# Patient Record
Sex: FEMALE | Race: WHITE | Employment: OTHER | URBAN - METROPOLITAN AREA
[De-identification: names, ages, dates, MRNs, and addresses within clinical notes are randomized per-mention and may not be internally consistent; named-entity substitution may affect disease eponyms.]

---

## 2017-05-02 PROBLEM — M17.11 PRIMARY OSTEOARTHRITIS OF RIGHT KNEE: Status: ACTIVE | Noted: 2017-05-02

## 2017-06-09 PROBLEM — M17.0 PRIMARY OSTEOARTHRITIS OF BOTH KNEES: Status: ACTIVE | Noted: 2017-06-09

## 2017-08-13 PROBLEM — M25.561 RIGHT KNEE PAIN: Chronic | Status: ACTIVE | Noted: 2017-08-13

## 2017-09-06 PROBLEM — M71.9 BURSITIS: Chronic | Status: ACTIVE | Noted: 2017-09-06

## 2017-09-11 PROBLEM — G89.4 CHRONIC PAIN SYNDROME: Chronic | Status: ACTIVE | Noted: 2017-09-11

## 2017-11-17 PROBLEM — T81.718A PSEUDOANEURYSM FOLLOWING PROCEDURE (HCC): Status: ACTIVE | Noted: 2017-11-17

## 2017-11-17 PROBLEM — I72.9 PSEUDOANEURYSM FOLLOWING PROCEDURE (HCC): Status: ACTIVE | Noted: 2017-11-17

## 2017-11-19 PROBLEM — Z86.39 HX OF HYPERCHOLESTEROLEMIA: Chronic | Status: ACTIVE | Noted: 2017-11-18

## 2017-11-19 PROBLEM — Z95.1 HISTORY OF CORONARY ARTERY BYPASS SURGERY: Chronic | Status: ACTIVE | Noted: 2017-11-18

## 2018-03-19 ENCOUNTER — HOSPITAL ENCOUNTER (OUTPATIENT)
Age: 73
Discharge: HOME | End: 2018-03-21
Payer: COMMERCIAL

## 2018-03-19 LAB
ALBUMIN SERPL-MCNC: 4 G/DL (ref 3.5–5.2)
ALP BLD-CCNC: 41 U/L (ref 35–104)
ALT SERPL-CCNC: 37 U/L (ref 0–32)
ANION GAP SERPL CALCULATED.3IONS-SCNC: 13 MMOL/L (ref 7–16)
AST SERPL-CCNC: 33 U/L (ref 0–31)
BILIRUB SERPL-MCNC: 0.3 MG/DL (ref 0–1.2)
BUN BLDV-MCNC: 19 MG/DL (ref 8–23)
CALCIUM SERPL-MCNC: 10 MG/DL (ref 8.6–10.2)
CHLORIDE BLD-SCNC: 106 MMOL/L (ref 98–107)
CHOLESTEROL, TOTAL: 177 MG/DL (ref 0–199)
CO2: 24 MMOL/L (ref 22–29)
CREAT SERPL-MCNC: 0.9 MG/DL (ref 0.5–1)
GFR AFRICAN AMERICAN: >60
GFR NON-AFRICAN AMERICAN: >60 ML/MIN/1.73
GLUCOSE BLD-MCNC: 58 MG/DL (ref 74–109)
HBA1C MFR BLD: 6.3 % (ref 4.8–5.9)
HCT VFR BLD CALC: 38 % (ref 34–48)
HDLC SERPL-MCNC: 39 MG/DL
HEMOGLOBIN: 12.3 G/DL (ref 11.5–15.5)
LDL CHOLESTEROL CALCULATED: 105 MG/DL (ref 0–99)
MCH RBC QN AUTO: 30.2 PG (ref 26–35)
MCHC RBC AUTO-ENTMCNC: 32.4 % (ref 32–34.5)
MCV RBC AUTO: 93.4 FL (ref 80–99.9)
PDW BLD-RTO: 14.5 FL (ref 11.5–15)
PLATELET # BLD: 329 E9/L (ref 130–450)
PMV BLD AUTO: 10.2 FL (ref 7–12)
POTASSIUM SERPL-SCNC: 4.3 MMOL/L (ref 3.5–5)
RBC # BLD: 4.07 E12/L (ref 3.5–5.5)
SODIUM BLD-SCNC: 143 MMOL/L (ref 132–146)
TOTAL PROTEIN: 6.3 G/DL (ref 6.4–8.3)
TRIGL SERPL-MCNC: 164 MG/DL (ref 0–149)
VLDLC SERPL CALC-MCNC: 33 MG/DL
WBC # BLD: 5.4 E9/L (ref 4.5–11.5)

## 2018-03-19 PROCEDURE — 85027 COMPLETE CBC AUTOMATED: CPT

## 2018-03-19 PROCEDURE — 80061 LIPID PANEL: CPT

## 2018-03-19 PROCEDURE — 80053 COMPREHEN METABOLIC PANEL: CPT

## 2018-03-19 PROCEDURE — 83036 HEMOGLOBIN GLYCOSYLATED A1C: CPT

## 2018-04-03 ENCOUNTER — OFFICE VISIT (OUTPATIENT)
Dept: ORTHOPEDIC SURGERY | Age: 73
End: 2018-04-03
Payer: COMMERCIAL

## 2018-04-03 VITALS — TEMPERATURE: 97.9 F | BODY MASS INDEX: 34.74 KG/M2 | WEIGHT: 184 LBS | HEIGHT: 61 IN

## 2018-04-03 DIAGNOSIS — M17.0 PRIMARY OSTEOARTHRITIS OF BOTH KNEES: Primary | ICD-10-CM

## 2018-04-03 DIAGNOSIS — S83.8X1A ACUTE MEDIAL MENISCAL INJURY OF KNEE, RIGHT, INITIAL ENCOUNTER: ICD-10-CM

## 2018-04-03 PROCEDURE — 99214 OFFICE O/P EST MOD 30 MIN: CPT | Performed by: ORTHOPAEDIC SURGERY

## 2018-04-10 ENCOUNTER — HOSPITAL ENCOUNTER (OUTPATIENT)
Dept: MRI IMAGING | Age: 73
Discharge: HOME | End: 2018-04-12
Payer: COMMERCIAL

## 2018-04-10 DIAGNOSIS — S83.8X1A ACUTE MEDIAL MENISCAL INJURY OF KNEE, RIGHT, INITIAL ENCOUNTER: ICD-10-CM

## 2018-04-10 PROCEDURE — 73721 MRI JNT OF LWR EXTRE W/O DYE: CPT

## 2018-04-12 ENCOUNTER — HOSPITAL ENCOUNTER (OUTPATIENT)
Age: 73
Discharge: HOME | End: 2018-04-12
Payer: COMMERCIAL

## 2018-04-12 LAB
T3 TOTAL: 71.19 NG/DL (ref 80–200)
T4 TOTAL: 9.2 MCG/DL (ref 4.5–11.7)
TSH SERPL DL<=0.05 MIU/L-ACNC: 1.82 UIU/ML (ref 0.27–4.2)

## 2018-04-12 PROCEDURE — 84436 ASSAY OF TOTAL THYROXINE: CPT

## 2018-04-12 PROCEDURE — 84443 ASSAY THYROID STIM HORMONE: CPT

## 2018-04-12 PROCEDURE — 36415 COLL VENOUS BLD VENIPUNCTURE: CPT

## 2018-04-12 PROCEDURE — 84480 ASSAY TRIIODOTHYRONINE (T3): CPT

## 2018-04-16 ENCOUNTER — OFFICE VISIT (OUTPATIENT)
Dept: NEUROSURGERY | Age: 73
End: 2018-04-16
Payer: COMMERCIAL

## 2018-04-16 VITALS
DIASTOLIC BLOOD PRESSURE: 65 MMHG | HEART RATE: 87 BPM | SYSTOLIC BLOOD PRESSURE: 132 MMHG | HEIGHT: 61 IN | BODY MASS INDEX: 34.55 KG/M2 | WEIGHT: 183 LBS

## 2018-04-16 DIAGNOSIS — M51.36 LUMBAR DEGENERATIVE DISC DISEASE: Primary | ICD-10-CM

## 2018-04-16 PROCEDURE — 99213 OFFICE O/P EST LOW 20 MIN: CPT | Performed by: PHYSICIAN ASSISTANT

## 2018-04-16 RX ORDER — OXYCODONE HYDROCHLORIDE AND ACETAMINOPHEN 5; 325 MG/1; MG/1
1 TABLET ORAL EVERY 4 HOURS PRN
COMMUNITY
End: 2018-05-22

## 2018-04-16 RX ORDER — NITROGLYCERIN 0.4 MG/1
0.4 TABLET SUBLINGUAL EVERY 5 MIN PRN
COMMUNITY

## 2018-04-16 RX ORDER — CLOPIDOGREL BISULFATE 75 MG/1
75 TABLET ORAL DAILY
COMMUNITY

## 2018-04-16 RX ORDER — LANOLIN ALCOHOL/MO/W.PET/CERES
800 CREAM (GRAM) TOPICAL DAILY
COMMUNITY
End: 2021-05-18 | Stop reason: ALTCHOICE

## 2018-04-19 ENCOUNTER — OFFICE VISIT (OUTPATIENT)
Dept: ORTHOPEDIC SURGERY | Age: 73
End: 2018-04-19
Payer: COMMERCIAL

## 2018-04-19 DIAGNOSIS — S83.281A ACUTE LATERAL MENISCUS TEAR OF RIGHT KNEE, INITIAL ENCOUNTER: Primary | ICD-10-CM

## 2018-04-19 DIAGNOSIS — M17.11 PRIMARY OSTEOARTHRITIS OF RIGHT KNEE: ICD-10-CM

## 2018-04-19 PROCEDURE — 99214 OFFICE O/P EST MOD 30 MIN: CPT | Performed by: ORTHOPAEDIC SURGERY

## 2018-04-24 ENCOUNTER — APPOINTMENT (OUTPATIENT)
Dept: CT IMAGING | Age: 73
End: 2018-04-24
Payer: COMMERCIAL

## 2018-04-24 ENCOUNTER — HOSPITAL ENCOUNTER (EMERGENCY)
Age: 73
Discharge: HOME | End: 2018-04-24
Attending: EMERGENCY MEDICINE
Payer: COMMERCIAL

## 2018-04-24 VITALS
WEIGHT: 184 LBS | BODY MASS INDEX: 34.74 KG/M2 | TEMPERATURE: 97.5 F | DIASTOLIC BLOOD PRESSURE: 107 MMHG | HEART RATE: 82 BPM | SYSTOLIC BLOOD PRESSURE: 163 MMHG | OXYGEN SATURATION: 97 % | RESPIRATION RATE: 18 BRPM | HEIGHT: 61 IN

## 2018-04-24 DIAGNOSIS — R42 DIZZINESS: Primary | ICD-10-CM

## 2018-04-24 LAB
ANION GAP SERPL CALCULATED.3IONS-SCNC: 12 MMOL/L (ref 7–16)
BACTERIA: ABNORMAL /HPF
BASOPHILS ABSOLUTE: 0.03 E9/L (ref 0–0.2)
BASOPHILS RELATIVE PERCENT: 0.5 % (ref 0–2)
BILIRUBIN URINE: NEGATIVE
BLOOD, URINE: NEGATIVE
BUN BLDV-MCNC: 31 MG/DL (ref 8–23)
CALCIUM SERPL-MCNC: 9.1 MG/DL (ref 8.6–10.2)
CHLORIDE BLD-SCNC: 99 MMOL/L (ref 98–107)
CLARITY: ABNORMAL
CO2: 28 MMOL/L (ref 22–29)
COLOR: YELLOW
CREAT SERPL-MCNC: 1 MG/DL (ref 0.5–1)
EKG ATRIAL RATE: 81 BPM
EKG P AXIS: 12 DEGREES
EKG P-R INTERVAL: 186 MS
EKG Q-T INTERVAL: 402 MS
EKG QRS DURATION: 82 MS
EKG QTC CALCULATION (BAZETT): 466 MS
EKG R AXIS: 14 DEGREES
EKG T AXIS: 25 DEGREES
EKG VENTRICULAR RATE: 81 BPM
EOSINOPHILS ABSOLUTE: 0.15 E9/L (ref 0.05–0.5)
EOSINOPHILS RELATIVE PERCENT: 2.3 % (ref 0–6)
GFR AFRICAN AMERICAN: >60
GFR NON-AFRICAN AMERICAN: 55 ML/MIN/1.73
GLUCOSE BLD-MCNC: 205 MG/DL (ref 74–109)
GLUCOSE URINE: >=1000 MG/DL
HCT VFR BLD CALC: 39.5 % (ref 34–48)
HEMOGLOBIN: 13.1 G/DL (ref 11.5–15.5)
IMMATURE GRANULOCYTES #: 0.03 E9/L
IMMATURE GRANULOCYTES %: 0.5 % (ref 0–5)
KETONES, URINE: NEGATIVE MG/DL
LEUKOCYTE ESTERASE, URINE: ABNORMAL
LYMPHOCYTES ABSOLUTE: 1.75 E9/L (ref 1.5–4)
LYMPHOCYTES RELATIVE PERCENT: 26.7 % (ref 20–42)
MCH RBC QN AUTO: 31 PG (ref 26–35)
MCHC RBC AUTO-ENTMCNC: 33.2 % (ref 32–34.5)
MCV RBC AUTO: 93.4 FL (ref 80–99.9)
MONOCYTES ABSOLUTE: 0.55 E9/L (ref 0.1–0.95)
MONOCYTES RELATIVE PERCENT: 8.4 % (ref 2–12)
NEUTROPHILS ABSOLUTE: 4.05 E9/L (ref 1.8–7.3)
NEUTROPHILS RELATIVE PERCENT: 61.6 % (ref 43–80)
NITRITE, URINE: NEGATIVE
PDW BLD-RTO: 13.2 FL (ref 11.5–15)
PH UA: 5.5 (ref 5–9)
PLATELET # BLD: 277 E9/L (ref 130–450)
PMV BLD AUTO: 9.9 FL (ref 7–12)
POTASSIUM SERPL-SCNC: 5 MMOL/L (ref 3.5–5)
PROTEIN UA: NEGATIVE MG/DL
RBC # BLD: 4.23 E12/L (ref 3.5–5.5)
RBC UA: ABNORMAL /HPF (ref 0–2)
SODIUM BLD-SCNC: 139 MMOL/L (ref 132–146)
SPECIFIC GRAVITY UA: 1.01 (ref 1–1.03)
TROPONIN: <0.01 NG/ML (ref 0–0.03)
UROBILINOGEN, URINE: 0.2 E.U./DL
WBC # BLD: 6.6 E9/L (ref 4.5–11.5)
WBC UA: ABNORMAL /HPF (ref 0–5)

## 2018-04-24 PROCEDURE — 84484 ASSAY OF TROPONIN QUANT: CPT

## 2018-04-24 PROCEDURE — 36415 COLL VENOUS BLD VENIPUNCTURE: CPT

## 2018-04-24 PROCEDURE — 70450 CT HEAD/BRAIN W/O DYE: CPT

## 2018-04-24 PROCEDURE — 93005 ELECTROCARDIOGRAM TRACING: CPT | Performed by: EMERGENCY MEDICINE

## 2018-04-24 PROCEDURE — 85025 COMPLETE CBC W/AUTO DIFF WBC: CPT

## 2018-04-24 PROCEDURE — 80048 BASIC METABOLIC PNL TOTAL CA: CPT

## 2018-04-24 PROCEDURE — 81001 URINALYSIS AUTO W/SCOPE: CPT

## 2018-04-24 PROCEDURE — 99285 EMERGENCY DEPT VISIT HI MDM: CPT

## 2018-04-24 ASSESSMENT — ENCOUNTER SYMPTOMS
CONSTIPATION: 0
SORE THROAT: 0
ABDOMINAL PAIN: 0
WHEEZING: 0
DIARRHEA: 0
SINUS PAIN: 0
VOMITING: 0
BACK PAIN: 0
SINUS PRESSURE: 0
RHINORRHEA: 0
PHOTOPHOBIA: 0
COUGH: 0
SHORTNESS OF BREATH: 0

## 2018-04-30 ENCOUNTER — HOSPITAL ENCOUNTER (OUTPATIENT)
Age: 73
Discharge: HOME | End: 2018-05-02
Payer: COMMERCIAL

## 2018-04-30 ENCOUNTER — HOSPITAL ENCOUNTER (OUTPATIENT)
Dept: GENERAL RADIOLOGY | Age: 73
Discharge: HOME | End: 2018-05-02
Payer: COMMERCIAL

## 2018-04-30 DIAGNOSIS — M46.1 BILATERAL SACROILIITIS (HCC): ICD-10-CM

## 2018-04-30 PROCEDURE — 72202 X-RAY EXAM SI JOINTS 3/> VWS: CPT

## 2018-05-04 ENCOUNTER — HOSPITAL ENCOUNTER (EMERGENCY)
Age: 73
Discharge: HOME | End: 2018-05-04
Attending: EMERGENCY MEDICINE
Payer: COMMERCIAL

## 2018-05-04 ENCOUNTER — APPOINTMENT (OUTPATIENT)
Dept: GENERAL RADIOLOGY | Age: 73
End: 2018-05-04
Payer: COMMERCIAL

## 2018-05-04 VITALS
TEMPERATURE: 97.5 F | BODY MASS INDEX: 34.74 KG/M2 | HEART RATE: 88 BPM | OXYGEN SATURATION: 94 % | HEIGHT: 61 IN | DIASTOLIC BLOOD PRESSURE: 67 MMHG | SYSTOLIC BLOOD PRESSURE: 140 MMHG | WEIGHT: 184 LBS | RESPIRATION RATE: 18 BRPM

## 2018-05-04 DIAGNOSIS — R19.7 NAUSEA VOMITING AND DIARRHEA: ICD-10-CM

## 2018-05-04 DIAGNOSIS — R11.2 NAUSEA VOMITING AND DIARRHEA: ICD-10-CM

## 2018-05-04 DIAGNOSIS — E86.0 DEHYDRATION: Primary | ICD-10-CM

## 2018-05-04 LAB
ALBUMIN SERPL-MCNC: 4.4 G/DL (ref 3.5–5.2)
ALP BLD-CCNC: 52 U/L (ref 35–104)
ALT SERPL-CCNC: 36 U/L (ref 0–32)
ANION GAP SERPL CALCULATED.3IONS-SCNC: 16 MMOL/L (ref 7–16)
AST SERPL-CCNC: 34 U/L (ref 0–31)
BILIRUB SERPL-MCNC: 0.4 MG/DL (ref 0–1.2)
BUN BLDV-MCNC: 46 MG/DL (ref 8–23)
CALCIUM SERPL-MCNC: 10.3 MG/DL (ref 8.6–10.2)
CHLORIDE BLD-SCNC: 99 MMOL/L (ref 98–107)
CO2: 24 MMOL/L (ref 22–29)
CREAT SERPL-MCNC: 1.7 MG/DL (ref 0.5–1)
EKG ATRIAL RATE: 85 BPM
EKG P AXIS: -5 DEGREES
EKG P-R INTERVAL: 164 MS
EKG Q-T INTERVAL: 390 MS
EKG QRS DURATION: 76 MS
EKG QTC CALCULATION (BAZETT): 464 MS
EKG R AXIS: 27 DEGREES
EKG T AXIS: 21 DEGREES
EKG VENTRICULAR RATE: 85 BPM
GFR AFRICAN AMERICAN: 36
GFR NON-AFRICAN AMERICAN: 30 ML/MIN/1.73
GLUCOSE BLD-MCNC: 158 MG/DL
GLUCOSE BLD-MCNC: 172 MG/DL
GLUCOSE BLD-MCNC: 172 MG/DL (ref 74–109)
HCT VFR BLD CALC: 44.4 % (ref 34–48)
HEMOGLOBIN: 14.2 G/DL (ref 11.5–15.5)
INFLUENZA A BY PCR: NOT DETECTED
INFLUENZA B BY PCR: NOT DETECTED
LIPASE: 73 U/L (ref 13–60)
MCH RBC QN AUTO: 30.5 PG (ref 26–35)
MCHC RBC AUTO-ENTMCNC: 32 % (ref 32–34.5)
MCV RBC AUTO: 95.5 FL (ref 80–99.9)
PDW BLD-RTO: 13.2 FL (ref 11.5–15)
PLATELET # BLD: 298 E9/L (ref 130–450)
PMV BLD AUTO: 10.1 FL (ref 7–12)
POC ANION GAP: 17
POC BUN: 43
POC CHLORIDE: 104
POC CO2: 24
POC CREATININE: 1.6
POC POTASSIUM: 4
POC SODIUM: 140
POTASSIUM SERPL-SCNC: 4.6 MMOL/L (ref 3.5–5)
RBC # BLD: 4.65 E12/L (ref 3.5–5.5)
SODIUM BLD-SCNC: 139 MMOL/L (ref 132–146)
TOTAL PROTEIN: 7.7 G/DL (ref 6.4–8.3)
WBC # BLD: 13.1 E9/L (ref 4.5–11.5)

## 2018-05-04 PROCEDURE — 36415 COLL VENOUS BLD VENIPUNCTURE: CPT

## 2018-05-04 PROCEDURE — 80053 COMPREHEN METABOLIC PANEL: CPT

## 2018-05-04 PROCEDURE — 83690 ASSAY OF LIPASE: CPT

## 2018-05-04 PROCEDURE — 96360 HYDRATION IV INFUSION INIT: CPT

## 2018-05-04 PROCEDURE — 96361 HYDRATE IV INFUSION ADD-ON: CPT

## 2018-05-04 PROCEDURE — 2580000003 HC RX 258: Performed by: STUDENT IN AN ORGANIZED HEALTH CARE EDUCATION/TRAINING PROGRAM

## 2018-05-04 PROCEDURE — 87502 INFLUENZA DNA AMP PROBE: CPT

## 2018-05-04 PROCEDURE — 71045 X-RAY EXAM CHEST 1 VIEW: CPT

## 2018-05-04 PROCEDURE — 99284 EMERGENCY DEPT VISIT MOD MDM: CPT

## 2018-05-04 PROCEDURE — 85027 COMPLETE CBC AUTOMATED: CPT

## 2018-05-04 RX ORDER — 0.9 % SODIUM CHLORIDE 0.9 %
500 INTRAVENOUS SOLUTION INTRAVENOUS ONCE
Status: COMPLETED | OUTPATIENT
Start: 2018-05-04 | End: 2018-05-04

## 2018-05-04 RX ORDER — ONDANSETRON 4 MG/1
4 TABLET, ORALLY DISINTEGRATING ORAL EVERY 8 HOURS PRN
Qty: 10 TABLET | Refills: 0 | Status: SHIPPED | OUTPATIENT
Start: 2018-05-04 | End: 2019-10-30 | Stop reason: ALTCHOICE

## 2018-05-04 RX ADMIN — SODIUM CHLORIDE 500 ML: 9 INJECTION, SOLUTION INTRAVENOUS at 19:58

## 2018-05-04 RX ADMIN — SODIUM CHLORIDE 500 ML: 9 INJECTION, SOLUTION INTRAVENOUS at 18:52

## 2018-05-04 ASSESSMENT — ENCOUNTER SYMPTOMS
NAUSEA: 1
DIARRHEA: 1
COLOR CHANGE: 0
SORE THROAT: 0
ABDOMINAL PAIN: 0
BLOOD IN STOOL: 0
VOMITING: 1
BACK PAIN: 0
WHEEZING: 0
SHORTNESS OF BREATH: 0
COUGH: 0
STRIDOR: 0
CHEST TIGHTNESS: 0

## 2018-05-08 ENCOUNTER — HOSPITAL ENCOUNTER (OUTPATIENT)
Dept: MRI IMAGING | Age: 73
Discharge: HOME | End: 2018-05-10
Payer: COMMERCIAL

## 2018-05-08 DIAGNOSIS — M54.16 LUMBAR RADICULOPATHY: ICD-10-CM

## 2018-05-08 DIAGNOSIS — M50.220 OTHER CERVICAL DISC DISPLACEMENT, MID-CERVICAL REGION, UNSPECIFIED LEVEL: ICD-10-CM

## 2018-05-08 PROCEDURE — 72141 MRI NECK SPINE W/O DYE: CPT

## 2018-05-08 PROCEDURE — 72148 MRI LUMBAR SPINE W/O DYE: CPT

## 2018-05-21 RX ORDER — SODIUM CHLORIDE 9 MG/ML
INJECTION, SOLUTION INTRAVENOUS CONTINUOUS
Status: CANCELLED | OUTPATIENT
Start: 2018-05-30

## 2018-05-22 ENCOUNTER — OFFICE VISIT (OUTPATIENT)
Dept: ORTHOPEDIC SURGERY | Age: 73
End: 2018-05-22
Payer: COMMERCIAL

## 2018-05-22 ENCOUNTER — HOSPITAL ENCOUNTER (OUTPATIENT)
Dept: PREADMISSION TESTING | Age: 73
Discharge: HOME | End: 2018-05-22
Payer: COMMERCIAL

## 2018-05-22 VITALS
DIASTOLIC BLOOD PRESSURE: 54 MMHG | WEIGHT: 187 LBS | SYSTOLIC BLOOD PRESSURE: 130 MMHG | RESPIRATION RATE: 20 BRPM | OXYGEN SATURATION: 96 % | HEART RATE: 80 BPM | TEMPERATURE: 98.1 F | BODY MASS INDEX: 35.33 KG/M2

## 2018-05-22 VITALS — WEIGHT: 194 LBS | HEIGHT: 61 IN | BODY MASS INDEX: 36.63 KG/M2

## 2018-05-22 DIAGNOSIS — M17.11 PRIMARY OSTEOARTHRITIS OF RIGHT KNEE: Primary | ICD-10-CM

## 2018-05-22 DIAGNOSIS — M17.11 PRIMARY OSTEOARTHRITIS OF RIGHT KNEE: ICD-10-CM

## 2018-05-22 DIAGNOSIS — S83.281A ACUTE LATERAL MENISCUS TEAR OF RIGHT KNEE, INITIAL ENCOUNTER: Primary | ICD-10-CM

## 2018-05-22 DIAGNOSIS — Z01.818 PREOP TESTING: ICD-10-CM

## 2018-05-22 LAB
ANION GAP SERPL CALCULATED.3IONS-SCNC: 14 MMOL/L (ref 7–16)
BACTERIA: ABNORMAL /HPF
BILIRUBIN URINE: NEGATIVE
BLOOD, URINE: NEGATIVE
BUN BLDV-MCNC: 33 MG/DL (ref 8–23)
CALCIUM SERPL-MCNC: 9.9 MG/DL (ref 8.6–10.2)
CHLORIDE BLD-SCNC: 98 MMOL/L (ref 98–107)
CLARITY: CLEAR
CO2: 28 MMOL/L (ref 22–29)
COLOR: ABNORMAL
CREAT SERPL-MCNC: 1.2 MG/DL (ref 0.5–1)
EPITHELIAL CELLS, UA: ABNORMAL /HPF
GFR AFRICAN AMERICAN: 54
GFR NON-AFRICAN AMERICAN: 44 ML/MIN/1.73
GLUCOSE BLD-MCNC: 232 MG/DL (ref 74–109)
GLUCOSE URINE: 250 MG/DL
HCT VFR BLD CALC: 36.3 % (ref 34–48)
HEMOGLOBIN: 11.8 G/DL (ref 11.5–15.5)
KETONES, URINE: NEGATIVE MG/DL
LEUKOCYTE ESTERASE, URINE: ABNORMAL
MCH RBC QN AUTO: 30.6 PG (ref 26–35)
MCHC RBC AUTO-ENTMCNC: 32.5 % (ref 32–34.5)
MCV RBC AUTO: 94.3 FL (ref 80–99.9)
NITRITE, URINE: NEGATIVE
PDW BLD-RTO: 13.1 FL (ref 11.5–15)
PH UA: 5 (ref 5–9)
PLATELET # BLD: 285 E9/L (ref 130–450)
PMV BLD AUTO: 10.4 FL (ref 7–12)
POTASSIUM REFLEX MAGNESIUM: 4.6 MMOL/L (ref 3.5–5)
PROTEIN UA: NEGATIVE MG/DL
RBC # BLD: 3.85 E12/L (ref 3.5–5.5)
RBC UA: ABNORMAL /HPF (ref 0–2)
SODIUM BLD-SCNC: 140 MMOL/L (ref 132–146)
SPECIFIC GRAVITY UA: 1.01 (ref 1–1.03)
UROBILINOGEN, URINE: 0.2 E.U./DL
WBC # BLD: 5.2 E9/L (ref 4.5–11.5)
WBC UA: ABNORMAL /HPF (ref 0–5)

## 2018-05-22 PROCEDURE — 85027 COMPLETE CBC AUTOMATED: CPT

## 2018-05-22 PROCEDURE — 87186 SC STD MICRODIL/AGAR DIL: CPT

## 2018-05-22 PROCEDURE — 80048 BASIC METABOLIC PNL TOTAL CA: CPT

## 2018-05-22 PROCEDURE — 99214 OFFICE O/P EST MOD 30 MIN: CPT | Performed by: ORTHOPAEDIC SURGERY

## 2018-05-22 PROCEDURE — 36415 COLL VENOUS BLD VENIPUNCTURE: CPT

## 2018-05-22 PROCEDURE — 81001 URINALYSIS AUTO W/SCOPE: CPT

## 2018-05-22 PROCEDURE — 87088 URINE BACTERIA CULTURE: CPT

## 2018-05-22 RX ORDER — FUROSEMIDE 20 MG/1
20 TABLET ORAL DAILY
COMMUNITY
End: 2022-06-07

## 2018-05-22 ASSESSMENT — PAIN SCALES - GENERAL: PAINLEVEL_OUTOF10: 0

## 2018-05-22 ASSESSMENT — PAIN DESCRIPTION - LOCATION: LOCATION: KNEE

## 2018-05-22 ASSESSMENT — PAIN DESCRIPTION - PAIN TYPE: TYPE: CHRONIC PAIN

## 2018-05-22 ASSESSMENT — PAIN DESCRIPTION - ORIENTATION: ORIENTATION: RIGHT

## 2018-05-23 DIAGNOSIS — M17.11 PRIMARY OSTEOARTHRITIS OF RIGHT KNEE: Primary | ICD-10-CM

## 2018-05-24 ENCOUNTER — TELEPHONE (OUTPATIENT)
Dept: ADMINISTRATIVE | Age: 73
End: 2018-05-24

## 2018-05-24 LAB
ORGANISM: ABNORMAL
URINE CULTURE, ROUTINE: ABNORMAL
URINE CULTURE, ROUTINE: ABNORMAL

## 2018-05-31 ENCOUNTER — HOSPITAL ENCOUNTER (OUTPATIENT)
Dept: CT IMAGING | Age: 73
Discharge: HOME | End: 2018-05-31
Payer: COMMERCIAL

## 2018-05-31 DIAGNOSIS — M51.36 LUMBAR DEGENERATIVE DISC DISEASE: ICD-10-CM

## 2018-05-31 PROCEDURE — 72131 CT LUMBAR SPINE W/O DYE: CPT

## 2018-06-05 ENCOUNTER — ANESTHESIA EVENT (OUTPATIENT)
Dept: OPERATING ROOM | Age: 73
End: 2018-06-05
Payer: COMMERCIAL

## 2018-06-05 ENCOUNTER — TELEPHONE (OUTPATIENT)
Dept: NEUROSURGERY | Age: 73
End: 2018-06-05

## 2018-06-05 ENCOUNTER — HOSPITAL ENCOUNTER (OUTPATIENT)
Age: 73
Discharge: HOME | End: 2018-06-07
Payer: COMMERCIAL

## 2018-06-05 ENCOUNTER — HOSPITAL ENCOUNTER (OUTPATIENT)
Dept: GENERAL RADIOLOGY | Age: 73
Discharge: HOME | End: 2018-06-07
Payer: COMMERCIAL

## 2018-06-05 DIAGNOSIS — M79.672 FOOT PAIN, LEFT: ICD-10-CM

## 2018-06-05 PROCEDURE — 73630 X-RAY EXAM OF FOOT: CPT

## 2018-06-06 ENCOUNTER — HOSPITAL ENCOUNTER (OUTPATIENT)
Age: 73
Setting detail: SURGERY ADMIT
Discharge: HOME | End: 2018-06-06
Attending: ORTHOPAEDIC SURGERY | Admitting: ORTHOPAEDIC SURGERY
Payer: COMMERCIAL

## 2018-06-06 ENCOUNTER — ANESTHESIA (OUTPATIENT)
Dept: OPERATING ROOM | Age: 73
End: 2018-06-06
Payer: COMMERCIAL

## 2018-06-06 VITALS
HEART RATE: 64 BPM | BODY MASS INDEX: 34.93 KG/M2 | OXYGEN SATURATION: 99 % | RESPIRATION RATE: 16 BRPM | HEIGHT: 61 IN | SYSTOLIC BLOOD PRESSURE: 110 MMHG | DIASTOLIC BLOOD PRESSURE: 60 MMHG | TEMPERATURE: 96.2 F | WEIGHT: 185 LBS

## 2018-06-06 VITALS
SYSTOLIC BLOOD PRESSURE: 111 MMHG | OXYGEN SATURATION: 100 % | DIASTOLIC BLOOD PRESSURE: 52 MMHG | RESPIRATION RATE: 6 BRPM

## 2018-06-06 DIAGNOSIS — M17.11 PRIMARY OSTEOARTHRITIS OF RIGHT KNEE: Primary | ICD-10-CM

## 2018-06-06 DIAGNOSIS — S83.281A ACUTE LATERAL MENISCUS TEAR OF RIGHT KNEE, INITIAL ENCOUNTER: ICD-10-CM

## 2018-06-06 LAB — METER GLUCOSE: 81 MG/DL (ref 70–110)

## 2018-06-06 PROCEDURE — 3600000003 HC SURGERY LEVEL 3 BASE: Performed by: ORTHOPAEDIC SURGERY

## 2018-06-06 PROCEDURE — 7100000001 HC PACU RECOVERY - ADDTL 15 MIN: Performed by: ORTHOPAEDIC SURGERY

## 2018-06-06 PROCEDURE — 3700000001 HC ADD 15 MINUTES (ANESTHESIA): Performed by: ORTHOPAEDIC SURGERY

## 2018-06-06 PROCEDURE — 7100000010 HC PHASE II RECOVERY - FIRST 15 MIN: Performed by: ORTHOPAEDIC SURGERY

## 2018-06-06 PROCEDURE — 29880 ARTHRS KNE SRG MNISECTMY M&L: CPT | Performed by: ORTHOPAEDIC SURGERY

## 2018-06-06 PROCEDURE — 6360000002 HC RX W HCPCS: Performed by: NURSE ANESTHETIST, CERTIFIED REGISTERED

## 2018-06-06 PROCEDURE — 82962 GLUCOSE BLOOD TEST: CPT

## 2018-06-06 PROCEDURE — 3700000000 HC ANESTHESIA ATTENDED CARE: Performed by: ORTHOPAEDIC SURGERY

## 2018-06-06 PROCEDURE — 7100000011 HC PHASE II RECOVERY - ADDTL 15 MIN: Performed by: ORTHOPAEDIC SURGERY

## 2018-06-06 PROCEDURE — 2500000003 HC RX 250 WO HCPCS: Performed by: ORTHOPAEDIC SURGERY

## 2018-06-06 PROCEDURE — 6370000000 HC RX 637 (ALT 250 FOR IP): Performed by: ANESTHESIOLOGY

## 2018-06-06 PROCEDURE — 2580000003 HC RX 258: Performed by: ANESTHESIOLOGY

## 2018-06-06 PROCEDURE — 3600000013 HC SURGERY LEVEL 3 ADDTL 15MIN: Performed by: ORTHOPAEDIC SURGERY

## 2018-06-06 PROCEDURE — 6360000002 HC RX W HCPCS: Performed by: ANESTHESIOLOGY

## 2018-06-06 PROCEDURE — 7100000000 HC PACU RECOVERY - FIRST 15 MIN: Performed by: ORTHOPAEDIC SURGERY

## 2018-06-06 PROCEDURE — 6360000002 HC RX W HCPCS: Performed by: NURSE PRACTITIONER

## 2018-06-06 RX ORDER — ACETAMINOPHEN AND CODEINE PHOSPHATE 300; 30 MG/1; MG/1
1 TABLET ORAL
Qty: 30 TABLET | Refills: 0 | Status: SHIPPED | OUTPATIENT
Start: 2018-06-06 | End: 2018-06-20 | Stop reason: SDUPTHER

## 2018-06-06 RX ORDER — CHLORHEXIDINE GLUCONATE 4 G/100ML
SOLUTION TOPICAL
Status: DISCONTINUED | OUTPATIENT
Start: 2018-06-06 | End: 2018-06-06 | Stop reason: HOSPADM

## 2018-06-06 RX ORDER — ACETAMINOPHEN AND CODEINE PHOSPHATE 300; 30 MG/1; MG/1
1 TABLET ORAL
Status: DISCONTINUED | OUTPATIENT
Start: 2018-06-06 | End: 2018-06-06 | Stop reason: ALTCHOICE

## 2018-06-06 RX ORDER — LABETALOL HYDROCHLORIDE 5 MG/ML
10 INJECTION, SOLUTION INTRAVENOUS EVERY 10 MIN PRN
Status: DISCONTINUED | OUTPATIENT
Start: 2018-06-06 | End: 2018-06-06 | Stop reason: HOSPADM

## 2018-06-06 RX ORDER — BUPIVACAINE HYDROCHLORIDE 2.5 MG/ML
INJECTION, SOLUTION EPIDURAL; INFILTRATION; INTRACAUDAL PRN
Status: DISCONTINUED | OUTPATIENT
Start: 2018-06-06 | End: 2018-06-06 | Stop reason: HOSPADM

## 2018-06-06 RX ORDER — PROMETHAZINE HYDROCHLORIDE 25 MG/ML
12.5 INJECTION, SOLUTION INTRAMUSCULAR; INTRAVENOUS
Status: DISCONTINUED | OUTPATIENT
Start: 2018-06-06 | End: 2018-06-06 | Stop reason: HOSPADM

## 2018-06-06 RX ORDER — ONDANSETRON 2 MG/ML
INJECTION INTRAMUSCULAR; INTRAVENOUS PRN
Status: DISCONTINUED | OUTPATIENT
Start: 2018-06-06 | End: 2018-06-06 | Stop reason: SDUPTHER

## 2018-06-06 RX ORDER — FENTANYL CITRATE 50 UG/ML
INJECTION, SOLUTION INTRAMUSCULAR; INTRAVENOUS PRN
Status: DISCONTINUED | OUTPATIENT
Start: 2018-06-06 | End: 2018-06-06 | Stop reason: SDUPTHER

## 2018-06-06 RX ORDER — SODIUM CHLORIDE 0.9 % (FLUSH) 0.9 %
10 SYRINGE (ML) INJECTION PRN
Status: DISCONTINUED | OUTPATIENT
Start: 2018-06-06 | End: 2018-06-06 | Stop reason: HOSPADM

## 2018-06-06 RX ORDER — SODIUM CHLORIDE 9 MG/ML
INJECTION, SOLUTION INTRAVENOUS CONTINUOUS
Status: DISCONTINUED | OUTPATIENT
Start: 2018-06-06 | End: 2018-06-06 | Stop reason: HOSPADM

## 2018-06-06 RX ORDER — PROPOFOL 10 MG/ML
INJECTION, EMULSION INTRAVENOUS PRN
Status: DISCONTINUED | OUTPATIENT
Start: 2018-06-06 | End: 2018-06-06 | Stop reason: SDUPTHER

## 2018-06-06 RX ORDER — MORPHINE SULFATE 2 MG/ML
1 INJECTION, SOLUTION INTRAMUSCULAR; INTRAVENOUS EVERY 5 MIN PRN
Status: DISCONTINUED | OUTPATIENT
Start: 2018-06-06 | End: 2018-06-06 | Stop reason: HOSPADM

## 2018-06-06 RX ORDER — MORPHINE SULFATE 2 MG/ML
INJECTION, SOLUTION INTRAMUSCULAR; INTRAVENOUS
Status: DISPENSED
Start: 2018-06-06 | End: 2018-06-06

## 2018-06-06 RX ORDER — SODIUM CHLORIDE 0.9 % (FLUSH) 0.9 %
10 SYRINGE (ML) INJECTION EVERY 12 HOURS SCHEDULED
Status: DISCONTINUED | OUTPATIENT
Start: 2018-06-06 | End: 2018-06-06 | Stop reason: HOSPADM

## 2018-06-06 RX ADMIN — FENTANYL CITRATE 50 MCG: 50 INJECTION, SOLUTION INTRAMUSCULAR; INTRAVENOUS at 07:35

## 2018-06-06 RX ADMIN — PROPOFOL 150 MG: 10 INJECTION, EMULSION INTRAVENOUS at 07:10

## 2018-06-06 RX ADMIN — SODIUM CHLORIDE: 9 INJECTION, SOLUTION INTRAVENOUS at 06:10

## 2018-06-06 RX ADMIN — ONDANSETRON 4 MG: 2 INJECTION, SOLUTION INTRAMUSCULAR; INTRAVENOUS at 07:50

## 2018-06-06 RX ADMIN — HYDROMORPHONE HYDROCHLORIDE 0.5 MG: 1 INJECTION, SOLUTION INTRAMUSCULAR; INTRAVENOUS; SUBCUTANEOUS at 08:45

## 2018-06-06 RX ADMIN — ACETAMINOPHEN AND CODEINE PHOSPHATE 1 TABLET: 300; 30 TABLET ORAL at 10:21

## 2018-06-06 RX ADMIN — FENTANYL CITRATE 100 MCG: 50 INJECTION, SOLUTION INTRAMUSCULAR; INTRAVENOUS at 07:50

## 2018-06-06 RX ADMIN — HYDROMORPHONE HYDROCHLORIDE 0.5 MG: 1 INJECTION, SOLUTION INTRAMUSCULAR; INTRAVENOUS; SUBCUTANEOUS at 08:40

## 2018-06-06 RX ADMIN — CEFAZOLIN SODIUM 2 G: 2 SOLUTION INTRAVENOUS at 07:01

## 2018-06-06 RX ADMIN — MORPHINE SULFATE 1 MG: 2 INJECTION, SOLUTION INTRAMUSCULAR; INTRAVENOUS at 08:55

## 2018-06-06 RX ADMIN — FENTANYL CITRATE 50 MCG: 50 INJECTION, SOLUTION INTRAMUSCULAR; INTRAVENOUS at 07:20

## 2018-06-06 RX ADMIN — FENTANYL CITRATE 50 MCG: 50 INJECTION, SOLUTION INTRAMUSCULAR; INTRAVENOUS at 07:10

## 2018-06-06 RX ADMIN — MORPHINE SULFATE 1 MG: 2 INJECTION, SOLUTION INTRAMUSCULAR; INTRAVENOUS at 09:00

## 2018-06-06 ASSESSMENT — PAIN DESCRIPTION - ORIENTATION
ORIENTATION: RIGHT

## 2018-06-06 ASSESSMENT — PULMONARY FUNCTION TESTS
PIF_VALUE: 1
PIF_VALUE: 3
PIF_VALUE: 1
PIF_VALUE: 3
PIF_VALUE: 3
PIF_VALUE: 1
PIF_VALUE: 3
PIF_VALUE: 26
PIF_VALUE: 3
PIF_VALUE: 1
PIF_VALUE: 2
PIF_VALUE: 3
PIF_VALUE: 3
PIF_VALUE: 1
PIF_VALUE: 3
PIF_VALUE: 2
PIF_VALUE: 3
PIF_VALUE: 2
PIF_VALUE: 3
PIF_VALUE: 1
PIF_VALUE: 1
PIF_VALUE: 3
PIF_VALUE: 1
PIF_VALUE: 3
PIF_VALUE: 2
PIF_VALUE: 3
PIF_VALUE: 1
PIF_VALUE: 3
PIF_VALUE: 1
PIF_VALUE: 1
PIF_VALUE: 2
PIF_VALUE: 3
PIF_VALUE: 3
PIF_VALUE: 2
PIF_VALUE: 3

## 2018-06-06 ASSESSMENT — PAIN SCALES - GENERAL
PAINLEVEL_OUTOF10: 8
PAINLEVEL_OUTOF10: 8
PAINLEVEL_OUTOF10: 10
PAINLEVEL_OUTOF10: 8
PAINLEVEL_OUTOF10: 8
PAINLEVEL_OUTOF10: 2
PAINLEVEL_OUTOF10: 0
PAINLEVEL_OUTOF10: 6
PAINLEVEL_OUTOF10: 8
PAINLEVEL_OUTOF10: 4
PAINLEVEL_OUTOF10: 0

## 2018-06-06 ASSESSMENT — PAIN DESCRIPTION - LOCATION
LOCATION: KNEE

## 2018-06-06 ASSESSMENT — PAIN DESCRIPTION - PAIN TYPE
TYPE: SURGICAL PAIN
TYPE: ACUTE PAIN;SURGICAL PAIN
TYPE: SURGICAL PAIN

## 2018-06-06 ASSESSMENT — PAIN DESCRIPTION - ONSET
ONSET: ON-GOING
ONSET: ON-GOING
ONSET: SUDDEN
ONSET: ON-GOING
ONSET: ON-GOING

## 2018-06-06 ASSESSMENT — PAIN DESCRIPTION - PROGRESSION
CLINICAL_PROGRESSION: NOT CHANGED
CLINICAL_PROGRESSION: GRADUALLY WORSENING
CLINICAL_PROGRESSION: NOT CHANGED
CLINICAL_PROGRESSION: GRADUALLY IMPROVING

## 2018-06-06 ASSESSMENT — PAIN DESCRIPTION - FREQUENCY
FREQUENCY: CONTINUOUS

## 2018-06-06 ASSESSMENT — PAIN DESCRIPTION - DESCRIPTORS
DESCRIPTORS: ACHING;CONSTANT;SORE
DESCRIPTORS: ACHING
DESCRIPTORS: ACHING;DISCOMFORT;SORE

## 2018-06-06 ASSESSMENT — PAIN - FUNCTIONAL ASSESSMENT: PAIN_FUNCTIONAL_ASSESSMENT: 0-10

## 2018-06-18 ENCOUNTER — APPOINTMENT (OUTPATIENT)
Dept: ULTRASOUND IMAGING | Age: 73
End: 2018-06-18
Payer: COMMERCIAL

## 2018-06-18 ENCOUNTER — TELEPHONE (OUTPATIENT)
Dept: PREADMISSION TESTING | Age: 73
End: 2018-06-18

## 2018-06-18 ENCOUNTER — HOSPITAL ENCOUNTER (EMERGENCY)
Age: 73
Discharge: HOME | End: 2018-06-18
Attending: EMERGENCY MEDICINE
Payer: COMMERCIAL

## 2018-06-18 VITALS
SYSTOLIC BLOOD PRESSURE: 122 MMHG | RESPIRATION RATE: 16 BRPM | TEMPERATURE: 98.1 F | DIASTOLIC BLOOD PRESSURE: 65 MMHG | BODY MASS INDEX: 34.74 KG/M2 | HEIGHT: 61 IN | OXYGEN SATURATION: 96 % | HEART RATE: 74 BPM | WEIGHT: 184 LBS

## 2018-06-18 DIAGNOSIS — B35.4 TINEA CORPORIS: Primary | ICD-10-CM

## 2018-06-18 DIAGNOSIS — H69.80 DYSFUNCTION OF EUSTACHIAN TUBE, UNSPECIFIED LATERALITY: ICD-10-CM

## 2018-06-18 PROCEDURE — 99283 EMERGENCY DEPT VISIT LOW MDM: CPT

## 2018-06-18 PROCEDURE — 93971 EXTREMITY STUDY: CPT

## 2018-06-18 RX ORDER — METHYLPREDNISOLONE 4 MG/1
TABLET ORAL
Qty: 1 KIT | Refills: 0 | Status: SHIPPED | OUTPATIENT
Start: 2018-06-18 | End: 2018-06-24

## 2018-06-18 RX ORDER — NYSTATIN 100000 [USP'U]/G
POWDER TOPICAL
Qty: 45 G | Refills: 1 | Status: SHIPPED | OUTPATIENT
Start: 2018-06-18 | End: 2018-09-06 | Stop reason: ALTCHOICE

## 2018-06-18 ASSESSMENT — PAIN DESCRIPTION - LOCATION: LOCATION: BREAST

## 2018-06-18 ASSESSMENT — PAIN DESCRIPTION - PAIN TYPE: TYPE: ACUTE PAIN

## 2018-06-18 ASSESSMENT — PAIN DESCRIPTION - DESCRIPTORS: DESCRIPTORS: BURNING

## 2018-06-18 ASSESSMENT — PAIN SCALES - GENERAL: PAINLEVEL_OUTOF10: 10

## 2018-06-18 ASSESSMENT — PAIN DESCRIPTION - ORIENTATION: ORIENTATION: RIGHT;LEFT

## 2018-06-20 ENCOUNTER — OFFICE VISIT (OUTPATIENT)
Dept: ORTHOPEDIC SURGERY | Age: 73
End: 2018-06-20

## 2018-06-20 VITALS — BODY MASS INDEX: 34.74 KG/M2 | WEIGHT: 184 LBS | HEIGHT: 61 IN | TEMPERATURE: 98 F

## 2018-06-20 DIAGNOSIS — S83.8X1A ACUTE MEDIAL MENISCAL INJURY OF KNEE, RIGHT, INITIAL ENCOUNTER: ICD-10-CM

## 2018-06-20 DIAGNOSIS — Z98.890 STATUS POST ARTHROSCOPY OF RIGHT KNEE: ICD-10-CM

## 2018-06-20 DIAGNOSIS — M17.11 PRIMARY OSTEOARTHRITIS OF RIGHT KNEE: Primary | ICD-10-CM

## 2018-06-20 DIAGNOSIS — S83.281A ACUTE LATERAL MENISCUS TEAR OF RIGHT KNEE, INITIAL ENCOUNTER: ICD-10-CM

## 2018-06-20 PROCEDURE — 99024 POSTOP FOLLOW-UP VISIT: CPT | Performed by: NURSE PRACTITIONER

## 2018-06-20 RX ORDER — ACETAMINOPHEN AND CODEINE PHOSPHATE 300; 30 MG/1; MG/1
1 TABLET ORAL EVERY 4 HOURS PRN
Qty: 28 TABLET | Refills: 0 | Status: SHIPPED | OUTPATIENT
Start: 2018-06-20 | End: 2018-06-27

## 2018-06-26 ENCOUNTER — OFFICE VISIT (OUTPATIENT)
Dept: ORTHOPEDIC SURGERY | Age: 73
End: 2018-06-26
Payer: COMMERCIAL

## 2018-06-26 VITALS — WEIGHT: 184 LBS | HEIGHT: 61 IN | TEMPERATURE: 98 F | BODY MASS INDEX: 34.74 KG/M2

## 2018-06-26 DIAGNOSIS — M70.62 TROCHANTERIC BURSITIS OF BOTH HIPS: Primary | ICD-10-CM

## 2018-06-26 DIAGNOSIS — M70.61 TROCHANTERIC BURSITIS OF BOTH HIPS: Primary | ICD-10-CM

## 2018-06-26 PROCEDURE — 99214 OFFICE O/P EST MOD 30 MIN: CPT | Performed by: ORTHOPAEDIC SURGERY

## 2018-06-26 PROCEDURE — 20610 DRAIN/INJ JOINT/BURSA W/O US: CPT | Performed by: ORTHOPAEDIC SURGERY

## 2018-06-26 RX ORDER — TRIAMCINOLONE ACETONIDE 40 MG/ML
40 INJECTION, SUSPENSION INTRA-ARTICULAR; INTRAMUSCULAR ONCE
Status: COMPLETED | OUTPATIENT
Start: 2018-06-26 | End: 2018-06-26

## 2018-06-26 RX ADMIN — TRIAMCINOLONE ACETONIDE 40 MG: 40 INJECTION, SUSPENSION INTRA-ARTICULAR; INTRAMUSCULAR at 11:58

## 2018-07-09 ENCOUNTER — OFFICE VISIT (OUTPATIENT)
Dept: ORTHOPEDIC SURGERY | Age: 73
End: 2018-07-09
Payer: COMMERCIAL

## 2018-07-09 VITALS — HEIGHT: 61 IN | WEIGHT: 195 LBS | BODY MASS INDEX: 36.82 KG/M2

## 2018-07-09 DIAGNOSIS — M70.61 TROCHANTERIC BURSITIS OF BOTH HIPS: Primary | ICD-10-CM

## 2018-07-09 DIAGNOSIS — M70.62 TROCHANTERIC BURSITIS OF BOTH HIPS: Primary | ICD-10-CM

## 2018-07-09 PROCEDURE — 99213 OFFICE O/P EST LOW 20 MIN: CPT | Performed by: ORTHOPAEDIC SURGERY

## 2018-07-09 PROCEDURE — 20610 DRAIN/INJ JOINT/BURSA W/O US: CPT | Performed by: ORTHOPAEDIC SURGERY

## 2018-07-09 RX ORDER — TRIAMCINOLONE ACETONIDE 40 MG/ML
40 INJECTION, SUSPENSION INTRA-ARTICULAR; INTRAMUSCULAR ONCE
Status: COMPLETED | OUTPATIENT
Start: 2018-07-09 | End: 2018-07-09

## 2018-07-09 RX ADMIN — TRIAMCINOLONE ACETONIDE 40 MG: 40 INJECTION, SUSPENSION INTRA-ARTICULAR; INTRAMUSCULAR at 11:22

## 2018-07-09 NOTE — PROGRESS NOTES
(Patient taking differently: Take 25 mg by mouth daily ), Disp: 30 tablet, Rfl: 3    gabapentin (NEURONTIN) 300 MG capsule, Take 300 mg by mouth 3 times daily, Disp: , Rfl:     fenofibrate (TRICOR) 145 MG tablet, Take 145 mg by mouth daily, Disp: , Rfl:     isosorbide mononitrate (IMDUR) 60 MG CR tablet, Take 60 mg by mouth daily, Disp: , Rfl:     ezetimibe (ZETIA) 10 MG tablet, Take 10 mg by mouth every evening , Disp: , Rfl:     glipiZIDE (GLUCOTROL) 5 MG tablet, Take 2 tablets by mouth 2 times daily (before meals). , Disp: 60 tablet, Rfl: 0    Multiple Vitamins-Minerals (CENTRUM SILVER PO), Take 1 tablet by mouth daily. , Disp: , Rfl:     Omega-3 Fatty Acids (FISH OIL) 1000 MG CAPS, Take 3,000 mg by mouth 2 times daily. , Disp: , Rfl:     levothyroxine (SYNTHROID) 100 MCG tablet, Take 100 mcg by mouth daily. , Disp: , Rfl:     potassium chloride (KLOR-CON) 10 MEQ CR tablet, Take 10 mEq by mouth every evening , Disp: , Rfl:   Allergies   Allergen Reactions    Atorvastatin     Cipro Xr      Reaction unknown    Cymbalta [Duloxetine Hcl] Nausea Only    Diazepam     Erythromycin      Reaction unknown    Indomethacin     Ketorolac     Ketorolac Tromethamine Itching    Lipitor Other (See Comments)     Severe leg pain    Lodine [Etodolac]     Oscal 500-200 D-3 [Oyster Shell Calcium-D]     Paxil [Paroxetine Hcl]     Ropinirole     Statins Other (See Comments)     Muscles go weak and she falls    Zithromax [Azithromycin]     Requip [Ropinirole Hcl] Nausea And Vomiting     Social History     Social History    Marital status:      Spouse name: N/A    Number of children: N/A    Years of education: N/A     Occupational History    Not on file.      Social History Main Topics    Smoking status: Never Smoker    Smokeless tobacco: Never Used    Alcohol use No    Drug use: No    Sexual activity: No     Other Topics Concern    Not on file     Social History Narrative    No narrative on file Family History   Problem Relation Age of Onset    Cancer Father     Cancer Brother     Arthritis Other     Depression Other     Diabetes Other     Heart Disease Other     Hypertension Other          REVIEW OF SYSTEMS:     General/Constitution:  (-)weight loss, (-)fever, (-)chills, (-)weakness. Skin: (-) rash,(-) psoriasis,(-) eczema, (-)skin cancer. Musculoskeletal: (-) fractures,  (-) dislocations,(-) collagen vascular disease, (-) fibromyalgia, (-) multiple sclerosis, (-) muscular dystrophy, (-) RSD,(-) joint pain (-)swelling, (-) joint pain,swelling. Neurologic: (-) epilepsy, (-)seizures,(-) brain tumor,(-) TIA, (-)stroke, (-)headaches, (-)Parkinson disease,(-) memory loss, (-) LOC. Cardiovascular: (-) Chest pain, (-) swelling in legs/feet, (-) SOB, (-) cramping in legs/feet with walking. Respiratory: (-) SOB, (-) Coughing, (-) night sweats. GI: (-) nausea, (-) vomiting, (-) diarrhea, (-) blood in stool, (-) gastric ulcer. Psychiatric: (-) Depression, (-) Anxiety, (-) bipolar disease, (-) Alzheimer's Disease  Allergic/Immunologic: (-) allergies latex, (-) allergies metal, (-) skin sensitivity. Hematlogic: (-) anemia, (-) blood transfusion, (-) DVT/PE, (-) Clotting disorders      Subjective:      Constitution:    Ht. 5 ft 1 in. , Wt. 195 lbs. Psycihatric:    The patient is alert and oriented x 3, appears to be stated age and in no distress. Respiratory:    Respiratory effort is not labored. Patient is not gasping. Palpation of the chest reveals no tactile fremitus. Skin:    Upon inspection: the skin appears warm, dry and intact. There is not a previous scar over the affected area. There is not any cellulitis, lymphedema or cutaneous lesions noted in the lower extremities. Upon palpation there is no induration noted. Neurologic:      Motor exam of the lower extremities show ; quadriceps, hamstrings, foot dorsi and plantar flexors intact R.  5/5 and L. 5/5.  Deep tendon

## 2018-07-11 ENCOUNTER — APPOINTMENT (OUTPATIENT)
Dept: CT IMAGING | Age: 73
End: 2018-07-11
Payer: COMMERCIAL

## 2018-07-11 ENCOUNTER — HOSPITAL ENCOUNTER (EMERGENCY)
Age: 73
Discharge: HOME | End: 2018-07-11
Attending: EMERGENCY MEDICINE
Payer: COMMERCIAL

## 2018-07-11 ENCOUNTER — APPOINTMENT (OUTPATIENT)
Dept: GENERAL RADIOLOGY | Age: 73
End: 2018-07-11
Payer: COMMERCIAL

## 2018-07-11 VITALS
RESPIRATION RATE: 18 BRPM | SYSTOLIC BLOOD PRESSURE: 173 MMHG | HEART RATE: 68 BPM | HEIGHT: 61 IN | TEMPERATURE: 97.6 F | WEIGHT: 191.06 LBS | OXYGEN SATURATION: 98 % | BODY MASS INDEX: 36.07 KG/M2 | DIASTOLIC BLOOD PRESSURE: 77 MMHG

## 2018-07-11 DIAGNOSIS — H81.10 BENIGN PAROXYSMAL POSITIONAL VERTIGO, UNSPECIFIED LATERALITY: Primary | ICD-10-CM

## 2018-07-11 LAB
ANION GAP SERPL CALCULATED.3IONS-SCNC: 12 MMOL/L (ref 7–16)
BASOPHILS ABSOLUTE: 0.03 E9/L (ref 0–0.2)
BASOPHILS RELATIVE PERCENT: 0.4 % (ref 0–2)
BUN BLDV-MCNC: 55 MG/DL (ref 8–23)
CALCIUM SERPL-MCNC: 10.4 MG/DL (ref 8.6–10.2)
CHLORIDE BLD-SCNC: 104 MMOL/L (ref 98–107)
CHP ED QC CHECK: NORMAL
CHP ED QC CHECK: YES
CO2: 29 MMOL/L (ref 22–29)
CREAT SERPL-MCNC: 1.4 MG/DL (ref 0.5–1)
EOSINOPHILS ABSOLUTE: 0.15 E9/L (ref 0.05–0.5)
EOSINOPHILS RELATIVE PERCENT: 2.1 % (ref 0–6)
GFR AFRICAN AMERICAN: 45
GFR NON-AFRICAN AMERICAN: 37 ML/MIN/1.73
GLUCOSE BLD-MCNC: 118 MG/DL
GLUCOSE BLD-MCNC: 66 MG/DL
GLUCOSE BLD-MCNC: 67 MG/DL (ref 74–109)
HCT VFR BLD CALC: 36 % (ref 34–48)
HEMOGLOBIN: 11.9 G/DL (ref 11.5–15.5)
IMMATURE GRANULOCYTES #: 0.06 E9/L
IMMATURE GRANULOCYTES %: 0.9 % (ref 0–5)
LYMPHOCYTES ABSOLUTE: 1.45 E9/L (ref 1.5–4)
LYMPHOCYTES RELATIVE PERCENT: 20.7 % (ref 20–42)
MCH RBC QN AUTO: 30.8 PG (ref 26–35)
MCHC RBC AUTO-ENTMCNC: 33.1 % (ref 32–34.5)
MCV RBC AUTO: 93.3 FL (ref 80–99.9)
METER GLUCOSE: 118 MG/DL (ref 70–110)
METER GLUCOSE: 66 MG/DL (ref 70–110)
MONOCYTES ABSOLUTE: 0.53 E9/L (ref 0.1–0.95)
MONOCYTES RELATIVE PERCENT: 7.6 % (ref 2–12)
NEUTROPHILS ABSOLUTE: 4.77 E9/L (ref 1.8–7.3)
NEUTROPHILS RELATIVE PERCENT: 68.3 % (ref 43–80)
PDW BLD-RTO: 13.7 FL (ref 11.5–15)
PLATELET # BLD: 216 E9/L (ref 130–450)
PMV BLD AUTO: 10 FL (ref 7–12)
POTASSIUM SERPL-SCNC: 5.2 MMOL/L (ref 3.5–5)
RBC # BLD: 3.86 E12/L (ref 3.5–5.5)
SODIUM BLD-SCNC: 145 MMOL/L (ref 132–146)
TROPONIN: <0.01 NG/ML (ref 0–0.03)
WBC # BLD: 7 E9/L (ref 4.5–11.5)

## 2018-07-11 PROCEDURE — 99285 EMERGENCY DEPT VISIT HI MDM: CPT

## 2018-07-11 PROCEDURE — 70450 CT HEAD/BRAIN W/O DYE: CPT

## 2018-07-11 PROCEDURE — 85025 COMPLETE CBC W/AUTO DIFF WBC: CPT

## 2018-07-11 PROCEDURE — 36415 COLL VENOUS BLD VENIPUNCTURE: CPT

## 2018-07-11 PROCEDURE — 71045 X-RAY EXAM CHEST 1 VIEW: CPT

## 2018-07-11 PROCEDURE — 82962 GLUCOSE BLOOD TEST: CPT

## 2018-07-11 PROCEDURE — 6370000000 HC RX 637 (ALT 250 FOR IP): Performed by: STUDENT IN AN ORGANIZED HEALTH CARE EDUCATION/TRAINING PROGRAM

## 2018-07-11 PROCEDURE — 80048 BASIC METABOLIC PNL TOTAL CA: CPT

## 2018-07-11 PROCEDURE — 94761 N-INVAS EAR/PLS OXIMETRY MLT: CPT

## 2018-07-11 PROCEDURE — 84484 ASSAY OF TROPONIN QUANT: CPT

## 2018-07-11 RX ORDER — ACETAMINOPHEN 500 MG
1000 TABLET ORAL ONCE
Status: COMPLETED | OUTPATIENT
Start: 2018-07-11 | End: 2018-07-11

## 2018-07-11 RX ADMIN — ACETAMINOPHEN 1000 MG: 500 TABLET, FILM COATED ORAL at 11:03

## 2018-07-11 ASSESSMENT — ENCOUNTER SYMPTOMS
ABDOMINAL DISTENTION: 0
EYE PAIN: 0
SORE THROAT: 0
BACK PAIN: 0
COUGH: 0
EYE REDNESS: 0
VOMITING: 0
SINUS PRESSURE: 0
WHEEZING: 0
EYE DISCHARGE: 0
NAUSEA: 0
DIARRHEA: 0
SHORTNESS OF BREATH: 0

## 2018-07-11 ASSESSMENT — PAIN SCALES - GENERAL: PAINLEVEL_OUTOF10: 8

## 2018-07-11 NOTE — ED PROVIDER NOTES
The patient is a 79-year-old female presents emergency department evaluated for dizziness. She states that she and episode of dizziness this morning when she woke up and rolled over in bed. She does not remember whether she rolled to the right of the left. She describes the dizzy sensation as feeling as if the room is spinning. She denies ever experiencing a sensation like this in the past. She states that she is not dizzy at this time, but when she changes position from sitting to standing she has felt dizzy this morning. She denies any chest pain, shortness of breath, palpitations or syncope related to the dizziness. However, the patient does have extensive history of cardiovascular disease with multiple stents placed in the past and a previous myocardial infarction. The patient has been falling frequently during the last several weeks. She states that she has not felt dizzy prior to the falls, which she cannot comment as to what has led her to falling more frequently. The patient does not take any anticoagulants, but does take aspirin and Plavix. She denies any hearing loss or ear pain. There is been no ringing in the ears. He denies any recent fevers or chills. She denies any focal numbness or weakness. She states that this time she feels a symptomatic. The history is provided by the patient. Review of Systems   Constitutional: Negative for chills and fever. HENT: Negative for ear pain, sinus pressure and sore throat. Eyes: Negative for pain, discharge and redness. Respiratory: Negative for cough, shortness of breath and wheezing. Cardiovascular: Negative for chest pain. Gastrointestinal: Negative for abdominal distention, diarrhea, nausea and vomiting. Genitourinary: Negative for dysuria and frequency. Musculoskeletal: Negative for arthralgias and back pain. Skin: Negative for rash and wound. Neurological: Positive for dizziness. Negative for weakness and headaches. Hematological: Negative for adenopathy. All other systems reviewed and are negative. Physical Exam   Constitutional: She is oriented to person, place, and time. She appears well-developed and well-nourished. No distress. HENT:   Head: Not macrocephalic and not microcephalic. Head is without raccoon's eyes, without Scott's sign, without abrasion, without contusion, without laceration, without right periorbital erythema and without left periorbital erythema. Hair is normal.   Right Ear: Hearing, tympanic membrane, external ear and ear canal normal.   Left Ear: Hearing, tympanic membrane, external ear and ear canal normal.   Mouth/Throat: Oropharynx is clear and moist. No oropharyngeal exudate. Eyes: Conjunctivae and EOM are normal. Pupils are equal, round, and reactive to light. Neck: Normal range of motion and full passive range of motion without pain. Neck supple. Muscular tenderness present. No spinous process tenderness present. No neck rigidity. No edema, no erythema and normal range of motion present. Cardiovascular: Normal rate, regular rhythm, normal heart sounds and intact distal pulses. Exam reveals no friction rub. No murmur heard. Pulmonary/Chest: Effort normal and breath sounds normal. No respiratory distress. She has no wheezes. She has no rales. Abdominal: Soft. She exhibits no distension. There is no tenderness. There is no rebound and no guarding. Musculoskeletal:   No peripheral edema, erythema, or temperature discrepancy in bilateral lower extremities. Bruising noted on all 4 extremities. Neurological: She is alert and oriented to person, place, and time. She has normal strength. She displays no atrophy and no tremor. No sensory deficit. She exhibits normal muscle tone. She displays no seizure activity. HiNTS exam without saccades, nystagmus, or gaze deviation. Negative McDougal-Hallpike test.     No focal deficits. Skin: Skin is warm and dry. She is not diaphoretic. Interval 386 ms    QTc Calculation (Bazett) 410 ms    P Axis -15 degrees    R Axis 4 degrees    T Axis 5 degrees       Radiology:  CT Head WO Contrast   Final Result   1. There is no acute intracranial abnormality. 2. Atrophy and periventricular leukomalacia. XR CHEST PORTABLE   Final Result   No evidence of acute cardiopulmonary disease.          ------------------------- NURSING NOTES AND VITALS REVIEWED ---------------------------  Date / Time Roomed:  7/11/2018  8:05 AM  ED Bed Assignment:  03/03    The nursing notes within the ED encounter and vital signs as below have been reviewed. BP (!) 175/82   Pulse 68   Temp 97.6 °F (36.4 °C) (Oral)   Resp 20   Ht 5' 1\" (1.549 m)   Wt 191 lb 1 oz (86.7 kg)   SpO2 99%   BMI 36.10 kg/m²   Oxygen Saturation Interpretation: Normal      ------------------------------------------ PROGRESS NOTES ------------------------------------------  I have spoken with the patient and discussed todays results, in addition to providing specific details for the plan of care and counseling regarding the diagnosis and prognosis. Their questions are answered at this time and they are agreeable with the plan. I discussed at length with them reasons for immediate return here for re evaluation. They will followup with primary care by calling their office tomorrow. --------------------------------- ADDITIONAL PROVIDER NOTES ---------------------------------    The patient presents emergency department evaluated for dizziness. The dizziness was described as a vertiginous sensation was worse and she rolled over in bed. There is no sinus symptoms and physical exam concerning for posterior stroke. Thus, she did not have any ataxia or dysarthria or trouble swallowing. Orthostatic testing was negative. Cardiac evaluation was performed as patient has history of cardiac disease, but EKG was normal in appearance with a negative troponin.  CT head was performed as patient has been

## 2018-07-11 NOTE — ED NOTES
The patient is complaining of right sided neck pain going into her head. Dr juarez. She was able to walk to the rest room. Her gait was steady.      Sorin Ramirez RN  07/11/18 [id number]

## 2018-07-23 LAB
EKG ATRIAL RATE: 68 BPM
EKG P AXIS: -15 DEGREES
EKG P-R INTERVAL: 182 MS
EKG Q-T INTERVAL: 386 MS
EKG QRS DURATION: 80 MS
EKG QTC CALCULATION (BAZETT): 410 MS
EKG R AXIS: 4 DEGREES
EKG T AXIS: 5 DEGREES
EKG VENTRICULAR RATE: 68 BPM

## 2018-09-06 ENCOUNTER — OFFICE VISIT (OUTPATIENT)
Dept: NEUROLOGY | Age: 73
End: 2018-09-06
Payer: COMMERCIAL

## 2018-09-06 VITALS
SYSTOLIC BLOOD PRESSURE: 113 MMHG | DIASTOLIC BLOOD PRESSURE: 58 MMHG | HEART RATE: 72 BPM | TEMPERATURE: 97.1 F | BODY MASS INDEX: 36.09 KG/M2 | RESPIRATION RATE: 18 BRPM | OXYGEN SATURATION: 99 % | WEIGHT: 191 LBS

## 2018-09-06 DIAGNOSIS — M54.17 L-S RADICULOPATHY: Primary | ICD-10-CM

## 2018-09-06 PROBLEM — M71.9 BURSITIS: Chronic | Status: RESOLVED | Noted: 2017-09-06 | Resolved: 2018-09-06

## 2018-09-06 PROBLEM — M25.561 RIGHT KNEE PAIN: Chronic | Status: RESOLVED | Noted: 2017-08-13 | Resolved: 2018-09-06

## 2018-09-06 PROBLEM — G89.4 CHRONIC PAIN SYNDROME: Chronic | Status: RESOLVED | Noted: 2017-09-11 | Resolved: 2018-09-06

## 2018-09-06 PROCEDURE — 99204 OFFICE O/P NEW MOD 45 MIN: CPT | Performed by: PSYCHIATRY & NEUROLOGY

## 2018-09-06 RX ORDER — LISINOPRIL 5 MG/1
5 TABLET ORAL DAILY
COMMUNITY
End: 2020-10-02

## 2018-09-06 RX ORDER — GLIPIZIDE 10 MG/1
10 TABLET ORAL
Status: ON HOLD | COMMUNITY
End: 2020-10-07 | Stop reason: HOSPADM

## 2018-09-06 RX ORDER — BACLOFEN 10 MG/1
10 TABLET ORAL NIGHTLY
Qty: 60 TABLET | Refills: 5 | Status: SHIPPED | OUTPATIENT
Start: 2018-09-06 | End: 2019-08-21

## 2018-09-06 RX ORDER — OMEPRAZOLE 20 MG/1
20 CAPSULE, DELAYED RELEASE ORAL DAILY
COMMUNITY

## 2018-09-06 NOTE — PROGRESS NOTES
examination displayed stable vital signs. She was an elderly woman in no acute distress who was alert, cooperative and oriented. She was very pleasant and in excellent historian. In addition, she appreciated my good humor. Her skin was unremarkable; there was no lymphadenopathy. Head examination was unrevealing, there was limited range of motion of her neck---especially with extension. I found no carotid bruits with +1 upstrokes. Her spine revealed minimal tenderness at the T7 spinous process but no other abnormalities. Her lungs were clear to auscultation. Cardiac examination was unrevealing with a well-healed bypass scar. Abdominal examination was also unremarkable. Her peripheral pulses are +1 without bruits. Her limbs were unremarkable except for her multiple surgical scars. Straight leg raising was unrevealing. Neurological examination produced an intact mental status. Cranial nerve examination was unrevealing. I found normal tone and bulk with 5/5 strength throughout. There were no abnormal movements. Reflexes were +1 in the right arm and absent in the left. I could not elicit reflexes in her legs. There were no pathological reflexes---including suck or grasp reflexes. Sensory exam was intact to light touch. Pinprick and vibration were decreased to both mid calves. Joint position sense was impaired in the feet. She walked with a slightly wide-based, cautious gait. Romberg's was unremarkable. Laboratory data included an unremarkable CT scan of her head from July of this year. An EDX study from 2014 thumb radiculopathy and very questionable myopathy. Past MRIs of her cervical and lumbosacral spines revealed significant young joint disease. This individual first presents with memory issues. I found none. She provided an excellent history and participating in the examination without any cognitive issues.   I suspect her difficulties are related to attention span and likely aggravated by her inadequate sleep. She was reassured she does not have dementia. She displays peripheral neuropathic findings from her long-standing diabetes mellitus. Her gait issues also aggravated by likely lumbosacral motor radiculopathies. Her nighttime spasms are likely the result of her radicular disease as well. Her right arm discomforts are from cervical radicular disease. I doubt any myopathic process. EDX studies of all limbs are in order. I will also prescribe low-dose baclofen at night to aid sleep and reduce her spasms. Medically she is stable despite her multiple comorbidities. I discontinued many of her supplements. She will return in 4 months. EDX studies of all limbs will ensue. Baclofen was begun at 10 mg at night. She will report back in EDX laboratory. She will call any time if problems arise. I spent 65 minutes with the patient with over 50 % spent in counseling and disease management. All pt issues were addressed and all questions were answered.

## 2018-09-11 ENCOUNTER — TELEPHONE (OUTPATIENT)
Dept: NEUROLOGY | Age: 73
End: 2018-09-11

## 2018-09-11 DIAGNOSIS — R25.2 SPASTICITY: Primary | ICD-10-CM

## 2018-09-11 NOTE — TELEPHONE ENCOUNTER
Pt called office stating that she had a reaction to her baclofen 10 mg daily that she began last week. Pt states that she had increased jerking and generalized pain . Patient did stop medication. She would like another muscle relaxer. Pt pharmacy is [facility].    Please advise

## 2018-09-11 NOTE — TELEPHONE ENCOUNTER
Baclofen does not cause increased jerking or generalized pain. Her reaction to this medication is highly unusual.  I first recommended she take the medication only at night. If not, we can consider using Dantrium 25 mg per day. Please let her know her options.   Thank you

## 2018-09-11 NOTE — TELEPHONE ENCOUNTER
Spoke with patient who states that she already took her baclofen at night  And she does not want to take it anymore. She would like to be started on Dantrium. Please escribe to [facility].

## 2018-09-12 RX ORDER — DANTROLENE SODIUM 25 MG/1
25 CAPSULE ORAL 2 TIMES DAILY
Qty: 60 CAPSULE | Refills: 5 | Status: SHIPPED | OUTPATIENT
Start: 2018-09-12 | End: 2019-07-25

## 2018-09-20 ENCOUNTER — HOSPITAL ENCOUNTER (OUTPATIENT)
Dept: NEUROLOGY | Age: 73
Discharge: HOME | End: 2018-09-20
Payer: COMMERCIAL

## 2018-09-20 DIAGNOSIS — M54.17 L-S RADICULOPATHY: ICD-10-CM

## 2018-09-20 PROCEDURE — 95886 MUSC TEST DONE W/N TEST COMP: CPT | Performed by: PSYCHIATRY & NEUROLOGY

## 2018-09-20 PROCEDURE — 95886 MUSC TEST DONE W/N TEST COMP: CPT

## 2018-09-20 PROCEDURE — 95913 NRV CNDJ TEST 13/> STUDIES: CPT

## 2018-09-20 PROCEDURE — 95913 NRV CNDJ TEST 13/> STUDIES: CPT | Performed by: PSYCHIATRY & NEUROLOGY

## 2018-09-20 NOTE — PROCEDURES
32.6   L Superficial peroneal - Ankle      Lat leg NR NR NR 10 NR 32.7   R Sural - Ankle (Calf)      Calf 3.28 3.96 2.1 14 43 32.6   L Sural - Ankle (Calf)      Calf 2.92 3.33 9.4 14 48 32.7       F  Wave      Nerve F Lat M Lat F-M Lat    ms ms ms   R Peroneal - EDB 51.9 4.1 47.8   L Peroneal - EDB 50.8 3.1 47.7   L Tibial - AH 55.2 5.7 49.5   R Median - APB 29.2 4.4 24.7   R Ulnar - ADM 29.5 3.1 26.4   L Median - APB 28.6 4.9 23.8   L Ulnar - ADM 29.3 3.0 26.3       H Reflex      Nerve Lat Hmax    ms   R Tibial - Soleus 31.0   L Tibial - Soleus 34. 3       EMG         EMG Summary Table     Spontaneous MUAP Recruitment   Muscle IA Fib PSW Fasc H.F. Amp Dur. PPP Pattern   R. Cervical paraspinals (mid) N None None None None N N N N   R. Cervical paraspinals (low) N None None None None N N N N   R. Supraspinatus N None None None None N N N N   R. Deltoid N None None None +2Myopathic N N N N   R. Triceps brachii N None None None +2Myopathic N N N N   R. Biceps brachii N None None None +2Myopathic N N N N   R. Brachioradialis N None None None +2Myopathic N N N N   R. Extensor indicis proprius N None None None +1Myopathic N N N N   R. Flexor digitorum profundus (Ulnar) N None None None +1Myopathic N N N N   R. Flexor pollicis longus N None None None +1Myopathic N N N N   R. First dorsal interosseous N None None None None N N N N   R. Abductor pollicis brevis N None None None None N N N N   R. Lumbar paraspinals (mid) N None None None None N N N Inc Resistance to NI   R. Lumbar paraspinals (low) N None None None None N N N Inc Resistance to NI   R. Sacral paraspinals N None None None None N N N Inc Resistance to NI   R. Gluteus medius N None None None None N N 1+ N   R. Biceps femoris (short head) N None None None None N N N N   R. Vastus lateralis N None None None None N N N N   R. Gastrocnemius (Medial head) N None None None None N N 1+ Sl Decr   R. Tibialis anterior N None None None None N N 2+ Sl Decr   R.  Flexor digitorum longus Inc/DNT None None None None N N 1+ Sl Decr   R. Extensor hallucis longus N None None None None N N 2+ Decr   R. Dorsal interossei (pedis) N None None None None N N N Distant MUPs   R. Extensor digitorum brevis N None None None None N N 2+ Decr         Nerve conduction studies in both arms disclosed no abnormalities. Nerve conduction studies of both legs revealed absent superficial peroneal sensory nerve action potentials and a prolonged left H reflex latency. These findings were compared to the normal values in this laboratory, listed at the end of this report. Monopolar needle examination of the right arm disclosed diffuse myopathic changes. Needle testing of the cervical paraspinals was unrevealing. Monopolar needle examination of the left leg disclosed chronic axonal loss with reinnervation in the muscles supplied primarily by the L5 and S1 motor roots. Needle testing of the lumbosacral paraspinals could not be performed due to excessive resistance to needle insertion from previous lumbosacral laminectomies. Electrodiagnostic examination of both arms and both legs disclosed evidence suggesting following---    1. A diffuse myopathy involving the right arm---as noted by the marked myopathic potentials in that limb. 2.  Left L5 and S1 motor radiculopathies. These were not proven due to difficulties performing needle examination of lumbosacral paraspinals. There were no other motor radiculopathies. There were no peripheral neuropathies. Sensory radiculopathies cannot be evaluated by electrodiagnostic means. Clinically, the patient recently presented in my office but I suspected diabetic peripheral neuropathy, lumbosacral radiculopathies and cervical radicular disease. I doubted myopathic disease at that time. Her lumbosacral radiculopathies are producing her leg discomforts. Her myopathic changes in her arms can explain her difficulties with those limbs.   MRIs of the cervical and lumbosacral spine are in order as well as additional workup for myopathic disease. Clinical correlation was highly advised.       Normal nerve Conduction Values    Sensory Nerves Peak to Peak  Amplitude  (mV) Peak Latency (ms)   Superficial Radial Sensory Antidromic (10cm) 11 2.8    Median Sensory Antidromic Dig II   Palm (7cm)  Wrist (14 cm)  Age 19-49 BMI <24  Age 47-78 BMI <24  Age 20-48 BMI >/= 24  Age 47-78 BMI >/= 24   8  13  19  15  13  8   2.3  4     Ulnar Sensory Antidromic Dig V (14 cm)  Age 20-48 BMI <24  Age 47-78 BMI <24  Age 20-48 BMI >/= 24  Age 47-78 BMI >/= 24 9  13  13  8  4 4   Medial Antebrachial cutaneous Sensory Antidromic (10 cm)   3 2.6   Lateral Antebrachial cutaneous Sensory Antidromic (10 cm) 6 2.5   Sural Sensory Antidromic (14 cm) 4 4.5     Medial and lateral Plantars (14 cm)   Compare side to side <3.8     Superficial peroneal sensory (10 cm)  Age <9  Age 7-34  Age 35-46  Age 52-63  Age >57   >6  >6  >5  >4  >3   <4.3  <4.4  <4.5  <4.6  <4.6     Saphenous sensory (12 cm)  Age <9  Age 7-34  Age 35-46  Age 52-63  Age >57   >6  >6  >4  >4  >3   <4.3  <4.4  <4.5  <4.6  <4.6     Dorsal ulnar sensory (8 cm)  Age <9  Age 7-34  Age 35-46  Age 52-63  Age >57   >24  >24  >16  >9  >5   <2.9  <3  <3.1  <3.1  <3.2         Study Latency difference (ms)   Median compared to (minus) ulnar motor comparison  All ages  Age 20-48  Age 47-78   1.5  1.4  1.7   Median to Ulnar comparison (second lumbrical/interossei)  0.4     Combined Sensory Index:   Study Latency Difference (ms)</=   Median to Ulnar Palmar Orthodromic mixed nerve comparison 0.3   Median to Ulnar sensory Ring finger comparison 0.4       Median: Radial sensory digit 1 comparison 0.5     Combined Sensory Index (CSI)  0.9       Motor Nerves Baseline to Peak Amplitude  (mV) Conduction Velocity (m/s) Distal Latency (ms)   Median motor APB  All ages  Men Age21 to 44  Men Age 36 to 52  Men Age 48 to 61  Men Age 61 to 71  Men age

## 2018-09-27 ENCOUNTER — HOSPITAL ENCOUNTER (OUTPATIENT)
Dept: MRI IMAGING | Age: 73
Discharge: HOME | End: 2018-09-29
Payer: COMMERCIAL

## 2018-09-27 DIAGNOSIS — M96.1 POSTLAMINECTOMY SYNDROME, LUMBAR REGION: ICD-10-CM

## 2018-09-27 PROCEDURE — A9577 INJ MULTIHANCE: HCPCS | Performed by: RADIOLOGY

## 2018-09-27 PROCEDURE — 72158 MRI LUMBAR SPINE W/O & W/DYE: CPT

## 2018-09-27 PROCEDURE — 6360000004 HC RX CONTRAST MEDICATION: Performed by: RADIOLOGY

## 2018-09-27 RX ADMIN — GADOBENATE DIMEGLUMINE 20 ML: 529 INJECTION, SOLUTION INTRAVENOUS at 14:55

## 2018-10-10 DIAGNOSIS — M17.11 PRIMARY OSTEOARTHRITIS OF RIGHT KNEE: Primary | ICD-10-CM

## 2018-10-11 ENCOUNTER — OFFICE VISIT (OUTPATIENT)
Dept: ORTHOPEDIC SURGERY | Age: 73
End: 2018-10-11
Payer: COMMERCIAL

## 2018-10-11 VITALS — BODY MASS INDEX: 36.82 KG/M2 | WEIGHT: 195 LBS | TEMPERATURE: 98 F | HEIGHT: 61 IN

## 2018-10-11 DIAGNOSIS — R22.31 FOREARM MASS, RIGHT: ICD-10-CM

## 2018-10-11 DIAGNOSIS — S83.281A ACUTE LATERAL MENISCUS TEAR OF RIGHT KNEE, INITIAL ENCOUNTER: ICD-10-CM

## 2018-10-11 DIAGNOSIS — M17.11 PRIMARY OSTEOARTHRITIS OF RIGHT KNEE: Primary | ICD-10-CM

## 2018-10-11 PROCEDURE — 99213 OFFICE O/P EST LOW 20 MIN: CPT | Performed by: ORTHOPAEDIC SURGERY

## 2018-10-11 PROCEDURE — 20610 DRAIN/INJ JOINT/BURSA W/O US: CPT | Performed by: ORTHOPAEDIC SURGERY

## 2018-10-11 RX ORDER — TRIAMCINOLONE ACETONIDE 40 MG/ML
40 INJECTION, SUSPENSION INTRA-ARTICULAR; INTRAMUSCULAR ONCE
Status: COMPLETED | OUTPATIENT
Start: 2018-10-11 | End: 2018-10-11

## 2018-10-11 RX ADMIN — TRIAMCINOLONE ACETONIDE 40 MG: 40 INJECTION, SUSPENSION INTRA-ARTICULAR; INTRAMUSCULAR at 12:19

## 2018-10-11 NOTE — PROGRESS NOTES
fremitus. Skin:    Upon inspection: the skin appears warm, dry and intact. There is  a previous scar over the affected area. There is any cellulitis, lymphedema or cutaneous lesions noted in the lower extremities. Upon palpation there is no induration noted. Neurologic:    Gait: normal;  Motor exam of the lower extremities show ; quadriceps, hamstrings, foot dorsi and plantar flexors intact R.  5/5 and L. 5/5. Deep tendon reflexes are 2/4 at the knees and 2/4 at the ankles with strong extensor hallicus longus motor strength bilaterally. Sensory to both feet is intact to all sensory roots. Cardiovascular: The vascular exam is normal and is well perfused to distal extremities. Distal pulses DP/PT: R. 2+; L. 2+. There is cap refill noted less than two seconds in all digits. There is not edema of the bilateral lower extremities. There is not varicosities noted in the distal extremities. Lymph:    Upon palpation,  there is no lymphadenopathy noted in bilateral lower extremities. Musculoskeletal:      Gait: normal; examination of the nails and digits reveal no cyanosis or clubbing. Lumbar exam:    On visual inspection, there is not deformity of the spine. full range of motion, no tenderness, palpable spasm or pain on motion. Special tests: Straight Leg Raise negative, Demetrius test negative. Hip exam-     Upon inspection, there is not deformity noted. Upon palpation there is not tenderness. ROM: is  full and symmetrical.   Strength: Hip Flexors 5/5; Hip Abductors 5/5; Hip Adduction 5/5. Knee exam    Right knee exam shows;  range of motion of R. Knee is 0 to 120, and L. Knee is 0 to 120. The patient does have  pain on motion, effusion is moderate, there is tenderness over the  global region, there are not any masses, there is not ligamentous instability, there is not  deformity noted.     Knee exam: right positive for moderate crepitations, some mild tenderness laxity is

## 2018-10-15 DIAGNOSIS — R22.31 MASS OF RIGHT FOREARM: Primary | ICD-10-CM

## 2018-10-18 ENCOUNTER — HOSPITAL ENCOUNTER (OUTPATIENT)
Dept: MAMMOGRAPHY | Age: 73
Discharge: HOME | End: 2018-10-20
Payer: COMMERCIAL

## 2018-10-18 DIAGNOSIS — Z12.39 BREAST CANCER SCREENING: ICD-10-CM

## 2018-10-18 PROCEDURE — 77067 SCR MAMMO BI INCL CAD: CPT

## 2018-10-19 ENCOUNTER — HOSPITAL ENCOUNTER (OUTPATIENT)
Age: 73
Discharge: HOME | End: 2018-10-19
Payer: COMMERCIAL

## 2018-10-19 LAB
SEDIMENTATION RATE, ERYTHROCYTE: 17 MM/HR (ref 0–20)
TOTAL CK: 36 U/L (ref 20–180)

## 2018-10-19 PROCEDURE — 85651 RBC SED RATE NONAUTOMATED: CPT

## 2018-10-19 PROCEDURE — 82550 ASSAY OF CK (CPK): CPT

## 2018-10-19 PROCEDURE — 36415 COLL VENOUS BLD VENIPUNCTURE: CPT

## 2018-10-19 PROCEDURE — 82085 ASSAY OF ALDOLASE: CPT

## 2018-10-22 LAB — ALDOLASE: 2 U/L (ref 1.5–8.1)

## 2018-10-24 ENCOUNTER — HOSPITAL ENCOUNTER (OUTPATIENT)
Dept: OPERATING ROOM | Age: 73
Discharge: HOME | End: 2018-10-24
Payer: COMMERCIAL

## 2018-10-24 ENCOUNTER — HOSPITAL ENCOUNTER (OUTPATIENT)
Age: 73
Setting detail: OUTPATIENT SURGERY
Discharge: HOME | End: 2018-10-24
Attending: ANESTHESIOLOGY | Admitting: ANESTHESIOLOGY
Payer: COMMERCIAL

## 2018-10-24 VITALS
SYSTOLIC BLOOD PRESSURE: 128 MMHG | DIASTOLIC BLOOD PRESSURE: 66 MMHG | OXYGEN SATURATION: 96 % | RESPIRATION RATE: 16 BRPM | HEART RATE: 84 BPM

## 2018-10-24 DIAGNOSIS — M47.896 OTHER OSTEOARTHRITIS OF SPINE, LUMBAR REGION: ICD-10-CM

## 2018-10-24 PROCEDURE — 7100000011 HC PHASE II RECOVERY - ADDTL 15 MIN: Performed by: ANESTHESIOLOGY

## 2018-10-24 PROCEDURE — 2500000003 HC RX 250 WO HCPCS: Performed by: ANESTHESIOLOGY

## 2018-10-24 PROCEDURE — 7100000010 HC PHASE II RECOVERY - FIRST 15 MIN: Performed by: ANESTHESIOLOGY

## 2018-10-24 PROCEDURE — 3600000005 HC SURGERY LEVEL 5 BASE: Performed by: ANESTHESIOLOGY

## 2018-10-24 PROCEDURE — 3209999900 FLUORO FOR SURGICAL PROCEDURES

## 2018-10-24 PROCEDURE — 6360000002 HC RX W HCPCS: Performed by: ANESTHESIOLOGY

## 2018-10-24 PROCEDURE — 2709999900 HC NON-CHARGEABLE SUPPLY: Performed by: ANESTHESIOLOGY

## 2018-10-24 RX ORDER — OXYCODONE HYDROCHLORIDE AND ACETAMINOPHEN 5; 325 MG/1; MG/1
1 TABLET ORAL EVERY 8 HOURS PRN
COMMUNITY
End: 2020-05-26 | Stop reason: DRUGHIGH

## 2018-10-24 RX ORDER — LIDOCAINE HYDROCHLORIDE 10 MG/ML
INJECTION, SOLUTION EPIDURAL; INFILTRATION; INTRACAUDAL; PERINEURAL PRN
Status: DISCONTINUED | OUTPATIENT
Start: 2018-10-24 | End: 2018-10-24 | Stop reason: HOSPADM

## 2018-10-24 ASSESSMENT — PAIN - FUNCTIONAL ASSESSMENT: PAIN_FUNCTIONAL_ASSESSMENT: 0-10

## 2018-10-24 ASSESSMENT — PAIN SCALES - GENERAL
PAINLEVEL_OUTOF10: 0
PAINLEVEL_OUTOF10: 0

## 2018-10-24 NOTE — OP NOTE
surrounding area. The needle was then repositioned to perform the medial branch nerve block. An additional 1.5 ml. of solution was injected at that level. The needle was then withdrawn intact. The entire procedure was then repeated at the left L1-2, L3-4 levels. Sterile Band-Aids were applied. Mich Gan was then placed in the supine position and transported to the recovery room and noted to have tolerated the procedure in satisfactory condition. Mich Gan has been given discharge instructions with their next scheduled appointment.       Electronically signed by Cezar Lindsey

## 2018-10-30 ENCOUNTER — HOSPITAL ENCOUNTER (OUTPATIENT)
Dept: MRI IMAGING | Age: 73
Discharge: HOME | End: 2018-11-01
Payer: COMMERCIAL

## 2018-10-30 DIAGNOSIS — R22.31 MASS OF RIGHT FOREARM: ICD-10-CM

## 2018-10-30 PROCEDURE — 73221 MRI JOINT UPR EXTREM W/O DYE: CPT

## 2018-11-06 ENCOUNTER — OFFICE VISIT (OUTPATIENT)
Dept: ORTHOPEDIC SURGERY | Age: 73
End: 2018-11-06
Payer: COMMERCIAL

## 2018-11-06 DIAGNOSIS — M70.62 TROCHANTERIC BURSITIS, LEFT HIP: Primary | ICD-10-CM

## 2018-11-06 DIAGNOSIS — S46.219A BICEPS TENDON TEAR: ICD-10-CM

## 2018-11-06 PROCEDURE — 99213 OFFICE O/P EST LOW 20 MIN: CPT | Performed by: ORTHOPAEDIC SURGERY

## 2018-11-06 PROCEDURE — 20610 DRAIN/INJ JOINT/BURSA W/O US: CPT | Performed by: ORTHOPAEDIC SURGERY

## 2018-11-06 RX ORDER — TRIAMCINOLONE ACETONIDE 40 MG/ML
40 INJECTION, SUSPENSION INTRA-ARTICULAR; INTRAMUSCULAR ONCE
Status: COMPLETED | OUTPATIENT
Start: 2018-11-06 | End: 2018-11-06

## 2018-11-06 RX ADMIN — TRIAMCINOLONE ACETONIDE 40 MG: 40 INJECTION, SUSPENSION INTRA-ARTICULAR; INTRAMUSCULAR at 13:58

## 2018-11-06 NOTE — PROGRESS NOTES
Chief Complaint   Patient presents with    Elbow Pain     follow up right elbow pain       Subjective:     Patient ID: Parish Dean is a 72 y.o. José Fear female    Knee Pain  Patient complains of right knee pain. She had arthroscopy in June 2018. She fell after surgery and continues to pain. She also complains of mass to right forearm. She is here for MRI results. She also reports left troch pain as well/ she states she had csi with good relief.      Past Medical History:   Diagnosis Date    Arthritis     CAD (coronary artery disease)     CAD (coronary artery disease)     Diabetes ([facility] [address]     Fibromyalgia     GERD (gastroesophageal reflux disease)     History of cardiovascular stress test 02/17/2014    lexiscan, also 4/21/2015    History of MI (myocardial infarction)     x4; most recent 2016    Hx of blood clots     leg    Hyperlipidemia     Hypertension     Hypothyroidism     Intractable low back pain 6/19/2016    Non-insulin dependent type 2 diabetes mellitus ([facility] [address]     NSTEMI (non-ST elevated myocardial infarction) ([facility] [address] 11/19/2015     Past Surgical History:   Procedure Laterality Date    APPENDECTOMY      BACK SURGERY      BLADDER SURGERY      CARDIAC CATHETERIZATION  11/15/2017    Dr Komal Caballero WITH IMPLANT Right 7 7 15    CATARACT REMOVAL WITH IMPLANT Left 4/26/2016    CHOLECYSTECTOMY      COCCYX REMOVAL      COLONOSCOPY      CORONARY ANGIOPLASTY WITH [address] started needing cardiac care; 5 stents total    CORONARY ANGIOPLASTY WITH STENT PLACEMENT  11/19/2015    Dr Servando Shaikh stent 3x18 prox Cx   [place] Dr. Hughes Body; 3 bypass grafts    ENDOSCOPY, COLON, DIAGNOSTIC      EYE SURGERY      HEMORRHOID SURGERY      HYSTERECTOMY      LUMBAR FUSION  6/7/2016    Dr. Nash Hughes Left 11 18 2015    left lumbar transforaminal nerve block l4-5 l5-s1    NERVE BLOCK Right 07/17/2017 auscultation bilaterally without rales, rhonchi or wheezes. Abdomen: soft, nontender. Positive bowel sounds. No organomegaly. No guarding or rigidity. Constitution:    Ht. 5 ft 1 in. , Wt. 194 lbs. Psycihatric:    The patient is alert and oriented x 3, appears to be stated age and in no distress. Respiratory:    Respiratory effort is not labored. Patient is not gasping. Palpation of the chest reveals no tactile fremitus. Skin:    Upon inspection: the skin appears warm, dry and intact. There is  a previous scar over the affected area. There is any cellulitis, lymphedema or cutaneous lesions noted in the lower extremities. Upon palpation there is no induration noted. Neurologic:    Gait: normal;  Motor exam of the lower extremities show ; quadriceps, hamstrings, foot dorsi and plantar flexors intact R.  5/5 and L. 5/5. Deep tendon reflexes are 2/4 at the knees and 2/4 at the ankles with strong extensor hallicus longus motor strength bilaterally. Sensory to both feet is intact to all sensory roots. Cardiovascular: The vascular exam is normal and is well perfused to distal extremities. Distal pulses DP/PT: R. 2+; L. 2+. There is cap refill noted less than two seconds in all digits. There is not edema of the bilateral lower extremities. There is not varicosities noted in the distal extremities. Lymph:    Upon palpation,  there is no lymphadenopathy noted in bilateral lower extremities. Musculoskeletal:      Gait: normal; examination of the nails and digits reveal no cyanosis or clubbing. Lumbar exam:    On visual inspection, there is not deformity of the spine. full range of motion, no tenderness, palpable spasm or pain on motion. Special tests: Straight Leg Raise negative, Demetrius test negative. Hip exam:     Upon visual inspection there is not a deformity noted. Patient complains of tenderness at the  greater trochanter.      Exam of the right hip shows no

## 2018-11-07 ENCOUNTER — NURSE ONLY (OUTPATIENT)
Dept: ORTHOPEDIC SURGERY | Age: 73
End: 2018-11-07
Payer: COMMERCIAL

## 2018-11-07 DIAGNOSIS — M17.11 PRIMARY OSTEOARTHRITIS OF RIGHT KNEE: Primary | ICD-10-CM

## 2018-11-07 PROCEDURE — 20610 DRAIN/INJ JOINT/BURSA W/O US: CPT | Performed by: NURSE PRACTITIONER

## 2018-11-07 RX ORDER — HYALURONATE SODIUM 10 MG/ML
20 SYRINGE (ML) INTRAARTICULAR ONCE
Status: COMPLETED | OUTPATIENT
Start: 2018-11-07 | End: 2018-11-07

## 2018-11-07 RX ADMIN — Medication 20 MG: at 08:21

## 2018-11-14 ENCOUNTER — NURSE ONLY (OUTPATIENT)
Dept: ORTHOPEDIC SURGERY | Age: 73
End: 2018-11-14
Payer: COMMERCIAL

## 2018-11-14 DIAGNOSIS — M17.11 PRIMARY OSTEOARTHRITIS OF RIGHT KNEE: ICD-10-CM

## 2018-11-14 DIAGNOSIS — M70.61 TROCHANTERIC BURSITIS OF RIGHT HIP: Primary | ICD-10-CM

## 2018-11-14 PROCEDURE — 20610 DRAIN/INJ JOINT/BURSA W/O US: CPT | Performed by: NURSE PRACTITIONER

## 2018-11-14 RX ORDER — HYALURONATE SODIUM 10 MG/ML
20 SYRINGE (ML) INTRAARTICULAR ONCE
Status: COMPLETED | OUTPATIENT
Start: 2018-11-14 | End: 2018-11-14

## 2018-11-14 RX ORDER — TRIAMCINOLONE ACETONIDE 40 MG/ML
40 INJECTION, SUSPENSION INTRA-ARTICULAR; INTRAMUSCULAR ONCE
Status: COMPLETED | OUTPATIENT
Start: 2018-11-14 | End: 2018-11-14

## 2018-11-14 RX ADMIN — Medication 20 MG: at 08:32

## 2018-11-14 RX ADMIN — TRIAMCINOLONE ACETONIDE 40 MG: 40 INJECTION, SUSPENSION INTRA-ARTICULAR; INTRAMUSCULAR at 08:32

## 2018-11-21 ENCOUNTER — NURSE ONLY (OUTPATIENT)
Dept: ORTHOPEDIC SURGERY | Age: 73
End: 2018-11-21
Payer: COMMERCIAL

## 2018-11-21 DIAGNOSIS — M17.11 PRIMARY OSTEOARTHRITIS OF RIGHT KNEE: Primary | ICD-10-CM

## 2018-11-21 PROCEDURE — 20610 DRAIN/INJ JOINT/BURSA W/O US: CPT | Performed by: NURSE PRACTITIONER

## 2018-11-21 RX ORDER — HYALURONATE SODIUM 10 MG/ML
20 SYRINGE (ML) INTRAARTICULAR ONCE
Status: COMPLETED | OUTPATIENT
Start: 2018-11-21 | End: 2018-11-21

## 2018-11-21 RX ADMIN — Medication 20 MG: at 08:23

## 2018-11-21 NOTE — PROGRESS NOTES
Verbal and written consent was obtained by the patient. The following is a well known to me that is here for Euflexxa #3. The knee was prepped in sterile fashion. Euflexxa 20 mg injected to Right knee. The patient tolerated the injections well and I will see the patient back as needed.

## 2019-04-02 ENCOUNTER — OFFICE VISIT (OUTPATIENT)
Dept: ORTHOPEDIC SURGERY | Age: 74
End: 2019-04-02
Payer: COMMERCIAL

## 2019-04-02 VITALS — BODY MASS INDEX: 37.76 KG/M2 | TEMPERATURE: 98 F | HEIGHT: 61 IN | WEIGHT: 200 LBS

## 2019-04-02 DIAGNOSIS — S46.219A BICEPS TENDON TEAR: Primary | ICD-10-CM

## 2019-04-02 DIAGNOSIS — M77.01 MEDIAL EPICONDYLITIS, RIGHT: ICD-10-CM

## 2019-04-02 PROCEDURE — 20550 NJX 1 TENDON SHEATH/LIGAMENT: CPT | Performed by: ORTHOPAEDIC SURGERY

## 2019-04-02 PROCEDURE — 99214 OFFICE O/P EST MOD 30 MIN: CPT | Performed by: ORTHOPAEDIC SURGERY

## 2019-04-02 NOTE — PROGRESS NOTES
Chief Complaint   Patient presents with    Elbow Pain     Right Elbow, F/U        Subjective:     Patient ID: Diomedes Lovett is a 72 y.o. Vicky Nawaf female    Knee Pain  Patient complains of right elbow pain. She had MRi p[reviously with topical cream treatment. She also complains of lower back pain. She is also complaining of right knee pain.     Past Medical History:   Diagnosis Date    Arthritis     CAD (coronary artery disease)     CAD (coronary artery disease)     Diabetes ([address]     Fibromyalgia     GERD (gastroesophageal reflux disease)     History of cardiovascular stress test 02/17/2014    lexiscan, also 4/21/2015    History of MI (myocardial infarction)     x4; most recent 2016    Hx of blood clots     leg    Hyperlipidemia     Hypertension     Hypothyroidism     Intractable low back pain 6/19/2016    Non-insulin dependent type 2 diabetes mellitus ([facility] [address]     NSTEMI (non-ST elevated myocardial infarction) ([facility] [address] 11/19/2015     Past Surgical History:   Procedure Laterality Date    APPENDECTOMY      BACK SURGERY      BLADDER SURGERY      CARDIAC CATHETERIZATION  11/15/2017    Dr Thuy Conrad WITH IMPLANT Right 7 7 15    CATARACT REMOVAL WITH IMPLANT Left 4/26/2016    CHOLECYSTECTOMY      COCCYX REMOVAL      COLONOSCOPY      CORONARY ANGIOPLASTY WITH STENT PLACEMENT      1996 started needing cardiac care; 5 stents total    CORONARY ANGIOPLASTY WITH STENT PLACEMENT  11/19/2015    Dr Coleman Sol stent 3x18 prox Cx   Joel Mojica; 3 bypass grafts    ENDOSCOPY, COLON, DIAGNOSTIC      EYE SURGERY      HEMORRHOID SURGERY      HYSTERECTOMY      LUMBAR FUSION  6/7/2016    Dr. Moise December Left 11 18 2015    left lumbar transforaminal nerve block l4-5 l5-s1    NERVE BLOCK Right 07/17/2017    lumbar transforaminal #1    NERVE BLOCK Right 09/06/2017    right knee injection#1    NERVE BLOCK Bilateral 10/24/2018    lumbar facet block     ORTHOPEDIC SURGERY      bilateral feet    NC ARTHRS KNE SURG W/MENISCECTOMY MED/LAT W/SHVG Right 6/6/2018    RIGHT KNEE ARTHROSCOPY MEDIAL AND LATERAL MENISECTOMY SYNOVECTOMY AND CHONDROPLASTY performed by Manisha Bach DO at [facility] [address] DX/THER AGNT PARAVERT FACET JOINT, LUMBAR/SAC, 1ST LEVEL Bilateral 10/24/2018    BILATERAL LUMBAR FACET BLOCK L1-2 ,L3-4 WITH X-RAY performed by Roberta Schmidt DO at [address]      TONSILLECTOMY      TONSILLECTOMY      VASCULAR SURGERY      laser on leaky veins       Current Outpatient Medications:     aspirin 81 MG tablet, Take 81 mg by mouth daily, Disp: , Rfl:     oxyCODONE-acetaminophen (PERCOCET) 5-325 MG per tablet, Take 1 tablet by mouth daily. ., Disp: , Rfl:     dantrolene (DANTRIUM) 25 MG capsule, Take 1 capsule by mouth 2 times daily, Disp: 60 capsule, Rfl: 5    glipiZIDE (GLUCOTROL) 5 MG tablet, Take 5 mg by mouth 2 times daily (before meals), Disp: , Rfl:     omeprazole (PRILOSEC) 20 MG delayed release capsule, Take 20 mg by mouth daily, Disp: , Rfl:     lisinopril (PRINIVIL;ZESTRIL) 5 MG tablet, Take 5 mg by mouth daily, Disp: , Rfl:     IRON PO, Take 65 mg by mouth daily, Disp: , Rfl:     baclofen (LIORESAL) 10 MG tablet, Take 1 tablet by mouth nightly 10 mg -20 mg hs, Disp: 60 tablet, Rfl: 5    furosemide (LASIX) 20 MG tablet, Take 20 mg by mouth daily, Disp: , Rfl:     ondansetron (ZOFRAN ODT) 4 MG disintegrating tablet, Take 1 tablet by mouth every 8 hours as needed for Nausea or Vomiting, Disp: 10 tablet, Rfl: 0    clopidogrel (PLAVIX) 75 MG tablet, Take 75 mg by mouth daily, Disp: , Rfl:     nitroGLYCERIN (NITROSTAT) 0.4 MG SL tablet, Place 0.4 mg under the tongue every 5 minutes as needed for Chest pain up to max of 3 total doses.  If no relief after 1 dose, call 911., Disp: , Rfl:     folic acid (FOLVITE) 464 MCG tablet, Take 400 mcg by mouth 2 times daily , Disp: , Rfl:     dapagliflozin (FARXIGA) 5 MG tablet, Take 5 mg by mouth every morning, Disp: , Rfl:     metFORMIN (GLUCOPHAGE) 500 MG tablet, Take 1 tablet by mouth daily (with breakfast) (Patient taking differently: Take 1,000 mg by mouth 2 times daily (with meals) ), Disp: 60 tablet, Rfl: 3    vitamin D 1000 UNITS CAPS, Take 1,000 Units by mouth every evening , Disp: , Rfl:     metoprolol succinate ER (TOPROL-XL) 25 MG XL tablet, Take 1 tablet by mouth 2 times daily (Patient taking differently: Take 25 mg by mouth daily ), Disp: 30 tablet, Rfl: 3    gabapentin (NEURONTIN) 300 MG capsule, Take 300 mg by mouth. Takes 1 tablet in am 2 tablets in pm., Disp: , Rfl:     fenofibrate (TRICOR) 145 MG tablet, Take 145 mg by mouth daily, Disp: , Rfl:     isosorbide mononitrate (IMDUR) 60 MG CR tablet, Take 60 mg by mouth daily, Disp: , Rfl:     ezetimibe (ZETIA) 10 MG tablet, Take 10 mg by mouth every evening , Disp: , Rfl:     Multiple Vitamins-Minerals (CENTRUM SILVER PO), Take 1 tablet by mouth daily. , Disp: , Rfl:     levothyroxine (SYNTHROID) 100 MCG tablet, Take 100 mcg by mouth daily. , Disp: , Rfl:   Allergies   Allergen Reactions    Atorvastatin     Cipro Xr      Reaction unknown    Cymbalta [Duloxetine Hcl] Nausea Only    Diazepam     Erythromycin      Reaction unknown    Indomethacin     Ketorolac     Ketorolac Tromethamine Itching    Lipitor Other (See Comments)     Severe leg pain    Lodine [Etodolac]     Oscal 500-200 D-3 [Oyster Shell Calcium-D]     Paxil [Paroxetine Hcl]     Ropinirole     Statins Other (See Comments)     Muscles go weak and she falls    Tromethamine     Zithromax [Azithromycin]     Requip [Ropinirole Hcl] Nausea And Vomiting     Social History     Socioeconomic History    Marital status:       Spouse name: Not on file    Number of children: Not on file    Years of education: Not on file    Highest education level: Not on file Occupational History    Not on file   Social Needs    Financial resource strain: Not on file    Food insecurity:     Worry: Not on file     Inability: Not on file    Transportation needs:     Medical: Not on file     Non-medical: Not on file   Tobacco Use    Smoking status: Never Smoker    Smokeless tobacco: Never Used   Substance and Sexual Activity    Alcohol use: No    Drug use: No    Sexual activity: Never   Lifestyle    Physical activity:     Days per week: Not on file     Minutes per session: Not on file    Stress: Not on file   Relationships    Social connections:     Talks on phone: Not on file     Gets together: Not on file     Attends [employer] service: Not on file     Active member of club or organization: Not on file     Attends meetings of clubs or organizations: Not on file     Relationship status: Not on file    Intimate partner violence:     Fear of current or ex partner: Not on file     Emotionally abused: Not on file     Physically abused: Not on file     Forced sexual activity: Not on file   Other Topics Concern    Not on file   Social History Narrative    Not on file     Family History   Problem Relation Age of Onset    Cancer Father     Cancer Brother     Arthritis Other     Depression Other     Diabetes Other     Heart Disease Other     Hypertension Other          REVIEW OF SYSTEMS:     General/Constitution:  (-)weight loss, (-)fever, (-)chills, (-)weakness. Skin: (-) rash,(-) psoriasis,(-) eczema, (-)skin cancer. Musculoskeletal: (-) fractures,  (-) dislocations,(-) collagen vascular disease, (-) fibromyalgia, (-) multiple sclerosis, (-) muscular dystrophy, (-) RSD,(-) joint pain (-)swelling, (-) joint pain,swelling. Neurologic: (-) epilepsy, (-)seizures,(-) brain tumor,(-) TIA, (-)stroke, (-)headaches, (-)Parkinson disease,(-) memory loss, (-) LOC.   Cardiovascular: (-) Chest pain, (-) swelling in legs/feet, (-) SOB, (-) cramping in legs/feet with walking. Respiratory: (-) SOB, (-) Coughing, (-) night sweats. GI: (-) nausea, (-) vomiting, (-) diarrhea, (-) blood in stool, (-) gastric ulcer. Psychiatric: (-) Depression, (-) Anxiety, (-) bipolar disease, (-) Alzheimer's Disease  Allergic/Immunologic: (-) allergies latex, (-) allergies metal, (-) skin sensitivity. Hematlogic: (-) anemia, (-) blood transfusion, (-) DVT/PE, (-) Clotting disorders      Subjective:  Vital signs are stable. In general, patient is awake, alert and oriented X3, in no apparent distress. Examination of HENT reveals normocephalic, atraumatic. PERRLA/EOMI sclera are white. Conjunctivae are clear. TM's are intact. Pharynx is pink and moist.  Uvula and tongue are midline. Heart: Positive S1 and positive S2 with regular rate and rhythm. Lungs: Clear to auscultation bilaterally without rales, rhonchi or wheezes. Abdomen: soft, nontender. Positive bowel sounds. No organomegaly. No guarding or rigidity. Constitution:    Temp 98 °F (36.7 °C)   Ht 5' 1\" (1.549 m)   Wt 200 lb (90.7 kg)   BMI 37.79 kg/m²       Psycihatric:    The patient is alert and oriented x 3, appears to be stated age and in no distress. Respiratory:    Respiratory effort is not labored. Patient is not gasping. Palpation of the chest reveals no tactile fremitus. Skin:    Upon inspection: the skin appears warm, dry and intact. There is  a previous scar over the affected area. There is any cellulitis, lymphedema or cutaneous lesions noted in the lower extremities. Upon palpation there is no induration noted. Neurologic:    Gait: normal;  Motor exam of the lower extremities show ; quadriceps, hamstrings, foot dorsi and plantar flexors intact R.  5/5 and L. 5/5. Deep tendon reflexes are 2/4 at the knees and 2/4 at the ankles with strong extensor hallicus longus motor strength bilaterally. Sensory to both feet is intact to all sensory roots. Cardiovascular:     The vascular exam is Radiographic findings reviewed with patient    Assessment:  Encounter Diagnoses   Name Primary?  Biceps tendon tear Yes       Plan:  I discussed with the patient there is not a great fix for the bicep tendon tear/tendonitis. I explained to her that an injection would now help into the bicep. I recommend that she see Dr Colonel Young for lumbar evaluation  Verbal and written consent for the injection was given by the patient. She was placed in a supine position and the right medial elbow was cleaned in prepped with alcohol and betadine. She was injected with . 5ml of lidocaine and 05ml of Kenalog 40 mg into the elbow. The patient tolerated the injection well.   Follow up prn  She will follow up in 1 week for her knee

## 2019-04-09 ENCOUNTER — OFFICE VISIT (OUTPATIENT)
Dept: ORTHOPEDIC SURGERY | Age: 74
End: 2019-04-09
Payer: COMMERCIAL

## 2019-04-09 VITALS — WEIGHT: 189 LBS | TEMPERATURE: 98 F | BODY MASS INDEX: 35.68 KG/M2 | HEIGHT: 61 IN

## 2019-04-09 DIAGNOSIS — M17.11 PRIMARY OSTEOARTHRITIS OF RIGHT KNEE: Primary | ICD-10-CM

## 2019-04-09 PROCEDURE — 99213 OFFICE O/P EST LOW 20 MIN: CPT | Performed by: ORTHOPAEDIC SURGERY

## 2019-04-09 NOTE — PROGRESS NOTES
Chief Complaint   Patient presents with    Knee Pain     Right Knee, F/U  Finished Euflexxa series on 11/21/18 with little results. Subjective:     Patient ID: Dawna Paula is a 72 y.o. Kaye Chars female    Knee Pain  Patient complains of right knee pain. She had arthroscopy in June 2018. She has also had CSI and visco with minimal relief.   Past Medical History:   Diagnosis Date    Arthritis     CAD (coronary artery disease)     CAD (coronary artery disease)     Diabetes ([address]     Fibromyalgia     GERD (gastroesophageal reflux disease)     History of cardiovascular stress test 02/17/2014    lexiscan, also 4/21/2015    History of MI (myocardial infarction)     x4; most recent 2016    Hx of blood clots     leg    Hyperlipidemia     Hypertension     Hypothyroidism     Intractable low back pain 6/19/2016    Non-insulin dependent type 2 diabetes mellitus ([facility] [address]     NSTEMI (non-ST elevated myocardial infarction) ([facility] [address] 11/19/2015     Past Surgical History:   Procedure Laterality Date    APPENDECTOMY      BACK SURGERY      BLADDER SURGERY      CARDIAC CATHETERIZATION  11/15/2017    Dr Monik Inman WITH IMPLANT Right 7 7 15    CATARACT REMOVAL WITH IMPLANT Left 4/26/2016    CHOLECYSTECTOMY      COCCYX REMOVAL      COLONOSCOPY      CORONARY ANGIOPLASTY WITH STENT PLACEMENT      1996 started needing cardiac care; 5 stents total    CORONARY ANGIOPLASTY WITH STENT PLACEMENT  11/19/2015    Dr Valerio Captain stent 3x18 prox Cx   Soco Ruck Dr. Tex Jang; 3 bypass grafts    ENDOSCOPY, COLON, DIAGNOSTIC      EYE SURGERY      HEMORRHOID SURGERY      HYSTERECTOMY      LUMBAR FUSION  6/7/2016    Dr. Payton Montgomery Left 11 18 2015    left lumbar transforaminal nerve block l4-5 l5-s1    NERVE BLOCK Right 07/17/2017    lumbar transforaminal #1    NERVE BLOCK Right 09/06/2017    right knee injection#1    NERVE BLOCK Bilateral 10/24/2018    lumbar facet block     ORTHOPEDIC SURGERY      bilateral feet    HI ARTHRS KNE SURG W/MENISCECTOMY MED/LAT W/SHVG Right 6/6/2018    RIGHT KNEE ARTHROSCOPY MEDIAL AND LATERAL MENISECTOMY SYNOVECTOMY AND CHONDROPLASTY performed by Jennyfer Ryan DO at [facility] [address] DX/THER AGNT PARAVERT FACET JOINT, LUMBAR/SAC, 1ST LEVEL Bilateral 10/24/2018    BILATERAL LUMBAR FACET BLOCK L1-2 ,L3-4 WITH X-RAY performed by Janey Day DO at [address]      TONSILLECTOMY      TONSILLECTOMY      VASCULAR SURGERY      laser on leaky veins       Current Outpatient Medications:     Semaglutide 0.25 or 0.5 MG/DOSE SOPN, Inject into the skin, Disp: , Rfl:     aspirin 81 MG tablet, Take 81 mg by mouth daily, Disp: , Rfl:     oxyCODONE-acetaminophen (PERCOCET) 5-325 MG per tablet, Take 1 tablet by mouth daily. ., Disp: , Rfl:     dantrolene (DANTRIUM) 25 MG capsule, Take 1 capsule by mouth 2 times daily, Disp: 60 capsule, Rfl: 5    glipiZIDE (GLUCOTROL) 5 MG tablet, Take 5 mg by mouth 2 times daily (before meals), Disp: , Rfl:     omeprazole (PRILOSEC) 20 MG delayed release capsule, Take 20 mg by mouth daily, Disp: , Rfl:     lisinopril (PRINIVIL;ZESTRIL) 5 MG tablet, Take 5 mg by mouth daily, Disp: , Rfl:     IRON PO, Take 65 mg by mouth daily, Disp: , Rfl:     baclofen (LIORESAL) 10 MG tablet, Take 1 tablet by mouth nightly 10 mg -20 mg hs, Disp: 60 tablet, Rfl: 5    furosemide (LASIX) 20 MG tablet, Take 20 mg by mouth daily, Disp: , Rfl:     ondansetron (ZOFRAN ODT) 4 MG disintegrating tablet, Take 1 tablet by mouth every 8 hours as needed for Nausea or Vomiting, Disp: 10 tablet, Rfl: 0    clopidogrel (PLAVIX) 75 MG tablet, Take 75 mg by mouth daily, Disp: , Rfl:     nitroGLYCERIN (NITROSTAT) 0.4 MG SL tablet, Place 0.4 mg under the tongue every 5 minutes as needed for Chest pain up to max of 3 total doses.  If no relief after 1 dose, call Years of education: Not on file    Highest education level: Not on file   Occupational History    Not on file   Social Needs    Financial resource strain: Not on file    Food insecurity:     Worry: Not on file     Inability: Not on file    Transportation needs:     Medical: Not on file     Non-medical: Not on file   Tobacco Use    Smoking status: Never Smoker    Smokeless tobacco: Never Used   Substance and Sexual Activity    Alcohol use: No    Drug use: No    Sexual activity: Never   Lifestyle    Physical activity:     Days per week: Not on file     Minutes per session: Not on file    Stress: Not on file   Relationships    Social connections:     Talks on phone: Not on file     Gets together: Not on file     Attends Gnosticism service: Not on file     Active member of club or organization: Not on file     Attends meetings of clubs or organizations: Not on file     Relationship status: Not on file    Intimate partner violence:     Fear of current or ex partner: Not on file     Emotionally abused: Not on file     Physically abused: Not on file     Forced sexual activity: Not on file   Other Topics Concern    Not on file   Social History Narrative    Not on file     Family History   Problem Relation Age of Onset    Cancer Father     Cancer Brother     Arthritis Other     Depression Other     Diabetes Other     Heart Disease Other     Hypertension Other          REVIEW OF SYSTEMS:     General/Constitution:  (-)weight loss, (-)fever, (-)chills, (-)weakness. Skin: (-) rash,(-) psoriasis,(-) eczema, (-)skin cancer. Musculoskeletal: (-) fractures,  (-) dislocations,(-) collagen vascular disease, (-) fibromyalgia, (-) multiple sclerosis, (-) muscular dystrophy, (-) RSD,(-) joint pain (-)swelling, (-) joint pain,swelling. Neurologic: (-) epilepsy, (-)seizures,(-) brain tumor,(-) TIA, (-)stroke, (-)headaches, (-)Parkinson disease,(-) memory loss, (-) LOC.   Cardiovascular: (-) Chest pain, (-) swelling in legs/feet, (-) SOB, (-) cramping in legs/feet with walking. Respiratory: (-) SOB, (-) Coughing, (-) night sweats. GI: (-) nausea, (-) vomiting, (-) diarrhea, (-) blood in stool, (-) gastric ulcer. Psychiatric: (-) Depression, (-) Anxiety, (-) bipolar disease, (-) Alzheimer's Disease  Allergic/Immunologic: (-) allergies latex, (-) allergies metal, (-) skin sensitivity. Hematlogic: (-) anemia, (-) blood transfusion, (-) DVT/PE, (-) Clotting disorders      Subjective:  Vital signs are stable. In general, patient is awake, alert and oriented X3, in no apparent distress. Examination of HENT reveals normocephalic, atraumatic. PERRLA/EOMI sclera are white. Conjunctivae are clear. TM's are intact. Pharynx is pink and moist.  Uvula and tongue are midline. Heart: Positive S1 and positive S2 with regular rate and rhythm. Lungs: Clear to auscultation bilaterally without rales, rhonchi or wheezes. Abdomen: soft, nontender. Positive bowel sounds. No organomegaly. No guarding or rigidity. Constitution:    Temp 98 °F (36.7 °C)   Ht 5' 1\" (1.549 m)   Wt 189 lb (85.7 kg)   BMI 35.71 kg/m²       Psycihatric:    The patient is alert and oriented x 3, appears to be stated age and in no distress. Respiratory:    Respiratory effort is not labored. Patient is not gasping. Palpation of the chest reveals no tactile fremitus. Skin:    Upon inspection: the skin appears warm, dry and intact. There is  a previous scar over the affected area. There is any cellulitis, lymphedema or cutaneous lesions noted in the lower extremities. Upon palpation there is no induration noted. Neurologic:    Gait: normal;  Motor exam of the lower extremities show ; quadriceps, hamstrings, foot dorsi and plantar flexors intact R.  5/5 and L. 5/5. Deep tendon reflexes are 2/4 at the knees and 2/4 at the ankles with strong extensor hallicus longus motor strength bilaterally.  Sensory to both feet is intact to all sensory roots. Cardiovascular: The vascular exam is normal and is well perfused to distal extremities. Distal pulses DP/PT: R. 2+; L. 2+. There is cap refill noted less than two seconds in all digits. There is not edema of the bilateral lower extremities. There is not varicosities noted in the distal extremities. Lymph:    Upon palpation,  there is no lymphadenopathy noted in bilateral lower extremities. Musculoskeletal:      Gait: normal; examination of the nails and digits reveal no cyanosis or clubbing. Lumbar exam:    On visual inspection, there is not deformity of the spine. full range of motion, no tenderness, palpable spasm or pain on motion. Special tests: Straight Leg Raise negative, Demetrius test negative. Hip exam-     Upon inspection, there is not deformity noted. Upon palpation there is not tenderness. ROM: is  full and symmetrical.   Strength: Hip Flexors 5/5; Hip Abductors 5/5; Hip Adduction 5/5. Knee exam    Right knee exam shows;  range of motion of R. Knee is 0 to 120, and L. Knee is 0 to 120. The patient does have  pain on motion, effusion is moderate, there is tenderness over the  global region, there are not any masses, there is not ligamentous instability, there is not  deformity noted. Knee exam: right positive for moderate crepitations, some mild tenderness laxity is not noted with any stress. There is not a popliteal cyst.    R. Knee:  Lachman's negative, Anterior Drawer negative, Posterior Drawer negative  Dario's negative, Thallasy  negative,   PF grind test negative, Apprehension test negative, Patellar J sign  negative  L. Knee:  Lachman's negative, Anterior Drawer negative, Posterior Drawer negative  Dario's negative, Thallasy  negative,   PF grind test negative, Apprehension test negative,  Patellar J sign  negative    Right forearm with palpable mass to medial forearm    MRI:  Diffuse partial thickness cartilage loss is identified. There is mild   spurring of the lateral compartment. A complex tear of the body and   anterior horn of the lateral meniscus with a predominant horizontal   component. The medial meniscus is intact. ACL, PCL, MCL and LCL   complexes are intact. Radiographic findings reviewed with patient      Assessment:  Encounter Diagnoses   Name Primary?  Primary osteoarthritis of right knee Yes           Plan:  I had a lengthy discussion with the patient regarding their diagnosis. I explained treatment options including surgical vs non surgical treatment. I reviewed in detail the risks and benefits and outlined the procedure in detail with expected outcomes and possible complications. I also discussed non surgical treatment such as injections (CSI and visco supplementation), physical therapy, topical creams and NSAID's. They have elected for conservative management at this time. The patient has failed conservative measures such as NSAIDS, HEP, and cortisone injections. She is an excellent candidate for Zilretta injections  in the Right knee.  We will contact the patient's insurance company and see them back in the office once we have received approval.

## 2019-04-16 ENCOUNTER — HOSPITAL ENCOUNTER (EMERGENCY)
Age: 74
Discharge: HOME | End: 2019-04-16
Payer: COMMERCIAL

## 2019-04-16 ENCOUNTER — APPOINTMENT (OUTPATIENT)
Dept: ULTRASOUND IMAGING | Age: 74
End: 2019-04-16
Payer: COMMERCIAL

## 2019-04-16 VITALS
SYSTOLIC BLOOD PRESSURE: 155 MMHG | HEART RATE: 79 BPM | OXYGEN SATURATION: 95 % | WEIGHT: 196.6 LBS | BODY MASS INDEX: 37.12 KG/M2 | HEIGHT: 61 IN | RESPIRATION RATE: 18 BRPM | DIASTOLIC BLOOD PRESSURE: 72 MMHG | TEMPERATURE: 98.1 F

## 2019-04-16 DIAGNOSIS — M79.604 RIGHT LEG PAIN: Primary | ICD-10-CM

## 2019-04-16 PROCEDURE — 93971 EXTREMITY STUDY: CPT

## 2019-04-16 PROCEDURE — 6370000000 HC RX 637 (ALT 250 FOR IP): Performed by: NURSE PRACTITIONER

## 2019-04-16 PROCEDURE — 99284 EMERGENCY DEPT VISIT MOD MDM: CPT

## 2019-04-16 RX ORDER — ACETAMINOPHEN 325 MG/1
650 TABLET ORAL ONCE
Status: COMPLETED | OUTPATIENT
Start: 2019-04-16 | End: 2019-04-16

## 2019-04-16 RX ADMIN — ACETAMINOPHEN 650 MG: 325 TABLET, FILM COATED ORAL at 21:00

## 2019-04-16 ASSESSMENT — PAIN DESCRIPTION - ORIENTATION: ORIENTATION: RIGHT

## 2019-04-16 ASSESSMENT — PAIN SCALES - GENERAL
PAINLEVEL_OUTOF10: 10
PAINLEVEL_OUTOF10: 10

## 2019-04-16 ASSESSMENT — PAIN DESCRIPTION - DESCRIPTORS: DESCRIPTORS: SHARP

## 2019-04-16 ASSESSMENT — PAIN DESCRIPTION - PAIN TYPE: TYPE: ACUTE PAIN

## 2019-04-16 ASSESSMENT — PAIN DESCRIPTION - LOCATION: LOCATION: LEG

## 2019-04-17 NOTE — ED PROVIDER NOTES
[facility]         Department of Emergency Medicine   ED  Provider Note  Admit Date/RoomTime: 4/16/2019  8:08 PM  ED Room: 17/17  Chief Complaint   Leg Pain (right sided, started last night, hx dvt in that leg)    History of Present Illness   Source of history provided by:  patient. History/Exam Limitations: none. Abigail Seth is a 72 y.o. old female who has a past medical history of:   Past Medical History:   Diagnosis Date    Arthritis     CAD (coronary artery disease)     CAD (coronary artery disease)     Diabetes ([facility] [address]     Fibromyalgia     GERD (gastroesophageal reflux disease)     History of cardiovascular stress test 02/17/2014    lexiscan, also 4/21/2015    History of MI (myocardial infarction)     x4; most recent 2016    Hx of blood clots     leg    Hyperlipidemia     Hypertension     Hypothyroidism     Intractable low back pain 6/19/2016    Non-insulin dependent type 2 diabetes mellitus ([facility] [address]     NSTEMI (non-ST elevated myocardial infarction) ([facility] [address] 11/19/2015    presents to the emergency department by private vehicle, for Right calf pain which occured 1 day(s) prior to arrival.  Cause of complaint: none while at home. Her weight bearing status is normal.  Previous injury: no. The patients tetanus status is unknown. Since onset the symptoms have been moderate in degree. Her pain is aggraveated by nothing and relieved by nothing. ROS    Pertinent positives and negatives are stated within HPI, all other systems reviewed and are negative.     Past Surgical History:   Procedure Laterality Date    APPENDECTOMY      BACK SURGERY      BLADDER SURGERY      CARDIAC CATHETERIZATION  11/15/2017    Dr Alma Delia Cadena Right 7 7 15    CATARACT REMOVAL WITH IMPLANT Left 4/26/2016    CHOLECYSTECTOMY      COCCYX REMOVAL      COLONOSCOPY      CORONARY ANGIOPLASTY WITH STENT PLACEMENT      1996 started needing cardiac care; 5 stents total    CORONARY ANGIOPLASTY WITH STENT PLACEMENT  11/19/2015    Dr Sumanth Garcia stent 3x18 prox Cx   Cleophus Chani Haque; 3 bypass grafts    ENDOSCOPY, COLON, DIAGNOSTIC      EYE SURGERY      HEMORRHOID SURGERY      HYSTERECTOMY      LUMBAR FUSION  6/7/2016    Dr. Bahman Serna Left 11 18 2015    left lumbar transforaminal nerve block l4-5 l5-s1    NERVE BLOCK Right 07/17/2017    lumbar transforaminal #1    NERVE BLOCK Right 09/06/2017    right knee injection#1    NERVE BLOCK Bilateral 10/24/2018    lumbar facet block     ORTHOPEDIC SURGERY      bilateral feet    NE ARTHRS KNE SURG W/MENISCECTOMY MED/LAT W/SHVG Right 6/6/2018    RIGHT KNEE ARTHROSCOPY MEDIAL AND LATERAL MENISECTOMY SYNOVECTOMY AND CHONDROPLASTY performed by Fabio Beverly DO at [address] DX/THER AGNT PARAVERT FACET JOINT, LUMBAR/SAC, 1ST LEVEL Bilateral 10/24/2018    BILATERAL LUMBAR FACET BLOCK L1-2 ,L3-4 WITH X-RAY performed by Darvin Giron DO at [address]      TONSILLECTOMY      TONSILLECTOMY      VASCULAR SURGERY      laser on leaky veins   Social History:  reports that she has never smoked. She has never used smokeless tobacco. She reports that she does not drink alcohol or use drugs. Family History: family history includes Arthritis in an other family member; Cancer in her brother and father; Depression in an other family member; Diabetes in an other family member; Heart Disease in an other family member; Hypertension in an other family member. Allergies: Atorvastatin; Cipro xr; Cymbalta [duloxetine hcl]; Diazepam; Erythromycin; Indomethacin; Ketorolac; Ketorolac tromethamine; Lipitor; Lodine [etodolac]; Oscal 500-200 d-3 [oyster shell calcium-d]; Paxil [paroxetine hcl]; Ropinirole; Statins; Tromethamine; Trulicity [dulaglutide];  Zithromax [azithromycin]; and Requip [ropinirole hcl]    Physical Exam           ED Triage Vitals [04/16/19 2001]   BP Temp Temp Source Pulse Resp SpO2 Height Weight   (!) 155/72 98.1 °F (36.7 °C) Oral 79 18 95 % 5' 1\" (1.549 m) 196 lb 9.6 oz (89.2 kg)      Oxygen Saturation Interpretation: Normal.    · Constitutional:  Alert, development consistent with age. · HEENT:  NC/NT. Airway patent. · Neck:  Normal ROM. Supple. · Extremity(s):  Right: calf. Tenderness:  mild. Swelling: None. Calf:  Posterior calf tenderness present. .              Deformity: No.                 ROM: diminished range with pain. Skin:  no erythema, rash or wounds noted. Neurovascular: Motor deficit: none. Sensory deficit: none. Pulse deficit: none. Capillary refill: normal.  · Gait:  normal.  · Lymphatics: No lymphangitis or adenopathy noted. · Neurological:  Oriented x3. Motor functions intact. Lab / Imaging Results   (All laboratory and radiology results have been personally reviewed by myself)  Labs:  No results found for this visit on 04/16/19. Imaging: All Radiology results interpreted by Radiologist unless otherwise noted. US DUP LOWER EXTREMITY RIGHT MATIAS   Final Result   No evidence of deep venous thrombosis in the right lower extremity. ED Course / Medical Decision Making     Medications   acetaminophen (TYLENOL) tablet 650 mg (650 mg Oral Given 4/16/19 2100)        Consults:   None    Procedure(s):   none    MDM:      Ultrasound was negative for acute DVT in there was no trauma. Plan is subsequently for symptom control with appropriate outpatient follow-up. She is instructed to return to emergency department with any new or worsening symptoms. Counseling:    The emergency provider has spoken with the patient and discussed todays results, in addition to providing specific details for the plan of care and counseling regarding the diagnosis and prognosis. Questions are answered at this time and they are agreeable with the plan. Assessment      1. Right leg pain      Plan   Discharge to home  Patient condition is good    New Medications     Discharge Medication List as of 4/16/2019  9:48 PM        Electronically signed by TRINIDAD Johnson CNP   DD: 4/16/19  **This report was transcribed using voice recognition software. Every effort was made to ensure accuracy; however, inadvertent computerized transcription errors may be present.   END OF ED PROVIDER NOTE       TRINIDAD Kidd CNP  04/16/19 [id number]

## 2019-05-29 DIAGNOSIS — M25.552 LEFT HIP PAIN: Primary | ICD-10-CM

## 2019-05-30 ENCOUNTER — OFFICE VISIT (OUTPATIENT)
Dept: ORTHOPEDIC SURGERY | Age: 74
End: 2019-05-30
Payer: COMMERCIAL

## 2019-05-30 VITALS — WEIGHT: 194 LBS | BODY MASS INDEX: 36.63 KG/M2 | HEIGHT: 61 IN

## 2019-05-30 DIAGNOSIS — M16.12 PRIMARY OSTEOARTHRITIS OF LEFT HIP: ICD-10-CM

## 2019-05-30 DIAGNOSIS — M70.62 TROCHANTERIC BURSITIS, LEFT HIP: Primary | ICD-10-CM

## 2019-05-30 PROCEDURE — 99213 OFFICE O/P EST LOW 20 MIN: CPT | Performed by: ORTHOPAEDIC SURGERY

## 2019-05-30 NOTE — PROGRESS NOTES
Chief Complaint   Patient presents with    Follow-up     follow up from left hip pain, patient had films today, patient states injections help. Jeannie Sams  Is a 72 y.o. @female@  is following up today with left hip pain. Patient states pain level is a 5/10. She has tried cortisone to troch bursa.     Past Medical History:   Diagnosis Date    Arthritis     CAD (coronary artery disease)     CAD (coronary artery disease)     Diabetes ([facility]     Fibromyalgia     GERD (gastroesophageal reflux disease)     History of cardiovascular stress test 02/17/2014    lexiscan, also 4/21/2015    History of MI (myocardial infarction)     x4; most recent 2016    Hx of blood clots     leg    Hyperlipidemia     Hypertension     Hypothyroidism     Intractable low back pain 6/19/2016    Non-insulin dependent type 2 diabetes mellitus ([facility] [address]     NSTEMI (non-ST elevated myocardial infarction) ([facility] [address] 11/19/2015     Past Surgical History:   Procedure Laterality Date    APPENDECTOMY      BACK SURGERY      BLADDER SURGERY      CARDIAC CATHETERIZATION  11/15/2017    Dr Familia Lu WITH IMPLANT Right 7 7 15    CATARACT REMOVAL WITH IMPLANT Left 4/26/2016    CHOLECYSTECTOMY      COCCYX REMOVAL      COLONOSCOPY      CORONARY ANGIOPLASTY WITH STENT PLACEMENT      1996 started needing cardiac care; 5 stents total    CORONARY ANGIOPLASTY WITH STENT PLACEMENT  11/19/2015    Dr Kamilah Nielson stent 3x18 prox Cx   [place] Dr. Erskin Canavan; 3 bypass grafts    ENDOSCOPY, COLON, DIAGNOSTIC      EYE SURGERY      HEMORRHOID SURGERY      HYSTERECTOMY      LUMBAR FUSION  6/7/2016    Dr. Martin Last Left 11 18 2015    left lumbar transforaminal nerve block l4-5 l5-s1    NERVE BLOCK Right 07/17/2017    lumbar transforaminal #1    NERVE BLOCK Right 09/06/2017    right knee injection#1    NERVE BLOCK Bilateral 10/24/2018    lumbar facet block     ORTHOPEDIC SURGERY      bilateral feet    KY ARTHRS KNE SURG W/MENISCECTOMY MED/LAT W/SHVG Right 6/6/2018    RIGHT KNEE ARTHROSCOPY MEDIAL AND LATERAL MENISECTOMY SYNOVECTOMY AND CHONDROPLASTY performed by Lucia Cruz DO at [facility] [address] DX/THER AGNT PARAVERT FACET JOINT, LUMBAR/SAC, 1ST LEVEL Bilateral 10/24/2018    BILATERAL LUMBAR FACET BLOCK L1-2 ,L3-4 WITH X-RAY performed by Mike De Paz DO at [address]      TONSILLECTOMY      TONSILLECTOMY      VASCULAR SURGERY      laser on leaky veins       Current Outpatient Medications:     aspirin 81 MG tablet, Take 81 mg by mouth daily, Disp: , Rfl:     oxyCODONE-acetaminophen (PERCOCET) 5-325 MG per tablet, Take 1 tablet by mouth daily. ., Disp: , Rfl:     glipiZIDE (GLUCOTROL) 5 MG tablet, Take 5 mg by mouth 2 times daily (before meals), Disp: , Rfl:     omeprazole (PRILOSEC) 20 MG delayed release capsule, Take 20 mg by mouth daily, Disp: , Rfl:     lisinopril (PRINIVIL;ZESTRIL) 5 MG tablet, Take 5 mg by mouth daily, Disp: , Rfl:     IRON PO, Take 65 mg by mouth daily, Disp: , Rfl:     baclofen (LIORESAL) 10 MG tablet, Take 1 tablet by mouth nightly 10 mg -20 mg hs, Disp: 60 tablet, Rfl: 5    furosemide (LASIX) 20 MG tablet, Take 20 mg by mouth daily, Disp: , Rfl:     ondansetron (ZOFRAN ODT) 4 MG disintegrating tablet, Take 1 tablet by mouth every 8 hours as needed for Nausea or Vomiting, Disp: 10 tablet, Rfl: 0    clopidogrel (PLAVIX) 75 MG tablet, Take 75 mg by mouth daily, Disp: , Rfl:     nitroGLYCERIN (NITROSTAT) 0.4 MG SL tablet, Place 0.4 mg under the tongue every 5 minutes as needed for Chest pain up to max of 3 total doses.  If no relief after 1 dose, call 911., Disp: , Rfl:     folic acid (FOLVITE) 890 MCG tablet, Take 400 mcg by mouth 2 times daily , Disp: , Rfl:     metFORMIN (GLUCOPHAGE) 500 MG tablet, Take 1 tablet by mouth daily (with breakfast) (Patient taking differently: Take 1,000 mg by mouth 2 times daily (with meals) ), Disp: 60 tablet, Rfl: 3    vitamin D 1000 UNITS CAPS, Take 1,000 Units by mouth every evening , Disp: , Rfl:     metoprolol succinate ER (TOPROL-XL) 25 MG XL tablet, Take 1 tablet by mouth 2 times daily (Patient taking differently: Take 25 mg by mouth daily ), Disp: 30 tablet, Rfl: 3    gabapentin (NEURONTIN) 300 MG capsule, Take 300 mg by mouth. Takes 1 tablet in am 2 tablets in pm., Disp: , Rfl:     fenofibrate (TRICOR) 145 MG tablet, Take 145 mg by mouth daily, Disp: , Rfl:     isosorbide mononitrate (IMDUR) 60 MG CR tablet, Take 60 mg by mouth daily, Disp: , Rfl:     ezetimibe (ZETIA) 10 MG tablet, Take 10 mg by mouth every evening , Disp: , Rfl:     Multiple Vitamins-Minerals (CENTRUM SILVER PO), Take 1 tablet by mouth daily. , Disp: , Rfl:     levothyroxine (SYNTHROID) 100 MCG tablet, Take 100 mcg by mouth daily. , Disp: , Rfl:     dantrolene (DANTRIUM) 25 MG capsule, Take 1 capsule by mouth 2 times daily, Disp: 60 capsule, Rfl: 5  Allergies   Allergen Reactions    Atorvastatin     Cipro Xr      Reaction unknown    Cymbalta [Duloxetine Hcl] Nausea Only    Diazepam     Erythromycin      Reaction unknown    Indomethacin     Ketorolac     Ketorolac Tromethamine Itching    Lipitor Other (See Comments)     Severe leg pain    Lodine [Etodolac]     Oscal 500-200 D-3 [Oyster Shell Calcium-D]     Paxil [Paroxetine Hcl]     Ropinirole     Statins Other (See Comments)     Muscles go weak and she falls    Tromethamine     Trulicity [Dulaglutide] Swelling    Zithromax [Azithromycin]     Requip [Ropinirole Hcl] Nausea And Vomiting     Social History     Socioeconomic History    Marital status:       Spouse name: Not on file    Number of children: Not on file    Years of education: Not on file    Highest education level: Not on file   Occupational History    Not on file   Social Needs    Financial resource strain: Not on file    Food insecurity:     Worry: Not on file     Inability: Not on file    Transportation needs:     Medical: Not on file     Non-medical: Not on file   Tobacco Use    Smoking status: Never Smoker    Smokeless tobacco: Never Used   Substance and Sexual Activity    Alcohol use: No    Drug use: No    Sexual activity: Never   Lifestyle    Physical activity:     Days per week: Not on file     Minutes per session: Not on file    Stress: Not on file   Relationships    Social connections:     Talks on phone: Not on file     Gets together: Not on file     Attends Anabaptism service: Not on file     Active member of club or organization: Not on file     Attends meetings of clubs or organizations: Not on file     Relationship status: Not on file    Intimate partner violence:     Fear of current or ex partner: Not on file     Emotionally abused: Not on file     Physically abused: Not on file     Forced sexual activity: Not on file   Other Topics Concern    Not on file   Social History Narrative    Not on file     Family History   Problem Relation Age of Onset    Cancer Father     Cancer Brother     Arthritis Other     Depression Other     Diabetes Other     Heart Disease Other     Hypertension Other        REVIEW OF SYSTEMS:     General/Constitution:  (-)weight loss, (-)fever, (-)chills, (-)weakness. Skin: (-) rash,(-) psoriasis,(-) eczema, (-)skin cancer. Musculoskeletal: (-) fractures,  (-) dislocations,(-) collagen vascular disease, (-) fibromyalgia, (-) multiple sclerosis, (-) muscular dystrophy, (-) RSD,(-) joint pain (-)swelling, (-) joint pain,swelling. Neurologic: (-) epilepsy, (-)seizures,(-) brain tumor,(-) TIA, (-)stroke, (-)headaches, (-)Parkinson disease,(-) memory loss, (-) LOC. Cardiovascular: (-) Chest pain, (-) swelling in legs/feet, (-) SOB, (-) cramping in legs/feet with walking. Respiratory: (-) SOB, (-) Coughing, (-) night sweats.   GI: (-) nausea, (-) vomiting, (-) diarrhea, (-) blood in stool, (-) gastric ulcer. Psychiatric: (-) Depression, (-) Anxiety, (-) bipolar disease, (-) Alzheimer's Disease  Allergic/Immunologic: (-) allergies latex, (-) allergies metal, (-) skin sensitivity. Hematlogic: (-) anemia, (-) blood transfusion, (-) DVT/PE, (-) Clotting disorders    Constitutional:  The patient is alert and oriented x 3, appears to be stated age and in no distress. Ht 5' 1\" (1.549 m)   Wt 194 lb (88 kg)   BMI 36.66 kg/m²     Skin:  Upon inspection: the skin appears warm, dry and intact. There is not a previous scar over the affected area. There is not any cellulitis, lymphedema or cutaneous lesions noted in the lower extremities. Upon palpation there is no induration noted. Neurologic:  Gait: normal;  Motor exam of the lower extremities show ; quadriceps, hamstrings, foot dorsi and plantar flexors intact R.  5/5 and L. 5/5. Deep tendon reflexes are 2/4 at the knees and 2/4 at the ankles with strong extensor hallicus longus motor strength bilaterally. Sensory to both feet is intact to all sensory roots. .    Cardiovascular: The vascular exam is normal and is well perfused to distal extremities. Distal pulses DP/PT: R. 2+; L. 2+. There is cap refill noted less than two seconds in all digits. There is not edema of the bilateral lower extremities. There is not varicosities noted in the distal extremities. Lymph:  Upon palpation,  there is no lymphadenopathy noted in bilateral lower extremities. Musculoskeletal:  Gait: normal; examination of the nails and digits reveal no cyanosis or clubbing. Lumbar exam:  On visual inspection, there is not deformity of the spine. antalgic gait, limited range of motion, pain with motion noted during exam. Special tests: Straight Leg Raise positive, Demetrius testnegative. Hip exam:  Upon visual inspection there is not a deformity noted. Patient complains of tenderness at the  greater trochanter and groin.      Exam of the left hip shows none leg length discrepancy. Range of motion of the involved/uninvolved hip is 20 degrees internal rotation and 30 degrees external rotation/20 degrees internal rotation and 30 degrees external rotation, the hip joint is stable to testing. Strength of the lower extremity is normal.The patient does not have ipsilateral knee pain, and is described as  none. Hip exam- Gait: normal; Strength: Hip Flexors 5/5; Hip Abductors 5/5; Hip Adduction 5/5. Knee exam   bilateral knee exam shows;  range of motion of R. Knee is 0 to 120, and L. Knee is 0 to 120. The patient does not have  pain on motion, effusion is none, there is not tenderness over the  medial, lateral, anterior region, there are not any masses, there is not ligamentous instability, there is not  deformity noted. Knee exam: the injured knee reveals normal exam, no swelling, tenderness, instability; ligaments intact, FROM. Knee exam: neither positive for moderate crepitations, some mild tenderness laxity is not noted with  stress. Xrays: The hip is well aligned. Mild arthritic narrowing of the hip joints   bilaterally similar to the previous study. Femoral head and neck   junction normal in contour. No fractures identified. The bony pelvis   appears normal.               Radiographic findings reviewed with patient      Assessment:  Encounter Diagnosis   Name Primary?  Trochanteric bursitis, left hip Yes       Plan:  Natural history and expected course discussed. Questions answered. Educational materials distributed. Home exercises discussed. OTC analgesics as needed.    Left hip CSI under fluro with dr Jose Angel trejo  Fu after injection to evaluate

## 2019-06-04 ENCOUNTER — OFFICE VISIT (OUTPATIENT)
Dept: NEUROSURGERY | Age: 74
End: 2019-06-04
Payer: COMMERCIAL

## 2019-06-04 VITALS
HEIGHT: 61 IN | WEIGHT: 191 LBS | DIASTOLIC BLOOD PRESSURE: 60 MMHG | SYSTOLIC BLOOD PRESSURE: 115 MMHG | BODY MASS INDEX: 36.06 KG/M2 | HEART RATE: 69 BPM

## 2019-06-04 DIAGNOSIS — M54.40 CHRONIC LOW BACK PAIN WITH SCIATICA, SCIATICA LATERALITY UNSPECIFIED, UNSPECIFIED BACK PAIN LATERALITY: Primary | ICD-10-CM

## 2019-06-04 DIAGNOSIS — G89.29 CHRONIC LOW BACK PAIN WITH SCIATICA, SCIATICA LATERALITY UNSPECIFIED, UNSPECIFIED BACK PAIN LATERALITY: Primary | ICD-10-CM

## 2019-06-04 PROCEDURE — 99213 OFFICE O/P EST LOW 20 MIN: CPT | Performed by: NEUROLOGICAL SURGERY

## 2019-06-04 NOTE — PROGRESS NOTES
Patient is here for follow consult for: back pain    Physical exam  Alert and Oriented X3  PERRLA, EOMI  HICKEY 5/5  Sensation intact to LT and PP  Reflexes are 2+ and symmetric    A/P: patient is here for follow up for: back pain. She is neurologically stable. She has had a prior L4-S1 fusion.   I will get a myelogram and if this does not show significant stenosis, I will recommend a spinal cord stimulator trial.    Judy Comp

## 2019-06-06 ENCOUNTER — OFFICE VISIT (OUTPATIENT)
Dept: NEUROLOGY | Age: 74
End: 2019-06-06
Payer: COMMERCIAL

## 2019-06-06 ENCOUNTER — TELEPHONE (OUTPATIENT)
Dept: PHYSICAL MEDICINE AND REHAB | Age: 74
End: 2019-06-06

## 2019-06-06 VITALS
DIASTOLIC BLOOD PRESSURE: 56 MMHG | SYSTOLIC BLOOD PRESSURE: 119 MMHG | HEIGHT: 61 IN | OXYGEN SATURATION: 97 % | BODY MASS INDEX: 36.44 KG/M2 | RESPIRATION RATE: 18 BRPM | HEART RATE: 80 BPM | WEIGHT: 193 LBS

## 2019-06-06 DIAGNOSIS — G72.9 MYOPATHY: Primary | ICD-10-CM

## 2019-06-06 PROCEDURE — 99214 OFFICE O/P EST MOD 30 MIN: CPT | Performed by: PSYCHIATRY & NEUROLOGY

## 2019-06-06 NOTE — TELEPHONE ENCOUNTER
Per Dr Dmitry Cui, this patient is scheduled for a consult on 6/11/19 but does not need to be seen in the office because the referral is for her to have a left hip steroid injection under fluro only. This is done at the Surgery center and will also require an authorization from insurance prior to scheduling. I called the patient and left a detailed message stating this, and that we will call her to schedule once an authorization is obtained. I went ahead and cancelled the appointment.

## 2019-06-06 NOTE — PROGRESS NOTES
worrisome---explaining her arm and leg weakness. I question an autoimmune disorder to previous Lipitor usage. Additional studies weren't order her    Medically she is stable despite her multiple comorbidities. She will return in 3 months. CK levels and antibodies to HMG reductase were ordered. She will be followed closely. Steroids and other immunosuppressants may be in order. I spent 60 minutes with the patient with over 50 % spent in counseling and disease management. All patient issues were addressed and all questions were answered.

## 2019-06-12 ENCOUNTER — HOSPITAL ENCOUNTER (OUTPATIENT)
Age: 74
Discharge: HOME | End: 2019-06-12
Payer: COMMERCIAL

## 2019-06-12 DIAGNOSIS — G72.9 MYOPATHY: ICD-10-CM

## 2019-06-12 LAB
SEDIMENTATION RATE, ERYTHROCYTE: 22 MM/HR (ref 0–20)
TOTAL CK: 50 U/L (ref 20–180)

## 2019-06-12 PROCEDURE — 36415 COLL VENOUS BLD VENIPUNCTURE: CPT

## 2019-06-12 PROCEDURE — 82550 ASSAY OF CK (CPK): CPT

## 2019-06-12 PROCEDURE — 85651 RBC SED RATE NONAUTOMATED: CPT

## 2019-06-12 PROCEDURE — 83516 IMMUNOASSAY NONANTIBODY: CPT

## 2019-06-13 ENCOUNTER — TELEPHONE (OUTPATIENT)
Dept: ORTHOPEDIC SURGERY | Age: 74
End: 2019-06-13

## 2019-06-13 NOTE — TELEPHONE ENCOUNTER
Spoke to [employer]. At [facility], she looked up  and states no Authorization is required.   Reference # [id number]

## 2019-06-25 LAB
Lab: NORMAL
REPORT: NORMAL
THIS TEST SENT TO: NORMAL

## 2019-07-01 ENCOUNTER — TELEPHONE (OUTPATIENT)
Dept: NEUROLOGY | Age: 74
End: 2019-07-01

## 2019-07-08 DIAGNOSIS — M54.40 CHRONIC LOW BACK PAIN WITH SCIATICA, SCIATICA LATERALITY UNSPECIFIED, UNSPECIFIED BACK PAIN LATERALITY: Primary | ICD-10-CM

## 2019-07-08 DIAGNOSIS — Z86.79 HISTORY OF HYPERTENSION: ICD-10-CM

## 2019-07-08 DIAGNOSIS — G89.29 CHRONIC LOW BACK PAIN WITH SCIATICA, SCIATICA LATERALITY UNSPECIFIED, UNSPECIFIED BACK PAIN LATERALITY: Primary | ICD-10-CM

## 2019-07-24 ENCOUNTER — HOSPITAL ENCOUNTER (OUTPATIENT)
Age: 74
Discharge: HOME | End: 2019-07-24
Payer: COMMERCIAL

## 2019-07-24 LAB
ALBUMIN SERPL-MCNC: 4.2 G/DL (ref 3.5–5.2)
ALP BLD-CCNC: 57 U/L (ref 35–104)
ALT SERPL-CCNC: 27 U/L (ref 0–32)
ANION GAP SERPL CALCULATED.3IONS-SCNC: 10 MMOL/L (ref 7–16)
AST SERPL-CCNC: 24 U/L (ref 0–31)
BILIRUB SERPL-MCNC: 0.3 MG/DL (ref 0–1.2)
BILIRUBIN URINE: NEGATIVE
BLOOD, URINE: NEGATIVE
BUN BLDV-MCNC: 31 MG/DL (ref 8–23)
CALCIUM SERPL-MCNC: 10 MG/DL (ref 8.6–10.2)
CHLORIDE BLD-SCNC: 102 MMOL/L (ref 98–107)
CLARITY: CLEAR
CO2: 29 MMOL/L (ref 22–29)
COLOR: YELLOW
CREAT SERPL-MCNC: 1.4 MG/DL (ref 0.5–1)
CREATININE URINE: 19 MG/DL (ref 29–226)
GFR AFRICAN AMERICAN: 45
GFR NON-AFRICAN AMERICAN: 37 ML/MIN/1.73
GLUCOSE FASTING: 100 MG/DL (ref 74–99)
GLUCOSE URINE: 500 MG/DL
HCT VFR BLD CALC: 38.8 % (ref 34–48)
HEMOGLOBIN: 12.5 G/DL (ref 11.5–15.5)
KETONES, URINE: NEGATIVE MG/DL
LEUKOCYTE ESTERASE, URINE: NEGATIVE
MCH RBC QN AUTO: 30.6 PG (ref 26–35)
MCHC RBC AUTO-ENTMCNC: 32.2 % (ref 32–34.5)
MCV RBC AUTO: 94.9 FL (ref 80–99.9)
MICROALBUMIN UR-MCNC: <12 MG/L
MICROALBUMIN/CREAT UR-RTO: ABNORMAL (ref 0–30)
NITRITE, URINE: NEGATIVE
PARATHYROID HORMONE INTACT: 37 PG/ML (ref 15–65)
PDW BLD-RTO: 13.7 FL (ref 11.5–15)
PH UA: 6.5 (ref 5–9)
PHOSPHORUS: 3.5 MG/DL (ref 2.5–4.5)
PLATELET # BLD: 272 E9/L (ref 130–450)
PMV BLD AUTO: 10 FL (ref 7–12)
POTASSIUM SERPL-SCNC: 4.1 MMOL/L (ref 3.5–5)
PROTEIN PROTEIN: <4 MG/DL (ref 0–12)
PROTEIN UA: NEGATIVE MG/DL
PROTEIN/CREAT RATIO: 0.2
PROTEIN/CREAT RATIO: 0.2 (ref 0–0.2)
RBC # BLD: 4.09 E12/L (ref 3.5–5.5)
SODIUM BLD-SCNC: 141 MMOL/L (ref 132–146)
SPECIFIC GRAVITY UA: 1.01 (ref 1–1.03)
TOTAL PROTEIN: 7 G/DL (ref 6.4–8.3)
UROBILINOGEN, URINE: 0.2 E.U./DL
VITAMIN D 25-HYDROXY: 107 NG/ML (ref 30–100)
WBC # BLD: 4.5 E9/L (ref 4.5–11.5)

## 2019-07-24 PROCEDURE — 82306 VITAMIN D 25 HYDROXY: CPT

## 2019-07-24 PROCEDURE — 83970 ASSAY OF PARATHORMONE: CPT

## 2019-07-24 PROCEDURE — 36415 COLL VENOUS BLD VENIPUNCTURE: CPT

## 2019-07-24 PROCEDURE — 82570 ASSAY OF URINE CREATININE: CPT

## 2019-07-24 PROCEDURE — 85027 COMPLETE CBC AUTOMATED: CPT

## 2019-07-24 PROCEDURE — 84156 ASSAY OF PROTEIN URINE: CPT

## 2019-07-24 PROCEDURE — 82044 UR ALBUMIN SEMIQUANTITATIVE: CPT

## 2019-07-24 PROCEDURE — 80053 COMPREHEN METABOLIC PANEL: CPT

## 2019-07-24 PROCEDURE — 84100 ASSAY OF PHOSPHORUS: CPT

## 2019-07-24 PROCEDURE — 81003 URINALYSIS AUTO W/O SCOPE: CPT

## 2019-07-25 ENCOUNTER — HOSPITAL ENCOUNTER (OUTPATIENT)
Dept: CT IMAGING | Age: 74
Discharge: HOME | End: 2019-07-27
Payer: COMMERCIAL

## 2019-07-25 ENCOUNTER — TELEPHONE (OUTPATIENT)
Dept: NEUROSURGERY | Age: 74
End: 2019-07-25

## 2019-07-25 ENCOUNTER — HOSPITAL ENCOUNTER (OUTPATIENT)
Dept: GENERAL RADIOLOGY | Age: 74
Discharge: HOME | End: 2019-07-27
Payer: COMMERCIAL

## 2019-07-25 VITALS
RESPIRATION RATE: 18 BRPM | TEMPERATURE: 97.8 F | SYSTOLIC BLOOD PRESSURE: 125 MMHG | HEART RATE: 98 BPM | BODY MASS INDEX: 36.06 KG/M2 | OXYGEN SATURATION: 98 % | DIASTOLIC BLOOD PRESSURE: 61 MMHG | HEIGHT: 61 IN | WEIGHT: 191 LBS

## 2019-07-25 DIAGNOSIS — M54.40 ACUTE RIGHT-SIDED LOW BACK PAIN WITH SCIATICA, SCIATICA LATERALITY UNSPECIFIED: Primary | ICD-10-CM

## 2019-07-25 DIAGNOSIS — Z86.79 HISTORY OF HYPERTENSION: ICD-10-CM

## 2019-07-25 DIAGNOSIS — M54.40 CHRONIC LOW BACK PAIN WITH SCIATICA, SCIATICA LATERALITY UNSPECIFIED, UNSPECIFIED BACK PAIN LATERALITY: ICD-10-CM

## 2019-07-25 DIAGNOSIS — G89.29 CHRONIC LOW BACK PAIN WITH SCIATICA, SCIATICA LATERALITY UNSPECIFIED, UNSPECIFIED BACK PAIN LATERALITY: ICD-10-CM

## 2019-07-25 LAB
INR BLD: 1.1
PROTHROMBIN TIME: 12.9 SEC (ref 9.3–12.4)

## 2019-07-25 PROCEDURE — 7100000011 HC PHASE II RECOVERY - ADDTL 15 MIN

## 2019-07-25 PROCEDURE — 36415 COLL VENOUS BLD VENIPUNCTURE: CPT

## 2019-07-25 PROCEDURE — 7100000010 HC PHASE II RECOVERY - FIRST 15 MIN

## 2019-07-25 PROCEDURE — 72132 CT LUMBAR SPINE W/DYE: CPT

## 2019-07-25 PROCEDURE — 2709999900 FL MYELOGRAM LUMBOSACRAL S&I

## 2019-07-25 PROCEDURE — 6360000004 HC RX CONTRAST MEDICATION: Performed by: PHYSICIAN ASSISTANT

## 2019-07-25 PROCEDURE — 85610 PROTHROMBIN TIME: CPT

## 2019-07-25 RX ADMIN — IOPAMIDOL 13 ML: 408 INJECTION, SOLUTION INTRATHECAL at 10:21

## 2019-07-25 ASSESSMENT — PAIN DESCRIPTION - DESCRIPTORS: DESCRIPTORS: SHARP

## 2019-07-25 ASSESSMENT — PAIN - FUNCTIONAL ASSESSMENT: PAIN_FUNCTIONAL_ASSESSMENT: 0-10

## 2019-07-25 NOTE — BRIEF OP NOTE
Brief Postoperative Note  ______________________________________________________________      IR PROCEDURE NOTE  LUMBAR PUNCTURE WITH CONTRAST  INJECTION   (lumbosacral spine imaging)    Patient Name: Marcio Jolley   Medical Record Number: [id number]  Date: 7/25/19   Time: 9:29 AM   Room/Bed: Room/bed info not found    Preoperative diagnosis: S/P lumbosacral fusion; chronic low back pain with sciatica    Postoperative diagnosis: S/P lumbosacral fusion; chronic low back pain with sciatica    Procedure Type: Fluoroscopic-guided lumbar puncture with contrast injection for CT Myelogram Imaging of Lumbosacral Spine. Anesthesia: Local anesthesia with approximately 5 mL of 1% lidocaine administered subcutaneously. Performed by: Briseida Nagy PA-C under indirect supervision by Altagracia Mclaughlin II, MD.    Estimated blood loss: Minimal    Complications: None    Implants: None    Procedure: Informed consent obtained. The appropriate labs reviewed including INR and PT. A site under fluoroscopy guidance at the level of L4 was selected. This site was prepped in usual sterile fashion. 1% lidocaine local injection was used for local anesthesia. A 6 in. 20g spinal needle inserted into thecal sac. 13 ml of Isovue M 200 contrast was injected. Final needle placement and contrast within the thecal sac was confirmed and images were saved into PACs. The patient tolerated the procedure. There were no immediate complications. Patient was taken to the CT suite for completion of imaging as per ordering provider.       Electronically signed by Avila Ribeiro   DD: 7/25/19  9:29 AM

## 2019-08-15 ENCOUNTER — HOSPITAL ENCOUNTER (EMERGENCY)
Age: 74
Discharge: HOME | End: 2019-08-15
Attending: EMERGENCY MEDICINE
Payer: COMMERCIAL

## 2019-08-15 ENCOUNTER — APPOINTMENT (OUTPATIENT)
Dept: CT IMAGING | Age: 74
End: 2019-08-15
Payer: COMMERCIAL

## 2019-08-15 VITALS
HEART RATE: 78 BPM | WEIGHT: 190 LBS | SYSTOLIC BLOOD PRESSURE: 148 MMHG | DIASTOLIC BLOOD PRESSURE: 68 MMHG | RESPIRATION RATE: 16 BRPM | TEMPERATURE: 98.7 F | OXYGEN SATURATION: 94 % | HEIGHT: 61 IN | BODY MASS INDEX: 35.87 KG/M2

## 2019-08-15 DIAGNOSIS — H53.8 BLURRED VISION: Primary | ICD-10-CM

## 2019-08-15 PROCEDURE — 70450 CT HEAD/BRAIN W/O DYE: CPT

## 2019-08-15 PROCEDURE — 99284 EMERGENCY DEPT VISIT MOD MDM: CPT

## 2019-08-20 ENCOUNTER — PREP FOR PROCEDURE (OUTPATIENT)
Dept: PHYSICAL MEDICINE AND REHAB | Age: 74
End: 2019-08-20

## 2019-08-21 ENCOUNTER — OFFICE VISIT (OUTPATIENT)
Dept: PAIN MANAGEMENT | Age: 74
End: 2019-08-21
Payer: COMMERCIAL

## 2019-08-21 VITALS
OXYGEN SATURATION: 97 % | HEART RATE: 65 BPM | BODY MASS INDEX: 35.68 KG/M2 | HEIGHT: 61 IN | DIASTOLIC BLOOD PRESSURE: 50 MMHG | WEIGHT: 189 LBS | TEMPERATURE: 98.7 F | SYSTOLIC BLOOD PRESSURE: 100 MMHG | RESPIRATION RATE: 18 BRPM

## 2019-08-21 DIAGNOSIS — M47.816 LUMBAR FACET ARTHROPATHY: ICD-10-CM

## 2019-08-21 DIAGNOSIS — M54.17 L-S RADICULOPATHY: ICD-10-CM

## 2019-08-21 DIAGNOSIS — M51.9 LUMBAR DISC DISORDER: ICD-10-CM

## 2019-08-21 DIAGNOSIS — M70.62 GREATER TROCHANTERIC BURSITIS OF LEFT HIP: ICD-10-CM

## 2019-08-21 DIAGNOSIS — M96.1 LUMBAR POSTLAMINECTOMY SYNDROME: Primary | ICD-10-CM

## 2019-08-21 DIAGNOSIS — M16.0 PRIMARY OSTEOARTHRITIS OF BOTH HIPS: ICD-10-CM

## 2019-08-21 DIAGNOSIS — M47.816 LUMBAR SPONDYLOSIS: ICD-10-CM

## 2019-08-21 DIAGNOSIS — M70.61 GREATER TROCHANTERIC BURSITIS OF RIGHT HIP: ICD-10-CM

## 2019-08-21 PROCEDURE — 99204 OFFICE O/P NEW MOD 45 MIN: CPT | Performed by: PAIN MEDICINE

## 2019-08-21 RX ORDER — POTASSIUM CHLORIDE 750 MG/1
10 TABLET, FILM COATED, EXTENDED RELEASE ORAL DAILY
Status: ON HOLD | COMMUNITY
Start: 2019-06-10 | End: 2020-10-07 | Stop reason: HOSPADM

## 2019-08-21 RX ORDER — FUROSEMIDE 40 MG/1
TABLET ORAL
COMMUNITY
Start: 2019-06-10 | End: 2019-08-21

## 2019-08-21 NOTE — PROGRESS NOTES
Anitra Avendaño presents to the [facility] on 8/21/2019. Elda Rowe is complaining of pain in her low back that travels into her right buttock and down her right leg. The pain is persistent. The pain is described as aching, shooting, dull, sharp, burning and is sharp that brings tears to her eyes. Pain is rated on her best day at a 2, on her worst day at a 10, and on average at a 8 on the VAS scale. She took her last dose of Percocet last night and also tylenol prn. Any procedures since your last visit: No, with  % relief. She has been on anticoagulation medications to include ASA and Plavix and is managed by Dr. Margret Hernandez. anticoag meds are on hold at present as she is sched'd for a left hip injection tomorrow with Dr. Vandana Casillas. Pacemaker or defibrilator: No Physician managing device is .       BP (!) 100/50   Pulse 65   Temp 98.7 °F (37.1 °C)   Resp 18   Ht 5' 1\" (1.549 m)   Wt 189 lb (85.7 kg)   SpO2 97%   BMI 35.71 kg/m²      No LMP recorded. Patient has had a hysterectomy.

## 2019-08-22 ENCOUNTER — HOSPITAL ENCOUNTER (OUTPATIENT)
Age: 74
Setting detail: OUTPATIENT SURGERY
Discharge: HOME | End: 2019-08-22
Attending: PHYSICAL MEDICINE & REHABILITATION | Admitting: PHYSICAL MEDICINE & REHABILITATION
Payer: COMMERCIAL

## 2019-08-22 ENCOUNTER — HOSPITAL ENCOUNTER (OUTPATIENT)
Dept: OPERATING ROOM | Age: 74
Setting detail: OUTPATIENT SURGERY
Discharge: HOME | End: 2019-08-22
Attending: PHYSICAL MEDICINE & REHABILITATION
Payer: COMMERCIAL

## 2019-08-22 VITALS
DIASTOLIC BLOOD PRESSURE: 49 MMHG | TEMPERATURE: 98 F | HEART RATE: 63 BPM | OXYGEN SATURATION: 99 % | RESPIRATION RATE: 16 BRPM | SYSTOLIC BLOOD PRESSURE: 101 MMHG

## 2019-08-22 DIAGNOSIS — M70.62 TROCHANTERIC BURSITIS OF LEFT HIP: ICD-10-CM

## 2019-08-22 PROBLEM — M16.12 OSTEOARTHRITIS OF LEFT HIP: Status: ACTIVE | Noted: 2019-08-22

## 2019-08-22 PROCEDURE — 3209999900 FLUORO FOR SURGICAL PROCEDURES

## 2019-08-22 PROCEDURE — 20610 DRAIN/INJ JOINT/BURSA W/O US: CPT | Performed by: PHYSICAL MEDICINE & REHABILITATION

## 2019-08-22 PROCEDURE — 2500000003 HC RX 250 WO HCPCS: Performed by: PHYSICAL MEDICINE & REHABILITATION

## 2019-08-22 PROCEDURE — 6360000002 HC RX W HCPCS: Performed by: PHYSICAL MEDICINE & REHABILITATION

## 2019-08-22 PROCEDURE — 6360000004 HC RX CONTRAST MEDICATION: Performed by: PHYSICAL MEDICINE & REHABILITATION

## 2019-08-22 PROCEDURE — 3600000005 HC SURGERY LEVEL 5 BASE: Performed by: PHYSICAL MEDICINE & REHABILITATION

## 2019-08-22 PROCEDURE — 7100000010 HC PHASE II RECOVERY - FIRST 15 MIN: Performed by: PHYSICAL MEDICINE & REHABILITATION

## 2019-08-22 PROCEDURE — 77002 NEEDLE LOCALIZATION BY XRAY: CPT | Performed by: PHYSICAL MEDICINE & REHABILITATION

## 2019-08-22 PROCEDURE — 7100000011 HC PHASE II RECOVERY - ADDTL 15 MIN: Performed by: PHYSICAL MEDICINE & REHABILITATION

## 2019-08-22 PROCEDURE — 2709999900 HC NON-CHARGEABLE SUPPLY: Performed by: PHYSICAL MEDICINE & REHABILITATION

## 2019-08-22 RX ORDER — LIDOCAINE HYDROCHLORIDE 10 MG/ML
INJECTION, SOLUTION EPIDURAL; INFILTRATION; INTRACAUDAL; PERINEURAL PRN
Status: DISCONTINUED | OUTPATIENT
Start: 2019-08-22 | End: 2019-08-22 | Stop reason: ALTCHOICE

## 2019-08-22 ASSESSMENT — PAIN - FUNCTIONAL ASSESSMENT: PAIN_FUNCTIONAL_ASSESSMENT: 0-10

## 2019-08-22 ASSESSMENT — PAIN SCALES - GENERAL
PAINLEVEL_OUTOF10: 0
PAINLEVEL_OUTOF10: 0

## 2019-08-22 NOTE — H&P
Craig Ovens, [address] [facility]  [address] [facility] [place]  Phone: [phone]  Fax: [phone]    PCP: Smith Buckley DO  Date of visit: 8/22/2019    CC: left hip pain       Ynes Garcia is a 69 y.o. female who presents today for left hip steroid injection. Patient complains of left hip pain that is chronic. No red flag symptoms. Consents to proceed with procedure. Allergies   Allergen Reactions    Atorvastatin     Cipro Xr      Reaction unknown    Cymbalta [Duloxetine Hcl] Nausea Only    Diazepam     Erythromycin      Reaction unknown    Indomethacin     Ketorolac     Ketorolac Tromethamine Itching    Lipitor Other (See Comments)     Severe leg pain    Lodine [Etodolac]     Oscal 500-200 D-3 [place] Shell Calcium-D]     Paxil [Paroxetine Hcl]     Ropinirole     Statins Other (See Comments)     Muscles go weak and she falls    Tromethamine     Trulicity [Dulaglutide] Swelling    Zithromax [Azithromycin]     Requip [Ropinirole Hcl] Nausea And Vomiting       No current facility-administered medications for this encounter.         Past Medical History:   Diagnosis Date    Arthritis     CAD (coronary artery disease)     CAD (coronary artery disease)     Diabetes ([address]     Fibromyalgia     GERD (gastroesophageal reflux disease)     History of cardiovascular stress test 02/17/2014    lexiscan, also 4/21/2015    History of MI (myocardial infarction)     x4; most recent 2016    Hx of blood clots     leg    Hyperlipidemia     Hypertension     Hypothyroidism     Intractable low back pain 6/19/2016    Non-insulin dependent type 2 diabetes mellitus ([address]     NSTEMI (non-ST elevated myocardial infarction) ([address] 11/19/2015       Past Surgical History:   Procedure Laterality Date    APPENDECTOMY      BACK SURGERY      BLADDER SURGERY      CARDIAC CATHETERIZATION  11/15/2017    Dr Angelic Dai

## 2019-08-22 NOTE — OP NOTE
HIP ARTHROGRAM WITH STEROID INFILTRATION      Patient: Chris Roque                                                    MRN: [id number]  : 1945                                                       Date of procedure: 2019    Physician Performing Procedure: Crystal Rodriguez DO     Clinical Scenario: As per our office notes. Diagnosis: left hip osteoarthritis    Injectate: A total of 5mL, consisting of 1mL of Kenalog (40mg/mL), the remainder comprised of 2% lidocaine    Levels Treated: left Hip Joint    Approach: Anterior    Improvement after today's procedure: As per nursing record. Comments: None    Procedure: The patient was prepped and draped in a sterile fashion after being placed in the supine position. After preliminary films were obtained, the femoral artery was palpated in the groin and a santos was placed 2 cm lateral and 2 cm distal to this point. After skin preparation, a 25 gauge needle was used to anesthetize the skin with 3-5 cc of 1% Lidocaine. A 22 gauge spinal needle was inserted in a superior direction with fluoroscopic guidance to contact the bone at the junction of the medial aspect of the femoral head and neck. Needle position was then confirmed in multiple views. 1-2cc's of omnipaque was injected and a partial hip arthrogram was obtained. The anesthetic/steroid solution noted above injected into the hip joint. The needle was withdrawn. Appropriate radiographs were obtained with results as described above. The patient tolerated the procedure well and was discharged after an appropriate period of observation. If there are any complications, the patient was instructed to call me. The patient is recommended to follow-up with the requesting physician within 4-6 weeks.

## 2019-09-09 ENCOUNTER — OFFICE VISIT (OUTPATIENT)
Dept: ORTHOPEDIC SURGERY | Age: 74
End: 2019-09-09
Payer: COMMERCIAL

## 2019-09-09 ENCOUNTER — OFFICE VISIT (OUTPATIENT)
Dept: PAIN MANAGEMENT | Age: 74
End: 2019-09-09
Payer: COMMERCIAL

## 2019-09-09 VITALS — BODY MASS INDEX: 35.87 KG/M2 | HEIGHT: 61 IN | WEIGHT: 190 LBS

## 2019-09-09 DIAGNOSIS — M17.12 PRIMARY OSTEOARTHRITIS OF LEFT KNEE: ICD-10-CM

## 2019-09-09 DIAGNOSIS — F45.1 SOMATIC SYMPTOM DISORDER, PERSISTENT, SEVERE, WITH PREDOMINANT PAIN: Primary | ICD-10-CM

## 2019-09-09 DIAGNOSIS — M17.11 PRIMARY OSTEOARTHRITIS OF RIGHT KNEE: Primary | ICD-10-CM

## 2019-09-09 PROCEDURE — 96131 PSYCL TST EVAL PHYS/QHP EA: CPT | Performed by: PSYCHOLOGIST

## 2019-09-09 PROCEDURE — 20610 DRAIN/INJ JOINT/BURSA W/O US: CPT | Performed by: ORTHOPAEDIC SURGERY

## 2019-09-09 PROCEDURE — 99205 OFFICE O/P NEW HI 60 MIN: CPT | Performed by: PSYCHOLOGIST

## 2019-09-09 PROCEDURE — 96130 PSYCL TST EVAL PHYS/QHP 1ST: CPT | Performed by: PSYCHOLOGIST

## 2019-09-09 PROCEDURE — 99213 OFFICE O/P EST LOW 20 MIN: CPT | Performed by: ORTHOPAEDIC SURGERY

## 2019-09-09 RX ORDER — TRIAMCINOLONE ACETONIDE 40 MG/ML
40 INJECTION, SUSPENSION INTRA-ARTICULAR; INTRAMUSCULAR ONCE
Status: COMPLETED | OUTPATIENT
Start: 2019-09-09 | End: 2019-09-09

## 2019-09-09 RX ADMIN — TRIAMCINOLONE ACETONIDE 40 MG: 40 INJECTION, SUSPENSION INTRA-ARTICULAR; INTRAMUSCULAR at 15:01

## 2019-09-09 NOTE — PROGRESS NOTES
Chief Complaint   Patient presents with    Follow-up     follow up from bilateral knee pain and left hip pain, patient did have hip injection that helped. She states the pain is aching to sharp pain. Subjective:     Patient ID: Lindsay Brunson is a 69 y.o. [place] female    Knee Pain  Patient complains of bilateral knee pain. She had arthroscopy in June 2018. She has also had CSI and visco with short term relief.    Past Medical History:   Diagnosis Date    Arthritis     CAD (coronary artery disease)     CAD (coronary artery disease)     Diabetes ([address]     Fibromyalgia     GERD (gastroesophageal reflux disease)     History of cardiovascular stress test 02/17/2014    lexiscan, also 4/21/2015    History of MI (myocardial infarction)     x4; most recent 2016    Hx of blood clots     leg    Hyperlipidemia     Hypertension     Hypothyroidism     Intractable low back pain 6/19/2016    Non-insulin dependent type 2 diabetes mellitus ([facility]     NSTEMI (non-ST elevated myocardial infarction) ([facility] 11/19/2015     Past Surgical History:   Procedure Laterality Date    APPENDECTOMY      ARTHROCENTESIS Left 8/22/2019    LEFT HIP CORTICOSTERIOD INJECTION UNDER FLUOROSCOPIC GUIDANCE performed by Addison Pro DO at [address] [place] [address]  11/15/2017    Dr Homero Jacinto WITH IMPLANT Right 7 7 15    CATARACT REMOVAL WITH IMPLANT Left 4/26/2016    CHOLECYSTECTOMY      COCCYX REMOVAL      COLONOSCOPY      CORONARY ANGIOPLASTY WITH [address] started needing cardiac care; 5 stents total    CORONARY ANGIOPLASTY WITH STENT PLACEMENT  11/19/2015    Dr Joanna Hayes stent 3x18 prox Cx   Mehnaz Pink; 3 bypass grafts    ENDOSCOPY, COLON, DIAGNOSTIC      EYE SURGERY      HEMORRHOID SURGERY      HYSTERECTOMY      LUMBAR FUSION  6/7/2016     every 8 hours as needed for Nausea or Vomiting, Disp: 10 tablet, Rfl: 0    clopidogrel (PLAVIX) 75 MG tablet, Take 75 mg by mouth daily, Disp: , Rfl:     nitroGLYCERIN (NITROSTAT) 0.4 MG SL tablet, Place 0.4 mg under the tongue every 5 minutes as needed for Chest pain up to max of 3 total doses. If no relief after 1 dose, call 911., Disp: , Rfl:     folic acid (FOLVITE) 119 MCG tablet, Take 400 mcg by mouth 2 times daily , Disp: , Rfl:     vitamin D 1000 UNITS CAPS, Take 1,000 Units by mouth every evening , Disp: , Rfl:     metoprolol succinate ER (TOPROL-XL) 25 MG XL tablet, Take 1 tablet by mouth 2 times daily (Patient taking differently: Take 25 mg by mouth daily ), Disp: 30 tablet, Rfl: 3    gabapentin (NEURONTIN) 300 MG capsule, Take 300 mg by mouth. Takes 2 tablet in am 3 tablets in pm, Disp: , Rfl:     fenofibrate (TRICOR) 145 MG tablet, Take 145 mg by mouth daily Takes 1/2 tablet, Disp: , Rfl:     isosorbide mononitrate (IMDUR) 60 MG CR tablet, Take 60 mg by mouth daily, Disp: , Rfl:     ezetimibe (ZETIA) 10 MG tablet, Take 10 mg by mouth every evening , Disp: , Rfl:     Multiple Vitamins-Minerals (CENTRUM SILVER PO), Take 1 tablet by mouth daily. , Disp: , Rfl:     levothyroxine (SYNTHROID) 100 MCG tablet, Take 100 mcg by mouth daily. , Disp: , Rfl:   Allergies   Allergen Reactions    Atorvastatin     Cipro Xr      Reaction unknown    Cymbalta [Duloxetine Hcl] Nausea Only    Diazepam     Erythromycin      Reaction unknown    Indomethacin     Ketorolac     Ketorolac Tromethamine Itching    Lipitor Other (See Comments)     Severe leg pain    Lodine [Etodolac]     Oscal 500-200 D-3 [Oyster Shell Calcium-D]     Paxil [Paroxetine Hcl]     Ropinirole     Statins Other (See Comments)     Muscles go weak and she falls    Tromethamine     Trulicity [Dulaglutide] Swelling    Zithromax [Azithromycin]     Requip [Ropinirole Hcl] Nausea And Vomiting     Social History     Socioeconomic History (-)stroke, (-)headaches, (-)Parkinson disease,(-) memory loss, (-) LOC. Cardiovascular: (-) Chest pain, (-) swelling in legs/feet, (-) SOB, (-) cramping in legs/feet with walking. Respiratory: (-) SOB, (-) Coughing, (-) night sweats. GI: (-) nausea, (-) vomiting, (-) diarrhea, (-) blood in stool, (-) gastric ulcer. Psychiatric: (-) Depression, (-) Anxiety, (-) bipolar disease, (-) Alzheimer's Disease  Allergic/Immunologic: (-) allergies latex, (-) allergies metal, (-) skin sensitivity. Hematlogic: (-) anemia, (-) blood transfusion, (-) DVT/PE, (-) Clotting disorders      Subjective:  Vital signs are stable. In general, patient is awake, alert and oriented X3, in no apparent distress. Examination of HENT reveals normocephalic, atraumatic. PERRLA/EOMI sclera are white. Conjunctivae are clear. TM's are intact. Pharynx is pink and moist.  Uvula and tongue are midline. Heart: Positive S1 and positive S2 with regular rate and rhythm. Lungs: Clear to auscultation bilaterally without rales, rhonchi or wheezes. Abdomen: soft, nontender. Positive bowel sounds. No organomegaly. No guarding or rigidity. Constitution:    Ht 5' 1\" (1.549 m)   Wt 190 lb (86.2 kg)   BMI 35.90 kg/m²       Psycihatric:    The patient is alert and oriented x 3, appears to be stated age and in no distress. Respiratory:    Respiratory effort is not labored. Patient is not gasping. Palpation of the chest reveals no tactile fremitus. Skin:    Upon inspection: the skin appears warm, dry and intact. There is  a previous scar over the affected area. There is any cellulitis, lymphedema or cutaneous lesions noted in the lower extremities. Upon palpation there is no induration noted. Neurologic:    Gait: normal;  Motor exam of the lower extremities show ; quadriceps, hamstrings, foot dorsi and plantar flexors intact R.  5/5 and L. 5/5.  Deep tendon reflexes are 2/4 at the knees and 2/4 at the ankles with strong

## 2019-09-16 ENCOUNTER — HOSPITAL ENCOUNTER (OUTPATIENT)
Age: 74
Discharge: HOME | End: 2019-09-18
Payer: COMMERCIAL

## 2019-09-16 ENCOUNTER — HOSPITAL ENCOUNTER (OUTPATIENT)
Dept: GENERAL RADIOLOGY | Age: 74
Discharge: HOME | End: 2019-09-18
Payer: COMMERCIAL

## 2019-09-16 DIAGNOSIS — M54.2 NECK PAIN: ICD-10-CM

## 2019-09-16 PROCEDURE — 72050 X-RAY EXAM NECK SPINE 4/5VWS: CPT

## 2019-09-23 ENCOUNTER — OFFICE VISIT (OUTPATIENT)
Dept: ORTHOPEDIC SURGERY | Age: 74
End: 2019-09-23
Payer: COMMERCIAL

## 2019-09-23 VITALS — HEIGHT: 61 IN | TEMPERATURE: 98 F | BODY MASS INDEX: 35.87 KG/M2 | WEIGHT: 190 LBS

## 2019-09-23 DIAGNOSIS — M17.11 PRIMARY OSTEOARTHRITIS OF RIGHT KNEE: Primary | ICD-10-CM

## 2019-09-23 PROCEDURE — 20610 DRAIN/INJ JOINT/BURSA W/O US: CPT | Performed by: ORTHOPAEDIC SURGERY

## 2019-09-23 PROCEDURE — 99213 OFFICE O/P EST LOW 20 MIN: CPT | Performed by: ORTHOPAEDIC SURGERY

## 2019-09-23 RX ORDER — TRIAMCINOLONE ACETONIDE 40 MG/ML
40 INJECTION, SUSPENSION INTRA-ARTICULAR; INTRAMUSCULAR ONCE
Status: COMPLETED | OUTPATIENT
Start: 2019-09-23 | End: 2019-09-23

## 2019-09-23 RX ADMIN — TRIAMCINOLONE ACETONIDE 40 MG: 40 INJECTION, SUSPENSION INTRA-ARTICULAR; INTRAMUSCULAR at 11:55

## 2019-09-23 NOTE — PROGRESS NOTES
Chief Complaint   Patient presents with    Knee Pain     Right Knee, F/U       Subjective:     Patient ID: Romy Bernal is a 69 [facility]    Knee Pain  Patient complains of bilateral knee pain. She had arthroscopy in June 2018. She has also had CSI and visco with short term relief. The right knee is painful, she had left injected 2 wks ago with good results.     Past Medical History:   Diagnosis Date    Arthritis     CAD (coronary artery disease)     CAD (coronary artery disease)     Diabetes ([address]     Fibromyalgia     GERD (gastroesophageal reflux disease)     History of cardiovascular stress test 02/17/2014    lexiscan, also 4/21/2015    History of MI (myocardial infarction)     x4; most recent 2016    Hx of blood clots     leg    Hyperlipidemia     Hypertension     Hypothyroidism     Intractable low back pain 6/19/2016    Non-insulin dependent type 2 diabetes mellitus ([address]     NSTEMI (non-ST elevated myocardial infarction) ([facility] 11/19/2015     Past Surgical History:   Procedure Laterality Date    APPENDECTOMY      ARTHROCENTESIS Left 8/22/2019    LEFT HIP CORTICOSTERIOD INJECTION UNDER FLUOROSCOPIC GUIDANCE performed by Yumi Forbes DO at [address] [place] [address]  11/15/2017    Dr Jayson Quintana WITH IMPLANT Right 7 7 15    CATARACT REMOVAL WITH IMPLANT Left 4/26/2016    CHOLECYSTECTOMY      COCCYX REMOVAL      COLONOSCOPY      CORONARY ANGIOPLASTY WITH [address] started needing cardiac care; 5 stents total    CORONARY ANGIOPLASTY WITH STENT PLACEMENT  11/19/2015    Dr Usman Rogers stent 3x18 prox Cx   Britt Rodriguez Expose; 3 bypass grafts    ENDOSCOPY, COLON, DIAGNOSTIC      EYE SURGERY      HEMORRHOID SURGERY      HYSTERECTOMY      LUMBAR FUSION  6/7/2016    Dr. Glen Duong Left 11 18 2015    left lumbar      Spouse name: Not on file    Number of children: Not on file    Years of education: Not on file    Highest education level: Not on file   Occupational History    Not on file   Social Needs    Financial resource strain: Not on file    Food insecurity:     Worry: Not on file     Inability: Not on file    Transportation needs:     Medical: Not on file     Non-medical: Not on file   Tobacco Use    Smoking status: Never Smoker    Smokeless tobacco: Never Used   Substance and Sexual Activity    Alcohol use: No    Drug use: No    Sexual activity: Never   Lifestyle    Physical activity:     Days per week: Not on file     Minutes per session: Not on file    Stress: Not on file   Relationships    Social connections:     Talks on phone: Not on file     Gets together: Not on file     Attends [employer] service: Not on file     Active member of club or organization: Not on file     Attends meetings of clubs or organizations: Not on file     Relationship status: Not on file    Intimate partner violence:     Fear of current or ex partner: Not on file     Emotionally abused: Not on file     Physically abused: Not on file     Forced sexual activity: Not on file   Other Topics Concern    Not on file   Social History Narrative    Not on file     Family History   Problem Relation Age of Onset    Cancer Father     Cancer Brother     Arthritis Other     Depression Other     Diabetes Other     Heart Disease Other     Hypertension Other     Stroke Other          REVIEW OF SYSTEMS:     General/Constitution:  (-)weight loss, (-)fever, (-)chills, (-)weakness. Skin: (-) rash,(-) psoriasis,(-) eczema, (-)skin cancer. Musculoskeletal: (-) fractures,  (-) dislocations,(-) collagen vascular disease, (-) fibromyalgia, (-) multiple sclerosis, (-) muscular dystrophy, (-) RSD,(-) joint pain (-)swelling, (-) joint pain,swelling.   Neurologic: (-) epilepsy, (-)seizures,(-) brain tumor,(-) TIA, (-)stroke,

## 2019-10-09 ENCOUNTER — OFFICE VISIT (OUTPATIENT)
Dept: NEUROLOGY | Age: 74
End: 2019-10-09
Payer: COMMERCIAL

## 2019-10-09 VITALS
WEIGHT: 190 LBS | OXYGEN SATURATION: 95 % | SYSTOLIC BLOOD PRESSURE: 96 MMHG | DIASTOLIC BLOOD PRESSURE: 60 MMHG | RESPIRATION RATE: 17 BRPM | BODY MASS INDEX: 35.87 KG/M2 | HEIGHT: 61 IN | HEART RATE: 76 BPM

## 2019-10-09 DIAGNOSIS — M54.17 L-S RADICULOPATHY: Primary | ICD-10-CM

## 2019-10-09 DIAGNOSIS — M54.12 CERVICAL RADICULOPATHY: ICD-10-CM

## 2019-10-09 PROCEDURE — 99215 OFFICE O/P EST HI 40 MIN: CPT | Performed by: PSYCHIATRY & NEUROLOGY

## 2019-10-09 RX ORDER — GABAPENTIN 300 MG/1
600 CAPSULE ORAL 3 TIMES DAILY
Qty: 180 CAPSULE | Refills: 5 | Status: SHIPPED | OUTPATIENT
Start: 2019-10-09 | End: 2019-10-10 | Stop reason: DRUGHIGH

## 2019-10-09 RX ORDER — BACLOFEN 10 MG/1
10 TABLET ORAL NIGHTLY
Qty: 30 TABLET | Refills: 5 | Status: SHIPPED | OUTPATIENT
Start: 2019-10-09 | End: 2019-11-14 | Stop reason: SDUPTHER

## 2019-10-10 ENCOUNTER — TELEPHONE (OUTPATIENT)
Dept: NEUROLOGY | Age: 74
End: 2019-10-10

## 2019-10-10 RX ORDER — GABAPENTIN 600 MG/1
600 TABLET ORAL 3 TIMES DAILY
Qty: 90 TABLET | Refills: 5 | OUTPATIENT
Start: 2019-10-10 | End: 2019-11-14 | Stop reason: SDUPTHER

## 2019-10-11 ENCOUNTER — OFFICE VISIT (OUTPATIENT)
Dept: PAIN MANAGEMENT | Age: 74
End: 2019-10-11
Payer: COMMERCIAL

## 2019-10-11 VITALS
HEART RATE: 68 BPM | HEIGHT: 61 IN | WEIGHT: 190 LBS | SYSTOLIC BLOOD PRESSURE: 110 MMHG | BODY MASS INDEX: 35.87 KG/M2 | TEMPERATURE: 97.9 F | RESPIRATION RATE: 16 BRPM | OXYGEN SATURATION: 93 % | DIASTOLIC BLOOD PRESSURE: 62 MMHG

## 2019-10-11 DIAGNOSIS — M54.17 L-S RADICULOPATHY: ICD-10-CM

## 2019-10-11 DIAGNOSIS — M70.61 GREATER TROCHANTERIC BURSITIS OF RIGHT HIP: ICD-10-CM

## 2019-10-11 DIAGNOSIS — M51.9 LUMBAR DISC DISORDER: ICD-10-CM

## 2019-10-11 DIAGNOSIS — G89.4 CHRONIC PAIN SYNDROME: ICD-10-CM

## 2019-10-11 DIAGNOSIS — F45.1 SOMATIC SYMPTOM DISORDER, PERSISTENT, SEVERE, WITH PREDOMINANT PAIN: ICD-10-CM

## 2019-10-11 DIAGNOSIS — M96.1 LUMBAR POSTLAMINECTOMY SYNDROME: ICD-10-CM

## 2019-10-11 DIAGNOSIS — M47.816 LUMBAR FACET ARTHROPATHY: ICD-10-CM

## 2019-10-11 DIAGNOSIS — M70.62 GREATER TROCHANTERIC BURSITIS OF LEFT HIP: ICD-10-CM

## 2019-10-11 DIAGNOSIS — M16.0 PRIMARY OSTEOARTHRITIS OF BOTH HIPS: Primary | ICD-10-CM

## 2019-10-11 DIAGNOSIS — M47.816 LUMBAR SPONDYLOSIS: ICD-10-CM

## 2019-10-11 PROCEDURE — 99213 OFFICE O/P EST LOW 20 MIN: CPT | Performed by: PAIN MEDICINE

## 2019-10-18 ENCOUNTER — HOSPITAL ENCOUNTER (OUTPATIENT)
Dept: MAMMOGRAPHY | Age: 74
Discharge: HOME | End: 2019-10-20
Payer: COMMERCIAL

## 2019-10-18 DIAGNOSIS — Z12.39 BREAST CANCER SCREENING: ICD-10-CM

## 2019-10-18 PROCEDURE — 77067 SCR MAMMO BI INCL CAD: CPT

## 2019-10-19 ENCOUNTER — HOSPITAL ENCOUNTER (EMERGENCY)
Age: 74
Discharge: HOME | End: 2019-10-19
Attending: EMERGENCY MEDICINE
Payer: COMMERCIAL

## 2019-10-19 ENCOUNTER — APPOINTMENT (OUTPATIENT)
Dept: CT IMAGING | Age: 74
End: 2019-10-19
Payer: COMMERCIAL

## 2019-10-19 VITALS
SYSTOLIC BLOOD PRESSURE: 119 MMHG | HEART RATE: 68 BPM | DIASTOLIC BLOOD PRESSURE: 58 MMHG | TEMPERATURE: 98.1 F | OXYGEN SATURATION: 98 % | HEIGHT: 61 IN | RESPIRATION RATE: 16 BRPM | WEIGHT: 192 LBS | BODY MASS INDEX: 36.25 KG/M2

## 2019-10-19 DIAGNOSIS — R25.1 SHAKING: Primary | ICD-10-CM

## 2019-10-19 LAB
ALBUMIN SERPL-MCNC: 3.9 G/DL (ref 3.5–5.2)
ALP BLD-CCNC: 61 U/L (ref 35–104)
ALT SERPL-CCNC: 26 U/L (ref 0–32)
ANION GAP SERPL CALCULATED.3IONS-SCNC: 8 MMOL/L (ref 7–16)
AST SERPL-CCNC: 26 U/L (ref 0–31)
BASOPHILS ABSOLUTE: 0.04 E9/L (ref 0–0.2)
BASOPHILS RELATIVE PERCENT: 0.6 % (ref 0–2)
BILIRUB SERPL-MCNC: 0.2 MG/DL (ref 0–1.2)
BILIRUBIN URINE: NEGATIVE
BLOOD, URINE: NEGATIVE
BUN BLDV-MCNC: 37 MG/DL (ref 8–23)
CALCIUM SERPL-MCNC: 9.7 MG/DL (ref 8.6–10.2)
CHLORIDE BLD-SCNC: 102 MMOL/L (ref 98–107)
CLARITY: CLEAR
CO2: 31 MMOL/L (ref 22–29)
COLOR: YELLOW
CREAT SERPL-MCNC: 1.7 MG/DL (ref 0.5–1)
EOSINOPHILS ABSOLUTE: 0.34 E9/L (ref 0.05–0.5)
EOSINOPHILS RELATIVE PERCENT: 5.5 % (ref 0–6)
GFR AFRICAN AMERICAN: 36
GFR NON-AFRICAN AMERICAN: 30 ML/MIN/1.73
GLUCOSE BLD-MCNC: 112 MG/DL (ref 74–99)
GLUCOSE URINE: 500 MG/DL
HCT VFR BLD CALC: 39 % (ref 34–48)
HEMOGLOBIN: 12.6 G/DL (ref 11.5–15.5)
IMMATURE GRANULOCYTES #: 0.03 E9/L
IMMATURE GRANULOCYTES %: 0.5 % (ref 0–5)
KETONES, URINE: NEGATIVE MG/DL
LEUKOCYTE ESTERASE, URINE: NEGATIVE
LYMPHOCYTES ABSOLUTE: 1.71 E9/L (ref 1.5–4)
LYMPHOCYTES RELATIVE PERCENT: 27.7 % (ref 20–42)
MCH RBC QN AUTO: 30.7 PG (ref 26–35)
MCHC RBC AUTO-ENTMCNC: 32.3 % (ref 32–34.5)
MCV RBC AUTO: 94.9 FL (ref 80–99.9)
MONOCYTES ABSOLUTE: 0.6 E9/L (ref 0.1–0.95)
MONOCYTES RELATIVE PERCENT: 9.7 % (ref 2–12)
NEUTROPHILS ABSOLUTE: 3.46 E9/L (ref 1.8–7.3)
NEUTROPHILS RELATIVE PERCENT: 56 % (ref 43–80)
NITRITE, URINE: NEGATIVE
PDW BLD-RTO: 14.1 FL (ref 11.5–15)
PH UA: 6 (ref 5–9)
PLATELET # BLD: 298 E9/L (ref 130–450)
PMV BLD AUTO: 9.8 FL (ref 7–12)
POTASSIUM SERPL-SCNC: 4 MMOL/L (ref 3.5–5)
PROTEIN UA: NEGATIVE MG/DL
RBC # BLD: 4.11 E12/L (ref 3.5–5.5)
SODIUM BLD-SCNC: 141 MMOL/L (ref 132–146)
SPECIFIC GRAVITY UA: 1.01 (ref 1–1.03)
TOTAL CK: 39 U/L (ref 20–180)
TOTAL PROTEIN: 6.6 G/DL (ref 6.4–8.3)
TROPONIN: <0.01 NG/ML (ref 0–0.03)
UROBILINOGEN, URINE: 0.2 E.U./DL
WBC # BLD: 6.2 E9/L (ref 4.5–11.5)

## 2019-10-19 PROCEDURE — 82550 ASSAY OF CK (CPK): CPT

## 2019-10-19 PROCEDURE — 81003 URINALYSIS AUTO W/O SCOPE: CPT

## 2019-10-19 PROCEDURE — 70450 CT HEAD/BRAIN W/O DYE: CPT

## 2019-10-19 PROCEDURE — 99284 EMERGENCY DEPT VISIT MOD MDM: CPT

## 2019-10-19 PROCEDURE — 85025 COMPLETE CBC W/AUTO DIFF WBC: CPT

## 2019-10-19 PROCEDURE — 36415 COLL VENOUS BLD VENIPUNCTURE: CPT

## 2019-10-19 PROCEDURE — 84484 ASSAY OF TROPONIN QUANT: CPT

## 2019-10-19 PROCEDURE — 80053 COMPREHEN METABOLIC PANEL: CPT

## 2019-10-28 ENCOUNTER — TELEPHONE (OUTPATIENT)
Dept: PAIN MANAGEMENT | Age: 74
End: 2019-10-28

## 2019-10-29 ENCOUNTER — OFFICE VISIT (OUTPATIENT)
Dept: ORTHOPEDIC SURGERY | Age: 74
End: 2019-10-29
Payer: COMMERCIAL

## 2019-10-29 ENCOUNTER — PREP FOR PROCEDURE (OUTPATIENT)
Dept: PAIN MANAGEMENT | Age: 74
End: 2019-10-29

## 2019-10-29 VITALS — HEIGHT: 61 IN | WEIGHT: 190 LBS | BODY MASS INDEX: 35.87 KG/M2

## 2019-10-29 DIAGNOSIS — M17.12 PRIMARY OSTEOARTHRITIS OF LEFT KNEE: Primary | ICD-10-CM

## 2019-10-29 DIAGNOSIS — M96.1 LUMBAR POSTLAMINECTOMY SYNDROME: Primary | ICD-10-CM

## 2019-10-29 DIAGNOSIS — M70.62 TROCHANTERIC BURSITIS, LEFT HIP: ICD-10-CM

## 2019-10-29 PROCEDURE — 99214 OFFICE O/P EST MOD 30 MIN: CPT | Performed by: ORTHOPAEDIC SURGERY

## 2019-10-29 PROCEDURE — 20610 DRAIN/INJ JOINT/BURSA W/O US: CPT | Performed by: ORTHOPAEDIC SURGERY

## 2019-10-29 RX ORDER — TRIAMCINOLONE ACETONIDE 40 MG/ML
40 INJECTION, SUSPENSION INTRA-ARTICULAR; INTRAMUSCULAR ONCE
Status: COMPLETED | OUTPATIENT
Start: 2019-10-29 | End: 2019-10-29

## 2019-10-29 RX ADMIN — TRIAMCINOLONE ACETONIDE 40 MG: 40 INJECTION, SUSPENSION INTRA-ARTICULAR; INTRAMUSCULAR at 09:50

## 2019-10-30 RX ORDER — CITALOPRAM 20 MG/1
20 TABLET ORAL DAILY
COMMUNITY
End: 2022-06-07

## 2019-10-30 RX ORDER — SODIUM CHLORIDE 9 MG/ML
INJECTION, SOLUTION INTRAVENOUS CONTINUOUS
Status: CANCELLED | OUTPATIENT
Start: 2019-10-30

## 2019-11-01 ENCOUNTER — ANESTHESIA EVENT (OUTPATIENT)
Dept: OPERATING ROOM | Age: 74
End: 2019-11-01
Payer: COMMERCIAL

## 2019-11-04 ENCOUNTER — ANESTHESIA (OUTPATIENT)
Dept: OPERATING ROOM | Age: 74
End: 2019-11-04
Payer: COMMERCIAL

## 2019-11-04 ENCOUNTER — HOSPITAL ENCOUNTER (OUTPATIENT)
Age: 74
Setting detail: OUTPATIENT SURGERY
Discharge: HOME | End: 2019-11-04
Attending: PAIN MEDICINE | Admitting: PAIN MEDICINE
Payer: COMMERCIAL

## 2019-11-04 ENCOUNTER — HOSPITAL ENCOUNTER (OUTPATIENT)
Dept: OPERATING ROOM | Age: 74
Setting detail: OUTPATIENT SURGERY
Discharge: HOME | End: 2019-11-04
Attending: PAIN MEDICINE
Payer: COMMERCIAL

## 2019-11-04 VITALS
OXYGEN SATURATION: 98 % | SYSTOLIC BLOOD PRESSURE: 155 MMHG | HEART RATE: 65 BPM | BODY MASS INDEX: 34.74 KG/M2 | HEIGHT: 61 IN | DIASTOLIC BLOOD PRESSURE: 71 MMHG | TEMPERATURE: 98 F | WEIGHT: 184 LBS | RESPIRATION RATE: 16 BRPM

## 2019-11-04 VITALS
SYSTOLIC BLOOD PRESSURE: 124 MMHG | OXYGEN SATURATION: 95 % | RESPIRATION RATE: 8 BRPM | TEMPERATURE: 98.6 F | DIASTOLIC BLOOD PRESSURE: 59 MMHG

## 2019-11-04 DIAGNOSIS — Z96.89 S/P INSERTION OF SPINAL CORD STIMULATOR: ICD-10-CM

## 2019-11-04 LAB — METER GLUCOSE: 112 MG/DL (ref 74–99)

## 2019-11-04 PROCEDURE — 3600000004 HC SURGERY LEVEL 4 BASE: Performed by: PAIN MEDICINE

## 2019-11-04 PROCEDURE — 7100000011 HC PHASE II RECOVERY - ADDTL 15 MIN: Performed by: PAIN MEDICINE

## 2019-11-04 PROCEDURE — 7100000010 HC PHASE II RECOVERY - FIRST 15 MIN: Performed by: PAIN MEDICINE

## 2019-11-04 PROCEDURE — 2500000003 HC RX 250 WO HCPCS: Performed by: PAIN MEDICINE

## 2019-11-04 PROCEDURE — 3700000000 HC ANESTHESIA ATTENDED CARE: Performed by: PAIN MEDICINE

## 2019-11-04 PROCEDURE — 82962 GLUCOSE BLOOD TEST: CPT

## 2019-11-04 PROCEDURE — 63650 IMPLANT NEUROELECTRODES: CPT | Performed by: PAIN MEDICINE

## 2019-11-04 PROCEDURE — 6360000002 HC RX W HCPCS: Performed by: NURSE ANESTHETIST, CERTIFIED REGISTERED

## 2019-11-04 PROCEDURE — 2709999900 HC NON-CHARGEABLE SUPPLY: Performed by: PAIN MEDICINE

## 2019-11-04 PROCEDURE — 95972 ALYS CPLX SP/PN NPGT W/PRGRM: CPT | Performed by: PAIN MEDICINE

## 2019-11-04 PROCEDURE — 2580000003 HC RX 258: Performed by: ANESTHESIOLOGY

## 2019-11-04 PROCEDURE — 3700000001 HC ADD 15 MINUTES (ANESTHESIA): Performed by: PAIN MEDICINE

## 2019-11-04 PROCEDURE — 2580000003 HC RX 258: Performed by: NURSE PRACTITIONER

## 2019-11-04 PROCEDURE — C1897 LEAD, NEUROSTIM TEST KIT: HCPCS | Performed by: PAIN MEDICINE

## 2019-11-04 PROCEDURE — 3600000014 HC SURGERY LEVEL 4 ADDTL 15MIN: Performed by: PAIN MEDICINE

## 2019-11-04 PROCEDURE — C1713 ANCHOR/SCREW BN/BN,TIS/BN: HCPCS | Performed by: PAIN MEDICINE

## 2019-11-04 PROCEDURE — 6360000002 HC RX W HCPCS: Performed by: NURSE PRACTITIONER

## 2019-11-04 PROCEDURE — 3209999900 FLUORO FOR SURGICAL PROCEDURES

## 2019-11-04 DEVICE — TRIAL LEAD KIT, 50CM WITH 5MM SPACING
Type: IMPLANTABLE DEVICE | Site: BACK | Status: FUNCTIONAL
Brand: NEVRO®

## 2019-11-04 DEVICE — N300 LEAD ANCHOR KIT
Type: IMPLANTABLE DEVICE | Site: BACK | Status: FUNCTIONAL
Brand: NEVRO®

## 2019-11-04 RX ORDER — SODIUM CHLORIDE 9 MG/ML
INJECTION, SOLUTION INTRAVENOUS CONTINUOUS
Status: DISCONTINUED | OUTPATIENT
Start: 2019-11-04 | End: 2019-11-04 | Stop reason: HOSPADM

## 2019-11-04 RX ORDER — FENTANYL CITRATE 50 UG/ML
INJECTION, SOLUTION INTRAMUSCULAR; INTRAVENOUS PRN
Status: DISCONTINUED | OUTPATIENT
Start: 2019-11-04 | End: 2019-11-04 | Stop reason: SDUPTHER

## 2019-11-04 RX ORDER — LIDOCAINE HYDROCHLORIDE 5 MG/ML
INJECTION, SOLUTION INFILTRATION; INTRAVENOUS PRN
Status: DISCONTINUED | OUTPATIENT
Start: 2019-11-04 | End: 2019-11-04 | Stop reason: ALTCHOICE

## 2019-11-04 RX ORDER — MIDAZOLAM HYDROCHLORIDE 1 MG/ML
INJECTION INTRAMUSCULAR; INTRAVENOUS PRN
Status: DISCONTINUED | OUTPATIENT
Start: 2019-11-04 | End: 2019-11-04 | Stop reason: SDUPTHER

## 2019-11-04 RX ORDER — PROPOFOL 10 MG/ML
INJECTION, EMULSION INTRAVENOUS CONTINUOUS PRN
Status: DISCONTINUED | OUTPATIENT
Start: 2019-11-04 | End: 2019-11-04 | Stop reason: SDUPTHER

## 2019-11-04 RX ORDER — LIDOCAINE HYDROCHLORIDE 20 MG/ML
INJECTION, SOLUTION INTRAVENOUS PRN
Status: DISCONTINUED | OUTPATIENT
Start: 2019-11-04 | End: 2019-11-04 | Stop reason: SDUPTHER

## 2019-11-04 RX ADMIN — MIDAZOLAM 2 MG: 1 INJECTION INTRAMUSCULAR; INTRAVENOUS at 13:22

## 2019-11-04 RX ADMIN — FENTANYL CITRATE 50 MCG: 50 INJECTION, SOLUTION INTRAMUSCULAR; INTRAVENOUS at 14:02

## 2019-11-04 RX ADMIN — FENTANYL CITRATE 50 MCG: 50 INJECTION, SOLUTION INTRAMUSCULAR; INTRAVENOUS at 13:23

## 2019-11-04 RX ADMIN — LIDOCAINE HYDROCHLORIDE 50 MG: 20 INJECTION, SOLUTION INTRAVENOUS at 13:23

## 2019-11-04 RX ADMIN — CEFAZOLIN 2 G: 1 INJECTION, POWDER, FOR SOLUTION INTRAMUSCULAR; INTRAVENOUS at 13:23

## 2019-11-04 RX ADMIN — SODIUM CHLORIDE: 9 INJECTION, SOLUTION INTRAVENOUS at 12:30

## 2019-11-04 RX ADMIN — PROPOFOL 50 MCG/KG/MIN: 10 INJECTION, EMULSION INTRAVENOUS at 13:23

## 2019-11-04 ASSESSMENT — PULMONARY FUNCTION TESTS
PIF_VALUE: 0

## 2019-11-04 ASSESSMENT — PAIN SCALES - GENERAL
PAINLEVEL_OUTOF10: 0

## 2019-11-04 ASSESSMENT — PAIN - FUNCTIONAL ASSESSMENT: PAIN_FUNCTIONAL_ASSESSMENT: 0-10

## 2019-11-04 ASSESSMENT — PAIN DESCRIPTION - DESCRIPTORS: DESCRIPTORS: ACHING

## 2019-11-11 ENCOUNTER — OFFICE VISIT (OUTPATIENT)
Dept: PAIN MANAGEMENT | Age: 74
End: 2019-11-11
Payer: COMMERCIAL

## 2019-11-11 VITALS
SYSTOLIC BLOOD PRESSURE: 120 MMHG | TEMPERATURE: 98.8 F | BODY MASS INDEX: 34.74 KG/M2 | HEART RATE: 67 BPM | DIASTOLIC BLOOD PRESSURE: 58 MMHG | OXYGEN SATURATION: 95 % | HEIGHT: 61 IN | RESPIRATION RATE: 16 BRPM | WEIGHT: 184 LBS

## 2019-11-11 DIAGNOSIS — M96.1 LUMBAR POSTLAMINECTOMY SYNDROME: Primary | ICD-10-CM

## 2019-11-11 DIAGNOSIS — G89.4 CHRONIC PAIN SYNDROME: ICD-10-CM

## 2019-11-11 PROCEDURE — 99024 POSTOP FOLLOW-UP VISIT: CPT | Performed by: PAIN MEDICINE

## 2019-11-11 PROCEDURE — 99213 OFFICE O/P EST LOW 20 MIN: CPT | Performed by: PAIN MEDICINE

## 2019-11-14 ENCOUNTER — TELEPHONE (OUTPATIENT)
Dept: PAIN MANAGEMENT | Age: 74
End: 2019-11-14

## 2019-11-14 ENCOUNTER — OFFICE VISIT (OUTPATIENT)
Dept: NEUROLOGY | Age: 74
End: 2019-11-14
Payer: COMMERCIAL

## 2019-11-14 VITALS
RESPIRATION RATE: 16 BRPM | WEIGHT: 184 LBS | BODY MASS INDEX: 34.74 KG/M2 | HEIGHT: 61 IN | HEART RATE: 69 BPM | OXYGEN SATURATION: 97 % | DIASTOLIC BLOOD PRESSURE: 62 MMHG | SYSTOLIC BLOOD PRESSURE: 110 MMHG

## 2019-11-14 DIAGNOSIS — I95.1 ORTHOSTATIC SYNCOPE: ICD-10-CM

## 2019-11-14 DIAGNOSIS — M54.17 L-S RADICULOPATHY: Primary | ICD-10-CM

## 2019-11-14 PROCEDURE — 99215 OFFICE O/P EST HI 40 MIN: CPT | Performed by: PSYCHIATRY & NEUROLOGY

## 2019-11-14 RX ORDER — BACLOFEN 10 MG/1
10 TABLET ORAL NIGHTLY
Qty: 90 TABLET | Refills: 3 | Status: SHIPPED
Start: 2019-11-14 | End: 2020-06-25 | Stop reason: SDUPTHER

## 2019-11-14 RX ORDER — GABAPENTIN 600 MG/1
600 TABLET ORAL 3 TIMES DAILY
Qty: 270 TABLET | Refills: 1 | Status: SHIPPED
Start: 2019-11-14 | End: 2020-06-08 | Stop reason: ALTCHOICE

## 2019-11-15 ENCOUNTER — OFFICE VISIT (OUTPATIENT)
Dept: PAIN MANAGEMENT | Age: 74
End: 2019-11-15
Payer: COMMERCIAL

## 2019-11-15 VITALS
DIASTOLIC BLOOD PRESSURE: 62 MMHG | BODY MASS INDEX: 34.74 KG/M2 | HEIGHT: 61 IN | WEIGHT: 184 LBS | RESPIRATION RATE: 16 BRPM | SYSTOLIC BLOOD PRESSURE: 92 MMHG | HEART RATE: 65 BPM | TEMPERATURE: 98 F | OXYGEN SATURATION: 94 %

## 2019-11-15 DIAGNOSIS — M47.816 LUMBAR SPONDYLOSIS: ICD-10-CM

## 2019-11-15 DIAGNOSIS — M70.62 GREATER TROCHANTERIC BURSITIS OF LEFT HIP: ICD-10-CM

## 2019-11-15 DIAGNOSIS — M16.0 PRIMARY OSTEOARTHRITIS OF BOTH HIPS: ICD-10-CM

## 2019-11-15 DIAGNOSIS — M47.816 LUMBAR FACET ARTHROPATHY: Primary | ICD-10-CM

## 2019-11-15 DIAGNOSIS — M70.61 GREATER TROCHANTERIC BURSITIS OF RIGHT HIP: ICD-10-CM

## 2019-11-15 DIAGNOSIS — M51.9 LUMBAR DISC DISORDER: ICD-10-CM

## 2019-11-15 DIAGNOSIS — M54.16 LUMBAR RADICULOPATHY: Chronic | ICD-10-CM

## 2019-11-15 DIAGNOSIS — M96.1 LUMBAR POSTLAMINECTOMY SYNDROME: ICD-10-CM

## 2019-11-15 DIAGNOSIS — F45.1 SOMATIC SYMPTOM DISORDER, PERSISTENT, SEVERE, WITH PREDOMINANT PAIN: ICD-10-CM

## 2019-11-15 DIAGNOSIS — G89.4 CHRONIC PAIN SYNDROME: ICD-10-CM

## 2019-11-15 PROCEDURE — 99213 OFFICE O/P EST LOW 20 MIN: CPT | Performed by: PAIN MEDICINE

## 2019-11-25 ENCOUNTER — OFFICE VISIT (OUTPATIENT)
Dept: ORTHOPEDIC SURGERY | Age: 74
End: 2019-11-25
Payer: COMMERCIAL

## 2019-11-25 DIAGNOSIS — M17.12 PRIMARY OSTEOARTHRITIS OF LEFT KNEE: Primary | ICD-10-CM

## 2019-11-25 PROCEDURE — 20610 DRAIN/INJ JOINT/BURSA W/O US: CPT | Performed by: ORTHOPAEDIC SURGERY

## 2019-11-26 RX ORDER — HYALURONATE SODIUM 10 MG/ML
20 SYRINGE (ML) INTRAARTICULAR ONCE
Status: COMPLETED | OUTPATIENT
Start: 2019-11-26 | End: 2019-11-26

## 2019-11-26 RX ADMIN — Medication 20 MG: at 08:00

## 2020-05-26 ENCOUNTER — VIRTUAL VISIT (OUTPATIENT)
Dept: PAIN MANAGEMENT | Age: 75
End: 2020-05-26
Payer: MEDICARE

## 2020-05-26 ENCOUNTER — PREP FOR PROCEDURE (OUTPATIENT)
Dept: PAIN MANAGEMENT | Age: 75
End: 2020-05-26

## 2020-05-26 PROCEDURE — 99442 PR PHYS/QHP TELEPHONE EVALUATION 11-20 MIN: CPT | Performed by: PAIN MEDICINE

## 2020-05-26 RX ORDER — OXYCODONE HYDROCHLORIDE AND ACETAMINOPHEN 5; 325 MG/1; MG/1
1 TABLET ORAL DAILY PRN
Qty: 30 TABLET | Refills: 0 | Status: SHIPPED | OUTPATIENT
Start: 2020-05-26 | End: 2020-06-25

## 2020-05-26 NOTE — PROGRESS NOTES
[email or web address] html     1-Stay home except to get medical care  2-Clean your hands often for atleast 20 secnds, avoid touching: Avoid touching your eyes, nose, and mouth with unwashed hands. 3-Seek medical attention: Seek prompt medical attention if your illness is worsening (e.g., difficulty breathing). Call you doctor first.  3-Wear a facemask if you are sick   4-Cover your coughs and sneezes        I affirm this is a Patient Initiated Episode with an Established Patient who has not had a related appointment within my department in the past 7 days or scheduled within the next 24 hours.     Total Time: minutes: 11-20 minutes    Note: not billable if this call serves to triage the patient into an appointment for the relevant concern

## 2020-05-27 ENCOUNTER — HOSPITAL ENCOUNTER (OUTPATIENT)
Age: 75
Discharge: HOME | End: 2020-05-29
Payer: MEDICARE

## 2020-05-27 LAB
ALBUMIN SERPL-MCNC: 4.4 G/DL (ref 3.5–5.2)
ALP BLD-CCNC: 63 U/L (ref 35–104)
ALT SERPL-CCNC: 37 U/L (ref 0–32)
ANION GAP SERPL CALCULATED.3IONS-SCNC: 15 MMOL/L (ref 7–16)
AST SERPL-CCNC: 39 U/L (ref 0–31)
BILIRUB SERPL-MCNC: 0.4 MG/DL (ref 0–1.2)
BUN BLDV-MCNC: 29 MG/DL (ref 8–23)
CALCIUM SERPL-MCNC: 9.7 MG/DL (ref 8.6–10.2)
CHLORIDE BLD-SCNC: 104 MMOL/L (ref 98–107)
CHOLESTEROL, TOTAL: 212 MG/DL (ref 0–199)
CO2: 26 MMOL/L (ref 22–29)
CREAT SERPL-MCNC: 1.3 MG/DL (ref 0.5–1)
GFR AFRICAN AMERICAN: 49
GFR NON-AFRICAN AMERICAN: 40 ML/MIN/1.73
GLUCOSE BLD-MCNC: 138 MG/DL (ref 74–99)
HBA1C MFR BLD: 6.9 % (ref 4–5.6)
HCT VFR BLD CALC: 39.4 % (ref 34–48)
HDLC SERPL-MCNC: 38 MG/DL
HEMOGLOBIN: 12.7 G/DL (ref 11.5–15.5)
LDL CHOLESTEROL CALCULATED: 146 MG/DL (ref 0–99)
MCH RBC QN AUTO: 30.6 PG (ref 26–35)
MCHC RBC AUTO-ENTMCNC: 32.2 % (ref 32–34.5)
MCV RBC AUTO: 94.9 FL (ref 80–99.9)
PDW BLD-RTO: 13.9 FL (ref 11.5–15)
PLATELET # BLD: 279 E9/L (ref 130–450)
PMV BLD AUTO: 11.1 FL (ref 7–12)
POTASSIUM SERPL-SCNC: 4.7 MMOL/L (ref 3.5–5)
PRO-BNP: 330 PG/ML (ref 0–125)
RBC # BLD: 4.15 E12/L (ref 3.5–5.5)
SODIUM BLD-SCNC: 145 MMOL/L (ref 132–146)
TOTAL PROTEIN: 7 G/DL (ref 6.4–8.3)
TRIGL SERPL-MCNC: 138 MG/DL (ref 0–149)
TSH SERPL DL<=0.05 MIU/L-ACNC: 1.83 UIU/ML (ref 0.27–4.2)
VLDLC SERPL CALC-MCNC: 28 MG/DL
WBC # BLD: 3.4 E9/L (ref 4.5–11.5)

## 2020-05-27 PROCEDURE — 80053 COMPREHEN METABOLIC PANEL: CPT

## 2020-05-27 PROCEDURE — 83036 HEMOGLOBIN GLYCOSYLATED A1C: CPT

## 2020-05-27 PROCEDURE — 80061 LIPID PANEL: CPT

## 2020-05-27 PROCEDURE — 85027 COMPLETE CBC AUTOMATED: CPT

## 2020-05-27 PROCEDURE — 83880 ASSAY OF NATRIURETIC PEPTIDE: CPT

## 2020-05-27 PROCEDURE — 84443 ASSAY THYROID STIM HORMONE: CPT

## 2020-05-28 ENCOUNTER — HOSPITAL ENCOUNTER (OUTPATIENT)
Age: 75
Discharge: HOME | End: 2020-05-28
Payer: MEDICARE

## 2020-05-28 LAB
ALBUMIN SERPL-MCNC: 4.1 G/DL (ref 3.5–5.2)
ALP BLD-CCNC: 63 U/L (ref 35–104)
ALT SERPL-CCNC: 32 U/L (ref 0–32)
ANION GAP SERPL CALCULATED.3IONS-SCNC: 10 MMOL/L (ref 7–16)
AST SERPL-CCNC: 27 U/L (ref 0–31)
BILIRUB SERPL-MCNC: 0.2 MG/DL (ref 0–1.2)
BUN BLDV-MCNC: 43 MG/DL (ref 8–23)
CALCIUM SERPL-MCNC: 9.4 MG/DL (ref 8.6–10.2)
CHLORIDE BLD-SCNC: 103 MMOL/L (ref 98–107)
CO2: 27 MMOL/L (ref 22–29)
CREAT SERPL-MCNC: 1.5 MG/DL (ref 0.5–1)
GFR AFRICAN AMERICAN: 41
GFR NON-AFRICAN AMERICAN: 34 ML/MIN/1.73
GLUCOSE FASTING: 253 MG/DL (ref 74–99)
HCT VFR BLD CALC: 38.2 % (ref 34–48)
HEMOGLOBIN: 12 G/DL (ref 11.5–15.5)
MCH RBC QN AUTO: 30.6 PG (ref 26–35)
MCHC RBC AUTO-ENTMCNC: 31.4 % (ref 32–34.5)
MCV RBC AUTO: 97.4 FL (ref 80–99.9)
PDW BLD-RTO: 13.7 FL (ref 11.5–15)
PHOSPHORUS: 3.4 MG/DL (ref 2.5–4.5)
PLATELET # BLD: 241 E9/L (ref 130–450)
PMV BLD AUTO: 10.4 FL (ref 7–12)
POTASSIUM SERPL-SCNC: 4.7 MMOL/L (ref 3.5–5)
RBC # BLD: 3.92 E12/L (ref 3.5–5.5)
SODIUM BLD-SCNC: 140 MMOL/L (ref 132–146)
TOTAL PROTEIN: 6.4 G/DL (ref 6.4–8.3)
VITAMIN D 25-HYDROXY: 68 NG/ML (ref 30–100)
WBC # BLD: 3.8 E9/L (ref 4.5–11.5)

## 2020-05-28 PROCEDURE — 85027 COMPLETE CBC AUTOMATED: CPT

## 2020-05-28 PROCEDURE — 84100 ASSAY OF PHOSPHORUS: CPT

## 2020-05-28 PROCEDURE — 80053 COMPREHEN METABOLIC PANEL: CPT

## 2020-05-28 PROCEDURE — 82306 VITAMIN D 25 HYDROXY: CPT

## 2020-05-28 PROCEDURE — 36415 COLL VENOUS BLD VENIPUNCTURE: CPT

## 2020-06-01 ENCOUNTER — TELEPHONE (OUTPATIENT)
Dept: PAIN MANAGEMENT | Age: 75
End: 2020-06-01

## 2020-06-08 ENCOUNTER — HOSPITAL ENCOUNTER (OUTPATIENT)
Age: 75
Discharge: HOME | End: 2020-06-10
Payer: MEDICARE

## 2020-06-08 PROCEDURE — U0003 INFECTIOUS AGENT DETECTION BY NUCLEIC ACID (DNA OR RNA); SEVERE ACUTE RESPIRATORY SYNDROME CORONAVIRUS 2 (SARS-COV-2) (CORONAVIRUS DISEASE [COVID-19]), AMPLIFIED PROBE TECHNIQUE, MAKING USE OF HIGH THROUGHPUT TECHNOLOGIES AS DESCRIBED BY CMS-2020-01-R: HCPCS

## 2020-06-08 RX ORDER — GABAPENTIN 600 MG/1
600 TABLET ORAL 3 TIMES DAILY
COMMUNITY
End: 2020-06-25 | Stop reason: SDUPTHER

## 2020-06-11 ENCOUNTER — ANESTHESIA EVENT (OUTPATIENT)
Dept: OPERATING ROOM | Age: 75
End: 2020-06-11
Payer: MEDICARE

## 2020-06-11 LAB
SARS-COV-2: NOT DETECTED
SOURCE: NORMAL

## 2020-06-12 NOTE — ANESTHESIA PRE PROCEDURE
Dapagliflozin Propanediol (FARXIGA PO) Take 10 mg by mouth daily      aspirin 81 MG tablet Take 81 mg by mouth daily Stopped 5 days pre op      glipiZIDE (GLUCOTROL) 5 MG tablet Take 5 mg by mouth 2 times daily (before meals)      omeprazole (PRILOSEC) 20 MG delayed release capsule Take 20 mg by mouth daily      lisinopril (PRINIVIL;ZESTRIL) 5 MG tablet Take 5 mg by mouth daily      IRON PO Take 65 mg by mouth daily      furosemide (LASIX) 20 MG tablet Take 20 mg by mouth daily      clopidogrel (PLAVIX) 75 MG tablet Take 75 mg by mouth daily Stopped 5 days pre op      nitroGLYCERIN (NITROSTAT) 0.4 MG SL tablet Place 0.4 mg under the tongue every 5 minutes as needed for Chest pain up to max of 3 total doses. If no relief after 1 dose, call 911.  folic acid (FOLVITE) 400 MCG tablet Take 400 mcg by mouth 2 times daily       vitamin D 1000 UNITS CAPS Take 1,000 Units by mouth every evening       metoprolol succinate ER (TOPROL-XL) 25 MG XL tablet Take 1 tablet by mouth 2 times daily (Patient taking differently: Take 25 mg by mouth daily ) 30 tablet 3    fenofibrate (TRICOR) 145 MG tablet Take 145 mg by mouth daily Takes 1/2 tablet      isosorbide mononitrate (IMDUR) 60 MG CR tablet Take 30 mg by mouth daily       ezetimibe (ZETIA) 10 MG tablet Take 10 mg by mouth every evening       Multiple Vitamins-Minerals (CENTRUM SILVER PO) Take 1 tablet by mouth daily.  levothyroxine (SYNTHROID) 100 MCG tablet Take 100 mcg by mouth daily. Allergies:     Allergies   Allergen Reactions    Atorvastatin     Cipro Xr      Reaction unknown    Cymbalta [Duloxetine Hcl] Nausea Only    Diazepam     Duloxetine Nausea Only    Erythromycin      Reaction unknown    Indomethacin     Ketorolac     Ketorolac Tromethamine Itching    Lipitor Other (See Comments)     Severe leg pain    Lodine [Etodolac]     Oscal 500-200 D-3 [place] Pac Shell Calcium-D]     Paxil [Paroxetine Hcl]     Ropinirole     Statins [facility]    NH INJ DX/THER AGNT PARAVERT FACET JOINT, LUMBAR/SAC, 1ST LEVEL Bilateral 10/24/2018    BILATERAL LUMBAR FACET BLOCK L1-2 ,L3-4 WITH X-RAY performed by Chiquita Burnett DO at [address] / [address] N/A 11/4/2019    SPINAL CORD STIMULATOR TRIAL WITH NEVRO performed by Te Woody MD at [address]      TONSILLECTOMY      VASCULAR SURGERY      laser on leaky veins       Social History:    Social History     Tobacco Use    Smoking status: Never Smoker    Smokeless tobacco: Never Used   Substance Use Topics    Alcohol use: No                                Counseling given: Not Answered      Vital Signs (Current):   Vitals:    06/08/20 1440   Weight: 184 lb (83.5 kg)   Height: 5' 1\" (1.549 m)                                              BP Readings from Last 3 Encounters:   11/15/19 92/62   11/14/19 110/62   11/11/19 (!) 120/58       NPO Status:  GREATER THAN 8 HOURS                                                                               BMI:   Wt Readings from Last 3 Encounters:   11/15/19 184 lb (83.5 kg)   11/14/19 184 lb (83.5 kg)   11/11/19 184 lb (83.5 kg)     Body mass index is 34.77 kg/m².     CBC:   Lab Results   Component Value Date    WBC 3.8 05/28/2020    RBC 3.92 05/28/2020    HGB 12.0 05/28/2020    HCT 38.2 05/28/2020    MCV 97.4 05/28/2020    RDW 13.7 05/28/2020     05/28/2020       CMP:   Lab Results   Component Value Date     05/28/2020    K 4.7 05/28/2020    K 4.6 05/22/2018     05/28/2020    CO2 27 05/28/2020    BUN 43 05/28/2020    CREATININE 1.5 05/28/2020    GFRAA 41 05/28/2020    LABGLOM 34 05/28/2020    GLUCOSE 138 05/27/2020    GLUCOSE 147 01/31/2012    PROT 6.4 05/28/2020    CALCIUM 9.4 05/28/2020    BILITOT 0.2 05/28/2020    ALKPHOS 63 05/28/2020    AST 27 05/28/2020    ALT 32 05/28/2020       POC Tests: No results for input(s): POCGLU, POCNA, POCK, POCCL, DO  Disha 15, 2020  8:41 AM

## 2020-06-15 ENCOUNTER — ANESTHESIA (OUTPATIENT)
Dept: OPERATING ROOM | Age: 75
End: 2020-06-15
Payer: MEDICARE

## 2020-06-15 ENCOUNTER — HOSPITAL ENCOUNTER (OUTPATIENT)
Dept: OPERATING ROOM | Age: 75
Setting detail: OUTPATIENT SURGERY
Discharge: HOME | End: 2020-06-15
Attending: PAIN MEDICINE
Payer: MEDICARE

## 2020-06-15 ENCOUNTER — HOSPITAL ENCOUNTER (OUTPATIENT)
Age: 75
Setting detail: OUTPATIENT SURGERY
Discharge: HOME | End: 2020-06-15
Attending: PAIN MEDICINE | Admitting: PAIN MEDICINE
Payer: MEDICARE

## 2020-06-15 VITALS
OXYGEN SATURATION: 98 % | SYSTOLIC BLOOD PRESSURE: 129 MMHG | WEIGHT: 184 LBS | HEART RATE: 59 BPM | HEIGHT: 61 IN | BODY MASS INDEX: 34.74 KG/M2 | TEMPERATURE: 96.9 F | RESPIRATION RATE: 16 BRPM | DIASTOLIC BLOOD PRESSURE: 59 MMHG

## 2020-06-15 VITALS
DIASTOLIC BLOOD PRESSURE: 54 MMHG | OXYGEN SATURATION: 99 % | TEMPERATURE: 98.6 F | RESPIRATION RATE: 11 BRPM | SYSTOLIC BLOOD PRESSURE: 90 MMHG

## 2020-06-15 LAB — METER GLUCOSE: 92 MG/DL (ref 74–99)

## 2020-06-15 PROCEDURE — 7100000010 HC PHASE II RECOVERY - FIRST 15 MIN: Performed by: PAIN MEDICINE

## 2020-06-15 PROCEDURE — 82962 GLUCOSE BLOOD TEST: CPT

## 2020-06-15 PROCEDURE — C1822 GEN, NEURO, HF, RECHG BAT: HCPCS | Performed by: PAIN MEDICINE

## 2020-06-15 PROCEDURE — 2580000003 HC RX 258: Performed by: PAIN MEDICINE

## 2020-06-15 PROCEDURE — C1778 LEAD, NEUROSTIMULATOR: HCPCS | Performed by: PAIN MEDICINE

## 2020-06-15 PROCEDURE — 2709999900 HC NON-CHARGEABLE SUPPLY: Performed by: PAIN MEDICINE

## 2020-06-15 PROCEDURE — 6360000002 HC RX W HCPCS: Performed by: PAIN MEDICINE

## 2020-06-15 PROCEDURE — 2580000003 HC RX 258: Performed by: ANESTHESIOLOGY

## 2020-06-15 PROCEDURE — 82962 GLUCOSE BLOOD TEST: CPT | Performed by: PAIN MEDICINE

## 2020-06-15 PROCEDURE — 3700000001 HC ADD 15 MINUTES (ANESTHESIA): Performed by: PAIN MEDICINE

## 2020-06-15 PROCEDURE — 2780000010 HC IMPLANT OTHER: Performed by: PAIN MEDICINE

## 2020-06-15 PROCEDURE — 63688 REV/RMV IMP SP NPG/R DTCH CN: CPT | Performed by: PAIN MEDICINE

## 2020-06-15 PROCEDURE — 95972 ALYS CPLX SP/PN NPGT W/PRGRM: CPT | Performed by: PAIN MEDICINE

## 2020-06-15 PROCEDURE — 3600000014 HC SURGERY LEVEL 4 ADDTL 15MIN: Performed by: PAIN MEDICINE

## 2020-06-15 PROCEDURE — 2720000010 HC SURG SUPPLY STERILE: Performed by: PAIN MEDICINE

## 2020-06-15 PROCEDURE — 7100000011 HC PHASE II RECOVERY - ADDTL 15 MIN: Performed by: PAIN MEDICINE

## 2020-06-15 PROCEDURE — 3209999900 FLUORO FOR SURGICAL PROCEDURES

## 2020-06-15 PROCEDURE — 6360000002 HC RX W HCPCS: Performed by: NURSE ANESTHETIST, CERTIFIED REGISTERED

## 2020-06-15 PROCEDURE — 2500000003 HC RX 250 WO HCPCS: Performed by: PAIN MEDICINE

## 2020-06-15 PROCEDURE — 63650 IMPLANT NEUROELECTRODES: CPT | Performed by: PAIN MEDICINE

## 2020-06-15 PROCEDURE — 6370000000 HC RX 637 (ALT 250 FOR IP): Performed by: ANESTHESIOLOGY

## 2020-06-15 PROCEDURE — 3600000004 HC SURGERY LEVEL 4 BASE: Performed by: PAIN MEDICINE

## 2020-06-15 PROCEDURE — 3700000000 HC ANESTHESIA ATTENDED CARE: Performed by: PAIN MEDICINE

## 2020-06-15 DEVICE — SURGICAL LEAD KIT, 50CM
Type: IMPLANTABLE DEVICE | Site: BACK | Status: FUNCTIONAL
Brand: NEVRO®

## 2020-06-15 DEVICE — SENZA II
Type: IMPLANTABLE DEVICE | Site: BACK | Status: FUNCTIONAL
Brand: NEVRO

## 2020-06-15 DEVICE — N300 LEAD ANCHOR KIT
Type: IMPLANTABLE DEVICE | Site: BACK | Status: FUNCTIONAL
Brand: NEVRO®

## 2020-06-15 RX ORDER — LIDOCAINE HYDROCHLORIDE 5 MG/ML
INJECTION, SOLUTION INFILTRATION; INTRAVENOUS PRN
Status: DISCONTINUED | OUTPATIENT
Start: 2020-06-15 | End: 2020-06-15 | Stop reason: ALTCHOICE

## 2020-06-15 RX ORDER — PROPOFOL 10 MG/ML
INJECTION, EMULSION INTRAVENOUS CONTINUOUS PRN
Status: DISCONTINUED | OUTPATIENT
Start: 2020-06-15 | End: 2020-06-15 | Stop reason: SDUPTHER

## 2020-06-15 RX ORDER — MIDAZOLAM HYDROCHLORIDE 1 MG/ML
INJECTION INTRAMUSCULAR; INTRAVENOUS PRN
Status: DISCONTINUED | OUTPATIENT
Start: 2020-06-15 | End: 2020-06-15 | Stop reason: SDUPTHER

## 2020-06-15 RX ORDER — OXYCODONE HYDROCHLORIDE AND ACETAMINOPHEN 5; 325 MG/1; MG/1
1 TABLET ORAL
Status: COMPLETED | OUTPATIENT
Start: 2020-06-15 | End: 2020-06-15

## 2020-06-15 RX ORDER — SODIUM CHLORIDE 9 MG/ML
INJECTION, SOLUTION INTRAVENOUS CONTINUOUS
Status: DISCONTINUED | OUTPATIENT
Start: 2020-06-15 | End: 2020-06-15 | Stop reason: HOSPADM

## 2020-06-15 RX ORDER — LIDOCAINE HYDROCHLORIDE 20 MG/ML
INJECTION, SOLUTION INTRAVENOUS PRN
Status: DISCONTINUED | OUTPATIENT
Start: 2020-06-15 | End: 2020-06-15 | Stop reason: SDUPTHER

## 2020-06-15 RX ORDER — OXYCODONE HYDROCHLORIDE AND ACETAMINOPHEN 5; 325 MG/1; MG/1
TABLET ORAL
Status: DISCONTINUED
Start: 2020-06-15 | End: 2020-06-15 | Stop reason: HOSPADM

## 2020-06-15 RX ORDER — FENTANYL CITRATE 50 UG/ML
INJECTION, SOLUTION INTRAMUSCULAR; INTRAVENOUS PRN
Status: DISCONTINUED | OUTPATIENT
Start: 2020-06-15 | End: 2020-06-15 | Stop reason: SDUPTHER

## 2020-06-15 RX ADMIN — PROPOFOL 75 MCG/KG/MIN: 10 INJECTION, EMULSION INTRAVENOUS at 09:51

## 2020-06-15 RX ADMIN — CEFAZOLIN 2 G: 10 INJECTION, POWDER, FOR SOLUTION INTRAVENOUS at 11:10

## 2020-06-15 RX ADMIN — FENTANYL CITRATE 50 MCG: 50 INJECTION, SOLUTION INTRAMUSCULAR; INTRAVENOUS at 09:51

## 2020-06-15 RX ADMIN — MIDAZOLAM 2 MG: 1 INJECTION INTRAMUSCULAR; INTRAVENOUS at 09:49

## 2020-06-15 RX ADMIN — LIDOCAINE HYDROCHLORIDE 50 MG: 20 INJECTION, SOLUTION INTRAVENOUS at 09:51

## 2020-06-15 RX ADMIN — FENTANYL CITRATE 50 MCG: 50 INJECTION, SOLUTION INTRAMUSCULAR; INTRAVENOUS at 10:54

## 2020-06-15 RX ADMIN — OXYCODONE HYDROCHLORIDE AND ACETAMINOPHEN 1 TABLET: 5; 325 TABLET ORAL at 12:42

## 2020-06-15 RX ADMIN — CEFAZOLIN 2 G: 10 INJECTION, POWDER, FOR SOLUTION INTRAVENOUS at 09:46

## 2020-06-15 RX ADMIN — SODIUM CHLORIDE: 9 INJECTION, SOLUTION INTRAVENOUS at 09:15

## 2020-06-15 ASSESSMENT — PULMONARY FUNCTION TESTS
PIF_VALUE: 0

## 2020-06-15 ASSESSMENT — PAIN DESCRIPTION - ONSET
ONSET: AWAKENED FROM SLEEP
ONSET: GRADUAL

## 2020-06-15 ASSESSMENT — PAIN SCALES - GENERAL
PAINLEVEL_OUTOF10: 0
PAINLEVEL_OUTOF10: 4
PAINLEVEL_OUTOF10: 4

## 2020-06-15 ASSESSMENT — PAIN DESCRIPTION - PAIN TYPE: TYPE: SURGICAL PAIN

## 2020-06-15 ASSESSMENT — PAIN - FUNCTIONAL ASSESSMENT: PAIN_FUNCTIONAL_ASSESSMENT: 0-10

## 2020-06-15 ASSESSMENT — PAIN DESCRIPTION - DESCRIPTORS: DESCRIPTORS: DISCOMFORT

## 2020-06-15 NOTE — H&P
[address]  [address] [facility], [address] [place] [address] Kvng  [phone]    Procedure History & Physical      Rj Vieira     HPI:    Patient  is here for low back and leg pain for SCS implant with Nevro  Labs/imaging studies reviewed   All question and concerns addressed including R/B/A associated with the procedure    Past Medical History:   Diagnosis Date    Arthritis     CAD (coronary artery disease)     CHF (congestive heart failure) ([address]     Diabetes ([address]     Fibromyalgia     GERD (gastroesophageal reflux disease)     History of cardiovascular stress test 02/17/2014    lexiscan, also 4/21/2015    History of MI (myocardial infarction)     x4; most recent 2016    Hx of blood clots     DVT leg    Hyperlipidemia     Hypertension     Hypothyroidism     Intractable low back pain 6/19/2016    Non-insulin dependent type 2 diabetes mellitus ([facility]     NSTEMI (non-ST elevated myocardial infarction) ([facility] 11/19/2015       Past Surgical History:   Procedure Laterality Date    APPENDECTOMY      ARTHROCENTESIS Left 8/22/2019    LEFT HIP CORTICOSTERIOD INJECTION UNDER FLUOROSCOPIC GUIDANCE performed by Schemartínez-PlDO heena at [address] [facility]  11/15/2017    Dr Lorrine Goodpasture Right 7 7 15    CATARACT REMOVAL WITH IMPLANT Left 4/26/2016    CHOLECYSTECTOMY      COCCYX REMOVAL      COLONOSCOPY      CORONARY ANGIOPLASTY WITH [address] started needing cardiac care; 5 stents total    CORONARY ANGIOPLASTY WITH STENT PLACEMENT  11/19/2015    Dr Josué Park stent 3x18 prox Cx   [facility] Dr. Pawan Denney; 3 bypass grafts    ENDOSCOPY, COLON, DIAGNOSTIC      HEMORRHOID SURGERY      HYSTERECTOMY      LUMBAR FUSION  6/7/2016    Dr. Karime Yoder Left 11 18 2015    left lumbar transforaminal nerve block l4-5 l5-s1    NERVE BLOCK Right MD   citalopram (CELEXA) 10 MG tablet Take 10 mg by mouth daily    Historical Provider, MD   potassium chloride (KLOR-CON) 10 MEQ extended release tablet Take 10 mEq by mouth daily  6/10/19   Historical Provider, MD   Multiple Vitamins-Minerals (EYE HEALTH) CAPS Take by mouth daily     Historical Provider, MD   Semaglutide (OZEMPIC) 0.25 or 0.5 MG/DOSE SOPN Inject into the skin once a week fridays    Historical Provider, MD   Dapagliflozin Propanediol (FARXIGA PO) Take 10 mg by mouth daily    Historical Provider, MD   aspirin 81 MG tablet Take 81 mg by mouth daily Stopped 5 days pre op    Historical Provider, MD   glipiZIDE (GLUCOTROL) 5 MG tablet Take 5 mg by mouth 2 times daily (before meals)    Historical Provider, MD   IRON PO Take 65 mg by mouth daily    Historical Provider, MD   furosemide (LASIX) 20 MG tablet Take 20 mg by mouth daily    Historical Provider, MD   clopidogrel (PLAVIX) 75 MG tablet Take 75 mg by mouth daily Stopped 5 days pre op    Historical Provider, MD   nitroGLYCERIN (NITROSTAT) 0.4 MG SL tablet Place 0.4 mg under the tongue every 5 minutes as needed for Chest pain up to max of 3 total doses. If no relief after 1 dose, call 911. Historical Provider, MD   folic acid (FOLVITE) 585 MCG tablet Take 400 mcg by mouth 2 times daily     Historical Provider, MD   vitamin D 1000 UNITS CAPS Take 1,000 Units by mouth every evening     Historical Provider, MD   fenofibrate (TRICOR) 145 MG tablet Take 145 mg by mouth daily Takes 1/2 tablet    Historical Provider, MD   isosorbide mononitrate (IMDUR) 60 MG CR tablet Take 30 mg by mouth daily     Historical Provider, MD   ezetimibe (ZETIA) 10 MG tablet Take 10 mg by mouth every evening     Historical Provider, MD   Multiple Vitamins-Minerals (CENTRUM SILVER PO) Take 1 tablet by mouth daily.     Historical Provider, MD       Allergies   Allergen Reactions    Atorvastatin     Cipro Xr      Reaction unknown    Cymbalta [Duloxetine Hcl] Nausea Only   

## 2020-06-24 ENCOUNTER — OFFICE VISIT (OUTPATIENT)
Dept: PAIN MANAGEMENT | Age: 75
End: 2020-06-24
Payer: MEDICARE

## 2020-06-24 VITALS
BODY MASS INDEX: 34.74 KG/M2 | HEART RATE: 60 BPM | HEIGHT: 61 IN | TEMPERATURE: 98.1 F | WEIGHT: 184 LBS | RESPIRATION RATE: 16 BRPM | DIASTOLIC BLOOD PRESSURE: 68 MMHG | SYSTOLIC BLOOD PRESSURE: 122 MMHG

## 2020-06-24 PROCEDURE — 99024 POSTOP FOLLOW-UP VISIT: CPT | Performed by: PAIN MEDICINE

## 2020-06-24 PROCEDURE — 99213 OFFICE O/P EST LOW 20 MIN: CPT | Performed by: PAIN MEDICINE

## 2020-06-24 NOTE — PROGRESS NOTES
Do you currently have any of the following:    Fever: No  Headache:  No  Cough: yes, hx of CHF  Shortness of breath: No  Exposed to anyone with these symptoms: No                                                                                                                Norval Coma presents to the [facility] on 6/24/2020. Rohit Kolb is complaining of pain none in her low back. The pain is constant. The pain is described as no pain. Pain is rated on her best day at a 0, on her worst day at a 0, and on average at a 0 on the VAS scale. She took her last dose of Tylenol last night. Rohit Kolb does not have issues with constipation. Any procedures since your last visit: Yes,spinal cord stimulator insertion    She is  on NSAIDS and  is  on anticoagulation medications to include ASA and Plavix and is managed by Dr Jose Hernandez. Pacemaker or defibrilator: No       /68   Pulse 60   Temp 98.1 °F (36.7 °C) (Oral)   Resp 16   Ht 5' 1\" (1.549 m)   Wt 184 lb (83.5 kg)   BMI 34.77 kg/m²      No LMP recorded. Patient has had a hysterectomy.

## 2020-06-25 ENCOUNTER — OFFICE VISIT (OUTPATIENT)
Dept: NEUROLOGY | Age: 75
End: 2020-06-25
Payer: MEDICARE

## 2020-06-25 VITALS
SYSTOLIC BLOOD PRESSURE: 139 MMHG | WEIGHT: 184 LBS | TEMPERATURE: 97.4 F | RESPIRATION RATE: 18 BRPM | DIASTOLIC BLOOD PRESSURE: 60 MMHG | HEIGHT: 61 IN | BODY MASS INDEX: 34.74 KG/M2 | OXYGEN SATURATION: 97 % | HEART RATE: 68 BPM

## 2020-06-25 PROCEDURE — 99215 OFFICE O/P EST HI 40 MIN: CPT | Performed by: PSYCHIATRY & NEUROLOGY

## 2020-06-25 RX ORDER — BACLOFEN 10 MG/1
10 TABLET ORAL NIGHTLY
Qty: 90 TABLET | Refills: 3 | Status: SHIPPED
Start: 2020-06-25 | End: 2021-01-19

## 2020-06-25 RX ORDER — FENOFIBRATE 54 MG/1
54 TABLET ORAL DAILY
Qty: 90 TABLET | Refills: 3 | Status: SHIPPED
Start: 2020-06-25 | End: 2021-04-22

## 2020-06-25 RX ORDER — FENOFIBRATE 54 MG/1
54 TABLET ORAL DAILY
Qty: 90 TABLET | Refills: 3 | Status: SHIPPED
Start: 2020-06-25 | End: 2020-06-25 | Stop reason: SDUPTHER

## 2020-06-25 RX ORDER — GABAPENTIN 600 MG/1
600 TABLET ORAL 3 TIMES DAILY
Qty: 180 TABLET | Refills: 5 | Status: SHIPPED
Start: 2020-06-25 | End: 2021-01-19

## 2020-06-25 NOTE — PROGRESS NOTES
was sleeping and eating better. Her blood sugars remained better controlled. Review of systems was otherwise unremarkable. Physical examination displayed stable vital signs. She was an elderly woman in no acute distress who was alert, cooperative and oriented. She remained very pleasant. Her lungs were clear to auscultation. Cardiac testing was unrevealing with a well-healed bypass scar. Her peripheral pulses are +1 without bruits. Her limbs were unremarkable except for her multiple surgical scars. I found no edema or cyanosis. Neurological examination produced an intact mental status. Cranial nerve testing was unrevealing. I found normal tone and bulk with 5/5 strength throughout. Reflexes were +1 in the right arm and absent in the left. There were no reflexes in her legs. Sensory exam was intact to light touch. Pinprick and vibration were decreased to both mid calves. Joint position sense was impaired in the feet. She walked with a slightly wide-based, cautious gait. She again displayed a minimal right foot drop. There were no changes on neurological testing. Laboratory data included a CMP with a GFR of only 34. Hemoglobin A1c was 6.9. CBC was normal.    This individual initially presented with memory issues. I again found none. Those difficulties were related to her inadequate sleep. Her lumbosacral radicular disease was producing her pains and right foot drop. Her gait issues are from the lumbosacral motor radiculopathies. Fortunately, she was responding remarkably well to the new stimulator. Hopefully, in the future gabapentin and baclofen to be reduced. In the interim, I again recommend hightop tennis shoes to avoid right foot drop and or tripping. Her tremors were related to anxiety. These have not returned. Medically, and now question recurrent congestive heart failure due to marked dyspnea with minimal exertion. She will see cardiology soon.     She will

## 2020-06-30 ENCOUNTER — OFFICE VISIT (OUTPATIENT)
Dept: PAIN MANAGEMENT | Age: 75
End: 2020-06-30
Payer: MEDICARE

## 2020-06-30 VITALS
OXYGEN SATURATION: 95 % | RESPIRATION RATE: 16 BRPM | WEIGHT: 184 LBS | HEART RATE: 77 BPM | BODY MASS INDEX: 34.74 KG/M2 | SYSTOLIC BLOOD PRESSURE: 104 MMHG | HEIGHT: 61 IN | TEMPERATURE: 98.3 F | DIASTOLIC BLOOD PRESSURE: 62 MMHG

## 2020-06-30 PROCEDURE — 99213 OFFICE O/P EST LOW 20 MIN: CPT | Performed by: PAIN MEDICINE

## 2020-06-30 NOTE — PROGRESS NOTES
Do you currently have any of the following:    Fever: No  Headache:  No  Cough: No  Shortness of breath: No  Exposed to anyone with these symptoms: No                                                                                                                Juany Bloodgood presents to the [address] [address] on 6/30/2020. Israel Mueller is complaining of pain is occasionally in lower back. . The pain is intermittent. The pain is described as shooting. Pain is rated on her best day at a 0, on her worst day at a 0, and on average at a 0 on the VAS scale. She took her last dose of Tylenol 2 days ago. Lazarus Fabry does not have issues with constipation. Any procedures since your last visit: Yes, with 90 % relief with SCS. She is not on NSAIDS and  is  on anticoagulation medications to include Plavix and is managed by Angela Becker DO  . Pacemaker or defibrilator: No Physician managing device is NA.       /62   Pulse 77   Temp 98.3 °F (36.8 °C) (Oral)   Resp 16   Ht 5' 1\" (1.549 m)   Wt 184 lb (83.5 kg)   SpO2 95%   BMI 34.77 kg/m²      No LMP recorded. Patient has had a hysterectomy.
lower  Intact: Yes     Neurological:     Sensory:diminished to light touch and pin prick bilateral legs  Motor:                             Right Quadriceps4/5          Left Quadriceps4/5           Right Gastrocnemius4/5    Left Gastrocnemius4/5  Right Ant Tibialis4/5  Left Ant Tibialis4/5  Reflexes:    Right Quadriceps reflex0  Left Quadriceps reflex0  Right Achilles reflex0  Left Achilles reflex0  Gait:antalgic     Dermatology:     Skin:no unusual rashes and no skin lesions     Impression:  Chronic low back pain with radiation to both lower extremities   Lumbar spine MRI 2019 L4-5-S1 bilateral laminectomies with fusion  Patient had seen neurosurgery and is not a candidate for surgery, SCS was recommended   Plan:  Follow up on her low back pain and steristripes removal.  Continues to benefit from SCS implant. Steristripes removed with no signs of infection or discharge. Incision cleaned with chloroprep and band aid applied. Currently on Gabapentin 1500 mg daily. OARRS report reviewed 06/2020. Patient encouraged to stay active and to lose weight. Treatment plan discussed with the patient including medications side effects. We discussed with the patient that combining opioids, benzodiazepines, alcohol, illicit drugs or sleep aids increases the risk of respiratory depression including death. We discussed that these medications may cause drowsiness, sedation or dizziness and have counseled the patient not to drive or operate machinery. We have discussed that these medications will be prescribed only by one provider. We have discussed with the patient about age related risk factors and have thoroughly discussed the importance of taking these medications as prescribed. The patient verbalizes understanding. bryant Zavala M.D.

## 2020-07-04 ENCOUNTER — APPOINTMENT (OUTPATIENT)
Dept: GENERAL RADIOLOGY | Age: 75
End: 2020-07-04
Payer: MEDICARE

## 2020-07-04 ENCOUNTER — HOSPITAL ENCOUNTER (OUTPATIENT)
Age: 75
Setting detail: OBSERVATION
Discharge: HOME | End: 2020-07-05
Attending: EMERGENCY MEDICINE | Admitting: INTERNAL MEDICINE
Payer: MEDICARE

## 2020-07-04 PROBLEM — R07.9 CHEST PAIN: Status: ACTIVE | Noted: 2020-07-04

## 2020-07-04 LAB
ALBUMIN SERPL-MCNC: 3.6 G/DL (ref 3.5–5.2)
ALP BLD-CCNC: 55 U/L (ref 35–104)
ALT SERPL-CCNC: 32 U/L (ref 0–32)
ANION GAP SERPL CALCULATED.3IONS-SCNC: 13 MMOL/L (ref 7–16)
AST SERPL-CCNC: 33 U/L (ref 0–31)
BASOPHILS ABSOLUTE: 0.03 E9/L (ref 0–0.2)
BASOPHILS RELATIVE PERCENT: 0.8 % (ref 0–2)
BILIRUB SERPL-MCNC: 0.2 MG/DL (ref 0–1.2)
BUN BLDV-MCNC: 27 MG/DL (ref 8–23)
CALCIUM SERPL-MCNC: 9.3 MG/DL (ref 8.6–10.2)
CHLORIDE BLD-SCNC: 102 MMOL/L (ref 98–107)
CO2: 24 MMOL/L (ref 22–29)
CREAT SERPL-MCNC: 1.2 MG/DL (ref 0.5–1)
D DIMER: <200 NG/ML DDU
EOSINOPHILS ABSOLUTE: 0.16 E9/L (ref 0.05–0.5)
EOSINOPHILS RELATIVE PERCENT: 4.3 % (ref 0–6)
GFR AFRICAN AMERICAN: 53
GFR NON-AFRICAN AMERICAN: 44 ML/MIN/1.73
GLUCOSE BLD-MCNC: 162 MG/DL (ref 74–99)
HCT VFR BLD CALC: 37.3 % (ref 34–48)
HEMOGLOBIN: 12 G/DL (ref 11.5–15.5)
IMMATURE GRANULOCYTES #: 0.02 E9/L
IMMATURE GRANULOCYTES %: 0.5 % (ref 0–5)
LYMPHOCYTES ABSOLUTE: 1.27 E9/L (ref 1.5–4)
LYMPHOCYTES RELATIVE PERCENT: 34.4 % (ref 20–42)
MCH RBC QN AUTO: 31.3 PG (ref 26–35)
MCHC RBC AUTO-ENTMCNC: 32.2 % (ref 32–34.5)
MCV RBC AUTO: 97.4 FL (ref 80–99.9)
METER GLUCOSE: 119 MG/DL (ref 74–99)
METER GLUCOSE: 49 MG/DL (ref 74–99)
MONOCYTES ABSOLUTE: 0.36 E9/L (ref 0.1–0.95)
MONOCYTES RELATIVE PERCENT: 9.8 % (ref 2–12)
NEUTROPHILS ABSOLUTE: 1.85 E9/L (ref 1.8–7.3)
NEUTROPHILS RELATIVE PERCENT: 50.2 % (ref 43–80)
PDW BLD-RTO: 13.2 FL (ref 11.5–15)
PLATELET # BLD: 262 E9/L (ref 130–450)
PMV BLD AUTO: 10 FL (ref 7–12)
POTASSIUM REFLEX MAGNESIUM: 4.1 MMOL/L (ref 3.5–5)
RBC # BLD: 3.83 E12/L (ref 3.5–5.5)
SODIUM BLD-SCNC: 139 MMOL/L (ref 132–146)
T4 TOTAL: 8.6 MCG/DL (ref 4.5–11.7)
TOTAL PROTEIN: 6.3 G/DL (ref 6.4–8.3)
TROPONIN: <0.01 NG/ML (ref 0–0.03)
TROPONIN: <0.01 NG/ML (ref 0–0.03)
TSH SERPL DL<=0.05 MIU/L-ACNC: 1.03 UIU/ML (ref 0.27–4.2)
WBC # BLD: 3.7 E9/L (ref 4.5–11.5)

## 2020-07-04 PROCEDURE — 36415 COLL VENOUS BLD VENIPUNCTURE: CPT

## 2020-07-04 PROCEDURE — 84443 ASSAY THYROID STIM HORMONE: CPT

## 2020-07-04 PROCEDURE — 85378 FIBRIN DEGRADE SEMIQUANT: CPT

## 2020-07-04 PROCEDURE — 94760 N-INVAS EAR/PLS OXIMETRY 1: CPT

## 2020-07-04 PROCEDURE — 80053 COMPREHEN METABOLIC PANEL: CPT

## 2020-07-04 PROCEDURE — 85025 COMPLETE CBC W/AUTO DIFF WBC: CPT

## 2020-07-04 PROCEDURE — 71045 X-RAY EXAM CHEST 1 VIEW: CPT

## 2020-07-04 PROCEDURE — 6370000000 HC RX 637 (ALT 250 FOR IP): Performed by: INTERNAL MEDICINE

## 2020-07-04 PROCEDURE — 99285 EMERGENCY DEPT VISIT HI MDM: CPT

## 2020-07-04 PROCEDURE — G0378 HOSPITAL OBSERVATION PER HR: HCPCS

## 2020-07-04 PROCEDURE — 84436 ASSAY OF TOTAL THYROXINE: CPT

## 2020-07-04 PROCEDURE — 82962 GLUCOSE BLOOD TEST: CPT

## 2020-07-04 PROCEDURE — 93005 ELECTROCARDIOGRAM TRACING: CPT | Performed by: EMERGENCY MEDICINE

## 2020-07-04 PROCEDURE — 84484 ASSAY OF TROPONIN QUANT: CPT

## 2020-07-04 RX ORDER — ISOSORBIDE MONONITRATE 30 MG/1
30 TABLET, EXTENDED RELEASE ORAL DAILY
Status: DISCONTINUED | OUTPATIENT
Start: 2020-07-05 | End: 2020-07-05 | Stop reason: HOSPADM

## 2020-07-04 RX ORDER — DOCUSATE SODIUM 100 MG/1
100 CAPSULE, LIQUID FILLED ORAL DAILY
COMMUNITY
End: 2020-10-02

## 2020-07-04 RX ORDER — CLOPIDOGREL BISULFATE 75 MG/1
75 TABLET ORAL DAILY
Status: DISCONTINUED | OUTPATIENT
Start: 2020-07-05 | End: 2020-07-05 | Stop reason: HOSPADM

## 2020-07-04 RX ORDER — PANTOPRAZOLE SODIUM 40 MG/1
40 TABLET, DELAYED RELEASE ORAL DAILY
Status: DISCONTINUED | OUTPATIENT
Start: 2020-07-05 | End: 2020-07-05 | Stop reason: HOSPADM

## 2020-07-04 RX ORDER — GABAPENTIN 300 MG/1
600 CAPSULE ORAL 3 TIMES DAILY
Status: DISCONTINUED | OUTPATIENT
Start: 2020-07-04 | End: 2020-07-05 | Stop reason: HOSPADM

## 2020-07-04 RX ORDER — GLIPIZIDE 5 MG/1
10 TABLET ORAL
Status: DISCONTINUED | OUTPATIENT
Start: 2020-07-05 | End: 2020-07-05 | Stop reason: HOSPADM

## 2020-07-04 RX ORDER — DOCUSATE SODIUM 100 MG/1
100 CAPSULE, LIQUID FILLED ORAL DAILY
Status: DISCONTINUED | OUTPATIENT
Start: 2020-07-05 | End: 2020-07-05 | Stop reason: HOSPADM

## 2020-07-04 RX ORDER — METOPROLOL SUCCINATE 25 MG/1
25 TABLET, EXTENDED RELEASE ORAL 2 TIMES DAILY
Status: DISCONTINUED | OUTPATIENT
Start: 2020-07-05 | End: 2020-07-04

## 2020-07-04 RX ORDER — LISINOPRIL 5 MG/1
5 TABLET ORAL DAILY
Status: DISCONTINUED | OUTPATIENT
Start: 2020-07-05 | End: 2020-07-05 | Stop reason: HOSPADM

## 2020-07-04 RX ORDER — LANOLIN ALCOHOL/MO/W.PET/CERES
800 CREAM (GRAM) TOPICAL DAILY
Status: DISCONTINUED | OUTPATIENT
Start: 2020-07-05 | End: 2020-07-05 | Stop reason: HOSPADM

## 2020-07-04 RX ORDER — OXYCODONE HYDROCHLORIDE AND ACETAMINOPHEN 5; 325 MG/1; MG/1
1 TABLET ORAL EVERY 8 HOURS PRN
COMMUNITY
End: 2020-10-02

## 2020-07-04 RX ORDER — FUROSEMIDE 20 MG/1
20 TABLET ORAL DAILY
Status: DISCONTINUED | OUTPATIENT
Start: 2020-07-05 | End: 2020-07-05 | Stop reason: HOSPADM

## 2020-07-04 RX ORDER — NITROGLYCERIN 0.4 MG/1
0.4 TABLET SUBLINGUAL EVERY 5 MIN PRN
Status: DISCONTINUED | OUTPATIENT
Start: 2020-07-04 | End: 2020-07-05 | Stop reason: HOSPADM

## 2020-07-04 RX ORDER — LEVOTHYROXINE SODIUM 0.1 MG/1
100 TABLET ORAL DAILY
Status: DISCONTINUED | OUTPATIENT
Start: 2020-07-05 | End: 2020-07-05 | Stop reason: HOSPADM

## 2020-07-04 RX ORDER — BACLOFEN 10 MG/1
10 TABLET ORAL NIGHTLY
Status: DISCONTINUED | OUTPATIENT
Start: 2020-07-04 | End: 2020-07-05 | Stop reason: HOSPADM

## 2020-07-04 RX ORDER — POTASSIUM CHLORIDE 750 MG/1
10 TABLET, EXTENDED RELEASE ORAL DAILY
Status: DISCONTINUED | OUTPATIENT
Start: 2020-07-05 | End: 2020-07-05 | Stop reason: HOSPADM

## 2020-07-04 RX ORDER — ASPIRIN 81 MG/1
81 TABLET ORAL DAILY
Status: DISCONTINUED | OUTPATIENT
Start: 2020-07-05 | End: 2020-07-05 | Stop reason: HOSPADM

## 2020-07-04 RX ORDER — OXYCODONE HYDROCHLORIDE AND ACETAMINOPHEN 5; 325 MG/1; MG/1
1 TABLET ORAL EVERY 8 HOURS PRN
Status: DISCONTINUED | OUTPATIENT
Start: 2020-07-04 | End: 2020-07-05 | Stop reason: HOSPADM

## 2020-07-04 RX ORDER — CITALOPRAM 10 MG/1
20 TABLET ORAL DAILY
Status: DISCONTINUED | OUTPATIENT
Start: 2020-07-05 | End: 2020-07-05 | Stop reason: HOSPADM

## 2020-07-04 RX ORDER — METOPROLOL SUCCINATE 25 MG/1
25 TABLET, EXTENDED RELEASE ORAL DAILY
Status: DISCONTINUED | OUTPATIENT
Start: 2020-07-05 | End: 2020-07-05 | Stop reason: HOSPADM

## 2020-07-04 RX ORDER — VITAMIN B COMPLEX
1000 TABLET ORAL EVERY EVENING
Status: DISCONTINUED | OUTPATIENT
Start: 2020-07-04 | End: 2020-07-05 | Stop reason: HOSPADM

## 2020-07-04 RX ORDER — EZETIMIBE 10 MG/1
10 TABLET ORAL EVERY EVENING
Status: DISCONTINUED | OUTPATIENT
Start: 2020-07-04 | End: 2020-07-05 | Stop reason: HOSPADM

## 2020-07-04 RX ADMIN — BACLOFEN 10 MG: 10 TABLET ORAL at 22:05

## 2020-07-04 RX ADMIN — EZETIMIBE 10 MG: 10 TABLET ORAL at 22:05

## 2020-07-04 RX ADMIN — MELATONIN 1000 UNITS: at 22:05

## 2020-07-04 RX ADMIN — GABAPENTIN 600 MG: 300 CAPSULE ORAL at 22:04

## 2020-07-04 ASSESSMENT — ENCOUNTER SYMPTOMS
SHORTNESS OF BREATH: 1
VOMITING: 0
ABDOMINAL PAIN: 0
EYE REDNESS: 0
NAUSEA: 0

## 2020-07-04 ASSESSMENT — PAIN SCALES - GENERAL: PAINLEVEL_OUTOF10: 0

## 2020-07-04 NOTE — ED PROVIDER NOTES
Chief complaint: Chest pain      HPI:  7/4/20, Time: 3:54 PM EDT         Jesica Wolfe is a 69 y.o. female presenting to the ED for pain. The history is obtained from patient result. The patient states that she began with chest pain presently 8 hours prior to arrival.  The pain is located on the left side of his chest and described as a dull sensation. The pain does wax and wane with no particular aggravating or alleviating factors. She states that the pain is rated 5 out of 10 when present. She denies any pain at the current time. She states that the pain does come and last for approximately 5 to 10 minutes at a time. The patient has never had a similar pain in past.  She does admit to mild associated shortness of breath from the pain. The patient denies any fevers, chills, nausea, vomiting, abdominal pain. The patient does have a history of hypertension, hyperlipidemia, diabetes as well as coronary artery disease. The patient did have a bypass in the 1990s. The patient does follow with Dr. Janis Green for cardiology. She states that her last breast test has been greater than 1 year. 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 She is not currently on any blood thinners. She denies any hormone replacement therapy, active malignancy. The patient does report that she traveled from [place] approximately 1 month ago. The patient did have aspirin by EMS prior to arrival.    ROS:   Review of Systems   Constitutional: Negative for chills and fatigue. HENT: Negative for congestion. Eyes: Negative for redness. Respiratory: Positive for shortness of breath. Cardiovascular: Positive for chest pain. Gastrointestinal: Negative for abdominal pain, nausea and vomiting. Genitourinary: Negative for dysuria. Musculoskeletal: Negative for arthralgias. Skin: Negative for rash. Neurological: Negative for light-headedness. includes ADHD in an other family member; COPD in her brother; Cancer in her brother; Cancer (age of onset: [de-identified]) in her father; Diabetes in her sister and another family member; Heart Attack (age of onset: 54) in her mother; Heart Disease in her brother and sister; Heart Disease (age of onset: 58) in an other family member. The patients home medications have been reviewed. Allergies: Lipitor; Statins; Atorvastatin; Cymbalta [duloxetine hcl]; Cipro xr; Diazepam; Duloxetine; Erythromycin; Indomethacin; Ketorolac; Ketorolac tromethamine; Lodine [etodolac]; Oscal 500-200 d-3 [oyster shell calcium-d]; Paxil [paroxetine hcl]; Ropinirole; Tromethamine; Trulicity [dulaglutide]; Zithromax [azithromycin]; and Requip [ropinirole hcl]    ---------------------------------------------------PHYSICAL EXAM--------------------------------------    Constitutional/General: Alert and oriented x3, well appearing, non toxic in NAD  Head: Normocephalic and atraumatic  Mouth: Oropharynx clear, handling secretions, no trismus  Neck: Supple, full ROM,  Pulmonary: Lungs clear to auscultation bilaterally, no wheezes, rales, or rhonchi. Not in respiratory distress  Cardiovascular:  Regular rate. Regular rhythm. No murmurs  Chest: no chest wall tenderness  Abdomen: Soft. Non tender. Non distended. +BS. No rebound, guarding, or rigidity. No pulsatile masses appreciated. Musculoskeletal: Moves all extremities x 4. Warm and well perfused, no clubbing, cyanosis, or edema. Capillary refill <3 seconds  Skin: warm and dry. No rashes. Neurologic: GCS 15, no gross focal neurologic deficits  Psych: Normal Affect    -------------------------------------------------- RESULTS -------------------------------------------------  I have personally reviewed all laboratory and imaging results for this patient. Results are listed below.      LABS:  Results for orders placed or performed during the hospital encounter of 07/04/20   CBC Auto Differential Result Value Ref Range    WBC 3.7 (L) 4.5 - 11.5 E9/L    RBC 3.83 3.50 - 5.50 E12/L    Hemoglobin 12.0 11.5 - 15.5 g/dL    Hematocrit 37.3 34.0 - 48.0 %    MCV 97.4 80.0 - 99.9 fL    MCH 31.3 26.0 - 35.0 pg    MCHC 32.2 32.0 - 34.5 %    RDW 13.2 11.5 - 15.0 fL    Platelets 654 645 - 219 E9/L    MPV 10.0 7.0 - 12.0 fL    Neutrophils % 50.2 43.0 - 80.0 %    Immature Granulocytes % 0.5 0.0 - 5.0 %    Lymphocytes % 34.4 20.0 - 42.0 %    Monocytes % 9.8 2.0 - 12.0 %    Eosinophils % 4.3 0.0 - 6.0 %    Basophils % 0.8 0.0 - 2.0 %    Neutrophils Absolute 1.85 1.80 - 7.30 E9/L    Immature Granulocytes # 0.02 E9/L    Lymphocytes Absolute 1.27 (L) 1.50 - 4.00 E9/L    Monocytes Absolute 0.36 0.10 - 0.95 E9/L    Eosinophils Absolute 0.16 0.05 - 0.50 E9/L    Basophils Absolute 0.03 0.00 - 0.20 E9/L   Comprehensive Metabolic Panel w/ Reflex to MG   Result Value Ref Range    Sodium 139 132 - 146 mmol/L    Potassium reflex Magnesium 4.1 3.5 - 5.0 mmol/L    Chloride 102 98 - 107 mmol/L    CO2 24 22 - 29 mmol/L    Anion Gap 13 7 - 16 mmol/L    Glucose 162 (H) 74 - 99 mg/dL    BUN 27 (H) 8 - 23 mg/dL    CREATININE 1.2 (H) 0.5 - 1.0 mg/dL    GFR Non-African American 44 >=60 mL/min/1.73    GFR African American 53     Calcium 9.3 8.6 - 10.2 mg/dL    Total Protein 6.3 (L) 6.4 - 8.3 g/dL    Alb 3.6 3.5 - 5.2 g/dL    Total Bilirubin 0.2 0.0 - 1.2 mg/dL    Alkaline Phosphatase 55 35 - 104 U/L    ALT 32 0 - 32 U/L    AST 33 (H) 0 - 31 U/L   Troponin   Result Value Ref Range    Troponin <0.01 0.00 - 0.03 ng/mL   D-Dimer, Quantitative   Result Value Ref Range    D-Dimer, Quant <200 ng/mL DDU   TSH without Reflex   Result Value Ref Range    TSH 1.030 0.270 - 4.200 uIU/mL   T4   Result Value Ref Range    T4, Total 8.6 4.5 - 11.7 mcg/dL   Troponin   Result Value Ref Range    Troponin <0.01 0.00 - 0.03 ng/mL   Comprehensive metabolic panel   Result Value Ref Range    Sodium 144 132 - 146 mmol/L    Potassium 4.3 3.5 - 5.0 mmol/L    Chloride 105 98 - 107 mmol/L    CO2 28 22 - 29 mmol/L    Anion Gap 11 7 - 16 mmol/L    Glucose 89 74 - 99 mg/dL    BUN 24 (H) 8 - 23 mg/dL    CREATININE 1.2 (H) 0.5 - 1.0 mg/dL    GFR Non-African American 44 >=60 mL/min/1.73    GFR African American 53     Calcium 9.3 8.6 - 10.2 mg/dL    Total Protein 5.9 (L) 6.4 - 8.3 g/dL    Alb 3.6 3.5 - 5.2 g/dL    Total Bilirubin 0.2 0.0 - 1.2 mg/dL    Alkaline Phosphatase 53 35 - 104 U/L    ALT 29 0 - 32 U/L    AST 27 0 - 31 U/L   Lipid panel   Result Value Ref Range    Cholesterol, Total 170 0 - 199 mg/dL    Triglycerides 129 0 - 149 mg/dL    HDL 35 >40 mg/dL    LDL Calculated 109 (H) 0 - 99 mg/dL    VLDL Cholesterol Calculated 26 mg/dL   TSH without Reflex   Result Value Ref Range    TSH 1.070 0.270 - 4.200 uIU/mL   POCT Glucose   Result Value Ref Range    Meter Glucose 49 (L) 74 - 99 mg/dL   POCT Glucose   Result Value Ref Range    Meter Glucose 119 (H) 74 - 99 mg/dL   POCT Glucose   Result Value Ref Range    Meter Glucose 97 74 - 99 mg/dL   POCT Glucose   Result Value Ref Range    Meter Glucose 96 74 - 99 mg/dL   EKG 12 Lead   Result Value Ref Range    Ventricular Rate 61 BPM    Atrial Rate 61 BPM    P-R Interval 196 ms    QRS Duration 78 ms    Q-T Interval 404 ms    QTc Calculation (Bazett) 406 ms    R Axis 1 degrees    T Axis -3 degrees       RADIOLOGY:  Interpreted by Radiologist.  NM Cardiac Stress Test Nuclear Imaging   Final Result   1. No pharmacologically induced reversible perfusion defects to suggest   ischemia   2. Ejection fraction of 68%   3. No focal wall motion abnormality         XR CHEST PORTABLE   Final Result   No acute process. EKG:  This EKG is signed and interpreted by me.     Normal sinus rhythm, rate of 61, no ST segment elevation or depression, there is T wave inversion isolated to lead III, this is stable from the patient's prior EKG, IN interval is 196 MS, QRS is 78 MS,  MS  Interpreted by me      ------------------------- NURSING NOTES AND VITALS REVIEWED ---------------------------   The nursing notes within the ED encounter and vital signs as below have been reviewed by myself. BP (!) 122/50   Pulse 76   Temp 97.9 °F (36.6 °C) (Oral)   Resp 18   Ht 5' 1\" (1.549 m)   Wt 168 lb 4.8 oz (76.3 kg)   SpO2 94%   BMI 31.80 kg/m²   Oxygen Saturation Interpretation: Normal    The patients available past medical records and past encounters were reviewed.         ------------------------------ ED COURSE/MEDICAL DECISION MAKING----------------------  Medications   aspirin EC tablet 81 mg (81 mg Oral Given 7/5/20 1059)   levothyroxine (SYNTHROID) tablet 100 mcg (100 mcg Oral Given 7/5/20 1058)   ezetimibe (ZETIA) tablet 10 mg (10 mg Oral Given 7/4/20 2205)   isosorbide mononitrate (IMDUR) extended release tablet 30 mg (30 mg Oral Given 7/5/20 1058)   Vitamin D (CHOLECALCIFEROL) tablet 1,000 Units (1,000 Units Oral Given 7/4/20 2205)   clopidogrel (PLAVIX) tablet 75 mg (75 mg Oral Given 7/5/20 1059)   nitroGLYCERIN (NITROSTAT) SL tablet 0.4 mg (has no administration in time range)   folic acid (FOLVITE) tablet 800 mcg (800 mcg Oral Given 7/5/20 1105)   furosemide (LASIX) tablet 20 mg (20 mg Oral Given 7/5/20 1059)   glipiZIDE (GLUCOTROL) tablet 10 mg (10 mg Oral Given 7/5/20 1058)   lisinopril (PRINIVIL;ZESTRIL) tablet 5 mg (5 mg Oral Given 7/5/20 1059)   Semaglutide(0.25 or 0.5MG/DOS) SOPN 0.25 mg (has no administration in time range)   potassium chloride (KLOR-CON M) extended release tablet 10 mEq (10 mEq Oral Given 7/5/20 1059)   citalopram (CELEXA) tablet 20 mg (20 mg Oral Given 7/5/20 1059)   baclofen (LIORESAL) tablet 10 mg (10 mg Oral Given 7/4/20 2205)   gabapentin (NEURONTIN) capsule 600 mg (600 mg Oral Given 7/5/20 1058)   oxyCODONE-acetaminophen (PERCOCET) 5-325 MG per tablet 1 tablet (1 tablet Oral Given 7/5/20 5246)   docusate sodium (COLACE) capsule 100 mg (100 mg Oral Given 7/5/20 1059)   pantoprazole (PROTONIX) tablet 40 mg (40 mg Oral Given 7/5/20 1059)   metoprolol succinate (TOPROL XL) extended release tablet 25 mg (25 mg Oral Given 7/5/20 1058)   glucose (GLUTOSE) 40 % oral gel 15 g (has no administration in time range)   dextrose 50 % IV solution (has no administration in time range)   glucagon (rDNA) injection 1 mg (has no administration in time range)   dextrose 5 % solution (has no administration in time range)   insulin lispro (HUMALOG) injection vial 0-12 Units (0 Units Subcutaneous Not Given 7/5/20 1139)   insulin lispro (HUMALOG) injection vial 0-6 Units (has no administration in time range)   regadenoson (LEXISCAN) injection 0.4 mg (0.4 mg Intravenous Given 7/5/20 1035)   technetium sestamibi (CARDIOLITE) injection 10 millicurie (10 millicuries Intravenous Given 7/5/20 0837)   technetium sestamibi (CARDIOLITE) injection 30 millicurie (30 millicuries Intravenous Given 7/5/20 1044)             Medical Decision Making:   Patient is a 53-year-old female presenting emergency department with a chief complaint of chest pain. Patient labs and imaging reviewed. Patient will be admitted for further treatment and evaluation. Re-Evaluations/Consultations:             ED Course as of Jul 05 1458   Sat Jul 04, 2020   1722 Spoke with Dr. Cesar Law he will accept the patient for admission    [MT]   1802 Spoke with Dr. Rodas Ask he will consult on the case. [MT]      ED Course User Index  [MT] Chaitanya Gillette DO   Time: 4961: The patient sitting in the bed in no acute distress. Results of today discussed with patient daughter at bedside. Patient will be admitted for further treatment and evaluation. Critical Care: Please note that the withdrawal or failure to initiate urgent interventions for this patient would likely result in a life threatening deterioration or permanent disability. Accordingly this patient received 0 minutes of critical care time, excluding separately billable procedures.           This patient's ED course

## 2020-07-05 ENCOUNTER — APPOINTMENT (OUTPATIENT)
Dept: NUCLEAR MEDICINE | Age: 75
End: 2020-07-05
Payer: MEDICARE

## 2020-07-05 VITALS
TEMPERATURE: 97.9 F | WEIGHT: 168.3 LBS | OXYGEN SATURATION: 94 % | HEIGHT: 61 IN | HEART RATE: 76 BPM | RESPIRATION RATE: 18 BRPM | DIASTOLIC BLOOD PRESSURE: 50 MMHG | SYSTOLIC BLOOD PRESSURE: 122 MMHG | BODY MASS INDEX: 31.78 KG/M2

## 2020-07-05 LAB
ALBUMIN SERPL-MCNC: 3.6 G/DL (ref 3.5–5.2)
ALP BLD-CCNC: 53 U/L (ref 35–104)
ALT SERPL-CCNC: 29 U/L (ref 0–32)
ANION GAP SERPL CALCULATED.3IONS-SCNC: 11 MMOL/L (ref 7–16)
AST SERPL-CCNC: 27 U/L (ref 0–31)
BILIRUB SERPL-MCNC: 0.2 MG/DL (ref 0–1.2)
BUN BLDV-MCNC: 24 MG/DL (ref 8–23)
CALCIUM SERPL-MCNC: 9.3 MG/DL (ref 8.6–10.2)
CHLORIDE BLD-SCNC: 105 MMOL/L (ref 98–107)
CHOLESTEROL, TOTAL: 170 MG/DL (ref 0–199)
CO2: 28 MMOL/L (ref 22–29)
CREAT SERPL-MCNC: 1.2 MG/DL (ref 0.5–1)
EKG ATRIAL RATE: 61 BPM
EKG P-R INTERVAL: 196 MS
EKG Q-T INTERVAL: 404 MS
EKG QRS DURATION: 78 MS
EKG QTC CALCULATION (BAZETT): 406 MS
EKG R AXIS: 1 DEGREES
EKG T AXIS: -3 DEGREES
EKG VENTRICULAR RATE: 61 BPM
GFR AFRICAN AMERICAN: 53
GFR NON-AFRICAN AMERICAN: 44 ML/MIN/1.73
GLUCOSE BLD-MCNC: 89 MG/DL (ref 74–99)
HBA1C MFR BLD: 6.1 % (ref 4–5.6)
HDLC SERPL-MCNC: 35 MG/DL
LDL CHOLESTEROL CALCULATED: 109 MG/DL (ref 0–99)
LV EF: 68 %
LVEF MODALITY: NORMAL
METER GLUCOSE: 60 MG/DL (ref 74–99)
METER GLUCOSE: 96 MG/DL (ref 74–99)
METER GLUCOSE: 97 MG/DL (ref 74–99)
POTASSIUM SERPL-SCNC: 4.3 MMOL/L (ref 3.5–5)
SODIUM BLD-SCNC: 144 MMOL/L (ref 132–146)
TOTAL PROTEIN: 5.9 G/DL (ref 6.4–8.3)
TRIGL SERPL-MCNC: 129 MG/DL (ref 0–149)
TSH SERPL DL<=0.05 MIU/L-ACNC: 1.07 UIU/ML (ref 0.27–4.2)
VLDLC SERPL CALC-MCNC: 26 MG/DL

## 2020-07-05 PROCEDURE — 3430000000 HC RX DIAGNOSTIC RADIOPHARMACEUTICAL: Performed by: RADIOLOGY

## 2020-07-05 PROCEDURE — G0378 HOSPITAL OBSERVATION PER HR: HCPCS

## 2020-07-05 PROCEDURE — A9500 TC99M SESTAMIBI: HCPCS | Performed by: RADIOLOGY

## 2020-07-05 PROCEDURE — 78452 HT MUSCLE IMAGE SPECT MULT: CPT

## 2020-07-05 PROCEDURE — 6370000000 HC RX 637 (ALT 250 FOR IP): Performed by: INTERNAL MEDICINE

## 2020-07-05 PROCEDURE — 93010 ELECTROCARDIOGRAM REPORT: CPT | Performed by: INTERNAL MEDICINE

## 2020-07-05 PROCEDURE — 93017 CV STRESS TEST TRACING ONLY: CPT

## 2020-07-05 PROCEDURE — 84443 ASSAY THYROID STIM HORMONE: CPT

## 2020-07-05 PROCEDURE — 83036 HEMOGLOBIN GLYCOSYLATED A1C: CPT

## 2020-07-05 PROCEDURE — 6360000002 HC RX W HCPCS: Performed by: INTERNAL MEDICINE

## 2020-07-05 PROCEDURE — 80053 COMPREHEN METABOLIC PANEL: CPT

## 2020-07-05 PROCEDURE — 80061 LIPID PANEL: CPT

## 2020-07-05 PROCEDURE — 36415 COLL VENOUS BLD VENIPUNCTURE: CPT

## 2020-07-05 PROCEDURE — 82962 GLUCOSE BLOOD TEST: CPT

## 2020-07-05 RX ORDER — NICOTINE POLACRILEX 4 MG
15 LOZENGE BUCCAL PRN
Status: DISCONTINUED | OUTPATIENT
Start: 2020-07-05 | End: 2020-07-05 | Stop reason: HOSPADM

## 2020-07-05 RX ORDER — DEXTROSE MONOHYDRATE 25 G/50ML
12.5 INJECTION, SOLUTION INTRAVENOUS PRN
Status: DISCONTINUED | OUTPATIENT
Start: 2020-07-05 | End: 2020-07-05 | Stop reason: HOSPADM

## 2020-07-05 RX ORDER — DEXTROSE MONOHYDRATE 50 MG/ML
100 INJECTION, SOLUTION INTRAVENOUS PRN
Status: DISCONTINUED | OUTPATIENT
Start: 2020-07-05 | End: 2020-07-05 | Stop reason: HOSPADM

## 2020-07-05 RX ADMIN — LISINOPRIL 5 MG: 5 TABLET ORAL at 10:59

## 2020-07-05 RX ADMIN — PANTOPRAZOLE SODIUM 40 MG: 40 TABLET, DELAYED RELEASE ORAL at 10:59

## 2020-07-05 RX ADMIN — GLIPIZIDE 10 MG: 5 TABLET ORAL at 10:58

## 2020-07-05 RX ADMIN — GLIPIZIDE 10 MG: 5 TABLET ORAL at 16:24

## 2020-07-05 RX ADMIN — FUROSEMIDE 20 MG: 20 TABLET ORAL at 10:59

## 2020-07-05 RX ADMIN — GABAPENTIN 600 MG: 300 CAPSULE ORAL at 10:58

## 2020-07-05 RX ADMIN — ISOSORBIDE MONONITRATE 30 MG: 30 TABLET, EXTENDED RELEASE ORAL at 10:58

## 2020-07-05 RX ADMIN — REGADENOSON 0.4 MG: 0.08 INJECTION, SOLUTION INTRAVENOUS at 10:35

## 2020-07-05 RX ADMIN — CITALOPRAM HYDROBROMIDE 20 MG: 10 TABLET ORAL at 10:59

## 2020-07-05 RX ADMIN — Medication 30 MILLICURIE: at 10:44

## 2020-07-05 RX ADMIN — FOLIC ACID TAB 400 MCG 800 MCG: 400 TAB at 11:05

## 2020-07-05 RX ADMIN — DOCUSATE SODIUM 100 MG: 100 CAPSULE, LIQUID FILLED ORAL at 10:59

## 2020-07-05 RX ADMIN — POTASSIUM CHLORIDE 10 MEQ: 750 TABLET, EXTENDED RELEASE ORAL at 10:59

## 2020-07-05 RX ADMIN — LEVOTHYROXINE SODIUM 100 MCG: 0.1 TABLET ORAL at 10:58

## 2020-07-05 RX ADMIN — Medication 10 MILLICURIE: at 08:37

## 2020-07-05 RX ADMIN — METOPROLOL SUCCINATE 25 MG: 25 TABLET, EXTENDED RELEASE ORAL at 10:58

## 2020-07-05 RX ADMIN — ASPIRIN 81 MG: 81 TABLET, COATED ORAL at 10:59

## 2020-07-05 RX ADMIN — CLOPIDOGREL BISULFATE 75 MG: 75 TABLET ORAL at 10:59

## 2020-07-05 RX ADMIN — OXYCODONE HYDROCHLORIDE AND ACETAMINOPHEN 1 TABLET: 5; 325 TABLET ORAL at 05:42

## 2020-07-05 ASSESSMENT — PAIN SCALES - GENERAL: PAINLEVEL_OUTOF10: 5

## 2020-07-05 NOTE — H&P
Department of Internal Medicine        CHIEF COMPLAINT: Chest pain    Reason for Admission: Chest pain rule out acute coronary syndrome    HISTORY OF PRESENT ILLNESS:      The patient is a 69 y.o. female who presents with chest pain that started about 8 hours prior to arrival to the ED. Patient stated it was left-sided chest pain described as a slight dull sensation. Patient denying chest pain in the ED. Patient states when she gets the pain it lasts 5 to 10 minutes and goes away. Not exacerbated by any event or food. She denies history of chest pain in the past.  There was some associated mild shortness of breath. Patient denied fever chills, nausea vomiting, abdominal pain or dizziness. Patient does have a history of bypass in 36s. Patient does follow-up with cardiology. Patient did have a stress test but it was over a year ago. Patient has history of DVT, PE years ago. Patient vital signs are stable O2 saturation 97% on room air.   Chest x-ray was unremarkable and 2 troponins performed were normal.    Past Medical History:    Past Medical History:   Diagnosis Date    Arthritis     CAD (coronary artery disease)     CHF (congestive heart failure) (HCC)     Diabetes (HCC)     Fibromyalgia     GERD (gastroesophageal reflux disease)     History of cardiovascular stress test 02/17/2014    lexiscan, also 4/21/2015    History of MI (myocardial infarction)     x4; most recent 2016    Hx of blood clots     DVT leg    Hyperlipidemia     Hypertension     Hypothyroidism     Intractable low back pain 6/19/2016    Non-insulin dependent type 2 diabetes mellitus ([facility]     NSTEMI (non-ST elevated myocardial infarction) ([facility] 11/19/2015     Past Surgical History:    Past Surgical History:   Procedure Laterality Date    APPENDECTOMY      ARTHROCENTESIS Left 8/22/2019    LEFT HIP CORTICOSTERIOD INJECTION UNDER FLUOROSCOPIC GUIDANCE performed by Huey Garcia DO at [address] [place] [address] SURGERY      CARDIAC CATHETERIZATION  11/15/2017    Dr Patrice Hamlin Right 7 7 15    CATARACT REMOVAL WITH IMPLANT Left 4/26/2016    CHOLECYSTECTOMY      COCCYX REMOVAL      COLONOSCOPY      CORONARY ANGIOPLASTY WITH STENT PLACEMENT      1996 started needing cardiac care; 5 stents total    CORONARY ANGIOPLASTY WITH STENT PLACEMENT  11/19/2015    Dr Jason Goss stent 3x18 prox Cx   Flysse Wilman Koehler; 3 bypass grafts    ENDOSCOPY, COLON, DIAGNOSTIC      HEMORRHOID SURGERY      HYSTERECTOMY      LUMBAR FUSION  6/7/2016    Dr. Frank Langford Left 11 18 2015    left lumbar transforaminal nerve block l4-5 l5-s1    NERVE BLOCK Right 07/17/2017    lumbar transforaminal #1    NERVE BLOCK Right 09/06/2017    right knee injection#1    NERVE BLOCK Bilateral 10/24/2018    lumbar facet block     NERVE BLOCK Left 08/22/2019    hip     NERVE SURGERY N/A 6/15/2020    SPINAL CORD STIMULATOR IMPLANT WITH NERVO performed by Brianna Ortiz MD at [address]      bilateral feet    OTHER SURGICAL HISTORY N/A 11/04/2019    spinal cord stimulator trial    KS ARTHRS KNE SURG W/MENISCECTOMY MED/LAT W/SHVG Right 6/6/2018    RIGHT KNEE ARTHROSCOPY MEDIAL AND LATERAL MENISECTOMY SYNOVECTOMY AND CHONDROPLASTY performed by Renato Carias DO at [facility] [address] DX/THER AGNT PARAVERT FACET JOINT, LUMBAR/SAC, 1ST LEVEL Bilateral 10/24/2018    BILATERAL LUMBAR FACET BLOCK L1-2 ,L3-4 WITH X-RAY performed by Mynor Adame DO at [address] [facility] [address] N/A 11/4/2019    SPINAL CORD STIMULATOR TRIAL WITH NEVRO performed by Brianna Ortiz MD at [address] [facility]  06/15/2020    nevro     TONSILLECTOMY      VASCULAR SURGERY      laser on leaky veins       Medications Prior to Admission:    @  Prior to Admission medications    Medication Sig Start Date End Date Taking? Authorizing Provider   oxyCODONE-acetaminophen (PERCOCET) 5-325 MG per tablet Take 1 tablet by mouth every 8 hours as needed for Pain. Yes Historical Provider, MD   docusate sodium (COLACE) 100 MG capsule Take 100 mg by mouth daily   Yes Historical Provider, MD   baclofen (LIORESAL) 10 MG tablet Take 1 tablet by mouth nightly 6/25/20 6/25/21 Yes Estelita Lundborg., MD   gabapentin (NEURONTIN) 600 MG tablet Take 1 tablet by mouth 3 times daily for 180 days.  6/25/20 12/22/20 Yes Estelita Lundborg., MD   fenofibrate (TRICOR) 54 MG tablet Take 1 tablet by mouth daily 6/25/20  Yes Estelita Lundborg., MD   citalopram (CELEXA) 10 MG tablet Take 20 mg by mouth daily    Yes Historical Provider, MD   potassium chloride (KLOR-CON) 10 MEQ extended release tablet Take 10 mEq by mouth daily  6/10/19  Yes Historical Provider, MD   Multiple Vitamins-Minerals (EYE HEALTH) CAPS Take by mouth daily    Yes Historical Provider, MD   Semaglutide (OZEMPIC) 0.25 or 0.5 MG/DOSE SOPN Inject into the skin once a week fridays   Yes Historical Provider, MD   Dapagliflozin Propanediol (FARXIGA PO) Take 10 mg by mouth daily   Yes Historical Provider, MD   aspirin 81 MG tablet Take 81 mg by mouth daily Stopped 5 days pre op   Yes Historical Provider, MD   glipiZIDE (GLUCOTROL) 5 MG tablet Take 10 mg by mouth 2 times daily (before meals)    Yes Historical Provider, MD   omeprazole (PRILOSEC) 20 MG delayed release capsule Take 20 mg by mouth daily   Yes Historical Provider, MD   lisinopril (PRINIVIL;ZESTRIL) 5 MG tablet Take 5 mg by mouth daily    Yes Historical Provider, MD   IRON PO Take 65 mg by mouth daily   Yes Historical Provider, MD   furosemide (LASIX) 20 MG tablet Take 20 mg by mouth daily   Yes Historical Provider, MD   clopidogrel (PLAVIX) 75 MG tablet Take 75 mg by mouth daily Stopped 5 days pre op   Yes Historical Provider, MD   folic acid (FOLVITE) 216 MCG tablet Take 800 mcg by mouth daily    Yes Historical Provider, MD   vitamin D 1000 UNITS CAPS Take 1,000 Units by mouth every evening    Yes Historical Provider, MD   metoprolol succinate ER (TOPROL-XL) 25 MG XL tablet Take 1 tablet by mouth 2 times daily  Patient taking differently: Take 25 mg by mouth daily  8/15/16  Yes Juno Delgado DO   isosorbide mononitrate (IMDUR) 60 MG CR tablet Take 30 mg by mouth daily    Yes Historical Provider, MD   ezetimibe (ZETIA) 10 MG tablet Take 10 mg by mouth every evening    Yes Historical Provider, MD   Multiple Vitamins-Minerals (CENTRUM SILVER PO) Take 1 tablet by mouth daily. Yes Historical Provider, MD   levothyroxine (SYNTHROID) 100 MCG tablet Take 100 mcg by mouth daily. Yes Historical Provider, MD   nitroGLYCERIN (NITROSTAT) 0.4 MG SL tablet Place 0.4 mg under the tongue every 5 minutes as needed for Chest pain up to max of 3 total doses. If no relief after 1 dose, call 911. Historical Provider, MD       Allergies:  Lipitor; Statins; Atorvastatin; Cymbalta [duloxetine hcl]; Cipro xr; Diazepam; Duloxetine; Erythromycin; Indomethacin; Ketorolac; Ketorolac tromethamine; Lodine [etodolac]; Oscal 500-200 d-3 [oyster shell calcium-d]; Paxil [paroxetine hcl]; Ropinirole; Tromethamine; Trulicity [dulaglutide]; Zithromax [azithromycin]; and Requip [ropinirole hcl]    Social History:   Social History     Socioeconomic History    Marital status:       Spouse name: Not on file    Number of children: Not on file    Years of education: Not on file    Highest education level: Not on file   Occupational History    Not on file   Social Needs    Financial resource strain: Not on file    Food insecurity     Worry: Not on file     Inability: Not on file    Transportation needs     Medical: Not on file     Non-medical: Not on file   Tobacco Use    Smoking status: Never Smoker    Smokeless tobacco: Never Used   Substance and Sexual Activity    Alcohol use: No    Drug use: No    Sexual activity: Never   Lifestyle    Physical activity     Days per week: Not on file     Minutes per session: Not on file    Stress: Not on file   Relationships    Social connections     Talks on phone: Not on file     Gets together: Not on file     Attends L[employer] service: Not on file     Active member of club or organization: Not on file     Attends meetings of clubs or organizations: Not on file     Relationship status: Not on file    Intimate partner violence     Fear of current or ex partner: Not on file     Emotionally abused: Not on file     Physically abused: Not on file     Forced sexual activity: Not on file   Other Topics Concern    Not on file   Social History Narrative    Not on file       Family History:   Family History   Problem Relation Age of Onset    Cancer Father     Cancer Brother     Arthritis Other     Depression Other     Diabetes Other     Heart Disease Other     Hypertension Other     Stroke Other        REVIEW OF SYSTEMS:    Gen: Patient denies any lightheadedness or dizziness. No LOC or syncope. No fevers or chills. HEENT: No earache, sore throat or nasal congestion. Resp: Denies cough, hemoptysis or sputum production. Cardiac: + chest pain, SOB, no diaphoresis or palpitations. GI: No nausea, vomiting, diarrhea or constipation. No melena or hematochezia. : No urinary complaints, dysuria, hematuria or frequency. MSK: No extremity weakness, paralysis or paresthesias. PHYSICAL EXAM:    Vitals:  /65   Pulse 58   Temp 97.9 °F (36.6 °C) (Oral)   Resp 18   Ht 5' 1\" (1.549 m)   Wt 168 lb 4.8 oz (76.3 kg)   SpO2 94%   BMI 31.80 kg/m²     General:  This is a 69 y.o. yo female who is alert and oriented in NAD  HEENT:  Head is normocephalic and atraumatic, PERRLA, EOMI, mucus membranes moist with no pharyngeal erythema or exudate. Neck:  Supple with no carotid bruits, JVD or thyromegaly.   No cervical adenopathy  CV:  Regular rate and rhythm, no murmurs  Lungs:  Clear to auscultation bilaterally with no wheezes, rales or rhonchi  Abdomen:  + Obese, soft, nontender, nondistended, bowel sounds present  Extremities:  No edema, peripheral pulses intact bilaterally  Neuro:  Cranial nerves II-XII grossly intact; motor and sensory function intact with no focal deficits  Skin:  No rashes, lesions or wounds    DATA:  CBC with Differential:    Lab Results   Component Value Date    WBC 3.7 07/04/2020    RBC 3.83 07/04/2020    HGB 12.0 07/04/2020    HCT 37.3 07/04/2020     07/04/2020    MCV 97.4 07/04/2020    MCH 31.3 07/04/2020    MCHC 32.2 07/04/2020    RDW 13.2 07/04/2020    SEGSPCT 34 02/18/2014    LYMPHOPCT 34.4 07/04/2020    MONOPCT 9.8 07/04/2020    BASOPCT 0.8 07/04/2020    MONOSABS 0.36 07/04/2020    LYMPHSABS 1.27 07/04/2020    EOSABS 0.16 07/04/2020    BASOSABS 0.03 07/04/2020     CMP:    Lab Results   Component Value Date     07/05/2020    K 4.3 07/05/2020    K 4.1 07/04/2020     07/05/2020    CO2 28 07/05/2020    BUN 24 07/05/2020    CREATININE 1.2 07/05/2020    GFRAA 53 07/05/2020    LABGLOM 44 07/05/2020    GLUCOSE 89 07/05/2020    GLUCOSE 147 01/31/2012    PROT 5.9 07/05/2020    LABALBU 3.6 07/05/2020    LABALBU 4.4 01/31/2012    CALCIUM 9.3 07/05/2020    BILITOT 0.2 07/05/2020    ALKPHOS 53 07/05/2020    AST 27 07/05/2020    ALT 29 07/05/2020     Magnesium:    Lab Results   Component Value Date    MG 2.0 08/12/2016     Phosphorus:    Lab Results   Component Value Date    PHOS 3.4 05/28/2020     PT/INR:    Lab Results   Component Value Date    PROTIME 12.9 07/25/2019    PROTIME 12.4 08/04/2011    INR 1.1 07/25/2019     Troponin:    Lab Results   Component Value Date    TROPONINI <0.01 07/04/2020     U/A:    Lab Results   Component Value Date    COLORU Yellow 10/19/2019    PROTEINU Negative 10/19/2019    PHUR 6.0 10/19/2019    WBCUA 10-20 05/22/2018    WBCUA NONE 10/25/2011    RBCUA 0-1 05/22/2018    RBCUA NONE 10/25/2011    BACTERIA RARE 05/22/2018    CLARITYU Clear 10/19/2019    SPECGRAV 1.010 10/19/2019    LEUKOCYTESUR Negative 10/19/2019    UROBILINOGEN 0.2 10/19/2019    BILIRUBINUR Negative 10/19/2019    BILIRUBINUR NEGATIVE 10/25/2011    BLOODU Negative 10/19/2019    GLUCOSEU 500 10/19/2019    GLUCOSEU NEGATIVE 10/25/2011     ABG:    Lab Results   Component Value Date    PH 7.387 06/18/2016    PCO2 45.7 06/18/2016    PO2 96.6 06/18/2016    HCO3 26.9 06/18/2016    BE 1.6 06/18/2016    O2SAT 97.2 06/18/2016     HgBA1c:    Lab Results   Component Value Date    LABA1C 6.9 05/27/2020     FLP:    Lab Results   Component Value Date    TRIG 129 07/05/2020    HDL 35 07/05/2020    LDLCALC 109 07/05/2020    LABVLDL 26 07/05/2020     TSH:    Lab Results   Component Value Date    TSH 1.070 07/05/2020     IRON:    Lab Results   Component Value Date    IRON 59 10/14/2016     LIPASE:    Lab Results   Component Value Date    LIPASE 73 05/04/2018       ASSESSMENT AND PLAN:      Patient Active Problem List    Diagnosis Date Noted    Colitis 08/11/2016     Priority: High    Lumbar radiculopathy 11/18/2015     Priority: High    Neural foraminal stenosis of lumbar spine 11/18/2015     Priority: High    DDD (degenerative disc disease), lumbar 11/18/2015     Priority: High    Protruded lumbar disc 11/18/2015     Priority: High    Neural foraminal stenosis of cervical spine 10/07/2014     Priority: Medium    Falls frequently 07/02/2014     Priority: Medium    Normocytic anemia 06/28/2014     Priority: Medium    History of MI (myocardial infarction)      Priority: Medium    HLD (hyperlipidemia) 02/15/2014     Priority: Low    Non-insulin dependent type 2 diabetes mellitus ([facility] [address]      Priority: Low    Hypothyroidism      Priority: Low    Hypertension      Priority: Low    Chest pain 07/04/2020    Osteoarthritis of left hip 08/22/2019    Lumbar postlaminectomy syndrome 08/21/2019    Lumbar facet arthropathy 08/21/2019    Lumbar spondylosis 08/21/2019    Lumbar disc disorder 08/21/2019    Greater trochanteric bursitis of left hip 08/21/2019    Greater trochanteric bursitis of right hip 08/21/2019    Primary osteoarthritis of both hips 08/21/2019    L-S radiculopathy     Acute lateral meniscus tear of right knee 04/19/2018    History of coronary artery bypass surgery 11/18/2017    Hx of hypercholesterolemia 11/18/2017    Pseudoaneurysm following procedure ([facility] [address] 11/17/2017    Primary osteoarthritis of both knees 06/09/2017    Primary osteoarthritis of right knee 05/02/2017    Left cataract 04/26/2016    DM II (diabetes mellitus, type II), controlled ([address] 11/19/2015    NSTEMI (non-ST elevated myocardial infarction) ([address] 11/19/2015    Right cataract 07/07/2015    Cervical spinal stenosis 10/07/2014    Facet arthropathy, cervical 10/07/2014    Proximal weakness of limb 07/02/2014    Acute renal failure (ARF) ([address] 06/28/2014    GERD (gastroesophageal reflux disease)      1. Chest pain-atypical, left-sided dull ache  2. History of coronary artery disease with an STEMI 2015  3. History of DVT legs  4. Non-insulin-dependent diabetes mellitus type 2  5. Hypertension  6. Hyperlipidemia  7. Chronic kidney disease stage III    Plan:  Home meds reviewed  Observation on telemetry floor  IV Lexiscan stress test  Monitor heart rate, blood pressure, O2 saturation    Patient will be discharged home today if the IV Lexiscan stress test is normal.  Patient will then follow-up with primary care physician along with Dr. Patty Kennedy.       Wan Lynn D.O.  7/5/2020  9:25 AM

## 2020-07-05 NOTE — PROCEDURES
[address]                              CARDIAC STRESS TEST    PATIENT NAME: You Allen                   :        1945  MED REC NO:   [id number]                            ROOM:       5624  ACCOUNT NO:   [de-identified]                           ADMIT DATE: 2020  PROVIDER:     Laisha Selby DO    CARDIOVASCULAR DIAGNOSTIC DEPARTMENT    DATE OF STUDY:  2020    IV LEXISCAN STRESS TEST    PATIENT OF:  Dr. Vignesh Erwin and Dr. Lisette Silver. This 70-year-old female had IV Lexiscan stress test performed for  evaluation of recent onset of left-sided chest pain. The patient's heart rate and blood pressure response to the IV Lexiscan  was thought to be physiologic. The patient did complain of some funny feeling during the test.  Denied  any chest or neck pain. There was no atrial or ventricular ectopy noted. There are no significant ST-T changes noted. IMPRESSION:  There is no electrocardiographic or clinical evidence of IV  Lexiscan-induced coronary ischemia. The patient was sent to Nuclear Medicine for imaging.         Tk Reed DO    D: 2020 10:46:04       T: 2020 10:47:54     [email or web address]  Job#: [id number]     Doc#: [id number]    CC:  Anna Velasquez MD

## 2020-07-05 NOTE — PROCEDURES
Dr. Almonte Corners present, Completed Lexiscan Protocol 0.4 mg IV per Right wrist vein . Post Lexiscan injection Patient experienced feeling \"weird\", which resolved.

## 2020-07-05 NOTE — PLAN OF CARE
Problem: Falls - Risk of:  Goal: Will remain free from falls  Description: Will remain free from falls  7/5/2020 0400 by Maria Elena Copeland RN  Outcome: Met This Shift  7/4/2020 2213 by Aden Pineda RN  Outcome: Met This Shift

## 2020-07-27 ENCOUNTER — OFFICE VISIT (OUTPATIENT)
Dept: ORTHOPEDIC SURGERY | Age: 75
End: 2020-07-27
Payer: MEDICARE

## 2020-07-27 VITALS — HEIGHT: 61 IN | WEIGHT: 184 LBS | BODY MASS INDEX: 34.74 KG/M2

## 2020-07-27 PROCEDURE — 20550 NJX 1 TENDON SHEATH/LIGAMENT: CPT | Performed by: ORTHOPAEDIC SURGERY

## 2020-07-27 PROCEDURE — 99213 OFFICE O/P EST LOW 20 MIN: CPT | Performed by: ORTHOPAEDIC SURGERY

## 2020-07-27 RX ORDER — TRIAMCINOLONE ACETONIDE 40 MG/ML
20 INJECTION, SUSPENSION INTRA-ARTICULAR; INTRAMUSCULAR ONCE
Status: COMPLETED | OUTPATIENT
Start: 2020-07-27 | End: 2020-07-27

## 2020-07-27 RX ADMIN — TRIAMCINOLONE ACETONIDE 20 MG: 40 INJECTION, SUSPENSION INTRA-ARTICULAR; INTRAMUSCULAR at 15:20

## 2020-07-27 NOTE — PROGRESS NOTES
Chief Complaint   Patient presents with    Pain     existing patient for right elbow pain, patient states she fell in April of this year. She has aching pain. Subjective:     Patient ID: Koko Booth is a 70 [facility]    Knee Pain  Patient complains of right elbow pain. She had MRi p[reviously with topical cream treatment. she has had csi with good relief.     Past Medical History:   Diagnosis Date    Arthritis     CAD (coronary artery disease)     CHF (congestive heart failure) (HCC)     Diabetes ([address]     Fibromyalgia     GERD (gastroesophageal reflux disease)     History of cardiovascular stress test 02/17/2014    lexiscan, also 4/21/2015    History of MI (myocardial infarction)     x4; most recent 2016    Hx of blood clots     DVT leg    Hyperlipidemia     Hypertension     Hypothyroidism     Intractable low back pain 6/19/2016    Non-insulin dependent type 2 diabetes mellitus ([facility]     NSTEMI (non-ST elevated myocardial infarction) ([facility] 11/19/2015     Past Surgical History:   Procedure Laterality Date    APPENDECTOMY      ARTHROCENTESIS Left 8/22/2019    LEFT HIP CORTICOSTERIOD INJECTION UNDER FLUOROSCOPIC GUIDANCE performed by Huey Garcia DO at [address] [facility]  11/15/2017    Dr Israel Ward Right 7 7 15    CATARACT REMOVAL WITH IMPLANT Left 4/26/2016    CHOLECYSTECTOMY      COCCYX REMOVAL      COLONOSCOPY      CORONARY ANGIOPLASTY WITH [address] started needing cardiac care; 5 stents total    CORONARY ANGIOPLASTY WITH STENT PLACEMENT  11/19/2015    Dr Js Benitez stent 3x18 prox Cx   [facility] Dr. Patsy Saldana; 3 bypass grafts    ENDOSCOPY, COLON, DIAGNOSTIC      HEMORRHOID SURGERY      HYSTERECTOMY      LUMBAR FUSION  6/7/2016    Dr. Adelia Foster Left 11 18 2015    left lumbar transforaminal nerve block l4-5 l5-s1    NERVE BLOCK Right 07/17/2017    lumbar transforaminal #1    NERVE BLOCK Right 09/06/2017    right knee injection#1    NERVE BLOCK Bilateral 10/24/2018    lumbar facet block     NERVE BLOCK Left 08/22/2019    hip     NERVE SURGERY N/A 6/15/2020    SPINAL CORD STIMULATOR IMPLANT WITH NERVO performed by Bunny Wu MD at [address]      bilateral feet    OTHER SURGICAL HISTORY N/A 11/04/2019    spinal cord stimulator trial    [place] ARTHRS KNE SURG W/MENISCECTOMY MED/LAT W/SHVG Right 6/6/2018    RIGHT KNEE ARTHROSCOPY MEDIAL AND LATERAL MENISECTOMY SYNOVECTOMY AND CHONDROPLASTY performed by Nadia Gunn DO at [facility] [address] DX/THER AGNT PARAVERT FACET JOINT, LUMBAR/SAC, 1ST LEVEL Bilateral 10/24/2018    BILATERAL LUMBAR FACET BLOCK L1-2 ,L3-4 WITH X-RAY performed by Manjinder Cortez DO at [address] [facility] [address] / [address] N/A 11/4/2019    SPINAL CORD STIMULATOR TRIAL WITH NEVRO performed by Bunny Wu MD at [address] [facility]  06/15/2020    nevro     TONSILLECTOMY      VASCULAR SURGERY      laser on leaky veins       Current Outpatient Medications:     oxyCODONE-acetaminophen (PERCOCET) 5-325 MG per tablet, Take 1 tablet by mouth every 8 hours as needed for Pain., Disp: , Rfl:     docusate sodium (COLACE) 100 MG capsule, Take 100 mg by mouth daily, Disp: , Rfl:     baclofen (LIORESAL) 10 MG tablet, Take 1 tablet by mouth nightly, Disp: 90 tablet, Rfl: 3    gabapentin (NEURONTIN) 600 MG tablet, Take 1 tablet by mouth 3 times daily for 180 days. , Disp: 180 tablet, Rfl: 5    fenofibrate (TRICOR) 54 MG tablet, Take 1 tablet by mouth daily, Disp: 90 tablet, Rfl: 3    citalopram (CELEXA) 10 MG tablet, Take 20 mg by mouth daily , Disp: , Rfl:     potassium chloride (KLOR-CON) 10 MEQ extended release tablet, Take 10 mEq by mouth daily , Disp: , Rfl:    falls    Atorvastatin      Makes me sick severe leg cramps    Cymbalta [Duloxetine Hcl] Nausea Only    Cipro Xr      Reaction unknown    Diazepam     Duloxetine Nausea Only    Erythromycin      Hurt my stomach    Indomethacin     Ketorolac     Ketorolac Tromethamine Itching    Lodine [Etodolac]     Oscal 500-200 D-3 [Oyster Shell Calcium-D]     Paxil [Paroxetine Hcl]     Ropinirole     Tromethamine     Trulicity [Dulaglutide] Swelling    Zithromax [Azithromycin]     Requip [Ropinirole Hcl] Nausea And Vomiting     Social History     Socioeconomic History    Marital status:       Spouse name: Not on file    Number of children: Not on file    Years of education: Not on file    Highest education level: Not on file   Occupational History    Not on file   Social Needs    Financial resource strain: Not on file    Food insecurity     Worry: Not on file     Inability: Not on file    Transportation needs     Medical: Not on file     Non-medical: Not on file   Tobacco Use    Smoking status: Never Smoker    Smokeless tobacco: Never Used   Substance and Sexual Activity    Alcohol use: No    Drug use: No    Sexual activity: Never   Lifestyle    Physical activity     Days per week: Not on file     Minutes per session: Not on file    Stress: Not on file   Relationships    Social connections     Talks on phone: Not on file     Gets together: Not on file     Attends Buddhist service: Not on file     Active member of club or organization: Not on file     Attends meetings of clubs or organizations: Not on file     Relationship status: Not on file    Intimate partner violence     Fear of current or ex partner: Not on file     Emotionally abused: Not on file     Physically abused: Not on file     Forced sexual activity: Not on file   Other Topics Concern    Not on file   Social History Narrative    Not on file     Family History   Problem Relation Age of Onset    Cancer Father [de-identified] serjio    Cancer Brother     COPD Brother     Heart Attack Mother 54    Heart Disease Sister     Diabetes Sister     Heart Disease Brother     Heart Disease Other 58             Diabetes Other     ADHD Other          REVIEW OF SYSTEMS:     General/Constitution:  (-)weight loss, (-)fever, (-)chills, (-)weakness. Skin: (-) rash,(-) psoriasis,(-) eczema, (-)skin cancer. Musculoskeletal: (-) fractures,  (-) dislocations,(-) collagen vascular disease, (-) fibromyalgia, (-) multiple sclerosis, (-) muscular dystrophy, (-) RSD,(-) joint pain (-)swelling, (-) joint pain,swelling. Neurologic: (-) epilepsy, (-)seizures,(-) brain tumor,(-) TIA, (-)stroke, (-)headaches, (-)Parkinson disease,(-) memory loss, (-) LOC. Cardiovascular: (-) Chest pain, (-) swelling in legs/feet, (-) SOB, (-) cramping in legs/feet with walking. Respiratory: (-) SOB, (-) Coughing, (-) night sweats. GI: (-) nausea, (-) vomiting, (-) diarrhea, (-) blood in stool, (-) gastric ulcer. Psychiatric: (-) Depression, (-) Anxiety, (-) bipolar disease, (-) Alzheimer's Disease  Allergic/Immunologic: (-) allergies latex, (-) allergies metal, (-) skin sensitivity. Hematlogic: (-) anemia, (-) blood transfusion, (-) DVT/PE, (-) Clotting disorders      Subjective:  Vital signs are stable. In general, patient is awake, alert and oriented X3, in no apparent distress. Examination of HENT reveals normocephalic, atraumatic. PERRLA/EOMI sclera are white. Conjunctivae are clear. TM's are intact. Pharynx is pink and moist.  Uvula and tongue are midline. Heart: Positive S1 and positive S2 with regular rate and rhythm. Lungs: Clear to auscultation bilaterally without rales, rhonchi or wheezes. Abdomen: soft, nontender. Positive bowel sounds. No organomegaly. No guarding or rigidity.       Constitution:    Ht 5' 1\" (1.549 m)   Wt 184 lb (83.5 kg)   BMI 34.77 kg/m²       Psycihatric:    The patient is alert and oriented x 3, appears to be stated age and in no distress. Respiratory:    Respiratory effort is not labored. Patient is not gasping. Palpation of the chest reveals no tactile fremitus. Skin:    Upon inspection: the skin appears warm, dry and intact. There is  a previous scar over the affected area. There is any cellulitis, lymphedema or cutaneous lesions noted in the lower extremities. Upon palpation there is no induration noted. Neurologic:    Gait: normal;  Motor exam of the lower extremities show ; quadriceps, hamstrings, foot dorsi and plantar flexors intact R.  5/5 and L. 5/5. Deep tendon reflexes are 2/4 at the knees and 2/4 at the ankles with strong extensor hallicus longus motor strength bilaterally. Sensory to both feet is intact to all sensory roots. Cardiovascular: The vascular exam is normal and is well perfused to distal extremities. Distal pulses DP/PT: R. 2+; L. 2+. There is cap refill noted less than two seconds in all digits. There is not edema of the bilateral lower extremities. There is not varicosities noted in the distal extremities. Lymph:    Upon palpation,  there is no lymphadenopathy noted in bilateral lower extremities. Right  elbow: pain is located over bicep insertion and medial epicondyle. Range of motion: R.Elbow flexion 140/extension 0; L. Elbow flexion 140/extension 0. ; Wrist ROM R wrist DF 80, VF 80, L wrist DF 80, VF 80, R pronation 90/ supination 90, L pronation 90/supination 90. There is not swelling, there is not ecchymosis. Exam is compared bilaterally. Right:  Special tests: Cozen's sign negative. Reverse cozen sign positive    Left:  Special tests: Cozen's sign negative. Reverse cozen sign negative          MRI:  Full-thickness versus high-grade partial-thickness tear at   the insertion of the biceps tendon. Exostosis versus osseous avulsion   at the site of injury arising from the proximal radius.        Radiographic findings reviewed with patient    Assessment:  Encounter Diagnoses   Name Primary?  Medial epicondylitis of elbow, right Yes       Plan:  I discussed with the patient there is not a great fix for the bicep tendon tear/tendonitis. I explained to her that an injection would now help into the bicep. I recommend that she see Dr Arminda Gamboa for lumbar evaluation  Verbal and written consent for the injection was given by the patient. She was placed in a supine position and the right medial elbow was cleaned in prepped with alcohol and betadine. She was injected with . 5ml of lidocaine and 05ml of Kenalog 40 mg into the elbow. The patient tolerated the injection well.   Follow up prn

## 2020-07-30 ENCOUNTER — HOSPITAL ENCOUNTER (OUTPATIENT)
Age: 75
Discharge: HOME | End: 2020-07-30
Payer: MEDICARE

## 2020-07-30 LAB
ALBUMIN SERPL-MCNC: 4 G/DL (ref 3.5–5.2)
ALP BLD-CCNC: 56 U/L (ref 35–104)
ALT SERPL-CCNC: 27 U/L (ref 0–32)
ANION GAP SERPL CALCULATED.3IONS-SCNC: 12 MMOL/L (ref 7–16)
AST SERPL-CCNC: 20 U/L (ref 0–31)
BASOPHILS ABSOLUTE: 0.03 E9/L (ref 0–0.2)
BASOPHILS RELATIVE PERCENT: 0.6 % (ref 0–2)
BILIRUB SERPL-MCNC: <0.2 MG/DL (ref 0–1.2)
BUN BLDV-MCNC: 40 MG/DL (ref 8–23)
CALCIUM SERPL-MCNC: 9.4 MG/DL (ref 8.6–10.2)
CHLORIDE BLD-SCNC: 103 MMOL/L (ref 98–107)
CO2: 27 MMOL/L (ref 22–29)
CREAT SERPL-MCNC: 1.6 MG/DL (ref 0.5–1)
EOSINOPHILS ABSOLUTE: 0.48 E9/L (ref 0.05–0.5)
EOSINOPHILS RELATIVE PERCENT: 9 % (ref 0–6)
GFR AFRICAN AMERICAN: 38
GFR NON-AFRICAN AMERICAN: 32 ML/MIN/1.73
GLUCOSE FASTING: 106 MG/DL (ref 74–99)
HCT VFR BLD CALC: 38.7 % (ref 34–48)
HEMOGLOBIN: 12.3 G/DL (ref 11.5–15.5)
IMMATURE GRANULOCYTES #: 0.02 E9/L
IMMATURE GRANULOCYTES %: 0.4 % (ref 0–5)
LYMPHOCYTES ABSOLUTE: 1.37 E9/L (ref 1.5–4)
LYMPHOCYTES RELATIVE PERCENT: 25.8 % (ref 20–42)
MCH RBC QN AUTO: 30.8 PG (ref 26–35)
MCHC RBC AUTO-ENTMCNC: 31.8 % (ref 32–34.5)
MCV RBC AUTO: 97 FL (ref 80–99.9)
MONOCYTES ABSOLUTE: 0.56 E9/L (ref 0.1–0.95)
MONOCYTES RELATIVE PERCENT: 10.5 % (ref 2–12)
NEUTROPHILS ABSOLUTE: 2.86 E9/L (ref 1.8–7.3)
NEUTROPHILS RELATIVE PERCENT: 53.7 % (ref 43–80)
PDW BLD-RTO: 13.4 FL (ref 11.5–15)
PHOSPHORUS: 3.4 MG/DL (ref 2.5–4.5)
PLATELET # BLD: 245 E9/L (ref 130–450)
PMV BLD AUTO: 10 FL (ref 7–12)
POTASSIUM SERPL-SCNC: 5 MMOL/L (ref 3.5–5)
RBC # BLD: 3.99 E12/L (ref 3.5–5.5)
SODIUM BLD-SCNC: 142 MMOL/L (ref 132–146)
TOTAL PROTEIN: 6.8 G/DL (ref 6.4–8.3)
VITAMIN D 25-HYDROXY: 88 NG/ML (ref 30–100)
WBC # BLD: 5.3 E9/L (ref 4.5–11.5)

## 2020-07-30 PROCEDURE — 82306 VITAMIN D 25 HYDROXY: CPT

## 2020-07-30 PROCEDURE — 84100 ASSAY OF PHOSPHORUS: CPT

## 2020-07-30 PROCEDURE — 36415 COLL VENOUS BLD VENIPUNCTURE: CPT

## 2020-07-30 PROCEDURE — 85025 COMPLETE CBC W/AUTO DIFF WBC: CPT

## 2020-07-30 PROCEDURE — 80053 COMPREHEN METABOLIC PANEL: CPT

## 2020-08-04 ENCOUNTER — OFFICE VISIT (OUTPATIENT)
Dept: PAIN MANAGEMENT | Age: 75
End: 2020-08-04
Payer: MEDICARE

## 2020-08-04 VITALS
OXYGEN SATURATION: 98 % | WEIGHT: 185 LBS | SYSTOLIC BLOOD PRESSURE: 132 MMHG | DIASTOLIC BLOOD PRESSURE: 68 MMHG | HEART RATE: 68 BPM | TEMPERATURE: 97.4 F | HEIGHT: 61 IN | BODY MASS INDEX: 34.93 KG/M2 | RESPIRATION RATE: 20 BRPM

## 2020-08-04 PROCEDURE — 99213 OFFICE O/P EST LOW 20 MIN: CPT | Performed by: PAIN MEDICINE

## 2020-08-04 RX ORDER — PROPRANOLOL HYDROCHLORIDE 10 MG/1
10 TABLET ORAL 2 TIMES DAILY
Status: ON HOLD | COMMUNITY
Start: 2020-07-21 | End: 2020-10-07 | Stop reason: HOSPADM

## 2020-08-04 NOTE — PROGRESS NOTES
[address]  [address] [facility], [address] [place] [address] [place]  [phone]    Follow up Note      Jackson Vargas     Date of Visit:  8/4/2020    CC:  Patient presents for follow up   Chief Complaint   Patient presents with    Follow-up     right side lower back     HPI:    Pain is better  Change in quality of symptoms:no. Medication side effects:not applicable . Recent diagnostic testing:none. Recent interventional procedures:none    Imaging:   Lumbar spine CT 07/2019  Remote postsurgical changes and multilevel degenerative changes as   described. Mild levoscoliosis. Moderate spinal canal stenosis at the   L3-L4 interspace level. No evidence of herniated nucleus pulposus. No   evidence of any level with critical spinal stenosis. No evidence of   remote postop complication.      Left hip Xray 05/2019  Stable hip joint arthritis. No acute findings.     Previous treatments: Surgery L4-5-S1 bilateral laminectomies 2016 with fusion and medications. .      Potential Aberrant Drug-Related Behavior:  None    Urine Drug Screening:  None, no opioids started     OARRS report:  08/2020 consistent      Past Medical History:   Diagnosis Date    Arthritis     CAD (coronary artery disease)     [facility]     Diabetes ([address]     Fibromyalgia     GERD (gastroesophageal reflux disease)     History of cardiovascular stress test 02/17/2014    lexiscan, also 4/21/2015    History of MI (myocardial infarction)     x4; most recent 2016    Hx of blood clots     DVT leg    Hyperlipidemia     Hypertension     Hypothyroidism     Intractable low back pain 6/19/2016    Non-insulin dependent type 2 diabetes mellitus ([address]     NSTEMI (non-ST elevated myocardial infarction) ([address] 11/19/2015     Past Surgical History:   Procedure Laterality Date    APPENDECTOMY      ARTHROCENTESIS Left 8/22/2019    LEFT HIP CORTICOSTERIOD INJECTION UNDER FLUOROSCOPIC GUIDANCE performed by Schering-PloughDO [facility] [address] [facility]  11/15/2017    Dr Kena Traore Right 7 7 15    CATARACT REMOVAL WITH IMPLANT Left 4/26/2016    CHOLECYSTECTOMY      COCCYX REMOVAL      COLONOSCOPY      CORONARY ANGIOPLASTY WITH STENT PLACEMENT      1996 started needing cardiac care; 5 stents total    CORONARY ANGIOPLASTY WITH STENT PLACEMENT  11/19/2015    Dr Sanam Vaz stent 3x18 prox Cx   [facility] Dr. Farhat Simmons; 3 bypass grafts    ENDOSCOPY, COLON, DIAGNOSTIC      HEMORRHOID SURGERY      HYSTERECTOMY      LUMBAR FUSION  6/7/2016    Dr. Amanda Castro Left 11 18 2015    left lumbar transforaminal nerve block l4-5 l5-s1    NERVE BLOCK Right 07/17/2017    lumbar transforaminal #1    NERVE BLOCK Right 09/06/2017    right knee injection#1    NERVE BLOCK Bilateral 10/24/2018    lumbar facet block     NERVE BLOCK Left 08/22/2019    hip     NERVE SURGERY N/A 6/15/2020    SPINAL CORD STIMULATOR IMPLANT WITH NERVO performed by Francisco Holly MD at [address] [facility] [address]      bilateral feet    OTHER SURGICAL HISTORY N/A 11/04/2019    spinal cord stimulator trial    [place] ARTH[place] KNE SURG W/MENISCECTOMY MED/LAT W/SHVG Right 6/6/2018    RIGHT KNEE ARTHROSCOPY MEDIAL AND LATERAL MENISECTOMY SYNOVECTOMY AND CHONDROPLASTY performed by Zhang Peters DO at [facility] [address] DX/THER AGNT PARAVERT FACET JOINT, LUMBAR/SAC, 1ST LEVEL Bilateral 10/24/2018    BILATERAL LUMBAR FACET BLOCK L1-2 ,L3-4 WITH X-RAY performed by Conrad Valera DO at [address] [address] / [address] N/A 11/4/2019    SPINAL CORD STIMULATOR TRIAL WITH NEVRO performed by Francisco Holly MD at [address]  06/15/2020    nevro     TONSILLECTOMY      VASCULAR SURGERY      laser on leaky veins     Prior to Admission medications    Medication Sig Start Date End Date Taking? Authorizing Provider   propranolol (INDERAL) 10 MG tablet TAKE 1 TABLET BY MOUTH TWICE DAILY 7/21/20  Yes Historical Provider, MD   oxyCODONE-acetaminophen (PERCOCET) 5-325 MG per tablet Take 1 tablet by mouth every 8 hours as needed for Pain. Yes Historical Provider, MD   docusate sodium (COLACE) 100 MG capsule Take 100 mg by mouth daily   Yes Historical Provider, MD   baclofen (LIORESAL) 10 MG tablet Take 1 tablet by mouth nightly 6/25/20 6/25/21 Yes Stefania Sandoval MD   gabapentin (NEURONTIN) 600 MG tablet Take 1 tablet by mouth 3 times daily for 180 days.  6/25/20 12/22/20 Yes Stefania Sandoval MD   fenofibrate (TRICOR) 54 MG tablet Take 1 tablet by mouth daily 6/25/20  Yes Stefania Sandoval MD   citalopram (CELEXA) 10 MG tablet Take 20 mg by mouth daily    Yes Historical Provider, MD   potassium chloride (KLOR-CON) 10 MEQ extended release tablet Take 10 mEq by mouth daily  6/10/19  Yes Historical Provider, MD   Multiple Vitamins-Minerals (EYE HEALTH) CAPS Take by mouth daily    Yes Historical Provider, MD   Semaglutide (OZEMPIC) 0.25 or 0.5 MG/DOSE SOPN Inject into the skin once a week fridays   Yes Historical Provider, MD   Dapagliflozin Propanediol (FARXIGA PO) Take 10 mg by mouth daily   Yes Historical Provider, MD   aspirin 81 MG tablet Take 81 mg by mouth daily Stopped 5 days pre op   Yes Historical Provider, MD   glipiZIDE (GLUCOTROL) 5 MG tablet Take 10 mg by mouth 2 times daily (before meals)    Yes Historical Provider, MD   omeprazole (PRILOSEC) 20 MG delayed release capsule Take 20 mg by mouth daily   Yes Historical Provider, MD   lisinopril (PRINIVIL;ZESTRIL) 5 MG tablet Take 5 mg by mouth daily    Yes Historical Provider, MD   IRON PO Take 65 mg by mouth daily   Yes Historical Provider, MD   furosemide (LASIX) 20 MG tablet Take 20 mg by mouth daily   Yes Historical Provider, MD   clopidogrel (PLAVIX) 75 MG tablet Take 75 mg by mouth daily Stopped 5 days pre op   Yes Historical Provider, MD   nitroGLYCERIN (NITROSTAT) 0.4 MG SL tablet Place 0.4 mg under the tongue every 5 minutes as needed for Chest pain up to max of 3 total doses. If no relief after 1 dose, call 911. Yes Historical Provider, MD   folic acid (FOLVITE) 059 MCG tablet Take 800 mcg by mouth daily    Yes Historical Provider, MD   vitamin D 1000 UNITS CAPS Take 1,000 Units by mouth every evening    Yes Historical Provider, MD   metoprolol succinate ER (TOPROL-XL) 25 MG XL tablet Take 1 tablet by mouth 2 times daily  Patient taking differently: Take 25 mg by mouth daily  8/15/16  Yes Manda Bill DO   isosorbide mononitrate (IMDUR) 60 MG CR tablet Take 30 mg by mouth daily    Yes Historical Provider, MD   ezetimibe (ZETIA) 10 MG tablet Take 10 mg by mouth every evening    Yes Historical Provider, MD   Multiple Vitamins-Minerals (CENTRUM SILVER PO) Take 1 tablet by mouth daily. Yes Historical Provider, MD   levothyroxine (SYNTHROID) 100 MCG tablet Take 100 mcg by mouth daily. Yes Historical Provider, MD     Allergies   Allergen Reactions    Lipitor Other (See Comments)     Severe leg pain    Statins Other (See Comments)     Muscles go weak and she falls    Atorvastatin      Makes me sick severe leg cramps    Cymbalta [Duloxetine Hcl] Nausea Only    Cipro Xr      Reaction unknown    Diazepam     Duloxetine Nausea Only    Erythromycin      Hurt my stomach    Indomethacin     Ketorolac     Ketorolac Tromethamine Itching    Lodine [Etodolac]     Oscal 500-200 D-3 [Oyster Shell Calcium-D]     Paxil [Paroxetine Hcl]     Ropinirole     Tromethamine     Trulicity [Dulaglutide] Swelling    Zithromax [Azithromycin]     Requip [Ropinirole Hcl] Nausea And Vomiting     Social History     Socioeconomic History    Marital status:       Spouse name: Not on file    Number of children: Not on file    Years of education: Not on file    Highest education level: Not on file   Occupational History    Not on file   Social Needs    Financial resource strain: Not on file    Food insecurity     Worry: Not on file     Inability: Not on file    Transportation needs     Medical: Not on file     Non-medical: Not on file   Tobacco Use    Smoking status: Never Smoker    Smokeless tobacco: Never Used   Substance and Sexual Activity    Alcohol use: No    Drug use: No    Sexual activity: Never   Lifestyle    Physical activity     Days per week: Not on file     Minutes per session: Not on file    Stress: Not on file   Relationships    Social connections     Talks on phone: Not on file     Gets together: Not on file     Attends Zoroastrian service: Not on file     Active member of club or organization: Not on file     Attends meetings of clubs or organizations: Not on file     Relationship status: Not on file    Intimate partner violence     Fear of current or ex partner: Not on file     Emotionally abused: Not on file     Physically abused: Not on file     Forced sexual activity: Not on file   Other Topics Concern    Not on file   Social History Narrative    Not on file     Family History   Problem Relation Age of Onset    Cancer Father [de-identified]        bladdder    Cancer Brother     COPD Brother     Heart Attack Mother 54    Heart Disease Sister     Diabetes Sister     Heart Disease Brother     Heart Disease Other 58             Diabetes Other     ADHD Other      REVIEW OF SYSTEMS:     Nathalie Palacios denies fever/chills, chest pain, shortness of breath, new bowel or bladder complaints. All other review of systems was negative. PHYSICAL EXAMINATION:      /68   Pulse 68   Temp 97.4 °F (36.3 °C) (Infrared)   Resp 20   Ht 5' 1\" (1.549 m)   Wt 185 lb (83.9 kg)   SpO2 98%   BMI 34.96 kg/m²     General:       General appearance:   elderly, pleasant and well-hydrated.    , in no discomfort and A & O x3  Build:Overweight     HEENT:     Head:normocephalic and atraumatic  Pupils:regular, round and equal.  Sclera: icterus absent,      Lungs:     Breathing:Breathing Pattern: regular, no distress     Abdomen:     Shape:non-distended and normal     Lumbar spine:     Spine inspection:surgical incision scar   steristipes removed with no signs of infection or discharge     Musculoskeletal:     SLR:negative right, negative left, sitting      Extremities:     Tremors:None bilaterally upper and lower  Intact: Yes     Neurological:     Sensory:diminished to light touch and pin prick bilateral legs  Motor:                             Right Quadriceps4/5          Left Quadriceps4/5           Right Gastrocnemius4/5    Left Gastrocnemius4/5  Right Ant Tibialis4/5  Left Ant Tibialis4/5  Reflexes:    Right Quadriceps reflex0  Left Quadriceps reflex0  Right Achilles reflex0  Left Achilles reflex0  Gait:antalgic     Dermatology:     Skin:no unusual rashes and no skin lesions     Impression:  Chronic low back pain with radiation to both lower extremities   Lumbar spine MRI 2019 L4-5-S1 bilateral laminectomies with fusion  Patient had seen neurosurgery and is not a candidate for surgery, SCS was recommended   Plan:  Follow up on her low back pain with no acute issues. Patient mentioned that she is having good relief with SCS, per patient more than 85%. Currently on Gabapentin 1500 mg daily. Continue with Norco 5/325 QD PRN. (no refill needed)  OARRS report reviewed 08/2020. Patient encouraged to stay active and to lose weight. Treatment plan discussed with the patient including medications side effects. We discussed with the patient that combining opioids, benzodiazepines, alcohol, illicit drugs or sleep aids increases the risk of respiratory depression including death. We discussed that these medications may cause drowsiness, sedation or dizziness and have counseled the patient not to drive or operate machinery.  We have

## 2020-08-04 NOTE — PROGRESS NOTES
Do you currently have any of the following:    Fever: No  Headache:  No  Cough: No  Shortness of breath: No  Exposed to anyone with these symptoms: No                                                                                                                Trisha Etienne presents to the [address] [address] on 8/4/2020. Luis Brenner is complaining of pain lower back. The pain is intermittent. The pain is described as shooting and stabbing. Pain is rated on her best day at a 0, on her worst day at a 5, and on average at a 3 on the VAS scale. She took her last dose of Tylenol . Luis Brenner does not have issues with constipation. Any procedures since your last visit: Yes, with 85   % relief. She is not on NSAIDS and  is  on anticoagulation medications to include ASA and Plavix and is managed by cardologist.     Pacemaker or defibrilator: No Physician managing device is .        /68   Pulse 68   Temp 97.4 °F (36.3 °C) (Infrared)   Resp 20   Ht 5' 1\" (1.549 m)   Wt 185 lb (83.9 kg)   SpO2 98%   BMI 34.96 kg/m²

## 2020-09-17 ENCOUNTER — HOSPITAL ENCOUNTER (OUTPATIENT)
Dept: CT IMAGING | Age: 75
Discharge: HOME | End: 2020-09-17
Payer: MEDICARE

## 2020-09-17 LAB
BUN BLDV-MCNC: 23 MG/DL (ref 8–23)
CREAT SERPL-MCNC: 1.4 MG/DL (ref 0.5–1)
GFR AFRICAN AMERICAN: 45
GFR NON-AFRICAN AMERICAN: 37 ML/MIN/1.73

## 2020-09-17 PROCEDURE — 6360000004 HC RX CONTRAST MEDICATION: Performed by: RADIOLOGY

## 2020-09-17 PROCEDURE — 70470 CT HEAD/BRAIN W/O & W/DYE: CPT

## 2020-09-17 PROCEDURE — 84520 ASSAY OF UREA NITROGEN: CPT

## 2020-09-17 PROCEDURE — 82565 ASSAY OF CREATININE: CPT

## 2020-09-17 PROCEDURE — 36415 COLL VENOUS BLD VENIPUNCTURE: CPT

## 2020-09-17 RX ADMIN — IOPAMIDOL 50 ML: 755 INJECTION, SOLUTION INTRAVENOUS at 14:55

## 2020-10-02 ENCOUNTER — APPOINTMENT (OUTPATIENT)
Dept: CT IMAGING | Age: 75
DRG: 580 | End: 2020-10-02
Payer: MEDICARE

## 2020-10-02 ENCOUNTER — APPOINTMENT (OUTPATIENT)
Dept: GENERAL RADIOLOGY | Age: 75
DRG: 580 | End: 2020-10-02
Payer: MEDICARE

## 2020-10-02 ENCOUNTER — HOSPITAL ENCOUNTER (INPATIENT)
Age: 75
LOS: 7 days | Discharge: HOME | DRG: 580 | End: 2020-10-09
Attending: EMERGENCY MEDICINE | Admitting: NURSE PRACTITIONER
Payer: MEDICARE

## 2020-10-02 PROBLEM — R55 SYNCOPE AND COLLAPSE: Status: ACTIVE | Noted: 2020-10-02

## 2020-10-02 PROBLEM — I60.9 SAH (SUBARACHNOID HEMORRHAGE) (HCC): Status: ACTIVE | Noted: 2020-10-02

## 2020-10-02 PROBLEM — S01.01XA SCALP LACERATION: Status: ACTIVE | Noted: 2020-10-02

## 2020-10-02 LAB
ABO/RH: NORMAL
ALBUMIN SERPL-MCNC: 3.8 G/DL (ref 3.5–5.2)
ALP BLD-CCNC: 50 U/L (ref 35–104)
ALT SERPL-CCNC: 25 U/L (ref 0–32)
ANGLE (CLOT STRENGTH): 76 DEGREE (ref 59–74)
ANION GAP SERPL CALCULATED.3IONS-SCNC: 7 MMOL/L (ref 7–16)
ANTIBODY SCREEN: NORMAL
APTT: 23.3 SEC (ref 24.5–35.1)
AST SERPL-CCNC: 24 U/L (ref 0–31)
BASOPHILS ABSOLUTE: 0.03 E9/L (ref 0–0.2)
BASOPHILS RELATIVE PERCENT: 0.4 % (ref 0–2)
BILIRUB SERPL-MCNC: 0.4 MG/DL (ref 0–1.2)
BUN BLDV-MCNC: 30 MG/DL (ref 8–23)
CALCIUM SERPL-MCNC: 9.3 MG/DL (ref 8.6–10.2)
CHLORIDE BLD-SCNC: 103 MMOL/L (ref 98–107)
CO2: 27 MMOL/L (ref 22–29)
CREAT SERPL-MCNC: 1.2 MG/DL (ref 0.5–1)
EKG ATRIAL RATE: 63 BPM
EKG P AXIS: -5 DEGREES
EKG P-R INTERVAL: 188 MS
EKG Q-T INTERVAL: 438 MS
EKG QRS DURATION: 72 MS
EKG QTC CALCULATION (BAZETT): 448 MS
EKG R AXIS: 27 DEGREES
EKG T AXIS: 14 DEGREES
EKG VENTRICULAR RATE: 63 BPM
EOSINOPHILS ABSOLUTE: 0.2 E9/L (ref 0.05–0.5)
EOSINOPHILS RELATIVE PERCENT: 2.8 % (ref 0–6)
EPL-TEG: 0.4 % (ref 0–15)
G-TEG: 10.5 K D/SC (ref 4.5–11)
GFR AFRICAN AMERICAN: 53
GFR NON-AFRICAN AMERICAN: 44 ML/MIN/1.73
GLUCOSE BLD-MCNC: 135 MG/DL (ref 74–99)
HCT VFR BLD CALC: 35.4 % (ref 34–48)
HEMOGLOBIN: 11.7 G/DL (ref 11.5–15.5)
IMMATURE GRANULOCYTES #: 0.06 E9/L
IMMATURE GRANULOCYTES %: 0.8 % (ref 0–5)
INR BLD: 1.2
K (CLOTTING TIME): 1 MIN (ref 1–3)
LY30 (FIBRINOLYSIS): 0.4 % (ref 0–8)
LYMPHOCYTES ABSOLUTE: 1.24 E9/L (ref 1.5–4)
LYMPHOCYTES RELATIVE PERCENT: 17.2 % (ref 20–42)
MA (MAX AMPLITUDE): 67.8 MM (ref 50–70)
MCH RBC QN AUTO: 31.3 PG (ref 26–35)
MCHC RBC AUTO-ENTMCNC: 33.1 % (ref 32–34.5)
MCV RBC AUTO: 94.7 FL (ref 80–99.9)
METER GLUCOSE: 140 MG/DL (ref 74–99)
MONOCYTES ABSOLUTE: 0.51 E9/L (ref 0.1–0.95)
MONOCYTES RELATIVE PERCENT: 7.1 % (ref 2–12)
NEUTROPHILS ABSOLUTE: 5.19 E9/L (ref 1.8–7.3)
NEUTROPHILS RELATIVE PERCENT: 71.7 % (ref 43–80)
PDW BLD-RTO: 13.5 FL (ref 11.5–15)
PLATELET # BLD: 251 E9/L (ref 130–450)
PMV BLD AUTO: 9.9 FL (ref 7–12)
POTASSIUM REFLEX MAGNESIUM: 4.3 MMOL/L (ref 3.5–5)
PROTHROMBIN TIME: 13 SEC (ref 9.3–12.4)
R (REACTION TIME): 3.7 MIN (ref 5–10)
RBC # BLD: 3.74 E12/L (ref 3.5–5.5)
SODIUM BLD-SCNC: 137 MMOL/L (ref 132–146)
TOTAL PROTEIN: 6.2 G/DL (ref 6.4–8.3)
TROPONIN: <0.01 NG/ML (ref 0–0.03)
WBC # BLD: 7.2 E9/L (ref 4.5–11.5)

## 2020-10-02 PROCEDURE — 85384 FIBRINOGEN ACTIVITY: CPT

## 2020-10-02 PROCEDURE — 0JD00ZZ EXTRACTION OF SCALP SUBCUTANEOUS TISSUE AND FASCIA, OPEN APPROACH: ICD-10-PCS | Performed by: STUDENT IN AN ORGANIZED HEALTH CARE EDUCATION/TRAINING PROGRAM

## 2020-10-02 PROCEDURE — 96374 THER/PROPH/DIAG INJ IV PUSH: CPT

## 2020-10-02 PROCEDURE — 2580000003 HC RX 258: Performed by: NURSE PRACTITIONER

## 2020-10-02 PROCEDURE — 72125 CT NECK SPINE W/O DYE: CPT

## 2020-10-02 PROCEDURE — 85730 THROMBOPLASTIN TIME PARTIAL: CPT

## 2020-10-02 PROCEDURE — 2060000000 HC ICU INTERMEDIATE R&B

## 2020-10-02 PROCEDURE — 6370000000 HC RX 637 (ALT 250 FOR IP): Performed by: NURSE PRACTITIONER

## 2020-10-02 PROCEDURE — 72170 X-RAY EXAM OF PELVIS: CPT

## 2020-10-02 PROCEDURE — 6370000000 HC RX 637 (ALT 250 FOR IP): Performed by: EMERGENCY MEDICINE

## 2020-10-02 PROCEDURE — 86900 BLOOD TYPING SEROLOGIC ABO: CPT

## 2020-10-02 PROCEDURE — APPSS60 APP SPLIT SHARED TIME 46-60 MINUTES: Performed by: NURSE PRACTITIONER

## 2020-10-02 PROCEDURE — 93010 ELECTROCARDIOGRAM REPORT: CPT | Performed by: INTERNAL MEDICINE

## 2020-10-02 PROCEDURE — 90471 IMMUNIZATION ADMIN: CPT | Performed by: EMERGENCY MEDICINE

## 2020-10-02 PROCEDURE — 99221 1ST HOSP IP/OBS SF/LOW 40: CPT | Performed by: SURGERY

## 2020-10-02 PROCEDURE — 99285 EMERGENCY DEPT VISIT HI MDM: CPT

## 2020-10-02 PROCEDURE — 70450 CT HEAD/BRAIN W/O DYE: CPT

## 2020-10-02 PROCEDURE — 71045 X-RAY EXAM CHEST 1 VIEW: CPT

## 2020-10-02 PROCEDURE — 85025 COMPLETE CBC W/AUTO DIFF WBC: CPT

## 2020-10-02 PROCEDURE — 99222 1ST HOSP IP/OBS MODERATE 55: CPT | Performed by: INTERNAL MEDICINE

## 2020-10-02 PROCEDURE — 72128 CT CHEST SPINE W/O DYE: CPT

## 2020-10-02 PROCEDURE — 6360000002 HC RX W HCPCS: Performed by: EMERGENCY MEDICINE

## 2020-10-02 PROCEDURE — 85347 COAGULATION TIME ACTIVATED: CPT

## 2020-10-02 PROCEDURE — 84484 ASSAY OF TROPONIN QUANT: CPT

## 2020-10-02 PROCEDURE — 86850 RBC ANTIBODY SCREEN: CPT

## 2020-10-02 PROCEDURE — 86901 BLOOD TYPING SEROLOGIC RH(D): CPT

## 2020-10-02 PROCEDURE — 96375 TX/PRO/DX INJ NEW DRUG ADDON: CPT

## 2020-10-02 PROCEDURE — 6360000002 HC RX W HCPCS

## 2020-10-02 PROCEDURE — 85610 PROTHROMBIN TIME: CPT

## 2020-10-02 PROCEDURE — 2500000003 HC RX 250 WO HCPCS: Performed by: EMERGENCY MEDICINE

## 2020-10-02 PROCEDURE — 72131 CT LUMBAR SPINE W/O DYE: CPT

## 2020-10-02 PROCEDURE — 96372 THER/PROPH/DIAG INJ SC/IM: CPT

## 2020-10-02 PROCEDURE — 96365 THER/PROPH/DIAG IV INF INIT: CPT

## 2020-10-02 PROCEDURE — 90715 TDAP VACCINE 7 YRS/> IM: CPT | Performed by: EMERGENCY MEDICINE

## 2020-10-02 PROCEDURE — 85576 BLOOD PLATELET AGGREGATION: CPT

## 2020-10-02 PROCEDURE — 82962 GLUCOSE BLOOD TEST: CPT

## 2020-10-02 PROCEDURE — 93005 ELECTROCARDIOGRAM TRACING: CPT | Performed by: EMERGENCY MEDICINE

## 2020-10-02 PROCEDURE — 80053 COMPREHEN METABOLIC PANEL: CPT

## 2020-10-02 RX ORDER — PANTOPRAZOLE SODIUM 40 MG/1
40 TABLET, DELAYED RELEASE ORAL
Status: DISCONTINUED | OUTPATIENT
Start: 2020-10-03 | End: 2020-10-09 | Stop reason: HOSPADM

## 2020-10-02 RX ORDER — ACETAMINOPHEN 650 MG/1
650 SUPPOSITORY RECTAL EVERY 6 HOURS PRN
Status: DISCONTINUED | OUTPATIENT
Start: 2020-10-02 | End: 2020-10-04

## 2020-10-02 RX ORDER — POTASSIUM CHLORIDE 20 MEQ/1
40 TABLET, EXTENDED RELEASE ORAL PRN
Status: DISCONTINUED | OUTPATIENT
Start: 2020-10-02 | End: 2020-10-09 | Stop reason: HOSPADM

## 2020-10-02 RX ORDER — FENOFIBRATE 54 MG/1
54 TABLET ORAL DAILY
Status: DISCONTINUED | OUTPATIENT
Start: 2020-10-03 | End: 2020-10-09 | Stop reason: HOSPADM

## 2020-10-02 RX ORDER — ASPIRIN 81 MG/1
81 TABLET, CHEWABLE ORAL DAILY
Status: DISCONTINUED | OUTPATIENT
Start: 2020-10-03 | End: 2020-10-09 | Stop reason: HOSPADM

## 2020-10-02 RX ORDER — LEVETIRACETAM 5 MG/ML
500 INJECTION INTRAVASCULAR ONCE
Status: DISCONTINUED | OUTPATIENT
Start: 2020-10-02 | End: 2020-10-09 | Stop reason: HOSPADM

## 2020-10-02 RX ORDER — GABAPENTIN 600 MG/1
600 TABLET ORAL 3 TIMES DAILY
Status: DISCONTINUED | OUTPATIENT
Start: 2020-10-02 | End: 2020-10-09 | Stop reason: HOSPADM

## 2020-10-02 RX ORDER — EZETIMIBE 10 MG/1
10 TABLET ORAL EVERY EVENING
Status: DISCONTINUED | OUTPATIENT
Start: 2020-10-02 | End: 2020-10-09 | Stop reason: HOSPADM

## 2020-10-02 RX ORDER — ISOSORBIDE MONONITRATE 60 MG/1
60 TABLET, EXTENDED RELEASE ORAL DAILY
Status: DISCONTINUED | OUTPATIENT
Start: 2020-10-03 | End: 2020-10-09 | Stop reason: HOSPADM

## 2020-10-02 RX ORDER — SODIUM CHLORIDE 0.9 % (FLUSH) 0.9 %
10 SYRINGE (ML) INJECTION EVERY 12 HOURS SCHEDULED
Status: DISCONTINUED | OUTPATIENT
Start: 2020-10-02 | End: 2020-10-09 | Stop reason: HOSPADM

## 2020-10-02 RX ORDER — LEVETIRACETAM 5 MG/ML
500 INJECTION INTRAVASCULAR ONCE
Status: COMPLETED | OUTPATIENT
Start: 2020-10-02 | End: 2020-10-02

## 2020-10-02 RX ORDER — SODIUM CHLORIDE 0.9 % (FLUSH) 0.9 %
10 SYRINGE (ML) INJECTION PRN
Status: DISCONTINUED | OUTPATIENT
Start: 2020-10-02 | End: 2020-10-09 | Stop reason: HOSPADM

## 2020-10-02 RX ORDER — ACETAMINOPHEN 325 MG/1
650 TABLET ORAL EVERY 6 HOURS PRN
Status: DISCONTINUED | OUTPATIENT
Start: 2020-10-02 | End: 2020-10-04

## 2020-10-02 RX ORDER — METOPROLOL SUCCINATE 25 MG/1
25 TABLET, EXTENDED RELEASE ORAL DAILY
Status: DISCONTINUED | OUTPATIENT
Start: 2020-10-03 | End: 2020-10-09 | Stop reason: HOSPADM

## 2020-10-02 RX ORDER — BACLOFEN 10 MG/1
10 TABLET ORAL NIGHTLY
Status: DISCONTINUED | OUTPATIENT
Start: 2020-10-02 | End: 2020-10-05

## 2020-10-02 RX ORDER — LIDOCAINE HYDROCHLORIDE AND EPINEPHRINE 10; 10 MG/ML; UG/ML
20 INJECTION, SOLUTION INFILTRATION; PERINEURAL ONCE
Status: COMPLETED | OUTPATIENT
Start: 2020-10-02 | End: 2020-10-02

## 2020-10-02 RX ORDER — METOPROLOL SUCCINATE 25 MG/1
25 TABLET, EXTENDED RELEASE ORAL DAILY
COMMUNITY

## 2020-10-02 RX ORDER — POTASSIUM CHLORIDE 7.45 MG/ML
10 INJECTION INTRAVENOUS PRN
Status: DISCONTINUED | OUTPATIENT
Start: 2020-10-02 | End: 2020-10-09 | Stop reason: HOSPADM

## 2020-10-02 RX ORDER — FOLIC ACID 1 MG/1
1 TABLET ORAL DAILY
Status: DISCONTINUED | OUTPATIENT
Start: 2020-10-03 | End: 2020-10-09 | Stop reason: HOSPADM

## 2020-10-02 RX ORDER — FERROUS SULFATE 325(65) MG
325 TABLET ORAL
Status: DISCONTINUED | OUTPATIENT
Start: 2020-10-03 | End: 2020-10-09 | Stop reason: HOSPADM

## 2020-10-02 RX ORDER — CLOPIDOGREL BISULFATE 75 MG/1
75 TABLET ORAL DAILY
Status: DISCONTINUED | OUTPATIENT
Start: 2020-10-03 | End: 2020-10-09 | Stop reason: HOSPADM

## 2020-10-02 RX ORDER — SODIUM CHLORIDE 9 MG/ML
INJECTION, SOLUTION INTRAVENOUS CONTINUOUS
Status: DISCONTINUED | OUTPATIENT
Start: 2020-10-02 | End: 2020-10-05

## 2020-10-02 RX ORDER — CITALOPRAM 20 MG/1
20 TABLET ORAL DAILY
Status: DISCONTINUED | OUTPATIENT
Start: 2020-10-03 | End: 2020-10-09 | Stop reason: HOSPADM

## 2020-10-02 RX ORDER — GLIPIZIDE 5 MG/1
10 TABLET ORAL
Status: DISCONTINUED | OUTPATIENT
Start: 2020-10-03 | End: 2020-10-07

## 2020-10-02 RX ORDER — ONDANSETRON 2 MG/ML
4 INJECTION INTRAMUSCULAR; INTRAVENOUS EVERY 6 HOURS PRN
Status: DISCONTINUED | OUTPATIENT
Start: 2020-10-02 | End: 2020-10-09 | Stop reason: HOSPADM

## 2020-10-02 RX ORDER — LEVOTHYROXINE SODIUM 0.1 MG/1
100 TABLET ORAL DAILY
Status: DISCONTINUED | OUTPATIENT
Start: 2020-10-03 | End: 2020-10-09 | Stop reason: HOSPADM

## 2020-10-02 RX ORDER — ACETAMINOPHEN 500 MG
1000 TABLET ORAL ONCE
Status: COMPLETED | OUTPATIENT
Start: 2020-10-02 | End: 2020-10-02

## 2020-10-02 RX ORDER — FERROUS SULFATE 325(65) MG
325 TABLET ORAL
COMMUNITY

## 2020-10-02 RX ORDER — FUROSEMIDE 20 MG/1
20 TABLET ORAL DAILY
Status: DISCONTINUED | OUTPATIENT
Start: 2020-10-03 | End: 2020-10-03

## 2020-10-02 RX ORDER — LEVETIRACETAM 500 MG/1
500 TABLET ORAL 2 TIMES DAILY
Status: DISCONTINUED | OUTPATIENT
Start: 2020-10-02 | End: 2020-10-02

## 2020-10-02 RX ORDER — PROMETHAZINE HYDROCHLORIDE 25 MG/1
12.5 TABLET ORAL EVERY 6 HOURS PRN
Status: DISCONTINUED | OUTPATIENT
Start: 2020-10-02 | End: 2020-10-09 | Stop reason: HOSPADM

## 2020-10-02 RX ADMIN — EZETIMIBE 10 MG: 10 TABLET ORAL at 20:02

## 2020-10-02 RX ADMIN — ACETAMINOPHEN 1000 MG: 500 TABLET ORAL at 11:00

## 2020-10-02 RX ADMIN — HYDROMORPHONE HYDROCHLORIDE 0.5 MG: 1 INJECTION, SOLUTION INTRAMUSCULAR; INTRAVENOUS; SUBCUTANEOUS at 12:04

## 2020-10-02 RX ADMIN — LIDOCAINE HYDROCHLORIDE AND EPINEPHRINE 20 ML: 10; 10 INJECTION, SOLUTION INFILTRATION; PERINEURAL at 10:27

## 2020-10-02 RX ADMIN — ACETAMINOPHEN 650 MG: 325 TABLET ORAL at 20:02

## 2020-10-02 RX ADMIN — LEVETIRACETAM 500 MG: 5 INJECTION INTRAVENOUS at 10:45

## 2020-10-02 RX ADMIN — TETANUS TOXOID, REDUCED DIPHTHERIA TOXOID AND ACELLULAR PERTUSSIS VACCINE, ADSORBED 0.5 ML: 5; 2.5; 8; 8; 2.5 SUSPENSION INTRAMUSCULAR at 10:26

## 2020-10-02 RX ADMIN — BACLOFEN 10 MG: 10 TABLET ORAL at 20:02

## 2020-10-02 RX ADMIN — GABAPENTIN 600 MG: 600 TABLET ORAL at 20:04

## 2020-10-02 RX ADMIN — Medication 0.5 MG: at 12:04

## 2020-10-02 RX ADMIN — SODIUM CHLORIDE: 9 INJECTION, SOLUTION INTRAVENOUS at 23:04

## 2020-10-02 ASSESSMENT — PAIN DESCRIPTION - PAIN TYPE: TYPE: ACUTE PAIN

## 2020-10-02 ASSESSMENT — PAIN SCALES - GENERAL
PAINLEVEL_OUTOF10: 5
PAINLEVEL_OUTOF10: 5
PAINLEVEL_OUTOF10: 0
PAINLEVEL_OUTOF10: 9
PAINLEVEL_OUTOF10: 7

## 2020-10-02 ASSESSMENT — PAIN DESCRIPTION - LOCATION: LOCATION: HEAD

## 2020-10-02 NOTE — CONSULTS
[address]                  [address] [place],  [facility]                                  CONSULTATION    PATIENT NAME: Chacha Hoyt                   :        1945  MED REC NO:   [id number]                            ROOM:       07  ACCOUNT NO:   [de-identified]                           ADMIT DATE: 10/02/2020  PROVIDER:     Leta Shepard MD    CONSULT DATE:  10/02/2020    CHIEF COMPLAINT:  History of syncopal episode. HISTORY OF PRESENT ILLNESS:  This 82-year-old female who apparently had  two episodes of falling in the bathroom. She is not very sure exactly  what happened. She apparently had a laceration of the back part of the  head. She was brought to the emergency room and had a CT scan of the  brain which revealed a small volume of acute subarachnoid hemorrhage  along the right inferior temporal convexity. She also had multiple left  parietal and occipital scalp soft tissue hematomas or lacerations and  the CT scan of the cervical spine did not reveal any acute fractures. The patient had a CT scan of the brain with and without contrast in  2020 which did not reveal any abnormality. These studies were  reviewed by me. PAST MEDICAL HISTORY:  Arthritis, coronary artery disease, CHF,  diabetes, fibromyalgia, GERD, CVA, hyperlipidemia, hypertension,  hypothyroidism, and intractable low back pain. PAST SURGICAL HISTORY:  Appendectomy, arthrocentesis, bladder surgery,  cardiac catheterization, cataract removal with implant, cholecystectomy,  coccyx removal, colonoscopy, coronary artery bypass graft, endoscopy,  hemorrhoid surgery, hysterectomy, lumbar fusion, nerve blocks,  orthopedic surgery, nerve surgery, rotator cuff repair, replaced and  revised vagus nerve stimulator, spinal cord stimulator surgery, and  vascular surgery.     ALLERGIES:  She is allergic to LIPITOR, STATIN, CYMBALTA, CIPRO,  DIAZEPAM, DULOXETINE, ERYTHROMYCIN, INDOMETHACIN, KETOROLAC, LODINE,  OS-JING, TROMETHAMINE, TRULICITY, ZITHROMAX, PAXIL, REQUIP. SOCIAL HISTORY:  She is not a smoker. Does not use alcohol or street  drugs. PHYSICAL EXAMINATION:  She is a well-developed, well-nourished female,  in no acute distress. She is alert, awake and fairly oriented to time,  place and person. Speech is good. Memory is fair. States she can  count the fingers well. She has laceration in the parieto-occipital  area which is being handled by the Trauma. Handgrip is equal  bilaterally. She can move her legs up. Strength in the lower extremity  is about 4/5 bilaterally. Sensation intact bilaterally. DIAGNOSTIC DATA:  I reviewed the CT scan of the brain. IMPRESSION:  1. Small subarachnoid hemorrhage, right temporal lobe. 2.  Scalp laceration. 3.  Syncopal episode, etiology to be determined. 4.  Multiple medical comorbidities. RECOMMENDATIONS:  At this time, there is no indication for any  neurosurgical intervention. The patient is being admitted to a  monitored bed. We will get a repeat CT scan of the brain. We will  follow along.         Sandi Kramer MD    D: 10/02/2020 11:50:32       T: 10/02/2020 12:00:58     [email or web address]  Job#: [id number]     Doc#: [id number]    CC:

## 2020-10-02 NOTE — ED PROVIDER NOTES
Department of Emergency Medicine   ED  Provider Note  Admit Date/RoomTime: 10/2/2020  8:02 AM  ED Room: 07/07                HPI:  10/2/20, Time: 9:38 AM EDT  Bret Harvey is a 70 y.o. female presenting to the ED after a fall, beginning prior to arrival.  The complaint has been constant, moderate in severity, and worsened by nothing. Presents for evaluation of a fall with syncope. The patient reports that she was in the shower and did not feel well, she reports she felt dizzy and then fell. She hit her head. She reports she came to and tried to get up and then passed out. She does not recall if when she fell the first time if she actually passed out or not. She complains of headache. She denies other injuries or other complaints. She does report that she has been dizzy for the last few weeks and has been staggering when she walks and feeling lightheaded at times as well. Glascow Coma Scale at time of initial examination  Best Eye Response 4 - Opens eyes on own   Best Verbal Response 5 - Alert and oriented   Best Motor Response 6 - Follows simple motor commands   Total 15       Review of Systems:   Pertinent positives and negatives are stated within HPI, all other systems reviewed and are negative.              --------------------------------------------- PAST HISTORY ---------------------------------------------  Past Medical History:  has a past medical history of Arthritis, CAD (coronary artery disease), CHF (congestive heart failure) ([facility] [address] Diabetes ([facility] [address] Fibromyalgia, GERD (gastroesophageal reflux disease), History of cardiovascular stress test, History of MI (myocardial infarction), Hx of blood clots, Hyperlipidemia, Hypertension, Hypothyroidism, Intractable low back pain, Non-insulin dependent type 2 diabetes mellitus ([address] and NSTEMI (non-ST elevated myocardial infarction) ([facility] [address]     Past Surgical History:  has a past surgical history that includes Salivary gland surgery; Rotator cuff repair; Cholecystectomy; Appendectomy; orthopedic surgery; Hysterectomy; Coccyx removal; Bladder surgery; Hemorrhoid surgery; Coronary angioplasty with stent; Coronary artery bypass graft; Tonsillectomy; Cataract removal with implant (Right, 7 7 15); Nerve Block (Left, 11 18 2015); Coronary angioplasty with stent (11/19/2015); Cataract removal with implant (Left, 4/26/2016); lumbar fusion (6/7/2016); Nerve Block (Right, 07/17/2017); Nerve Block (Right, 09/06/2017); Cardiac catheterization (11/15/2017); Colonoscopy; Endoscopy, colon, diagnostic; vascular surgery; pr arthrs kne surg w/meniscectomy med/lat w/shvg (Right, 6/6/2018); Nerve Block (Bilateral, 10/24/2018); pr inj dx/ther agnt paravert facet joint, lumbar/sac, 1st level (Bilateral, 10/24/2018); Nerve Block (Left, 08/22/2019); arthrocentesis (Left, 8/22/2019); other surgical history (N/A, 11/04/2019); REPLACE / REVISE VAGAL NERVE STIMULATOR (N/A, 11/4/2019); Spinal Cord Stimulator Surgery (06/15/2020); and Nerve Surgery (N/A, 6/15/2020). Social History:  reports that she has never smoked. She has never used smokeless tobacco. She reports that she does not drink alcohol or use drugs. Family History: family history includes ADHD in an other family member; COPD in her brother; Cancer in her brother; Cancer (age of onset: [de-identified]) in her father; Diabetes in her sister and another family member; Heart Attack (age of onset: 54) in her mother; Heart Disease in her brother and sister; Heart Disease (age of onset: 58) in an other family member. The patients home medications have been reviewed. Allergies: Lipitor; Statins; Atorvastatin; Cymbalta [duloxetine hcl]; Cipro xr; Diazepam; Duloxetine; Erythromycin; Indomethacin; Ketorolac; Ketorolac tromethamine; Lodine [etodolac]; Oscal 500-200 d-3 [oyster shell calcium-d]; Paxil [paroxetine hcl]; Ropinirole; Tromethamine; Trulicity [dulaglutide];  Zithromax [azithromycin]; and Requip [ropinirole hcl]            ------------------------- NURSING NOTES AND VITALS REVIEWED ---------------------------   The nursing notes within the ED encounter and vital signs as below have been reviewed. BP (!) 110/49   Pulse 63   Temp 98.7 °F (37.1 °C)   Resp 20   Wt 185 lb (83.9 kg)   SpO2 95%   BMI 34.96 kg/m²   Oxygen Saturation Interpretation: Normal    The patients available past medical records and past encounters were reviewed.           -------------------------------------------------- RESULTS -------------------------------------------------    LABS:  Results for orders placed or performed during the hospital encounter of 10/02/20   CBC Auto Differential   Result Value Ref Range    WBC 7.2 4.5 - 11.5 E9/L    RBC 3.74 3.50 - 5.50 E12/L    Hemoglobin 11.7 11.5 - 15.5 g/dL    Hematocrit 35.4 34.0 - 48.0 %    MCV 94.7 80.0 - 99.9 fL    MCH 31.3 26.0 - 35.0 pg    MCHC 33.1 32.0 - 34.5 %    RDW 13.5 11.5 - 15.0 fL    Platelets 741 290 - 964 E9/L    MPV 9.9 7.0 - 12.0 fL    Neutrophils % 71.7 43.0 - 80.0 %    Immature Granulocytes % 0.8 0.0 - 5.0 %    Lymphocytes % 17.2 (L) 20.0 - 42.0 %    Monocytes % 7.1 2.0 - 12.0 %    Eosinophils % 2.8 0.0 - 6.0 %    Basophils % 0.4 0.0 - 2.0 %    Neutrophils Absolute 5.19 1.80 - 7.30 E9/L    Immature Granulocytes # 0.06 E9/L    Lymphocytes Absolute 1.24 (L) 1.50 - 4.00 E9/L    Monocytes Absolute 0.51 0.10 - 0.95 E9/L    Eosinophils Absolute 0.20 0.05 - 0.50 E9/L    Basophils Absolute 0.03 0.00 - 0.20 E9/L   Comprehensive Metabolic Panel w/ Reflex to MG   Result Value Ref Range    Sodium 137 132 - 146 mmol/L    Potassium reflex Magnesium 4.3 3.5 - 5.0 mmol/L    Chloride 103 98 - 107 mmol/L    CO2 27 22 - 29 mmol/L    Anion Gap 7 7 - 16 mmol/L    Glucose 135 (H) 74 - 99 mg/dL    BUN 30 (H) 8 - 23 mg/dL    CREATININE 1.2 (H) 0.5 - 1.0 mg/dL    GFR Non-African American 44 >=60 mL/min/1.73    GFR African American 53     Calcium 9.3 8.6 - 10.2 mg/dL    Total Protein 6.2 (L) 6.4 - 8.3 g/dL    Alb 3.8 3.5 - 5.2 g/dL    Total Bilirubin 0.4 0.0 - 1.2 mg/dL    Alkaline Phosphatase 50 35 - 104 U/L    ALT 25 0 - 32 U/L    AST 24 0 - 31 U/L   APTT   Result Value Ref Range    aPTT 23.3 (L) 24.5 - 35.1 sec   Protime-INR   Result Value Ref Range    Protime 13.0 (H) 9.3 - 12.4 sec    INR 1.2    Troponin   Result Value Ref Range    Troponin <0.01 0.00 - 0.03 ng/mL   TEG lab test   Result Value Ref Range    R (Reaction Time) 3.7 (L) 5.0 - 10.0 min    K (Clotting Time) 1.0 1.0 - 3.0 min    Angle (Clot Strength) 76.0 (H) 59.0 - 74.0 degree    MA (Max Amplitude) 67.8 50.0 - 70.0 mm    G-TEG 10.5 4.5 - 11.0 K d/sc    EPL-TEG 0.4 0.0 - 15.0 %    LY30 (Fibrinolysis) 0.4 0.0 - 8.0 %   POCT Glucose   Result Value Ref Range    Meter Glucose 140 (H) 74 - 99 mg/dL   EKG 12 Lead   Result Value Ref Range    Ventricular Rate 63 BPM    Atrial Rate 63 BPM    P-R Interval 188 ms    QRS Duration 72 ms    Q-T Interval 438 ms    QTc Calculation (Bazett) 448 ms    P Axis -5 degrees    R Axis 27 degrees    T Axis 14 degrees   TYPE AND SCREEN   Result Value Ref Range    ABO/Rh A NEG     Antibody Screen NEG        RADIOLOGY:  Interpreted by Radiologist.  CT HEAD WO CONTRAST   Final Result   1. Mild soft tissue swelling, small subcutaneous/subgaleal hematoma, and   minimal soft tissue emphysema, are scattered about the left parietal   high-convexity scalp, lessening inferiorly over the left occipital and   suboccipital regions. Within the limits of this exam, no definite associated   fracture is identified. 2.   Negative for acute intracranial process, within the limits of this   non-contrast CT exam.  Consider follow-up CT versus MR imaging, as directed   clinically. .         CT THORACIC SPINE WO CONTRAST   Final Result   No fracture or malalignment of the thoracic spine. CT LUMBAR SPINE WO CONTRAST   Final Result   1.   Stable, satisfactory alignment status post, posterior fusion of lumbar   spine from levels of L4-S1.      2.  No acute fracture or malalignment. CT HEAD WO CONTRAST   Final Result   Small volume of acute subarachnoid hemorrhage along the right inferior   temporal convexity. Multiple left parietal and occipital scalp soft tissue hematomas/lacerations. No evidence for acute cervical spine fracture. Findings discussed with the ER at the time of interpretation. CT CERVICAL SPINE WO CONTRAST   Final Result   Small volume of acute subarachnoid hemorrhage along the right inferior   temporal convexity. Multiple left parietal and occipital scalp soft tissue hematomas/lacerations. No evidence for acute cervical spine fracture. Findings discussed with the ER at the time of interpretation. XR PELVIS (1-2 VIEWS)   Preliminary Result   Diffuse osteopenia without convincing evidence of acute fracture. If there   is persistent concern for an occult fracture, correlation with CT could be   made. XR CHEST PORTABLE   Final Result   No acute process. EKG Interpretation  Interpreted by emergency department physician, Dr. Santi Medrano     10/2/20  Time: 0804    Rhythm: normal sinus   Rate: normal  Axis: normal  Conduction: normal  ST Segments: no acute change  T Waves: no acute change    Clinical Impression: Sinus rhythm, no acute ischemic changes  Comparison to Old EKG  Stable from prior        ---------------------------------------------------PHYSICAL EXAM--------------------------------------      Primary Survey:  Airway: patient, trachea midline,   Breathing: Spontaneous, breath sounds equal bilaterally, symmetric chest rise  Circulation: 2+ femoral pulses, 2+ DP/PT pulses  Disability: GCS 15      Constitutional/General: Alert and oriented x3, well appearing, non toxic in NAD  Head: Normocephalic and occipital scalp laceration with some venous oozing. Eyes: PERRL, EOMI  Mouth: Oropharynx clear, handling secretions, no trismus.  No dental trauma, no oral trauma  Neck: Immobilized in cervical collar. No crepitus, no lacerations, abrasions, deformities, or stepoffs. Back: midline cervical, thoracic, lumbar spine tenderness. No Stepoffs, abrasions, lacerations, or deformities. Pulmonary: Lungs clear to auscultation bilaterally, no wheezes, rales, or rhonchi. Not in respiratory distress  Chest: no chest wall tenderness, no crepitus  Cardiovascular:  Regular rate and rhythm, no murmurs, gallops, or rubs. 2+ distal pulses  Abdomen: Soft, non tender, non distended, +BS, no rebound, guarding, or rigidity. No pulsatile masses appreciated  Extremities: Moves all extremities x 4. Warm and well perfused, no clubbing, cyanosis, or edema. Capillary refill <3 seconds  Skin: warm and dry without rash  Neurologic: GCS 15, CN 2-12 grossly intact, no focal deficits, symmetric strength 5/5 in the upper and lower extremities bilaterally  Psych: Normal Affect        ------------------------------ ED COURSE/MEDICAL DECISION MAKING----------------------  Medications   levETIRAcetam (KEPPRA) tablet 500 mg (has no administration in time range)   perflutren lipid microspheres (DEFINITY) injection 1.65 mg (has no administration in time range)   Tetanus-Diphth-Acell Pertussis (BOOSTRIX) injection 0.5 mL (0.5 mLs Intramuscular Given 10/2/20 1026)   lidocaine-EPINEPHrine 1 percent-[id number] injection 20 mL (20 mLs Intradermal Given 10/2/20 1027)   levetiracetam (KEPPRA) 500 mg/100 mL IVPB (0 mg Intravenous Stopped 10/2/20 1100)   acetaminophen (TYLENOL) tablet 1,000 mg (1,000 mg Oral Given 10/2/20 1100)   HYDROmorphone (DILAUDID) injection 0.5 mg (0.5 mg Intravenous Given 10/2/20 1204)         Medical Decision Making:    Fall, also had syncope, real question is what happened first although upon further questioning she has been feeling lightheaded and having balance changes and other issues concerning for an underlying medical issue.   Trauma initially was consulted based on the CT showing subarachnoid although repeat imaging does not show this. Trauma requested medical admission at that time and I spoke with the covering practitioner for Dr. Karishma Lopes. Re-Evaluations:             Re-evaluation. Patients symptoms are improving      Consultations:         Rickard Severance covering Dr. Karishma Lopes      This patient's ED course included: a personal history and physicial examination, re-evaluation prior to disposition, multiple bedside re-evaluations, IV medications, cardiac monitoring, continuous pulse oximetry, complex medical decision making and emergency management and a personal history and physicial eaxmination    This patient has remained hemodynamically stable during their ED course. Counseling: The emergency provider has spoken with the patient and daughter and discussed todays results, in addition to providing specific details for the plan of care and counseling regarding the diagnosis and prognosis. Questions are answered at this time and they are agreeable with the plan.       --------------------------------- IMPRESSION AND DISPOSITION ---------------------------------    IMPRESSION  1. Syncope and collapse    2. Closed head injury, initial encounter    3.  Laceration of scalp, initial encounter        DISPOSITION  Disposition: Admit to telemetry  Patient condition is stable           Caitlyn Galvin MD  10/02/20 [id number]

## 2020-10-02 NOTE — PROCEDURES
10/2/20 6:33 pm - Patricia Martin RN called back regarding pt not having the remote for her spinal stimulator on her at this time and her son bringing it in tomorrow morning. Rn spoke with ordering dr about this and dr said ok for MRI to wait til tomorrow. Also, please fax to us ([phone])  the stimulator card for mri compatibility information. Pt did have a previous MRI here RN states since it was implanted. We still need a copy of the card for our records and the remote has to be present with pt so the stimulator can be put into \"mri mode\" before going inside the scanner.

## 2020-10-02 NOTE — CONSULTS
I independently performed an evaluation on the patient. I have reviewed the above documentation completed by the advance practitioner. Please see my additional contributions to the HPI, physical exam, assessment and medical decision making. Physical Exam   BP (!) 108/48   Pulse 63   Temp 98.7 °F (37.1 °C)   Resp 16   Wt 185 lb (83.9 kg)   SpO2 96%   BMI 34.96 kg/m²   Constitutional: Oriented to person, place, and time. Well-developed and well-nourished. No distress. Head: Normocephalic and atraumatic. Eyes: EOM are normal. Pupils are equal, round, and reactive to light. Neck: Normal range of motion. Neck supple. No hepatojugular reflux and no JVD present. Carotid bruit is not present. No tracheal deviation present. No thyromegaly present. Cardiovascular: Normal rate, regular rhythm, normal heart sounds and intact distal pulses. Exam reveals no gallop and no friction rub. No murmur heard. Pulmonary/Chest: Effort normal and breath sounds normal. No respiratory distress. No wheezes. No rales. No tenderness. Abdominal: Soft. Bowel sounds are normal. No distension and no mass. No tenderness. No rebound and no guarding. Musculoskeletal: Normal range of motion. No edema and no tenderness. Lymphadenopathy:   No cervical adenopathy. Neurological: Alert and oriented to person, place, and time. Skin: Skin is warm and dry. No rash noted. Not diaphoretic. No erythema. Psychiatric: Normal mood and affect. Behavior is normal.     See accompanying documentation for full consult. ASSESSMENT AND PLAN:  1. Syncope/Fall/Head laceration/SAH:    Neurosurgery following. EKG normal.     Echo. Monitor. Outpatient 30 day monitor if evaluation unfruitful. 2. CAD-CAB/17 cath recommended medical management. See above for details. Stable stress 2020.     3. HTN: Observe. 4. DM: Per primary service. 5. GERD    6. Prerenal azotemia: Hydrate.     7. Obesity    [place], ALEXANDER  Cardiologist  Cardiology, [facility]) Physicians

## 2020-10-02 NOTE — CONSULTS
TRAUMA HISTORY & PHYSICAL  Resident   10/2/2020  4:23 PM    Chief Complaint   Patient presents with    Fall     fell in shower and passed out, +plavix, two hemtomas to posterior head c/o head and neck pain given 50mcg fentanyl and 4 zofran PTA         HPI: Kinza Fuller is a 70 y.o. female who presents after a fall from standing height, and she admits to an episode of dizziness prior to the fall. She states she did lose consciousness and she has been having this issues in the past and has followed up with her cardiologist.  Her cardiologist has been adjusting her antihypertensives and recently stopped her lisinopril and continued her metoprolol in order to decrease the frequency of her dizzy spells. She also admits to having episodes where she \"staggers\" as she is walking. She denies dizziness during these times and states she has no control over her legs. She denies paresthesias, neuropathy, weakness in her legs, but states she simply walks off in different direction than where she wants to go. She states she has not seen anybody for this nor told anybody about this.       PRIMARY SURVEY    AIRWAY:   Airway normal  EMS ETT Absent   Noisy respirations Absent   Retractions: Absent   Vomiting/bleeding: Absent     BREATHING:    Midaxillary breath sound left:  Present  Midaxillary breath sound right: Present  Cough sound intensity:  Good  Respiratory rate: 18  FiO2: RA  SpO2: 96  SMI: na    CIRCULATION:   Femoral pulse rate: within normal limits  Femoral pulse intensity: present  Palpebral conjunctiva: Pink     BP      P     SpO2       T      F    Patient Vitals for the past 8 hrs:   BP Pulse Resp SpO2   10/02/20 1530 (!) 122/56 55 18 96 %   10/02/20 1426 (!) 110/49 63 20 95 %   10/02/20 1330 (!) 108/48 63 16 96 %   10/02/20 1028 (!) 149/73 59 18 97 %       FAST EXAM: Not performed    Central Nervous System    GCS Initial 15 minutes   Eye  Motor  Verbal 4 - Opens eyes on own  6 - Follows simple motor commands  5 - Alert and oriented 4 - Opens eyes on own  6 - Follows simple motor commands  5 - Alert and oriented     Neuromuscular blockade: No  Pupil size:  Left 4 mm    Right 4 mm  Pupil reaction: Yes  Wiggles fingers: Left Yes Right Yes  Wiggles toes: Left Yes    Right Yes    Hand grasp:   Left normal       Right normal  Plantar flexion: Left normal     Right normal  Loss of consciousness: Yes    History Obtained From:  patient  Private Medical Doctor: Kayley Duong DO    Pre-exisiting Medical History:  yes    Conditions:  has a past medical history of Arthritis, CAD (coronary artery disease), CHF (congestive heart failure) ([facility] ([facility] Fibromyalgia, GERD (gastroesophageal reflux disease), History of cardiovascular stress test, History of MI (myocardial infarction), Hx of blood clots, Hyperlipidemia, Hypertension, Hypothyroidism, Intractable low back pain, Non-insulin dependent type 2 diabetes mellitus ([facility] and NSTEMI (non-ST elevated myocardial infarction) ([facility] Medications:   Prior to Admission medications    Medication Sig Start Date End Date Taking? Authorizing Provider   ferrous sulfate (IRON 325) 325 (65 Fe) MG tablet Take 325 mg by mouth daily (with breakfast)   Yes Historical Provider, MD   metoprolol succinate (TOPROL XL) 25 MG extended release tablet Take 25 mg by mouth daily   Yes Historical Provider, MD   Semaglutide, 1 MG/DOSE, 2 MG/1.5ML SOPN Inject 2 mg into the skin once a week Given Friday   Yes Historical Provider, MD   propranolol (INDERAL) 10 MG tablet Take 10 mg by mouth 2 times daily  7/21/20  Yes Historical Provider, MD   baclofen (LIORESAL) 10 MG tablet Take 1 tablet by mouth nightly 6/25/20 6/25/21 Yes Berniece Bamberger., MD   gabapentin (NEURONTIN) 600 MG tablet Take 1 tablet by mouth 3 times daily for 180 days.  6/25/20 12/22/20 Yes Berniece Bamberger., MD   fenofibrate (TRICOR) 54 MG tablet Take 1 tablet by mouth daily 6/25/20  Yes Berniece Bamberger., MD citalopram (CELEXA) 20 MG tablet Take 20 mg by mouth daily    Yes Historical Provider, MD   potassium chloride (KLOR-CON) 10 MEQ extended release tablet Take 10 mEq by mouth daily  6/10/19  Yes Historical Provider, MD   Multiple Vitamins-Minerals (EYE HEALTH) CAPS Take 1 capsule by mouth 2 times daily    Yes Historical Provider, MD   dapagliflozin (FARXIGA) 10 MG tablet Take 10 mg by mouth daily    Yes Historical Provider, MD   aspirin 81 MG tablet Take 81 mg by mouth daily Stopped 5 days pre op   Yes Historical Provider, MD   glipiZIDE (GLUCOTROL) 10 MG tablet Take 10 mg by mouth 2 times daily (before meals)    Yes Historical Provider, MD   omeprazole (PRILOSEC) 20 MG delayed release capsule Take 20 mg by mouth daily   Yes Historical Provider, MD   furosemide (LASIX) 20 MG tablet Take 20 mg by mouth daily   Yes Historical Provider, MD   clopidogrel (PLAVIX) 75 MG tablet Take 75 mg by mouth daily    Yes Historical Provider, MD   folic acid (FOLVITE) 825 MCG tablet Take 800 mcg by mouth daily    Yes Historical Provider, MD   vitamin D 1000 UNITS CAPS Take 1,000 Units by mouth every evening    Yes Historical Provider, MD   isosorbide mononitrate (IMDUR) 60 MG CR tablet Take 60 mg by mouth daily    Yes Historical Provider, MD   ezetimibe (ZETIA) 10 MG tablet Take 10 mg by mouth every evening    Yes Historical Provider, MD   Multiple Vitamins-Minerals (CENTRUM SILVER) TABS Take 1 tablet by mouth daily    Yes Historical Provider, MD   levothyroxine (SYNTHROID) 100 MCG tablet Take 100 mcg by mouth daily. Yes Historical Provider, MD   nitroGLYCERIN (NITROSTAT) 0.4 MG SL tablet Place 0.4 mg under the tongue every 5 minutes as needed for Chest pain up to max of 3 total doses. If no relief after 1 dose, call 911. Historical Provider, MD       Allergies:    Allergies   Allergen Reactions    Lipitor Other (See Comments)     Severe leg pain    Statins Other (See Comments)     Muscles go weak and she falls    Atorvastatin      Makes me sick severe leg cramps    Cymbalta [Duloxetine Hcl] Nausea Only    Cipro Xr      Reaction unknown    Diazepam     Duloxetine Nausea Only    Erythromycin      Hurt my stomach    Indomethacin     Ketorolac     Ketorolac Tromethamine Itching    Lodine [Etodolac]     Oscal 500-200 D-3 [Oyster Shell Calcium-D]     Paxil [Paroxetine Hcl]     Ropinirole     Tromethamine     Trulicity [Dulaglutide] Swelling    Zithromax [Azithromycin]     Requip [Ropinirole Hcl] Nausea And Vomiting       Social History:   Social History     Tobacco Use    Smoking status: Never Smoker    Smokeless tobacco: Never Used   Substance Use Topics    Alcohol use: No       Past Surgical History:   has a past surgical history that includes Salivary gland surgery; Rotator cuff repair; Cholecystectomy; Appendectomy; orthopedic surgery; Hysterectomy; Coccyx removal; Bladder surgery; Hemorrhoid surgery; Coronary angioplasty with stent; Coronary artery bypass graft; Tonsillectomy; Cataract removal with implant (Right, 7 7 15); Nerve Block (Left, 11 18 2015); Coronary angioplasty with stent (11/19/2015); Cataract removal with implant (Left, 4/26/2016); lumbar fusion (6/7/2016); Nerve Block (Right, 07/17/2017); Nerve Block (Right, 09/06/2017); Cardiac catheterization (11/15/2017); Colonoscopy; Endoscopy, colon, diagnostic; vascular surgery; pr arthrs kne surg w/meniscectomy med/lat w/shvg (Right, 6/6/2018); Nerve Block (Bilateral, 10/24/2018); pr inj dx/ther agnt paravert facet joint, lumbar/sac, 1st level (Bilateral, 10/24/2018); Nerve Block (Left, 08/22/2019); arthrocentesis (Left, 8/22/2019); other surgical history (N/A, 11/04/2019); REPLACE / REVISE VAGAL NERVE STIMULATOR (N/A, 11/4/2019); Spinal Cord Stimulator Surgery (06/15/2020); and Nerve Surgery (N/A, 6/15/2020).     NSAID use in last 72 hours: yes  Taken PCN in past:  yes  Last food/drink: breakfast  Last tetanus: today ordered    Complaints:     Head: moderate  Neck: moderate  Chest: none  Back: moderate  Abdomen: none  Extremities: none  Comments:       SECONDARY SURVEY  Head/scalp: Occipital scalp laceration about 4 cm, status post repair, no signs of infection. Small areas of ecchymosis worse on the base of the occiput     Face: No soft tissue injuries    Eyes/ears/nose: EOMI, PEERL, no soft tissue injuries    Pharynx/mouth:  No soft tissue injury, no malocclusion    Neck: No soft tissue injuries   Cervical spine tenderness: mild paraspinal and midline, nonspecific  ROM:  normal    Chest wall:  CTAB, no crepitus appreciated, no soft tissue injuries     Heart:  RRR    Abdomen: Soft, non-distended, no soft tissue injuries   Tenderness:  none    Pelvis: Stable, no soft tissue injuries, no pain with hip flexion   Tenderness: none    Thoracolumbar spine: No soft tissue injuries, no step-offs  Tenderness:  mild    Genitourinary:  no traumatic injuries    Rectum: no traumatic injuries    Perineum: no traumatic injuries    Extremities: Right hand ecchymosis, no tenderness overlying ecchymosis  Sensory normal  Motor normal    Distal Pulses  Left arm Normal  Right arm Normal  Left leg Normal  Right leg Normal    Capillary refill   Left arm normal  Right arm normal  Left leg normal   Right leg normal      Procedures in ED:  None    Lacerations sutured by: Zaria Ruiz MD  Description of repair: see procedure note, 2-0 nylon sutures    Radiology:     Xr Pelvis (1-2 Views)    Result Date: 10/2/2020  EXAMINATION: ONE XRAY VIEW OF THE PELVIS 10/2/2020 9:23 am COMPARISON: May 30, 2019 HISTORY: ORDERING SYSTEM PROVIDED HISTORY: trauma TECHNOLOGIST PROVIDED HISTORY: Reason for exam:->trauma What reading provider will be dictating this exam?->CRC FINDINGS: Bones are diffusely osteopenic, limiting assessment for a nondisplaced fracture. Within this limitation, no convincing evidence of acute fracture. There are least mild degenerative changes involving the hips bilaterally. Postsurgical changes related to posterior fusion of the lower lumbar spine are present. A presumed nerve stimulator battery pack overlies the left iliac bone. Surgical clips are present in the proximal left thigh. Diffuse osteopenia without convincing evidence of acute fracture. If there is persistent concern for an occult fracture, correlation with CT could be made. Ct Head Wo Contrast    Result Date: 10/2/2020  EXAMINATION: CT OF THE HEAD WITHOUT CONTRAST  10/2/2020 1:47 pm TECHNIQUE: CT of the head was performed without the administration of intravenous contrast. Dose modulation, iterative reconstruction, and/or weight based adjustment of the mA/kV was utilized to reduce the radiation dose to as low as reasonably achievable. COMPARISON: None. HISTORY: ORDERING SYSTEM PROVIDED HISTORY: Follow up on Right [address] TECHNOLOGIST PROVIDED HISTORY: Reason for exam:->Follow up on Right [address] Has a \"code stroke\" or \"stroke alert\" been called? ->No What reading provider will be dictating this exam?->CRC FINDINGS: Mild soft tissue swelling, small subcutaneous/subgaleal hematoma, and minimal soft tissue emphysema, are scattered about the left parietal high-convexity scalp, lessening inferiorly over the left occipital and suboccipital regions. Within the limits of this exam, no definite associated fracture is identified. Negative for acute intracranial hemorrhage, other intra/extra-axial fluid collection, or mass effect/midline shift. Moderate/severe atherosclerotic calcifications are scattered about the cavernous/supraclinoid segments of both internal carotid arteries, followed by both distal vertebral arteries. Gray/white matter differentiation appears unremarkable. There is age-appropriate, generalized parenchymal volume loss. Both ocular lenses have been previously surgically replaced. Trace scattered paranasal sinus mucosal thickening is most notable within the ethmoid sinus.   Aerated sphenoid sinus mildly extends into the left pterygoid process, with minimal involvement of the right pterygoid process. 1.  Mild soft tissue swelling, small subcutaneous/subgaleal hematoma, and minimal soft tissue emphysema, are scattered about the left parietal high-convexity scalp, lessening inferiorly over the left occipital and suboccipital regions. Within the limits of this exam, no definite associated fracture is identified. 2.   Negative for acute intracranial process, within the limits of this non-contrast CT exam.  Consider follow-up CT versus MR imaging, as directed clinically. .     Ct Head Wo Contrast    Result Date: 10/2/2020  EXAMINATION: CT OF THE HEAD AND CERVICAL SPINE WITHOUT CONTRAST  10/2/2020 9:45 am TECHNIQUE: CT of the head was performed without the administration of intravenous contrast. Dose modulation, iterative reconstruction, and/or weight based adjustment of the mA/kV was utilized to reduce the radiation dose to as low as reasonably achievable.; CT of the cervical spine was performed without the administration of intravenous contrast. Multiplanar reformatted images are provided for review. Dose modulation, iterative reconstruction, and/or weight based adjustment of the mA/kV was utilized to reduce the radiation dose to as low as reasonably achievable. COMPARISON: CT HEAD SEPTEMBER 17, 2020. HISTORY: ORDERING SYSTEM PROVIDED HISTORY: fall, r/o ICH TECHNOLOGIST PROVIDED HISTORY: Reason for exam:->fall, r/o ICH Has a \"code stroke\" or \"stroke alert\" been called? ->No What reading provider will be dictating this exam?->CRC FINDINGS: There is mild diffuse parenchymal volume loss. There is mild chronic microvascular ischemic changes. There is a small volume of acute subarachnoid hemorrhage within the right middle cranial fossa along the right inferior temporal convexity. There is no evidence for an acute infarct, discrete mass lesion, or midline shift.  The bilateral intra-ocular lenses have been replaced otherwise, the orbital globes and retrobulbar structures appear grossly unremarkable. The visualized paranasal sinuses and mastoid air cells are well aerated. The bony calvarium is intact. There are multiple scalp hematomas/soft tissue lacerations with subcutaneous emphysema involving the left parietal and left occipital scalp. There is straightening of the cervical lordosis without evidence for listhesis. The vertebral body heights are well maintained. There is moderate intervertebral disc space narrowing at C5-C6. There is no evidence for acute cervical spine fracture. There is multilevel mild cervical spondylosis. There is linear axial joint mild-to-moderate arthrosis. The prevertebral soft tissues unremarkable. The visualized lung apices are clear. Small volume of acute subarachnoid hemorrhage along the right inferior temporal convexity. Multiple left parietal and occipital scalp soft tissue hematomas/lacerations. No evidence for acute cervical spine fracture. Findings discussed with the ER at the time of interpretation. Ct Cervical Spine Wo Contrast    Result Date: 10/2/2020  EXAMINATION: CT OF THE HEAD AND CERVICAL SPINE WITHOUT CONTRAST  10/2/2020 9:45 am TECHNIQUE: CT of the head was performed without the administration of intravenous contrast. Dose modulation, iterative reconstruction, and/or weight based adjustment of the mA/kV was utilized to reduce the radiation dose to as low as reasonably achievable.; CT of the cervical spine was performed without the administration of intravenous contrast. Multiplanar reformatted images are provided for review. Dose modulation, iterative reconstruction, and/or weight based adjustment of the mA/kV was utilized to reduce the radiation dose to as low as reasonably achievable. COMPARISON: CT HEAD SEPTEMBER 17, 2020.  HISTORY: ORDERING SYSTEM PROVIDED HISTORY: fall, r/o ICH TECHNOLOGIST PROVIDED HISTORY: Reason for exam:->fall, r/o ICH Has a \"code stroke\" or \"stroke alert\" been called? ->No What reading provider will be dictating this exam?->CRC FINDINGS: There is mild diffuse parenchymal volume loss. There is mild chronic microvascular ischemic changes. There is a small volume of acute subarachnoid hemorrhage within the right middle cranial fossa along the right inferior temporal convexity. There is no evidence for an acute infarct, discrete mass lesion, or midline shift. The bilateral intra-ocular lenses have been replaced otherwise, the orbital globes and retrobulbar structures appear grossly unremarkable. The visualized paranasal sinuses and mastoid air cells are well aerated. The bony calvarium is intact. There are multiple scalp hematomas/soft tissue lacerations with subcutaneous emphysema involving the left parietal and left occipital scalp. There is straightening of the cervical lordosis without evidence for listhesis. The vertebral body heights are well maintained. There is moderate intervertebral disc space narrowing at C5-C6. There is no evidence for acute cervical spine fracture. There is multilevel mild cervical spondylosis. There is linear axial joint mild-to-moderate arthrosis. The prevertebral soft tissues unremarkable. The visualized lung apices are clear. Small volume of acute subarachnoid hemorrhage along the right inferior temporal convexity. Multiple left parietal and occipital scalp soft tissue hematomas/lacerations. No evidence for acute cervical spine fracture. Findings discussed with the ER at the time of interpretation. Ct Thoracic Spine Wo Contrast    Result Date: 10/2/2020  EXAMINATION: CT OF THE THORACIC SPINE WITHOUT CONTRAST  10/2/2020 12:47 pm: TECHNIQUE: CT of the thoracic spine was performed without the administration of intravenous contrast. Multiplanar reformatted images are provided for review.  Dose modulation, iterative reconstruction, and/or weight based adjustment of the mA/kV was utilized to reduce the radiation dose to as low as reasonably achievable. COMPARISON: Correlation made with prior MRI of the thoracic spine from June 5, 2013 HISTORY: [address] [facility] [address]: Trauma TECHNOLOGIST PROVIDED HISTORY: Reason for exam:->Trauma What reading provider will be dictating this exam?->CRC FINDINGS: There is no thoracic spine fracture. Vertebral body height is well maintained. No osseous central canal stenosis. Mild degenerative posterior osteophytosis is seen at the level of T5/T6. Stimulating leads are present within the dorsal aspect of the central canal with distal end of the leads at level of for T7. No fracture or malalignment of the thoracic spine. Ct Lumbar Spine Wo Contrast    Result Date: 10/2/2020  EXAMINATION: CT OF THE LUMBAR SPINE WITHOUT CONTRAST  10/2/2020 TECHNIQUE: CT of the lumbar spine was performed without the administration of intravenous contrast. Multiplanar reformatted images are provided for review. Dose modulation, iterative reconstruction, and/or weight based adjustment of the mA/kV was utilized to reduce the radiation dose to as low as reasonably achievable. COMPARISON: July 25, 2019 HISTORY: ORDERING SYSTEM PROVIDED HISTORY: Trauma TECHNOLOGIST PROVIDED HISTORY: Reason for exam:->Trauma What reading provider will be dictating this exam?->CRC FINDINGS: There is stable alignment status post posterior fusion of lumbar spine from levels of L4-S1 with 6 pedicle screws and 2 rods. Hardware is intact. There is evidence of laminectomy at these levels. Interbody grafting material is present at L4/L5 and L5/S1. Catheter material or neuro stimulating leads enter the dorsal aspect of the spinal canal at level of T12/L1. No evidence of fracture or malalignment of the lumbar spine. No lytic or blastic bone lesion. 1.  Stable, satisfactory alignment status post, posterior fusion of lumbar spine from levels of L4-S1. 2.  No acute fracture or malalignment.      Xr Chest Portable    Result Date: 10/2/2020  EXAMINATION: ONE XRAY VIEW OF THE CHEST 10/2/2020 9:21 am COMPARISON: None. HISTORY: ORDERING SYSTEM PROVIDED HISTORY: fall TECHNOLOGIST PROVIDED HISTORY: Reason for exam:->fall What reading provider will be dictating this exam?->CRC FINDINGS: The lungs are without acute focal process. There is no effusion or pneumothorax. The cardiomediastinal silhouette is stable in size. The osseous structures are without acute process. No acute process. Consultations:  IM, cardiology, NSG    Admission/Diagnosis:   70 y.o. female s/p syncopal fall from [facility] with a scalp laceration sp repair, spinal tenderness, ongoing presyncopal episodes    Code Status: full    Plan of Treatment:  -pain control  -soft cervical collar  -MRI spine  -tertiary  -defer syncope workup to IM/cards  -stop keppra as previously read SAH is likely artifact  -TEG wnl, on ASA/plavix, AC management per neurosurgery and primary    Plan discussed with Dr. Adriana Bradford Boxer on 10/2/2020 at 4:23 PM

## 2020-10-02 NOTE — PROGRESS NOTES
MRI department notified of patient having spinal cord stimulator and of needing to have her remote for it here before she can proceed with MRI. Also messaged via perfect serve to Dr Ria Ragsdale regarding MRI and clarification of keppra order.

## 2020-10-02 NOTE — ED NOTES
Bed: 07  Expected date:   Expected time:   Means of arrival:   Comments:  esther Ruiz RN  10/02/20 [id number]

## 2020-10-02 NOTE — PROGRESS NOTES
Perfect serve sent to Dr Oliverio Jeff regarding patient's spinal stimulator requiring a rep to be in the MRI area when test is performed and them not being able to schedule this for days.

## 2020-10-02 NOTE — ED NOTES
Shay Herrera, [address] [facility] at bedside.      Sherry Castillo RN  10/02/20 [id number]

## 2020-10-02 NOTE — ED NOTES
Pt having episodes of confusion, daughter states pt was seeing a little girl in her room and asking about containers. Informed it may be related to the head trauma from the fall however will continue to monitor pt to make sure it doesn't get worse.       Eboni Covarrubias RN  10/02/20 [id number]

## 2020-10-02 NOTE — CONSULTS
Inpatient Cardiology Consultation      Reason for Consult:  Syncope     Consulting Physician: Dr. Dsouza Rolling    Requesting Physician:  Susan ABDI    Date of Consultation: 10/2/2020    HISTORY OF PRESENT ILLNESS:   Ms. Guerrero Cowan is a 70year old  female who follows with Dr. Dionicio Balckwell. Her medical history includes CAD s/p CABG in the 1990s with medical management advised at time of cardiac catheterization in 11/2017, HTN, HLD, obesity, DM, GERD, DVT, Hypothyroidism, Arthritis and Fibromyalgia. Ms. Guerrero Cowan presented to [facility] ED on 10/02/2020 with complaints of falling. Please note, Ms. Jaimes is a poor historian and is unable to ascertain the events that led to her presentation. According to ED documentation, she was in the shower and fell with +LOC. Ms. Guerrero Cowan denies accompanying dyspnea, and chest pain. Upon arrival to the ED her VS were 98.7-65-/71-90% on RA. EKG SR. CT of the head with mild soft tissue swelling, small subcutaneous/subgaleal hematoma, and minimal soft tissue emphysema, are scattered about the left parietal high-convexity scalp, lessening inferiorly over the left occipital and   suboccipital regions. Within the limits of this exam, no definite associated   fracture is identified. Negative for acute intracranial process, within the limits of this non-contrast CT exam.  She received T-DAP, Keppra and Tylenol. Of note she received Fentanyl and Zofran en route. Neuro Surgery was consulted. Cardiology was consulted by Susan ABDI for syncope with extensive cardiac history. Please note: past medical records were reviewed per electronic medical record (EMR) - see detailed reports under Past Medical/ Surgical History. Past Medical and Surgical History:   1. Reported CABG in the 1990s with Dr. Abida Villa (full detailed report unavailable)  2. Echocardiogram 04/25/2015 (Dr. Dionicio Blackwell):  The left atrium is top normal to perhaps mildly dilated on 2-D imaging measuring 3.9 by M-Mode. Remaining cardiac chamber sizes including aortic root size are within normal limits. The left ventricle shows mild asymmetric septal hypertrophy at 1.2 cm. Systolic function appears fairly well preserved. There is mild to moderate hypokinesia of a very small, discrete area of the very distant interventricular septum and portion of apex. Overall ejection fraction is still estimated at 60%. There is no definite diastolic filling dysfunction seen. The mitral valve appears with mild mitral annular calcification. Maximal gradient 5.4 mmHg. Mitral valve area 3.7 sq cm by pressure halftime. There is no significant mitral stenosis. There is trace to 1+ mitral regurgitation only. The aortic valve is structurally normal and shows no aortic stenosis nor insufficiency. Maximal gradient 7.6 mmHg. Aortic valve area 1.8 sq cm. There is no pulmonic stenosis nor insufficiency. There is 1+ tricuspid regurgitation with pulmonary hypertension at 31 mmHg. There is no significant pericardial effusion nor any definite intracavitary mass or thrombus or shunt identified on this study. 3. Cardiac catheterization (Dr. Bhavya King) 11/15/2017: Totally occluded right small coronary artery with a totally occluded saphenous venous graft to RCA. Totally occluded SVG graft possibly to OM branch. Mild in-stent stenosis involving the proximal left circumflex artery. Totally occluded LAD with a patent LIMA to LAD. Medical management advised. 4. Lexiscan MPS 07/05/2020: Nonischemic, no WMA, EF 68%  5. HTN  6. HLD  7. Obesity  8. DM  9. GERD  10. Hypothyroidism  11. Hx of LE DVT  12. Arthritis  13. Fibromyalgia  14. Hx of CHF (specifics unclear)  15. S/p Appendectomy, cholecystectomy, bilateral eye cataract removal, hysterectomy, lumbar fusion, and tonsillectomy. Medications Prior to admit:  Prior to Admission medications    Medication Sig Start Date End Date Taking?  Authorizing Provider   ferrous sulfate (IRON 325) 325 (65 Fe) MG tablet Take 325 mg by mouth daily (with breakfast)   Yes Historical Provider, MD   metoprolol succinate (TOPROL XL) 25 MG extended release tablet Take 25 mg by mouth daily   Yes Historical Provider, MD   Semaglutide, 1 MG/DOSE, 2 MG/1.5ML SOPN Inject 2 mg into the skin once a week Given Friday   Yes Historical Provider, MD   propranolol (INDERAL) 10 MG tablet Take 10 mg by mouth 2 times daily  7/21/20  Yes Historical Provider, MD   baclofen (LIORESAL) 10 MG tablet Take 1 tablet by mouth nightly 6/25/20 6/25/21 Yes Eleonora Rollins MD   gabapentin (NEURONTIN) 600 MG tablet Take 1 tablet by mouth 3 times daily for 180 days.  6/25/20 12/22/20 Yes Eleonora Rollins MD   fenofibrate (TRICOR) 54 MG tablet Take 1 tablet by mouth daily 6/25/20  Yes Eleonora Rollins MD   citalopram (CELEXA) 20 MG tablet Take 20 mg by mouth daily    Yes Historical Provider, MD   potassium chloride (KLOR-CON) 10 MEQ extended release tablet Take 10 mEq by mouth daily  6/10/19  Yes Historical Provider, MD   Multiple Vitamins-Minerals (EYE HEALTH) CAPS Take 1 capsule by mouth 2 times daily    Yes Historical Provider, MD   dapagliflozin (FARXIGA) 10 MG tablet Take 10 mg by mouth daily    Yes Historical Provider, MD   aspirin 81 MG tablet Take 81 mg by mouth daily Stopped 5 days pre op   Yes Historical Provider, MD   glipiZIDE (GLUCOTROL) 10 MG tablet Take 10 mg by mouth 2 times daily (before meals)    Yes Historical Provider, MD   omeprazole (PRILOSEC) 20 MG delayed release capsule Take 20 mg by mouth daily   Yes Historical Provider, MD   furosemide (LASIX) 20 MG tablet Take 20 mg by mouth daily   Yes Historical Provider, MD   clopidogrel (PLAVIX) 75 MG tablet Take 75 mg by mouth daily    Yes Historical Provider, MD   folic acid (FOLVITE) 213 MCG tablet Take 800 mcg by mouth daily    Yes Historical Provider, MD   vitamin D 1000 UNITS CAPS Take 1,000 Units by mouth every evening    Yes Historical inferior temporal convexity. Multiple left parietal and occipital scalp soft tissue hematomas/lacerations. No evidence for acute cervical spine fracture. 10/02/2020 CXR:  No acute process    10/02/2020 Pelvic XRay:   Diffuse osteopenia without convincing evidence of acute fracture.  If there is persistent concern for an occult fracture, correlation with CT could be made. IMPRESSION and PLAN to follow per Dr. Shasha Pacheco    Electronically signed by TIRNIDAD Clemons CNP on 10/2/2020 at 11:34 AM     I independently performed an evaluation on the patient. I have reviewed the above documentation completed by the advance practitioner. Please see my additional contributions to the HPI, physical exam, assessment and medical decision making. Physical Exam   BP (!) 108/48   Pulse 63   Temp 98.7 °F (37.1 °C)   Resp 16   Wt 185 lb (83.9 kg)   SpO2 96%   BMI 34.96 kg/m²   Constitutional: Oriented to person, place, and time. Well-developed and well-nourished. No distress. Head: Normocephalic and atraumatic. Eyes: EOM are normal. Pupils are equal, round, and reactive to light. Neck: Normal range of motion. Neck supple. No hepatojugular reflux and no JVD present. Carotid bruit is not present. No tracheal deviation present. No thyromegaly present. Cardiovascular: Normal rate, regular rhythm, normal heart sounds and intact distal pulses. Exam reveals no gallop and no friction rub. No murmur heard. Pulmonary/Chest: Effort normal and breath sounds normal. No respiratory distress. No wheezes. No rales. No tenderness. Abdominal: Soft. Bowel sounds are normal. No distension and no mass. No tenderness. No rebound and no guarding. Musculoskeletal: Normal range of motion. No edema and no tenderness. Lymphadenopathy:   No cervical adenopathy. Neurological: Alert and oriented to person, place, and time. Skin: Skin is warm and dry. No rash noted. Not diaphoretic. No erythema.    Psychiatric: Normal mood and affect. Behavior is normal.     See accompanying documentation for full consult. ASSESSMENT AND PLAN:  1. Syncope/Fall/Head laceration/SAH:    Neurosurgery following. EKG normal.     Echo. Monitor. Outpatient 30 day monitor if evaluation unfruitful. 2. CAD-CAB/17 cath recommended medical management. See above for details. Stable stress 2020.     3. HTN: Observe. 4. DM: Per primary service. 5. GERD    6. Prerenal azotemia: Hydrate. 7. Obesity    Phillip Rosa D.O.   Cardiologist  Cardiology, [facility]

## 2020-10-02 NOTE — H&P
microvascular ischemic changes. Michael Walters is a small volume of acute   subarachnoid hemorrhage within the right middle cranial fossa along the right   inferior temporal convexity.  There is no evidence for an acute infarct,   discrete mass lesion, or midline shift. The bilateral intra-ocular lenses have been replaced otherwise, the orbital   globes and retrobulbar structures appear grossly unremarkable.  The   visualized paranasal sinuses and mastoid air cells are well aerated.  The   bony calvarium is intact. There are multiple scalp hematomas/soft tissue   lacerations with subcutaneous emphysema involving the left parietal and left   occipital scalp. There is straightening of the cervical lordosis without evidence for   listhesis.  The vertebral body heights are well maintained.  There is   moderate intervertebral disc space narrowing at C5-C6.  There is no evidence   for acute cervical spine fracture.  There is multilevel mild cervical   spondylosis.  There is linear axial joint mild-to-moderate arthrosis.  The   prevertebral soft tissues unremarkable.  The visualized lung apices are clear.        Impression:         Small volume of acute subarachnoid hemorrhage along the right inferior   temporal convexity. Multiple left parietal and occipital scalp soft tissue hematomas/lacerations. No evidence for acute cervical spine fracture.      Findings discussed with the ER at the time of interpretation.       CT CERVICAL SPINE WO CONTRAST [[id number]]  Collected: 10/02/20 1001       Order Status: Completed  Updated: 10/02/20 1017       Narrative:         EXAMINATION:   CT OF THE HEAD AND CERVICAL SPINE WITHOUT CONTRAST  10/2/2020 9:45 am     TECHNIQUE:   CT of the head was performed without the administration of intravenous   contrast. Dose modulation, iterative reconstruction, and/or weight based   adjustment of the mA/kV was utilized to reduce the radiation dose to as low   as reasonably achievable.; CT of the cervical spine was performed without the   administration of intravenous contrast. Multiplanar reformatted images are   provided for review. Dose modulation, iterative reconstruction, and/or weight   based adjustment of the mA/kV was utilized to reduce the radiation dose to as   low as reasonably achievable. COMPARISON:   CT HEAD SEPTEMBER 17, 2020. HISTORY:   ORDERING SYSTEM PROVIDED HISTORY: fall, r/o ICH   TECHNOLOGIST PROVIDED HISTORY:   Reason for exam:->fall, r/o ICH   Has a \"code stroke\" or \"stroke alert\" been called? ->No   What reading provider will be dictating this exam?->CRC     FINDINGS:   There is mild diffuse parenchymal volume loss. [place] is mild chronic   microvascular ischemic changes. [place] is a small volume of acute   subarachnoid hemorrhage within the right middle cranial fossa along the right   inferior temporal convexity.  There is no evidence for an acute infarct,   discrete mass lesion, or midline shift. The bilateral intra-ocular lenses have been replaced otherwise, the orbital   globes and retrobulbar structures appear grossly unremarkable.  The   visualized paranasal sinuses and mastoid air cells are well aerated.  The   bony calvarium is intact. There are multiple scalp hematomas/soft tissue   lacerations with subcutaneous emphysema involving the left parietal and left   occipital scalp. There is straightening of the cervical lordosis without evidence for   listhesis.  The vertebral body heights are well maintained.  There is   moderate intervertebral disc space narrowing at C5-C6.  There is no evidence   for acute cervical spine fracture.  There is multilevel mild cervical   spondylosis.  There is linear axial joint mild-to-moderate arthrosis.  The   prevertebral soft tissues unremarkable.  The visualized lung apices are clear.       Impression:         Small volume of acute subarachnoid hemorrhage along the right inferior   temporal convexity.      Multiple left Cardiology    Admission/Diagnosis: Syncopal fall  Right [address]    Plan of Treatment:  NPO  CT thoracic and lumbar spine  Consult cardiology  Analgesia  Admit   Plan discussed with {Trauma Docs:[id number]} at 10/2/2020 on 10:19 AM    Electronically signed by TRINIDAD Marie CNP on 10/2/2020 at 10:19 AM

## 2020-10-03 LAB
LV EF: 55 %
LVEF MODALITY: NORMAL
METER GLUCOSE: 106 MG/DL (ref 74–99)

## 2020-10-03 PROCEDURE — 99232 SBSQ HOSP IP/OBS MODERATE 35: CPT | Performed by: SURGERY

## 2020-10-03 PROCEDURE — 6370000000 HC RX 637 (ALT 250 FOR IP): Performed by: NURSE PRACTITIONER

## 2020-10-03 PROCEDURE — 2580000003 HC RX 258: Performed by: NURSE PRACTITIONER

## 2020-10-03 PROCEDURE — 6370000000 HC RX 637 (ALT 250 FOR IP): Performed by: SURGERY

## 2020-10-03 PROCEDURE — 93306 TTE W/DOPPLER COMPLETE: CPT

## 2020-10-03 PROCEDURE — 99233 SBSQ HOSP IP/OBS HIGH 50: CPT | Performed by: INTERNAL MEDICINE

## 2020-10-03 PROCEDURE — 2060000000 HC ICU INTERMEDIATE R&B

## 2020-10-03 PROCEDURE — 82962 GLUCOSE BLOOD TEST: CPT

## 2020-10-03 RX ORDER — OXYCODONE HYDROCHLORIDE AND ACETAMINOPHEN 5; 325 MG/1; MG/1
1 TABLET ORAL ONCE
Status: COMPLETED | OUTPATIENT
Start: 2020-10-03 | End: 2020-10-03

## 2020-10-03 RX ORDER — DIAPER,BRIEF,INFANT-TODD,DISP
EACH MISCELLANEOUS 2 TIMES DAILY
Status: DISCONTINUED | OUTPATIENT
Start: 2020-10-03 | End: 2020-10-09 | Stop reason: HOSPADM

## 2020-10-03 RX ORDER — OXYCODONE HYDROCHLORIDE AND ACETAMINOPHEN 5; 325 MG/1; MG/1
1 TABLET ORAL EVERY 8 HOURS PRN
Status: COMPLETED | OUTPATIENT
Start: 2020-10-03 | End: 2020-10-04

## 2020-10-03 RX ADMIN — BACLOFEN 10 MG: 10 TABLET ORAL at 21:24

## 2020-10-03 RX ADMIN — GLIPIZIDE 10 MG: 5 TABLET ORAL at 17:15

## 2020-10-03 RX ADMIN — ACETAMINOPHEN 650 MG: 325 TABLET ORAL at 21:24

## 2020-10-03 RX ADMIN — FERROUS SULFATE TAB 325 MG (65 MG ELEMENTAL FE) 325 MG: 325 (65 FE) TAB at 08:40

## 2020-10-03 RX ADMIN — FENOFIBRATE 54 MG: 54 TABLET ORAL at 08:40

## 2020-10-03 RX ADMIN — CITALOPRAM 20 MG: 20 TABLET, FILM COATED ORAL at 08:40

## 2020-10-03 RX ADMIN — LEVOTHYROXINE SODIUM 100 MCG: 0.1 TABLET ORAL at 06:33

## 2020-10-03 RX ADMIN — GLIPIZIDE 10 MG: 5 TABLET ORAL at 06:32

## 2020-10-03 RX ADMIN — PANTOPRAZOLE SODIUM 40 MG: 40 TABLET, DELAYED RELEASE ORAL at 06:33

## 2020-10-03 RX ADMIN — OXYCODONE HYDROCHLORIDE AND ACETAMINOPHEN 1 TABLET: 5; 325 TABLET ORAL at 03:00

## 2020-10-03 RX ADMIN — ISOSORBIDE MONONITRATE 60 MG: 60 TABLET ORAL at 08:40

## 2020-10-03 RX ADMIN — GABAPENTIN 600 MG: 600 TABLET ORAL at 08:41

## 2020-10-03 RX ADMIN — BACITRACIN ZINC: 500 OINTMENT TOPICAL at 21:24

## 2020-10-03 RX ADMIN — GABAPENTIN 600 MG: 600 TABLET ORAL at 21:24

## 2020-10-03 RX ADMIN — ASPIRIN 81 MG CHEWABLE TABLET 81 MG: 81 TABLET CHEWABLE at 08:40

## 2020-10-03 RX ADMIN — ACETAMINOPHEN 650 MG: 325 TABLET ORAL at 12:31

## 2020-10-03 RX ADMIN — FUROSEMIDE 20 MG: 20 TABLET ORAL at 08:40

## 2020-10-03 RX ADMIN — SODIUM CHLORIDE: 9 INJECTION, SOLUTION INTRAVENOUS at 12:32

## 2020-10-03 RX ADMIN — GABAPENTIN 600 MG: 600 TABLET ORAL at 15:17

## 2020-10-03 RX ADMIN — CLOPIDOGREL 75 MG: 75 TABLET, FILM COATED ORAL at 08:40

## 2020-10-03 RX ADMIN — EZETIMIBE 10 MG: 10 TABLET ORAL at 17:15

## 2020-10-03 RX ADMIN — METOPROLOL SUCCINATE 25 MG: 25 TABLET, EXTENDED RELEASE ORAL at 08:40

## 2020-10-03 RX ADMIN — FOLIC ACID 1 MG: 1 TABLET ORAL at 08:40

## 2020-10-03 ASSESSMENT — PAIN SCALES - GENERAL
PAINLEVEL_OUTOF10: 7
PAINLEVEL_OUTOF10: 8
PAINLEVEL_OUTOF10: 8
PAINLEVEL_OUTOF10: 0
PAINLEVEL_OUTOF10: 7
PAINLEVEL_OUTOF10: 0

## 2020-10-03 ASSESSMENT — PAIN DESCRIPTION - LOCATION: LOCATION: HEAD

## 2020-10-03 ASSESSMENT — PAIN DESCRIPTION - PAIN TYPE: TYPE: ACUTE PAIN

## 2020-10-03 NOTE — PROGRESS NOTES
Complaining of neck pain and rates the pain at a #10 on a scale of 1 to 10. Tylenol was given but ineffective. Message sent to Dr. Viviane Watson to request a 1 time dose of Percocet.

## 2020-10-03 NOTE — PROGRESS NOTES
Physical Therapy    PT order received and medical chart reviewed 10/3 AM. Holding for imaging of spine. Will re-attempt at a later time/date. Thank you.     Ana Maria Duque, PT, DPT  [id number]

## 2020-10-03 NOTE — PROGRESS NOTES
Dr. Mariah Main returned page and says to call the attending, Dr. Danica Iglesias for pain management.

## 2020-10-03 NOTE — PROCEDURES
10/2/20 8 pm - Notified Aruna NEIL that I do not see a record of pt having an MRI here since the time this stimulator was implanted. According to chart she has [employer] [id number] stimulator. The company called me back and said pt does have a whole body eligible stimulator implanted. However, a rep from this company has to be present to check the implant immediately prior to putting the pt in the scanner. He said it takes 3-4 days to make an appt with them for this procedure. Also, still looking through all the guidelines contained within their manual for doing an MRI with this stim. For instance, pt can not have a temp greater than 37 degrees celsius the day of scan either to avoid overheating the pt inside the scanner.

## 2020-10-03 NOTE — PROGRESS NOTES
Trauma Tertiary Survey    Admit Date: 10/2/2020  Hospital day 1    CC:  Syncopal fall       Past Medical History:   Diagnosis Date    Arthritis     CAD (coronary artery disease)     CHF (congestive heart failure) (HCC)     Diabetes (HCC)     Fibromyalgia     GERD (gastroesophageal reflux disease)     History of cardiovascular stress test 02/17/2014    lexiscan, also 4/21/2015    History of MI (myocardial infarction)     x4; most recent 2016    Hx of blood clots     DVT leg    Hyperlipidemia     Hypertension     Hypothyroidism     Intractable low back pain 6/19/2016    Non-insulin dependent type 2 diabetes mellitus ([facility] [address] [address]     [facility]STEMI (non-ST elevated [facility] infarction) [facility] 11/19/2015       Alcohol pre-screening:    Women: How many times in the past year have you had 4 or more drinks in a day? none    Scheduled Meds:   aspirin  81 mg Oral Daily    baclofen  10 mg Oral Nightly    citalopram  20 mg Oral Daily    clopidogrel  75 mg Oral Daily    dapagliflozin  10 mg Oral Daily    ezetimibe  10 mg Oral QPM    fenofibrate  54 mg Oral Daily    ferrous sulfate  325 mg Oral Daily with breakfast    folic acid  1 mg Oral Daily    furosemide  20 mg Oral Daily    gabapentin  600 mg Oral TID    glipiZIDE  10 mg Oral BID AC    isosorbide mononitrate  60 mg Oral Daily    levothyroxine  100 mcg Oral Daily    metoprolol succinate  25 mg Oral Daily    pantoprazole  40 mg Oral QAM AC    sodium chloride flush  10 mL Intravenous 2 times per day    levetiracetam  500 mg Intravenous Once    Tetanus-Diphth-Acell Pertussis  0.5 mL Intramuscular Once     Continuous Infusions:   sodium chloride 50 mL/hr at 10/02/20 2304     PRN Meds:perflutren lipid microspheres, sodium chloride flush, acetaminophen **OR** acetaminophen, promethazine **OR** ondansetron, potassium chloride **OR** potassium alternative oral replacement **OR** potassium chloride, magnesium hydroxide    Subjective:     States sore all over. C/o soreness @ scalp lac. C/o neck and back pain. No new issues. Objective:     Patient Vitals for the past 8 hrs:   BP Temp Temp src Pulse Resp SpO2 Weight   10/03/20 0600 -- -- -- -- -- -- 196 lb (88.9 kg)   10/03/20 0015 135/64 97.9 °F (36.6 °C) Temporal 64 16 93 % --       No intake/output data recorded. I/O this shift:  In: 616 [P.O.:100; I.V.:516]  Out: -     Past Medical History:   Diagnosis Date    Arthritis     CAD (coronary artery disease)     CHF (congestive heart failure) (HCC)     Diabetes (HCC)     Fibromyalgia     GERD (gastroesophageal reflux disease)     History of cardiovascular stress test 02/17/2014    lexiscan, also 4/21/2015    History of MI (myocardial infarction)     x4; most recent 2016    Hx of blood clots     DVT leg    Hyperlipidemia     Hypertension     Hypothyroidism     Intractable low back pain 6/19/2016    Non-insulin dependent type 2 diabetes mellitus [facility]     NSTEMI (non-ST elevated myocardial infarction) ([facility] 11/19/2015       Radiology:  CT HEAD WO CONTRAST   Final Result   1. Mild soft tissue swelling, small subcutaneous/subgaleal hematoma, and   minimal soft tissue emphysema, are scattered about the left parietal   high-convexity scalp, lessening inferiorly over the left occipital and   suboccipital regions. Within the limits of this exam, no definite associated   fracture is identified. 2.   Negative for acute intracranial process, within the limits of this   non-contrast CT exam.  Consider follow-up CT versus MR imaging, as directed   clinically. .         CT THORACIC SPINE WO CONTRAST   Final Result   No fracture or malalignment of the thoracic spine. CT LUMBAR SPINE WO CONTRAST   Final Result   1. Stable, satisfactory alignment status post, posterior fusion of lumbar   spine from levels of L4-S1.      2.  No acute fracture or malalignment.          CT HEAD WO CONTRAST   Final Result   Small volume of acute subarachnoid hemorrhage along the right inferior   temporal convexity. Multiple left parietal and occipital scalp soft tissue hematomas/lacerations. No evidence for acute cervical spine fracture. Findings discussed with the ER at the time of interpretation. CT CERVICAL SPINE WO CONTRAST   Final Result   Small volume of acute subarachnoid hemorrhage along the right inferior   temporal convexity. Multiple left parietal and occipital scalp soft tissue hematomas/lacerations. No evidence for acute cervical spine fracture. Findings discussed with the ER at the time of interpretation. XR PELVIS (1-2 VIEWS)   Final Result   Diffuse osteopenia without convincing evidence of acute fracture. If there   is persistent concern for an occult fracture, correlation with CT could be   made. XR CHEST PORTABLE   Final Result   No acute process. MRI THORACIC SPINE WO CONTRAST    (Results Pending)   MRI CERVICAL SPINE WO CONTRAST    (Results Pending)   MRI LUMBAR SPINE WO CONTRAST    (Results Pending)       PHYSICAL EXAM:   GCS:  4 - Opens eyes on own   6 - Follows simple motor commands  5 - Alert and oriented    Pupil size:  Left 2 mm Right 2 mm  Pupil reaction: Yes  Wiggles fingers: Left Yes Right Yes  Hand grasp:   Left normal   Right normal  Wiggles toes: Left Yes    Right Yes  Plantar flexion: Left normal  Right normal    PHYSICAL EXAM  General: No apparent distress, comfortable. Soft collar in place  HEENT: Trachea midline, no masses, Pupils equal round   Chest: Respiratory effort was normal with no retractions or use of accessory muscles. Cardiovascular: Extremities warm, well perfused,   Abdomen:  Soft and non distended.   No tenderness, guarding, rebound, or rigidity   Extremities: Moves all 4 extremeties, No pedal edema     Spine:     Spine Tenderness ROM   Cervical 3 /10 In ccollar   Thoracic 0 /10 Normal   Lumbar 0 /10 Normal     Musculoskeletal    Joint Tenderness Swelling ROM   Right shoulder absent absent normal   Left shoulder absent absent normal   Right elbow absent absent normal   Left elbow absent absent normal   Right wrist absent absent normal   Left wrist absent absent normal   Right hand grasp absent absent normal   Left hand grasp absent absent normal   Right hip absent absent normal   Left hip absent absent normal   Right knee absent absent normal   Left knee absent absent normal   Right ankle absent absent normal   Left ankle absent absent normal   Right foot absent absent normal   Left foot absent absent normal       CONSULTS: Medicine (admitting), Cardiology,  Neurosurgery. PROCEDURES: s/p 10/2 scalp lac repair    INJURIES:  Syncopal fall; acute cervical and thoracic strain      Principal Problem:    Syncope and collapse  Active Problems:    Lumbar radiculopathy    DDD (degenerative disc disease), lumbar    History of MI (myocardial infarction)    Normocytic anemia    Falls frequently    Non-insulin dependent type 2 diabetes mellitus (HCC)    Hypothyroidism    HLD (hyperlipidemia)    GERD (gastroesophageal reflux disease)    Facet arthropathy, cervical    DM II (diabetes mellitus, type II), controlled (HCC)    Left cataract    History of coronary artery bypass surgery    Hx of hypercholesterolemia    L-S radiculopathy    Lumbar spondylosis    Lumbar disc disorder    Closed head injury    Scalp laceration  Resolved Problems:    * No resolved hospital problems. *        Assessment/Plan:       · Neuro:  GCS 15, Concussion with LOC. NSG consulted. Acute Cervical and Thoracic strains-  MRIs pening due to neurostimulator. Continue soft collar. · Chronic pain - defer to Primary for chronic pain management  · CV: HR near normal limits, Syncopal fall. Cardiology following. ECHO ordered. Home meds per Primary and Cardiology. ASA/Plavix  · Pulm: tolerating room air. No acute injuries. Pulmonary toilet  · GI: tolerating general diet. Bowel regimen.  On home PPI  · Renal: mild NAINA, ?prerenal.  Mgmt per Primary  · ID: afebrile, no acute issues     · Endocrine: no acute issues, Diabetes, on home meds per Primary  · MSK: no acute issues. PT/OT evals   · Heme: no acute issues   · Skin:  Scalp laceration s/p repair. Suture removal in 10 days     Bowel regime: colace, MOM   Pain control/Sedation: per Primary. Home gabapentin, tylenol, baclofen,   DVT prophylaxis: SCDs. Ok for chemoppx from trauma standpoint    GI: diet   Glucose protocol: per Primary  Mouth/Eye care: per patient, . Demarco: none     Code status:    Full Code    Patient/Family update:  As available     Disposition:    Overall care per Primary  MRIs pending  NSG following  Acute cervical and thoracic strain without other acute traumatic injuries      Electronically signed by Lizette Pablo DO on 10/3/20 at 6:14 AM EDT    Attending Supervising Physician's Attestation Statement  I performed a history and physical examination on the patient and discussed the management with the resident physician. I reviewed and agree with the findings and plan as documented in her note . Pain appears in less pain today.   Tolerating diet  Scalp lac intact, ok to shower and/or wash hair  TBI--no bleed on f/u CT head--Dr. Terrell Hurley following  Acute cervical strain--Dr. Terrell Hurley following, c/w soft collar    Trauma will s/o, please call if needed    Electronically signed by Escobar Rivera MD on 10/3/20 at 10:46 AM EDT

## 2020-10-03 NOTE — PROGRESS NOTES
Patient assisted to turn onto her side and complaingin of more neck pain. Asking to have something stronger than tylenol ordered. Message sent to surgical resident.

## 2020-10-03 NOTE — PROCEDURES
[facility] stimulator company was called, they have to be present for MRI. Message was left regarding scheduling this pt. Their recording states they are only available Mon-Fri 8:30 am - 5 pm and will be back again during normal business hours. MRI on hold until rep can be scheduled.

## 2020-10-03 NOTE — H&P
Department of Internal Medicine  Freda Jackson MD    [facility]  [id number]  1945  CHIEF COMPLAINT:  Syncope and collapse   History Obtained From:  patient, electronic medical record  HISTORY OF PRESENT ILLNESS:    The patient is a 70 y.o. female who presents with fall. She says she was in the bathroom in the morning, felt dizzy and fell. She hit her head. Doesn't recall LOC. She states he blood sugar was 70. She is also on Glipizide. Has h/o CAD. Denies chest pain or palpitation. Denies nausea or vomiting. Denies abd pain. Denies fever, cough.       Past Medical History:        Diagnosis Date    Arthritis     CAD (coronary artery disease)     [facility]est[facility] failure) ([facility]     Diabetes ([address]     Fibromyalgia     GERD (gastroesophageal reflux disease)     History of cardiovascular stress test 02/17/2014    lexiscan, also 4/21/2015    History of MI (myocardial infarction)     x4; most recent 2016    Hx of blood clots     DVT leg    Hyperlipidemia     Hypertension     Hypothyroidism     Intractable low back pain 6/19/2016    Non-insulin dependent type 2 diabetes mellitus ([address]     NSTEMI (non-ST elevated myocardial infarction) ([address] 11/19/2015     Past Surgical History:        Procedure Laterality Date    APPENDECTOMY      ARTHROCENTESIS Left 8/22/2019    LEFT HIP CORTICOSTERIOD INJECTION UNDER FLUOROSCOPIC GUIDANCE performed by Matt Pierre DO at [address] [facility]  11/15/2017    Dr Nida Garner Right 7 7 15    CATARACT REMOVAL WITH IMPLANT Left 4/26/2016    CHOLECYSTECTOMY      COCCYX REMOVAL      COLONOSCOPY      CORONARY ANGIOPLASTY WITH [address] started needing cardiac care; 5 stents total    CORONARY ANGIOPLASTY WITH STENT PLACEMENT  11/19/2015    Dr Handy Felton stent 3x18 prox Cx   Elva Bean; 3 bypass grafts    ENDOSCOPY, COLON, DIAGNOSTIC      HEMORRHOID SURGERY      HYSTERECTOMY      LUMBAR FUSION  6/7/2016    Dr. Meghan Mitchell Left 11 18 2015    left lumbar transforaminal nerve block l4-5 l5-s1    NERVE BLOCK Right 07/17/2017    lumbar transforaminal #1    NERVE BLOCK Right 09/06/2017    right knee injection#1    NERVE BLOCK Bilateral 10/24/2018    lumbar facet block     NERVE BLOCK Left 08/22/2019    hip     NERVE SURGERY N/A 6/15/2020    SPINAL CORD STIMULATOR IMPLANT WITH NERVO performed by Kaitlynn Mack MD at [address]      bilateral feet    OTHER SURGICAL HISTORY N/A 11/04/2019    spinal cord stimulator trial    DE ARTHRS KNE SURG W/MENISCECTOMY MED/LAT W/SHVG Right 6/6/2018    RIGHT KNEE ARTHROSCOPY MEDIAL AND LATERAL MENISECTOMY SYNOVECTOMY AND CHONDROPLASTY performed by Johnson Haider DO at [facility] [address] DX/THER AGNT PARAVERT FACET JOINT, LUMBAR/SAC, 1ST LEVEL Bilateral 10/24/2018    BILATERAL LUMBAR FACET BLOCK L1-2 ,L3-4 WITH X-RAY performed by Allie Lehman DO at [address] [facility] [address] / [address] N/A 11/4/2019    SPINAL CORD STIMULATOR TRIAL WITH NEVRO performed by Kaitlynn Mack MD at [address] [facility] SURGERY  06/15/2020    nevro     TONSILLECTOMY      VASCULAR SURGERY      laser on leaky veins     Meds at Home:  Medications Prior to Admission: ferrous sulfate (IRON 325) 325 (65 Fe) MG tablet, Take 325 mg by mouth daily (with breakfast)  metoprolol succinate (TOPROL XL) 25 MG extended release tablet, Take 25 mg by mouth daily  Semaglutide, 1 MG/DOSE, 2 MG/1.5ML SOPN, Inject 2 mg into the skin once a week Given Friday  propranolol (INDERAL) 10 MG tablet, Take 10 mg by mouth 2 times daily   baclofen (LIORESAL) 10 MG tablet, Take 1 tablet by mouth nightly  gabapentin (NEURONTIN) 600 MG tablet, Take 1 tablet by mouth 3 times daily for 180 days. fenofibrate (TRICOR) 54 MG tablet, Take 1 tablet by mouth daily  citalopram (CELEXA) 20 MG tablet, Take 20 mg by mouth daily   potassium chloride (KLOR-CON) 10 MEQ extended release tablet, Take 10 mEq by mouth daily   Multiple Vitamins-Minerals (EYE HEALTH) CAPS, Take 1 capsule by mouth 2 times daily   dapagliflozin (FARXIGA) 10 MG tablet, Take 10 mg by mouth daily   aspirin 81 MG tablet, Take 81 mg by mouth daily Stopped 5 days pre op  glipiZIDE (GLUCOTROL) 10 MG tablet, Take 10 mg by mouth 2 times daily (before meals)   omeprazole (PRILOSEC) 20 MG delayed release capsule, Take 20 mg by mouth daily  furosemide (LASIX) 20 MG tablet, Take 20 mg by mouth daily  clopidogrel (PLAVIX) 75 MG tablet, Take 75 mg by mouth daily   folic acid (FOLVITE) 690 MCG tablet, Take 800 mcg by mouth daily   vitamin D 1000 UNITS CAPS, Take 1,000 Units by mouth every evening   isosorbide mononitrate (IMDUR) 60 MG CR tablet, Take 60 mg by mouth daily   ezetimibe (ZETIA) 10 MG tablet, Take 10 mg by mouth every evening   Multiple Vitamins-Minerals (CENTRUM SILVER) TABS, Take 1 tablet by mouth daily   levothyroxine (SYNTHROID) 100 MCG tablet, Take 100 mcg by mouth daily. nitroGLYCERIN (NITROSTAT) 0.4 MG SL tablet, Place 0.4 mg under the tongue every 5 minutes as needed for Chest pain up to max of 3 total doses. If no relief after 1 dose, call 911.   Current Medications:     bacitracin zinc   Topical BID    aspirin  81 mg Oral Daily    baclofen  10 mg Oral Nightly    citalopram  20 mg Oral Daily    clopidogrel  75 mg Oral Daily    dapagliflozin  10 mg Oral Daily    ezetimibe  10 mg Oral QPM    fenofibrate  54 mg Oral Daily    ferrous sulfate  325 mg Oral Daily with breakfast    folic acid  1 mg Oral Daily    furosemide  20 mg Oral Daily    gabapentin  600 mg Oral TID    glipiZIDE  10 mg Oral BID AC    isosorbide mononitrate  60 mg Oral Daily    levothyroxine  100 mcg Oral Daily    metoprolol succinate  25 mg Oral Problem Relation Age of Onset    Cancer Father [de-identified]        blossomddgris    Cancer Brother     COPD Brother     Heart Attack Mother 54    Heart Disease Sister     Diabetes Sister     Heart Disease Brother     Heart Disease Other 58             Diabetes Other     ADHD Other      REVIEW OF SYSTEMS:    Constitutional: Negative for chills, fever and weight loss. HENT: Negative for congestion, ear pain, hearing loss, sore throat and tinnitus. Eyes: Negative for blurred vision, double vision and discharge. Respiratory: Negative for cough, hemoptysis and shortness of breath. Cardiovascular: Negative for chest pain and palpitations. H/o CAD  Gastrointestinal: Negative for heartburn, nausea and vomiting. Genitourinary: Negative for dysuria, frequency, hematuria and urgency. Musculoskeletal: Chronic back pain, myalgias and neck pain. Skin: Negative for rash. Neurological: Negative for, seizures  BEHAVIOR/PSYCH: negative    PHYSICAL EXAM:    Vitals:    /71   Pulse 68   Temp 97.2 °F (36.2 °C) (Temporal)   Resp 16   Wt 196 lb (88.9 kg)   SpO2 94%   BMI 37.03 kg/m²   Patient Vitals for the past 24 hrs:   BP Temp Temp src Pulse Resp SpO2 Weight   10/03/20 0830 118/71 97.2 °F (36.2 °C) Temporal 68 16 94 % --   10/03/20 0600 -- -- -- -- -- -- 196 lb (88.9 kg)   10/03/20 0015 135/64 97.9 °F (36.6 °C) Temporal 64 16 93 % --   10/02/20 1800 (!) 156/79 95.8 °F (35.4 °C) -- 65 18 96 % --   10/02/20 1530 (!) 122/56 -- -- 55 18 96 % --     CONSTITUTIONAL:  awake, alert, not in apparent acute distress. EYES:  No pallor or icterus. ENT:  Normocephalic, without obvious abnormality, laceration occipital area  LUNGS:  No increased work of breathing, clear to auscultation bilaterally  CHEST: unremarkable  CARDIOVASCULAR:  S1 S2 regular rate and rhythm  ABDOMEN:  Normal bowel sounds, soft, non-tender, no masses palpable  MUSCULOSKELETAL:  Moves all extremeties equally bilaterally.   NEUROLOGIC: Awake, alert and oriented. No gross focal deficit noted. SKIN:  unremarkable  EXTREMETIES: atraumatic, no cyanosis or edema    DATA:    Recent Labs     10/02/20  0855   WBC 7.2   HGB 11.7        Recent Labs     10/02/20  0855      K 4.3      CO2 27   BUN 30*   CREATININE 1.2*   GLUCOSE 135*     Recent Labs     10/02/20  0855   AST 24   ALT 25   ALKPHOS 50   BILITOT 0.4     Recent Labs     10/02/20  0855   TROPONINI <0.01     No results for input(s): BNP in the last 72 hours. Recent Labs     10/02/20  0855   PROT 6.2*   INR 1.2     Recent Labs     10/02/20  0855   APTT 23.3*     Lab Results   Component Value Date    TSH 1.070 07/05/2020     Xr Pelvis (1-2 Views)    Result Date: 10/2/2020  EXAMINATION: ONE XRAY VIEW OF THE PELVIS 10/2/2020 9:23 am COMPARISON: May 30, 2019 HISTORY: ORDERING SYSTEM PROVIDED HISTORY: trauma TECHNOLOGIST PROVIDED HISTORY: Reason for exam:->trauma What reading provider will be dictating this exam?->CRC FINDINGS: Bones are diffusely osteopenic, limiting assessment for a nondisplaced fracture. Within this limitation, no convincing evidence of acute fracture. There are least mild degenerative changes involving the hips bilaterally. Postsurgical changes related to posterior fusion of the lower lumbar spine are present. A presumed nerve stimulator battery pack overlies the left iliac bone. Surgical clips are present in the proximal left thigh. Diffuse osteopenia without convincing evidence of acute fracture. If there is persistent concern for an occult fracture, correlation with CT could be made. Ct Head Wo Contrast    Result Date: 10/2/2020  EXAMINATION: CT OF THE HEAD WITHOUT CONTRAST  10/2/2020 1:47 pm TECHNIQUE: CT of the head was performed without the administration of intravenous contrast. Dose modulation, iterative reconstruction, and/or weight based adjustment of the mA/kV was utilized to reduce the radiation dose to as low as reasonably achievable. COMPARISON: None. HISTORY: ORDERING SYSTEM PROVIDED HISTORY: Follow up on Right [address] TECHNOLOGIST PROVIDED HISTORY: Reason for exam:->Follow up on Right [address] Has a \"code stroke\" or \"stroke alert\" been called? ->No What reading provider will be dictating this exam?->CRC FINDINGS: Mild soft tissue swelling, small subcutaneous/subgaleal hematoma, and minimal soft tissue emphysema, are scattered about the left parietal high-convexity scalp, lessening inferiorly over the left occipital and suboccipital regions. Within the limits of this exam, no definite associated fracture is identified. Negative for acute intracranial hemorrhage, other intra/extra-axial fluid collection, or mass effect/midline shift. Moderate/severe atherosclerotic calcifications are scattered about the cavernous/supraclinoid segments of both internal carotid arteries, followed by both distal vertebral arteries. Gray/white matter differentiation appears unremarkable. There is age-appropriate, generalized parenchymal volume loss. Both ocular lenses have been previously surgically replaced. Trace scattered paranasal sinus mucosal thickening is most notable within the ethmoid sinus. Aerated sphenoid sinus mildly extends into the left pterygoid process, with minimal involvement of the right pterygoid process. 1.  Mild soft tissue swelling, small subcutaneous/subgaleal hematoma, and minimal soft tissue emphysema, are scattered about the left parietal high-convexity scalp, lessening inferiorly over the left occipital and suboccipital regions. Within the limits of this exam, no definite associated fracture is identified. 2.   Negative for acute intracranial process, within the limits of this non-contrast CT exam.  Consider follow-up CT versus MR imaging, as directed clinically.  .     Ct Head Wo Contrast    Result Date: 10/2/2020  EXAMINATION: CT OF THE HEAD AND CERVICAL SPINE WITHOUT CONTRAST  10/2/2020 9:45 am TECHNIQUE: CT of the head was performed without the administration of intravenous contrast. Dose modulation, iterative reconstruction, and/or weight based adjustment of the mA/kV was utilized to reduce the radiation dose to as low as reasonably achievable.; CT of the cervical spine was performed without the administration of intravenous contrast. Multiplanar reformatted images are provided for review. Dose modulation, iterative reconstruction, and/or weight based adjustment of the mA/kV was utilized to reduce the radiation dose to as low as reasonably achievable. COMPARISON: CT HEAD SEPTEMBER 17, 2020. HISTORY: ORDERING SYSTEM PROVIDED HISTORY: fall, r/o ICH TECHNOLOGIST PROVIDED HISTORY: Reason for exam:->fall, r/o ICH Has a \"code stroke\" or \"stroke alert\" been called? ->No What reading provider will be dictating this exam?->CRC FINDINGS: There is mild diffuse parenchymal volume loss. There is mild chronic microvascular ischemic changes. There is a small volume of acute subarachnoid hemorrhage within the right middle cranial fossa along the right inferior temporal convexity. There is no evidence for an acute infarct, discrete mass lesion, or midline shift. The bilateral intra-ocular lenses have been replaced otherwise, the orbital globes and retrobulbar structures appear grossly unremarkable. The visualized paranasal sinuses and mastoid air cells are well aerated. The bony calvarium is intact. There are multiple scalp hematomas/soft tissue lacerations with subcutaneous emphysema involving the left parietal and left occipital scalp. There is straightening of the cervical lordosis without evidence for listhesis. The vertebral body heights are well maintained. There is moderate intervertebral disc space narrowing at C5-C6. There is no evidence for acute cervical spine fracture. There is multilevel mild cervical spondylosis. There is linear axial joint mild-to-moderate arthrosis. The prevertebral soft tissues unremarkable.   The visualized lung apices are clear. Small volume of acute subarachnoid hemorrhage along the right inferior temporal convexity. Multiple left parietal and occipital scalp soft tissue hematomas/lacerations. No evidence for acute cervical spine fracture. Findings discussed with the ER at the time of interpretation. Ct Cervical Spine Wo Contrast    Result Date: 10/2/2020  EXAMINATION: CT OF THE HEAD AND CERVICAL SPINE WITHOUT CONTRAST  10/2/2020 9:45 am TECHNIQUE: CT of the head was performed without the administration of intravenous contrast. Dose modulation, iterative reconstruction, and/or weight based adjustment of the mA/kV was utilized to reduce the radiation dose to as low as reasonably achievable.; CT of the cervical spine was performed without the administration of intravenous contrast. Multiplanar reformatted images are provided for review. Dose modulation, iterative reconstruction, and/or weight based adjustment of the mA/kV was utilized to reduce the radiation dose to as low as reasonably achievable. COMPARISON: CT HEAD SEPTEMBER 17, 2020. HISTORY: ORDERING SYSTEM PROVIDED HISTORY: fall, r/o ICH TECHNOLOGIST PROVIDED HISTORY: Reason for exam:->fall, r/o ICH Has a \"code stroke\" or \"stroke alert\" been called? ->No What reading provider will be dictating this exam?->CRC FINDINGS: There is mild diffuse parenchymal volume loss. There is mild chronic microvascular ischemic changes. There is a small volume of acute subarachnoid hemorrhage within the right middle cranial fossa along the right inferior temporal convexity. There is no evidence for an acute infarct, discrete mass lesion, or midline shift. The bilateral intra-ocular lenses have been replaced otherwise, the orbital globes and retrobulbar structures appear grossly unremarkable. The visualized paranasal sinuses and mastoid air cells are well aerated. The bony calvarium is intact.  There are multiple scalp hematomas/soft tissue lacerations with subcutaneous emphysema involving the left parietal and left occipital scalp. There is straightening of the cervical lordosis without evidence for listhesis. The vertebral body heights are well maintained. There is moderate intervertebral disc space narrowing at C5-C6. There is no evidence for acute cervical spine fracture. There is multilevel mild cervical spondylosis. There is linear axial joint mild-to-moderate arthrosis. The prevertebral soft tissues unremarkable. The visualized lung apices are clear. Small volume of acute subarachnoid hemorrhage along the right inferior temporal convexity. Multiple left parietal and occipital scalp soft tissue hematomas/lacerations. No evidence for acute cervical spine fracture. Findings discussed with the ER at the time of interpretation. Ct Thoracic Spine Wo Contrast    Result Date: 10/2/2020  EXAMINATION: CT OF THE THORACIC SPINE WITHOUT CONTRAST  10/2/2020 12:47 pm: TECHNIQUE: CT of the thoracic spine was performed without the administration of intravenous contrast. Multiplanar reformatted images are provided for review. Dose modulation, iterative reconstruction, and/or weight based adjustment of the mA/kV was utilized to reduce the radiation dose to as low as reasonably achievable. COMPARISON: Correlation made with prior MRI of the thoracic spine from June 5, 2013 HISTORY: [facility]: Trauma TECHNOLOGIST PROVIDED HISTORY: Reason for exam:->Trauma What reading provider will be dictating this exam?->CRC FINDINGS: There is no thoracic spine fracture. Vertebral body height is well maintained. No osseous central canal stenosis. Mild degenerative posterior osteophytosis is seen at the level of T5/T6. Stimulating leads are present within the dorsal aspect of the central canal with distal end of the leads at level of for T7. No fracture or malalignment of the thoracic spine.      Ct Lumbar Spine Wo Contrast    Result Date: 10/2/2020  EXAMINATION: CT OF THE LUMBAR SPINE WITHOUT CONTRAST  10/2/2020 TECHNIQUE: CT of the lumbar spine was performed without the administration of intravenous contrast. Multiplanar reformatted images are provided for review. Dose modulation, iterative reconstruction, and/or weight based adjustment of the mA/kV was utilized to reduce the radiation dose to as low as reasonably achievable. COMPARISON: July 25, 2019 HISTORY: ORDERING SYSTEM PROVIDED HISTORY: Trauma TECHNOLOGIST PROVIDED HISTORY: Reason for exam:->Trauma What reading provider will be dictating this exam?->CRC FINDINGS: There is stable alignment status post posterior fusion of lumbar spine from levels of L4-S1 with 6 pedicle screws and 2 rods. Hardware is intact. There is evidence of laminectomy at these levels. Interbody grafting material is present at L4/L5 and L5/S1. Catheter material or neuro stimulating leads enter the dorsal aspect of the spinal canal at level of T12/L1. No evidence of fracture or malalignment of the lumbar spine. No lytic or blastic bone lesion. 1.  Stable, satisfactory alignment status post, posterior fusion of lumbar spine from levels of L4-S1. 2.  No acute fracture or malalignment. Xr Chest Portable    Result Date: 10/2/2020  EXAMINATION: ONE XRAY VIEW OF THE CHEST 10/2/2020 9:21 am COMPARISON: None. HISTORY: ORDERING SYSTEM PROVIDED HISTORY: fall TECHNOLOGIST PROVIDED HISTORY: Reason for exam:->fall What reading provider will be dictating this exam?->CRC FINDINGS: The lungs are without acute focal process. There is no effusion or pneumothorax. The cardiomediastinal silhouette is stable in size. The osseous structures are without acute process. No acute process.      Medications     sodium chloride 50 mL/hr at 10/03/20 1232      bacitracin zinc   Topical BID    aspirin  81 mg Oral Daily    baclofen  10 mg Oral Nightly    citalopram  20 mg Oral Daily    clopidogrel  75 mg Oral Daily    dapagliflozin  10 mg Oral Daily    ezetimibe  10 mg Oral QPM    fenofibrate  54 mg Oral Daily    ferrous sulfate  325 mg Oral Daily with breakfast    folic acid  1 mg Oral Daily    furosemide  20 mg Oral Daily    gabapentin  600 mg Oral TID    glipiZIDE  10 mg Oral BID AC    isosorbide mononitrate  60 mg Oral Daily    levothyroxine  100 mcg Oral Daily    metoprolol succinate  25 mg Oral Daily    pantoprazole  40 mg Oral QAM AC    sodium chloride flush  10 mL Intravenous 2 times per day    levetiracetam  500 mg Intravenous Once    Tetanus-Diphth-Acell Pertussis  0.5 mL Intramuscular Once      DIET CARB CONTROL;    ASSESSMENT  -Syncope and collapse  -Closed head injury  -Degenerative disc disease, lumbar  -Diabetes mellitus, type II  -Gastroesophageal reflux disease  -History of coronary artery bypass surgery  -Hyperlipidemia  -Hypothyroidism  -Left cataract  -Lumbar radiculopathy  -Normocytic anemia  -Facet arthropathy, cervical  -Scalp laceration       PLAN/RECOMMENDATIONS:    Seen by cardiology and neurosurgery and trauma  Will consult neuro  PT/OT  DVT  Prophylaxis  Monitor sugars        Electronically signed by Genesis Erazo MD on 10/3/2020 at 2:37 PM

## 2020-10-03 NOTE — PROGRESS NOTES
Occupational Therapy  OT eval order received and chart was reviewed. Orders in chart for pt to have imaging of spine. Will attempt OT eval at a later time following imaging . Bennett Anderson OTR/L [id number]

## 2020-10-03 NOTE — PROCEDURES
10/2/20 10 pm - Called rep from [facility] that has to be present for MRI and left a message regarding scheduling this pt. Their recording states they are only available Mon-Fri 8:30 am - 5 pm and will be back again during normal business hours. Notified RASHAAD Valdovinos of this.

## 2020-10-03 NOTE — PROGRESS NOTES
Doing Well. No focal deficit. Reviewed CT scan of brain. 1.  Mild soft tissue swelling, small subcutaneous/subgaleal hematoma, and    minimal soft tissue emphysema, are scattered about the left parietal    high-convexity scalp, lessening inferiorly over the left occipital and    suboccipital regions. Within the limits of this exam, no definite associated    fracture is identified.         2.   Negative for acute intracranial process, within the limits of this    non-contrast CT exam.  Consider follow-up CT versus MR imaging, as directed    clinically     Nothing new to add from neurosurgical stand point at this time.

## 2020-10-03 NOTE — PROGRESS NOTES
Spoke to Dr Aurea Bui to clarify if Lovenox was ok to be given as ordered. Per Dr Aurea Bui no lovenox.  Order discontinued

## 2020-10-03 NOTE — PROGRESS NOTES
Spoke to Dmitry Lui, who is on call for Dr. Mesfin Phillip regarding the patient's complaining of neck pain. Orders received for percocet 5/325mg x 1 dose.

## 2020-10-04 LAB — METER GLUCOSE: 91 MG/DL (ref 74–99)

## 2020-10-04 PROCEDURE — 6370000000 HC RX 637 (ALT 250 FOR IP): Performed by: NURSE PRACTITIONER

## 2020-10-04 PROCEDURE — 6370000000 HC RX 637 (ALT 250 FOR IP): Performed by: PSYCHIATRY & NEUROLOGY

## 2020-10-04 PROCEDURE — 2060000000 HC ICU INTERMEDIATE R&B

## 2020-10-04 PROCEDURE — 99223 1ST HOSP IP/OBS HIGH 75: CPT | Performed by: PSYCHIATRY & NEUROLOGY

## 2020-10-04 PROCEDURE — 2580000003 HC RX 258: Performed by: NURSE PRACTITIONER

## 2020-10-04 PROCEDURE — 82962 GLUCOSE BLOOD TEST: CPT

## 2020-10-04 PROCEDURE — 6370000000 HC RX 637 (ALT 250 FOR IP): Performed by: INTERNAL MEDICINE

## 2020-10-04 RX ORDER — HYDROCODONE BITARTRATE AND ACETAMINOPHEN 5; 325 MG/1; MG/1
1 TABLET ORAL EVERY 6 HOURS PRN
Status: DISCONTINUED | OUTPATIENT
Start: 2020-10-04 | End: 2020-10-09 | Stop reason: HOSPADM

## 2020-10-04 RX ADMIN — GABAPENTIN 600 MG: 600 TABLET ORAL at 14:40

## 2020-10-04 RX ADMIN — METOPROLOL SUCCINATE 25 MG: 25 TABLET, EXTENDED RELEASE ORAL at 08:17

## 2020-10-04 RX ADMIN — GABAPENTIN 600 MG: 600 TABLET ORAL at 08:13

## 2020-10-04 RX ADMIN — GLIPIZIDE 10 MG: 5 TABLET ORAL at 06:29

## 2020-10-04 RX ADMIN — ASPIRIN 81 MG CHEWABLE TABLET 81 MG: 81 TABLET CHEWABLE at 08:13

## 2020-10-04 RX ADMIN — Medication 10 ML: at 08:18

## 2020-10-04 RX ADMIN — LEVOTHYROXINE SODIUM 100 MCG: 0.1 TABLET ORAL at 06:13

## 2020-10-04 RX ADMIN — FERROUS SULFATE TAB 325 MG (65 MG ELEMENTAL FE) 325 MG: 325 (65 FE) TAB at 08:13

## 2020-10-04 RX ADMIN — CITALOPRAM 20 MG: 20 TABLET, FILM COATED ORAL at 08:13

## 2020-10-04 RX ADMIN — GLIPIZIDE 10 MG: 5 TABLET ORAL at 17:11

## 2020-10-04 RX ADMIN — PANTOPRAZOLE SODIUM 40 MG: 40 TABLET, DELAYED RELEASE ORAL at 06:13

## 2020-10-04 RX ADMIN — ACETAMINOPHEN 650 MG: 325 TABLET ORAL at 17:11

## 2020-10-04 RX ADMIN — HYDROCODONE BITARTRATE AND ACETAMINOPHEN 1 TABLET: 5; 325 TABLET ORAL at 21:09

## 2020-10-04 RX ADMIN — FENOFIBRATE 54 MG: 54 TABLET ORAL at 08:13

## 2020-10-04 RX ADMIN — BACLOFEN 10 MG: 10 TABLET ORAL at 20:28

## 2020-10-04 RX ADMIN — OXYCODONE HYDROCHLORIDE AND ACETAMINOPHEN 1 TABLET: 5; 325 TABLET ORAL at 05:41

## 2020-10-04 RX ADMIN — GABAPENTIN 600 MG: 600 TABLET ORAL at 20:29

## 2020-10-04 RX ADMIN — EZETIMIBE 10 MG: 10 TABLET ORAL at 17:11

## 2020-10-04 RX ADMIN — CLOPIDOGREL 75 MG: 75 TABLET, FILM COATED ORAL at 08:13

## 2020-10-04 RX ADMIN — Medication 10 ML: at 20:29

## 2020-10-04 RX ADMIN — ACETAMINOPHEN 650 MG: 325 TABLET ORAL at 03:31

## 2020-10-04 RX ADMIN — ISOSORBIDE MONONITRATE 60 MG: 60 TABLET ORAL at 08:13

## 2020-10-04 RX ADMIN — FOLIC ACID 1 MG: 1 TABLET ORAL at 08:13

## 2020-10-04 RX ADMIN — BACITRACIN ZINC: 500 OINTMENT TOPICAL at 19:39

## 2020-10-04 ASSESSMENT — PAIN SCALES - GENERAL
PAINLEVEL_OUTOF10: 8
PAINLEVEL_OUTOF10: 9
PAINLEVEL_OUTOF10: 8
PAINLEVEL_OUTOF10: 8

## 2020-10-04 NOTE — PROGRESS NOTES
Message sent to Dr Rubio Zhong regarding patient asking for something stronger than tylenol for pain

## 2020-10-04 NOTE — PROGRESS NOTES
Requesting something stronger for pain. She says the Tylenol is ineffective. Message sent to Dr. Debby Pan that patient had a one-time dose of percocet last night which was effective.

## 2020-10-04 NOTE — CONSULTS
NEUROLOGY CONSULT NOTE      Requesting Physician: Edin Pace MD    Reason for Consult:  Evaluate for syncope. History of Present Illness:  Sudha Miller is a 70 y.o. female  with h/o CAD, CHF, IDDM, DVT, HTN, and HLD, who was admitted to [facility]  on 10/2/2020 with presentation of syncope and collapse. She reports that on the morning of admission she was at home in her bathroom she began feeling dizzy. There was no specific precipitating event or factors causing the dizziness. Indicated that she has been having dizzy episodes. Now for some time with both spinning sensations and woozy wobbly type sensations. It seems to be primarily woozy wobbly type. At the time of her event she was taking a shower and indicated she bent over during the shower and then became extremely dizzy. It was at that time that she first fell. When she fell the first time there was no significant injury but upon trying to get up she fell backwards hitting the back of her head and losing consciousness. This caused a laceration to the back of her head with bleeding associated. It is unclear how long she was unconscious. Post-event she did notice headache with pain in the back of the head as well as in the forehead region. .  She did note that her dizziness has been more persistent for last few weeks with no prior episodes of syncope. Patient does note that she staggers when walking with feeling of a lightheadedness. No report of any focal weakness, numbness, or tingling. No prior history of syncope or near syncopal events. Patient denies any history of seizures.     Past Medical History:        Diagnosis Date    Arthritis     CAD (coronary artery disease)     [facility]est[facility] failure) [facility]     Diabetes ([facility] [address]     Fibromyalgia     GERD (gastroesophageal reflux disease)     History of cardiovascular stress test 02/17/2014    lexiscan, also 4/21/2015    History of MI (myocardial infarction)     x4; most recent 2016    Hx of blood clots     DVT leg    Hyperlipidemia     Hypertension     Hypothyroidism     Intractable low back pain 6/19/2016    Non-insulin dependent type 2 diabetes mellitus ([facility]     NSTEMI (non-ST elevated myocardial infarction) ([facility] [address] [facility] 11/19/2015           Procedure Laterality Date    APPENDECTOMY      ARTHROCENTESIS Left 8/22/2019    LEFT HIP CORTICOSTERIOD INJECTION UNDER FLUOROSCOPIC GUIDANCE performed by Liseth Goncalves DO at [address]  11/15/2017    Dr Modesta Wetzel Right 7 7 15    CATARACT REMOVAL WITH IMPLANT Left 4/26/2016    CHOLECYSTECTOMY      COCCYX REMOVAL      COLONOSCOPY      CORONARY ANGIOPLASTY WITH [address] started needing cardiac care; 5 stents total    CORONARY ANGIOPLASTY WITH STENT PLACEMENT  11/19/2015    Dr Shin Green stent 3x18 prox Cx   [place] Hiss Dr. Carl Colon; 3 bypass grafts    ENDOSCOPY, COLON, DIAGNOSTIC      HEMORRHOID SURGERY      HYSTERECTOMY      LUMBAR FUSION  6/7/2016    Dr. Alisa Lao Left 11 18 2015    left lumbar transforaminal nerve block l4-5 l5-s1    NERVE BLOCK Right 07/17/2017    lumbar transforaminal #1    NERVE BLOCK Right 09/06/2017    right knee injection#1    NERVE BLOCK Bilateral 10/24/2018    lumbar facet block     NERVE BLOCK Left 08/22/2019    hip     NERVE SURGERY N/A 6/15/2020    SPINAL CORD STIMULATOR IMPLANT WITH NERVO performed by Loree Du MD [address]      bilateral feet    OTHER SURGICAL HISTORY N/A 11/04/2019    spinal cord stimulator trial    MI ARTHRS KNE SURG W/MENISCECTOMY MED/LAT W/SHVG Right 6/6/2018    RIGHT KNEE ARTHROSCOPY MEDIAL AND LATERAL MENISECTOMY SYNOVECTOMY AND CHONDROPLASTY performed by Elijah Graf DO at [facility] [address] DX/THER AGNT PARAVERT FACET JOINT, LUMBAR/SAC, 1ST LEVEL Bilateral 10/24/2018    BILATERAL LUMBAR FACET BLOCK L1-2 ,L3-4 WITH X-RAY performed by Joie Iqbal DO at [address] N/A 11/4/2019    SPINAL CORD STIMULATOR TRIAL WITH NEVRO performed by Lizzette Giraldo MD at [address] [facility]  06/15/2020    nevro     TONSILLECTOMY      VASCULAR SURGERY      laser on leaky veins       Social History:  Social History     Tobacco Use   Smoking Status Never Smoker   Smokeless Tobacco Never Used     Social History     Substance and Sexual Activity   Alcohol Use No     Social History     Substance and Sexual Activity   Drug Use No         Family History:       Problem Relation Age of Onset    Cancer Father [de-identified]        serjio Shi Her Cancer Brother     COPD Brother     Heart Attack Mother 54    Heart Disease Sister     Diabetes Sister     Heart Disease Brother     Heart Disease Other 58             Diabetes Other     ADHD Other        Review of Systems:  All systems reviewed are negative except what is mentioned in history of present illness. Allergies:     Allergies   Allergen Reactions    Lipitor Other (See Comments)     Severe leg pain    Statins Other (See Comments)     Muscles go weak and she falls    Atorvastatin      Makes me sick severe leg cramps    Cymbalta [Duloxetine Hcl] Nausea Only    Cipro Xr      Reaction unknown    Diazepam     Duloxetine Nausea Only    Erythromycin      Hurt my stomach    Indomethacin     Ketorolac     Ketorolac Tromethamine Itching    Lodine [Etodolac]     Oscal 500-200 D-3 [Oyster Shell Calcium-D]     Paxil [Paroxetine Hcl]     Ropinirole     Tromethamine     Trulicity [Dulaglutide] Swelling    Zithromax [Azithromycin]     Requip [Ropinirole Hcl] Nausea And Vomiting        Current Medications:   bacitracin zinc ointment, BID  perflutren lipid microspheres (DEFINITY) injection 1.65 mg, ONCE PRN  aspirin chewable tablet 81 mg, Daily  baclofen (LIORESAL) tablet 10 mg, Nightly  citalopram (CELEXA) tablet 20 mg, Daily  clopidogrel (PLAVIX) tablet 75 mg, Daily  dapagliflozin (FARXIGA) tablet 10 mg, Daily  ezetimibe (ZETIA) tablet 10 mg, QPM  fenofibrate (TRICOR) tablet 54 mg, Daily  ferrous sulfate (IRON 325) tablet 325 mg, Daily with breakfast  folic acid (FOLVITE) tablet 1 mg, Daily  gabapentin (NEURONTIN) tablet 600 mg, TID  glipiZIDE (GLUCOTROL) tablet 10 mg, BID AC  isosorbide mononitrate (IMDUR) extended release tablet 60 mg, Daily  levothyroxine (SYNTHROID) tablet 100 mcg, Daily  metoprolol succinate (TOPROL XL) extended release tablet 25 mg, Daily  pantoprazole (PROTONIX) tablet 40 mg, QAM AC  0.9 % sodium chloride infusion, Continuous  sodium chloride flush 0.9 % injection 10 mL, 2 times per day  sodium chloride flush 0.9 % injection 10 mL, PRN  acetaminophen (TYLENOL) tablet 650 mg, Q6H PRN    Or  acetaminophen (TYLENOL) suppository 650 mg, Q6H PRN  promethazine (PHENERGAN) tablet 12.5 mg, Q6H PRN    Or  ondansetron (ZOFRAN) injection 4 mg, Q6H PRN  potassium chloride (KLOR-CON M) extended release tablet 40 mEq, PRN    Or  potassium bicarb-citric acid (EFFER-K) effervescent tablet 40 mEq, PRN    Or  potassium chloride 10 mEq/100 mL IVPB (Peripheral Line), PRN  magnesium hydroxide (MILK OF MAGNESIA) 400 MG/5ML suspension 30 mL, Daily PRN  levetiracetam (KEPPRA) 500 mg/100 mL IVPB, Once  Tetanus-Diphth-Acell Pertussis (BOOSTRIX) injection 0.5 mL, Once       Physical Exam:  BP (!) 128/58   Pulse 61   Temp 97.9 °F (36.6 °C)   Resp 16   Wt 198 lb 6.4 oz (90 kg)   SpO2 95%   BMI 37.49 kg/m²  I Body mass index is 37.49 kg/m². I   Wt Readings from Last 1 Encounters:   10/04/20 198 lb 6.4 oz (90 kg)          HEENT: Normocephalic, atraumatic, no lesions or abnormalities noted. Neck:  supple with full ROM; no masses, nodes or bruits; no cervical tenderness on palpation.      Lungs: clear to auscultation  bilaterally     CV: RRR without gallops or murmurs     Extremities: no c/c/e          Neurologic Exam     Mental Status:  Patient was alert, responsive, oriented, appropriate, answering questions, and following commands. Speech was fluent with normal sensorium and cognition. Cranial Nerves: Pupils were equal round and reactive to light and accommodation;    Visual fields were full on confrontation; Extraocular movements were intact; no nystagmus; Intact facial sensation to temp, pinprick, and light touch; Symmetric facial movements with good lip and eye closure bilaterally; Hearing was intact; Harden was midline;    Normal palatal elevation with midline uvula  Trapezius strength of 5/5. Tongue was midline with no atrophy or fasciculations  Motor Exam:  Proximal weakness in LE with 3/5 strength in IP, Quads and Hamstrings. 4/5 strength in bilateral AT and Gastroc. Normal strength in left UE at 5/5 with 4/5 in right UE; normal tone and bulk; no atrophy or fasiculations noted. Sensory Exam:  Normal sensation to light touch, pin-prick, and temperature; No sensory extinction. Cerebella Exam:  Intact finger-nose-finger, rapidly alternating movements, fine motor movements; No cerebellar rebound or drift; No tremors   Gait:  Gait was not tested. Patient was slow to stand with buckling in legs with prolonged standing. High fall risk.    Reflexes: normal and symmetric bilaterally; Babinski is negative    Labs:    CBC:   Recent Labs     10/02/20  0855   WBC 7.2   HGB 11.7      MCV 94.7   MCH 31.3   MCHC 33.1   RDW 13.5     CMP:  Recent Labs     10/02/20  0855      K 4.3      CO2 27   BUN 30*   CREATININE 1.2*   GFRAA 53   LABGLOM 44   GLUCOSE 135*   CALCIUM 9.3     Liver:   Recent Labs     10/02/20  0855   AST 24   ALT 25   ALKPHOS 50   PROT 6.2*   LABALBU 3.8   BILITOT 0.4     INR:   Recent Labs     10/02/20  0855   PROTIME 13.0*   INR 1.2       ToxicologyNo results for input(s): PHENYTOIN, CARBTOT, PHENOBARB, VALPROATE in the last 72 hours. Invalid input(s): LAMOTRIG,  KEPPRA  No results for input(s): AMPMETHURSCR, BARBTQTU, BDZQTU, CANNABQUANT, COCMETQTU, OPIAU, PCPQUANT in the last 72 hours. Radiology:  Xr Pelvis (1-2 Views)    Result Date: 10/2/2020  EXAMINATION: ONE XRAY VIEW OF THE PELVIS 10/2/2020 9:23 am COMPARISON: May 30, 2019 HISTORY: ORDERING SYSTEM PROVIDED HISTORY: trauma TECHNOLOGIST PROVIDED HISTORY: Reason for exam:->trauma What reading provider will be dictating this exam?->CRC FINDINGS: Bones are diffusely osteopenic, limiting assessment for a nondisplaced fracture. Within this limitation, no convincing evidence of acute fracture. There are least mild degenerative changes involving the hips bilaterally. Postsurgical changes related to posterior fusion of the lower lumbar spine are present. A presumed nerve stimulator battery pack overlies the left iliac bone. Surgical clips are present in the proximal left thigh. Diffuse osteopenia without convincing evidence of acute fracture. If there is persistent concern for an occult fracture, correlation with CT could be made. Ct Head Wo Contrast    Result Date: 10/2/2020  EXAMINATION: CT OF THE HEAD WITHOUT CONTRAST  10/2/2020 1:47 pm TECHNIQUE: CT of the head was performed without the administration of intravenous contrast. Dose modulation, iterative reconstruction, and/or weight based adjustment of the mA/kV was utilized to reduce the radiation dose to as low as reasonably achievable. COMPARISON: None. HISTORY: ORDERING SYSTEM PROVIDED HISTORY: Follow up on [facility] TECHNOLOGIST PROVIDED HISTORY: Reason for exam:->Follow up on [facility] Has a \"code stroke\" or \"stroke alert\" been called? ->No What reading provider will be dictating this exam?->CRC FINDINGS: Mild soft tissue swelling, small subcutaneous/subgaleal hematoma, and minimal soft tissue emphysema, are scattered about the left parietal high-convexity scalp, lessening inferiorly over the left occipital and suboccipital regions. Within the limits of this exam, no definite associated fracture is identified. Negative for acute intracranial hemorrhage, other intra/extra-axial fluid collection, or mass effect/midline shift. Moderate/severe atherosclerotic calcifications are scattered about the cavernous/supraclinoid segments of both internal carotid arteries, followed by both distal vertebral arteries. Gray/white matter differentiation appears unremarkable. There is age-appropriate, generalized parenchymal volume loss. Both ocular lenses have been previously surgically replaced. Trace scattered paranasal sinus mucosal thickening is most notable within the ethmoid sinus. Aerated sphenoid sinus mildly extends into the left pterygoid process, with minimal involvement of the right pterygoid process. 1.  Mild soft tissue swelling, small subcutaneous/subgaleal hematoma, and minimal soft tissue emphysema, are scattered about the left parietal high-convexity scalp, lessening inferiorly over the left occipital and suboccipital regions. Within the limits of this exam, no definite associated fracture is identified. 2.   Negative for acute intracranial process, within the limits of this non-contrast CT exam.  Consider follow-up CT versus MR imaging, as directed clinically. .     Ct Head Wo Contrast    Result Date: 10/2/2020  EXAMINATION: CT OF THE HEAD AND CERVICAL SPINE WITHOUT CONTRAST  10/2/2020 9:45 am TECHNIQUE: CT of the head was performed without the administration of intravenous contrast. Dose modulation, iterative reconstruction, and/or weight based adjustment of the mA/kV was utilized to reduce the radiation dose to as low as reasonably achievable.; CT of the cervical spine was performed without the administration of intravenous contrast. Multiplanar reformatted images are provided for review.  Dose modulation, iterative reconstruction, 9/17/2020 2:30 pm TECHNIQUE: CT of the head/brain was performed without and with the administration of intravenous contrast. Multiplanar reformatted images are provided for review. Dose modulation, iterative reconstruction, and/or weight based adjustment of the mA/kV was utilized to reduce the radiation dose to as low as reasonably achievable. COMPARISON: October 19, 2019 HISTORY: ORDERING SYSTEM PROVIDED HISTORY: Vascular dementia with delirium [facility]) TECHNOLOGIST PROVIDED HISTORY: Reason for exam:->Dizziness and giddiness Reason for exam:->Vascular dementia with delirium ([facility] [address] FINDINGS: BRAIN/VENTRICLES: There is no acute intracranial hemorrhage, mass effect or midline shift. No abnormal extra-axial fluid collection. The gray-white differentiation is maintained without evidence of an acute infarct. There is no evidence of hydrocephalus. Following administration of intravenous contrast there is normal brain parenchymal enhancement. No evidence of aneurysm or vascular malformation. The basilar artery and branches as well as bilateral carotid circulation outflow demonstrate no significant abnormality. ORBITS: The visualized portion of the orbits demonstrate no acute abnormality. SINUSES: The visualized paranasal sinuses and mastoid air cells demonstrate no acute abnormality. SOFT TISSUES/SKULL:  No acute abnormality of the visualized skull or soft tissues. No acute intracranial abnormality.      Ct Cervical Spine Wo Contrast    Result Date: 10/2/2020  EXAMINATION: CT OF THE HEAD AND CERVICAL SPINE WITHOUT CONTRAST  10/2/2020 9:45 am TECHNIQUE: CT of the head was performed without the administration of intravenous contrast. Dose modulation, iterative reconstruction, and/or weight based adjustment of the mA/kV was utilized to reduce the radiation dose to as low as reasonably achievable.; CT of the cervical spine was performed without the administration of intravenous contrast. Multiplanar reformatted images are provided for review. Dose modulation, iterative reconstruction, and/or weight based adjustment of the mA/kV was utilized to reduce the radiation dose to as low as reasonably achievable. COMPARISON: CT HEAD SEPTEMBER 17, 2020. HISTORY: ORDERING SYSTEM PROVIDED HISTORY: fall, r/o [facility] TECHNOLOGIST PROVIDED HISTORY: Reason for exam:->fall, r/o ICH Has a \"code stroke\" or \"stroke alert\" been called? ->No What reading provider will be dictating this exam?->CRC FINDINGS: There is mild diffuse parenchymal volume loss. There is mild chronic microvascular ischemic changes. There is a small volume of acute subarachnoid hemorrhage within the right middle cranial fossa along the right inferior temporal convexity. There is no evidence for an acute infarct, discrete mass lesion, or midline shift. The bilateral intra-ocular lenses have been replaced otherwise, the orbital globes and retrobulbar structures appear grossly unremarkable. The visualized paranasal sinuses and mastoid air cells are well aerated. The bony calvarium is intact. There are multiple scalp hematomas/soft tissue lacerations with subcutaneous emphysema involving the left parietal and left occipital scalp. There is straightening of the cervical lordosis without evidence for listhesis. The vertebral body heights are well maintained. There is moderate intervertebral disc space narrowing at C5-C6. There is no evidence for acute cervical spine fracture. There is multilevel mild cervical spondylosis. There is linear axial joint mild-to-moderate arthrosis. The prevertebral soft tissues unremarkable. The visualized lung apices are clear. Small volume of acute subarachnoid hemorrhage along the right inferior temporal convexity. Multiple left parietal and occipital scalp soft tissue hematomas/lacerations. No evidence for acute cervical spine fracture. Findings discussed with the ER at the time of interpretation.      Ct Thoracic Spine Wo Contrast    Result Date: 10/2/2020  EXAMINATION: CT OF THE THORACIC SPINE WITHOUT CONTRAST  10/2/2020 12:47 pm: TECHNIQUE: CT of the thoracic spine was performed without the administration of intravenous contrast. Multiplanar reformatted images are provided for review. Dose modulation, iterative reconstruction, and/or weight based adjustment of the mA/kV was utilized to reduce the radiation dose to as low as reasonably achievable. COMPARISON: Correlation made with prior MRI of the thoracic spine from June 5, 2013 HISTORY: [address] [place] [address]: Trauma TECHNOLOGIST PROVIDED HISTORY: Reason for exam:->Trauma What reading provider will be dictating this exam?->CRC FINDINGS: There is no thoracic spine fracture. Vertebral body height is well maintained. No osseous central canal stenosis. Mild degenerative posterior osteophytosis is seen at the level of T5/T6. Stimulating leads are present within the dorsal aspect of the central canal with distal end of the leads at level of for T7. No fracture or malalignment of the thoracic spine. Ct Lumbar Spine Wo Contrast    Result Date: 10/2/2020  EXAMINATION: CT OF THE LUMBAR SPINE WITHOUT CONTRAST  10/2/2020 TECHNIQUE: CT of the lumbar spine was performed without the administration of intravenous contrast. Multiplanar reformatted images are provided for review. Dose modulation, iterative reconstruction, and/or weight based adjustment of the mA/kV was utilized to reduce the radiation dose to as low as reasonably achievable. COMPARISON: July 25, 2019 HISTORY: ORDERING SYSTEM PROVIDED HISTORY: Trauma TECHNOLOGIST PROVIDED HISTORY: Reason for exam:->Trauma What reading provider will be dictating this exam?->CRC FINDINGS: There is stable alignment status post posterior fusion of lumbar spine from levels of L4-S1 with 6 pedicle screws and 2 rods. Hardware is intact. There is evidence of laminectomy at these levels. Interbody grafting material is present at L4/L5 and L5/S1. Catheter material or neuro stimulating leads enter the dorsal aspect of the spinal canal at level of T12/L1. No evidence of fracture or malalignment of the lumbar spine. No lytic or blastic bone lesion. 1.  Stable, satisfactory alignment status post, posterior fusion of lumbar spine from levels of L4-S1. 2.  No acute fracture or malalignment. Xr Chest Portable    Result Date: 10/2/2020  EXAMINATION: ONE XRAY VIEW OF THE CHEST 10/2/2020 9:21 am COMPARISON: None. HISTORY: ORDERING SYSTEM PROVIDED HISTORY: fall TECHNOLOGIST PROVIDED HISTORY: Reason for exam:->fall What reading provider will be dictating this exam?->CRC FINDINGS: The lungs are without acute focal process. There is no effusion or pneumothorax. The cardiomediastinal silhouette is stable in size. The osseous structures are without acute process. No acute process. The patient's records from referring provider and available information in the EHR was reviewed. Impression:  1. Syncope  2. Traumatic head injury  3. Scalp Laceration: posterior head in ocipital regionCervical DJD/DDD  4. Chronic low back pain s/p spinal cord stimulator. 5. Lumbar DJD/DDD with h/o back surgery and spinal cord stimulator. 6.  Cervicalgia    Patient presenting with episode of syncope and head injury. She has been experiencing dizziness for some time with report of intermittent vertigo but more prominent dizziness with unsteadiness and woozy wobbly type sensation. She has fairly significant disease of the lumbar spine with prior back surgery and spinal cord stimulator for pain management. She also noticed neck pain and degenerative changes in the neck as well. The dizziness that she has been describing has a fairly significant cervicogenic component. There does appear to be evidence of vertigo as well.   Patient has fairly significant weakness in her legs bilaterally along with notable weakness in the right arm suggesting that there may be significant cervical degenerative changes and possible polyradiculopathy paresis myelopathy. She did complain of syncopal episode and it is possible that this may have been the result of her fall and hitting her head. With her syncope there is also concern that there may be vasovagal or autonomic component. Further evaluation is warranted. We will proceed with treatment of underlying degenerative spine disease and patient will need physical and occupational therapy to help with management of her condition. I am concerned about the profound weakness in her legs which puts her at a high fall risk will require physical therapy to help with management since she is already undergone significant cervical spine surgery and now has a spinal cord stimulator as well. We will need to evaluate her cervical spine given concerns about possible cervical myelopathy. Principal Problem:    Syncope and collapse  Active Problems:    Lumbar radiculopathy    DDD (degenerative disc disease), lumbar    History of MI (myocardial infarction)    Normocytic anemia    Falls frequently    Non-insulin dependent type 2 diabetes mellitus (HCC)    Hypothyroidism    HLD (hyperlipidemia)    GERD (gastroesophageal reflux disease)    Facet arthropathy, cervical    DM II (diabetes mellitus, type II), controlled (HCC)    Left cataract    History of coronary artery bypass surgery    Hx of hypercholesterolemia    Closed head injury    Scalp laceration, initial encounter  Resolved Problems:    * No resolved hospital problems. *      Recommendations:                                            1. Meloxicam 7.5 mg daily   2. Disontinue Baclofen. 3. Zanaflex 4 mg po qhs  4. MRI of brain and cervical spine. 5. PT/OT evaluation and therapy. .       It was my pleasure to evaluate Sudha Miller today. Please call with questions.       Electronically signed by Binu Salter MD on 10/4/2020 at 7:29 PM

## 2020-10-04 NOTE — PROGRESS NOTES
HISTORY: trauma TECHNOLOGIST PROVIDED HISTORY: Reason for exam:->trauma What reading provider will be dictating this exam?->CRC FINDINGS: Bones are diffusely osteopenic, limiting assessment for a nondisplaced fracture. Within this limitation, no convincing evidence of acute fracture. There are least mild degenerative changes involving the hips bilaterally. Postsurgical changes related to posterior fusion of the lower lumbar spine are present. A presumed nerve stimulator battery pack overlies the left iliac bone. Surgical clips are present in the proximal left thigh. Diffuse osteopenia without convincing evidence of acute fracture. If there is persistent concern for an occult fracture, correlation with CT could be made. Ct Head Wo Contrast    Result Date: 10/2/2020  EXAMINATION: CT OF THE HEAD WITHOUT CONTRAST  10/2/2020 1:47 pm TECHNIQUE: CT of the head was performed without the administration of intravenous contrast. Dose modulation, iterative reconstruction, and/or weight based adjustment of the mA/kV was utilized to reduce the radiation dose to as low as reasonably achievable. COMPARISON: None. HISTORY: ORDERING SYSTEM PROVIDED HISTORY: Follow up on [address]  TECHNOLOGIST PROVIDED HISTORY: Reason for exam:->Follow up on [address] Has a \"code stroke\" or \"stroke alert\" been called? ->No What reading provider will be dictating this exam?->CRC FINDINGS: Mild soft tissue swelling, small subcutaneous/subgaleal hematoma, and minimal soft tissue emphysema, are scattered about the left parietal high-convexity scalp, lessening inferiorly over the left occipital and suboccipital regions. Within the limits of this exam, no definite associated fracture is identified. Negative for acute intracranial hemorrhage, other intra/extra-axial fluid collection, or mass effect/midline shift.  Moderate/severe atherosclerotic calcifications are scattered about the cavernous/supraclinoid segments of both internal carotid arteries, followed by both distal vertebral arteries. Gray/white matter differentiation appears unremarkable. There is age-appropriate, generalized parenchymal volume loss. Both ocular lenses have been previously surgically replaced. Trace scattered paranasal sinus mucosal thickening is most notable within the ethmoid sinus. Aerated sphenoid sinus mildly extends into the left pterygoid process, with minimal involvement of the right pterygoid process. 1.  Mild soft tissue swelling, small subcutaneous/subgaleal hematoma, and minimal soft tissue emphysema, are scattered about the left parietal high-convexity scalp, lessening inferiorly over the left occipital and suboccipital regions. Within the limits of this exam, no definite associated fracture is identified. 2.   Negative for acute intracranial process, within the limits of this non-contrast CT exam.  Consider follow-up CT versus MR imaging, as directed clinically. .     Ct Head Wo Contrast    Result Date: 10/2/2020  EXAMINATION: CT OF THE HEAD AND CERVICAL SPINE WITHOUT CONTRAST  10/2/2020 9:45 am TECHNIQUE: CT of the head was performed without the administration of intravenous contrast. Dose modulation, iterative reconstruction, and/or weight based adjustment of the mA/kV was utilized to reduce the radiation dose to as low as reasonably achievable.; CT of the cervical spine was performed without the administration of intravenous contrast. Multiplanar reformatted images are provided for review. Dose modulation, iterative reconstruction, and/or weight based adjustment of the mA/kV was utilized to reduce the radiation dose to as low as reasonably achievable. COMPARISON: CT HEAD SEPTEMBER 17, 2020. HISTORY: ORDERING SYSTEM PROVIDED HISTORY: fall, r/o ICH TECHNOLOGIST PROVIDED HISTORY: Reason for exam:->fall, r/o ICH Has a \"code stroke\" or \"stroke alert\" been called? ->No What reading provider will be dictating this exam?->CRC FINDINGS: There is mild diffuse parenchymal volume loss. There is mild chronic microvascular ischemic changes. There is a small volume of acute subarachnoid hemorrhage within the right middle cranial fossa along the right inferior temporal convexity. There is no evidence for an acute infarct, discrete mass lesion, or midline shift. The bilateral intra-ocular lenses have been replaced otherwise, the orbital globes and retrobulbar structures appear grossly unremarkable. The visualized paranasal sinuses and mastoid air cells are well aerated. The bony calvarium is intact. There are multiple scalp hematomas/soft tissue lacerations with subcutaneous emphysema involving the left parietal and left occipital scalp. There is straightening of the cervical lordosis without evidence for listhesis. The vertebral body heights are well maintained. There is moderate intervertebral disc space narrowing at C5-C6. There is no evidence for acute cervical spine fracture. There is multilevel mild cervical spondylosis. There is linear axial joint mild-to-moderate arthrosis. The prevertebral soft tissues unremarkable. The visualized lung apices are clear. Small volume of acute subarachnoid hemorrhage along the right inferior temporal convexity. Multiple left parietal and occipital scalp soft tissue hematomas/lacerations. No evidence for acute cervical spine fracture. Findings discussed with the ER at the time of interpretation.      Ct Cervical Spine Wo Contrast    Result Date: 10/2/2020  EXAMINATION: CT OF THE HEAD AND CERVICAL SPINE WITHOUT CONTRAST  10/2/2020 9:45 am TECHNIQUE: CT of the head was performed without the administration of intravenous contrast. Dose modulation, iterative reconstruction, and/or weight based adjustment of the mA/kV was utilized to reduce the radiation dose to as low as reasonably achievable.; CT of the cervical spine was performed without the administration of intravenous contrast. Multiplanar reformatted images are provided for review. Dose modulation, iterative reconstruction, and/or weight based adjustment of the mA/kV was utilized to reduce the radiation dose to as low as reasonably achievable. COMPARISON: CT HEAD SEPTEMBER 17, 2020. HISTORY: ORDERING SYSTEM PROVIDED HISTORY: fall, r/o ICH TECHNOLOGIST PROVIDED HISTORY: Reason for exam:->fall, r/o ICH Has a \"code stroke\" or \"stroke alert\" been called? ->No What reading provider will be dictating this exam?->CRC FINDINGS: There is mild diffuse parenchymal volume loss. There is mild chronic microvascular ischemic changes. There is a small volume of acute subarachnoid hemorrhage within the right middle cranial fossa along the right inferior temporal convexity. There is no evidence for an acute infarct, discrete mass lesion, or midline shift. The bilateral intra-ocular lenses have been replaced otherwise, the orbital globes and retrobulbar structures appear grossly unremarkable. The visualized paranasal sinuses and mastoid air cells are well aerated. The bony calvarium is intact. There are multiple scalp hematomas/soft tissue lacerations with subcutaneous emphysema involving the left parietal and left occipital scalp. There is straightening of the cervical lordosis without evidence for listhesis. The vertebral body heights are well maintained. There is moderate intervertebral disc space narrowing at C5-C6. There is no evidence for acute cervical spine fracture. There is multilevel mild cervical spondylosis. There is linear axial joint mild-to-moderate arthrosis. The prevertebral soft tissues unremarkable. The visualized lung apices are clear. Small volume of acute subarachnoid hemorrhage along the right inferior temporal convexity. Multiple left parietal and occipital scalp soft tissue hematomas/lacerations. No evidence for acute cervical spine fracture. Findings discussed with the ER at the time of interpretation.      Ct Thoracic Spine Wo Contrast    Result Date: 10/2/2020  EXAMINATION: CT OF THE THORACIC SPINE WITHOUT CONTRAST  10/2/2020 12:47 pm: TECHNIQUE: CT of the thoracic spine was performed without the administration of intravenous contrast. Multiplanar reformatted images are provided for review. Dose modulation, iterative reconstruction, and/or weight based adjustment of the mA/kV was utilized to reduce the radiation dose to as low as reasonably achievable. COMPARISON: Correlation made with prior MRI of the thoracic spine from June 5, 2013 HISTORY: [facility]: Trauma TECHNOLOGIST PROVIDED HISTORY: Reason for exam:->Trauma What reading provider will be dictating this exam?->CRC FINDINGS: There is no thoracic spine fracture. Vertebral body height is well maintained. No osseous central canal stenosis. Mild degenerative posterior osteophytosis is seen at the level of T5/T6. Stimulating leads are present within the dorsal aspect of the central canal with distal end of the leads at level of for T7. No fracture or malalignment of the thoracic spine. Ct Lumbar Spine Wo Contrast    Result Date: 10/2/2020  EXAMINATION: CT OF THE LUMBAR SPINE WITHOUT CONTRAST  10/2/2020 TECHNIQUE: CT of the lumbar spine was performed without the administration of intravenous contrast. Multiplanar reformatted images are provided for review. Dose modulation, iterative reconstruction, and/or weight based adjustment of the mA/kV was utilized to reduce the radiation dose to as low as reasonably achievable. COMPARISON: July 25, 2019 HISTORY: ORDERING SYSTEM PROVIDED HISTORY: Trauma TECHNOLOGIST PROVIDED HISTORY: Reason for exam:->Trauma What reading provider will be dictating this exam?->CRC FINDINGS: There is stable alignment status post posterior fusion of lumbar spine from levels of L4-S1 with 6 pedicle screws and 2 rods. Hardware is intact. There is evidence of laminectomy at these levels. Interbody grafting material is present at L4/L5 and L5/S1.   Catheter material or neuro stimulating leads enter the dorsal aspect of the spinal canal at level of T12/L1. No evidence of fracture or malalignment of the lumbar spine. No lytic or blastic bone lesion. 1.  Stable, satisfactory alignment status post, posterior fusion of lumbar spine from levels of L4-S1. 2.  No acute fracture or malalignment. Xr Chest Portable    Result Date: 10/2/2020  EXAMINATION: ONE XRAY VIEW OF THE CHEST 10/2/2020 9:21 am COMPARISON: None. HISTORY: ORDERING SYSTEM PROVIDED HISTORY: fall TECHNOLOGIST PROVIDED HISTORY: Reason for exam:->fall What reading provider will be dictating this exam?->CRC FINDINGS: The lungs are without acute focal process. There is no effusion or pneumothorax. The cardiomediastinal silhouette is stable in size. The osseous structures are without acute process. No acute process.        Medications     sodium chloride 50 mL/hr at 10/03/20 1232      bacitracin zinc   Topical BID    aspirin  81 mg Oral Daily    baclofen  10 mg Oral Nightly    citalopram  20 mg Oral Daily    clopidogrel  75 mg Oral Daily    dapagliflozin  10 mg Oral Daily    ezetimibe  10 mg Oral QPM    fenofibrate  54 mg Oral Daily    ferrous sulfate  325 mg Oral Daily with breakfast    folic acid  1 mg Oral Daily    gabapentin  600 mg Oral TID    glipiZIDE  10 mg Oral BID AC    isosorbide mononitrate  60 mg Oral Daily    levothyroxine  100 mcg Oral Daily    metoprolol succinate  25 mg Oral Daily    pantoprazole  40 mg Oral QAM AC    sodium chloride flush  10 mL Intravenous 2 times per day    levetiracetam  500 mg Intravenous Once    Tetanus-Diphth-Acell Pertussis  0.5 mL Intramuscular Once      DIET CARB CONTROL;       ASSESSMENT  -Syncope and collapse  -Closed head injury  -Degenerative disc disease, lumbar  -Diabetes mellitus, type II  -Gastroesophageal reflux disease  -History of coronary artery bypass surgery  -Hyperlipidemia  -Hypothyroidism  -Left cataract  -Lumbar radiculopathy  -Normocytic anemia  -Facet arthropathy, cervical  -Scalp laceration  Will consult neuro  PT/OT  DVT  Prophylaxis  Monitor sugars         Electronically signed by Judy Lanza MD on 10/4/2020 at 11:14 AM

## 2020-10-04 NOTE — PROGRESS NOTES
Chart reviewed. Patient seen and examined. Patient denies dizziness or syncope. Her vital signs are stable. Telemetry reveals sinus rhythm at 65 bpm with occasional PVCs. BUN 30, creatinine 1.2 and potassium 4.3.    Echocardiogram:  Normal left ventricle size. Estimated left ventricle ejection fraction 55   %. No gross regional wall motion abnormality. Normal right ventricular size and function. Technically difficult study: no gross valvular pathology noted within   these limitations. Assessment:  -Syncopal event: Probably due to dehydration and prerenal azotemia.  -History of CABG and stenting.  -Diabetes. Plan:  -Hydration.  -Check orthostatic vital signs.  -Discontinue Lasix.  -Continue other cardiac medications.  -30-day event recorder after hospital discharge.  -Consider Komal Ashley as outpatient to rule out significant myocardial ischemia.  -Will sign off. May call if needed.  -Follow-up with her cardiologist or Dr. Esvin Zazueta, few weeks after hospital discharge.

## 2020-10-04 NOTE — PLAN OF CARE
Problem: Falls - Risk of:  Goal: Will remain free from falls  Description: Will remain free from falls  Outcome: Met This Shift  Goal: Absence of physical injury  Description: Absence of physical injury  Outcome: Met This Shift     Problem: Discharge Planning:  Goal: Discharged to appropriate level of care  Description: Discharged to appropriate level of care  Outcome: Met This Shift

## 2020-10-05 PROBLEM — S16.1XXA CERVICAL STRAIN: Status: ACTIVE | Noted: 2020-10-05

## 2020-10-05 LAB
METER GLUCOSE: 160 MG/DL (ref 74–99)
METER GLUCOSE: 77 MG/DL (ref 74–99)
METER GLUCOSE: 80 MG/DL (ref 74–99)
METER GLUCOSE: 85 MG/DL (ref 74–99)
METER GLUCOSE: 90 MG/DL (ref 74–99)

## 2020-10-05 PROCEDURE — 6370000000 HC RX 637 (ALT 250 FOR IP): Performed by: PSYCHIATRY & NEUROLOGY

## 2020-10-05 PROCEDURE — 2580000003 HC RX 258: Performed by: NURSE PRACTITIONER

## 2020-10-05 PROCEDURE — 82962 GLUCOSE BLOOD TEST: CPT

## 2020-10-05 PROCEDURE — 2060000000 HC ICU INTERMEDIATE R&B

## 2020-10-05 PROCEDURE — 6370000000 HC RX 637 (ALT 250 FOR IP): Performed by: INTERNAL MEDICINE

## 2020-10-05 PROCEDURE — 6370000000 HC RX 637 (ALT 250 FOR IP): Performed by: NURSE PRACTITIONER

## 2020-10-05 RX ORDER — SENNA AND DOCUSATE SODIUM 50; 8.6 MG/1; MG/1
2 TABLET, FILM COATED ORAL DAILY
Status: DISCONTINUED | OUTPATIENT
Start: 2020-10-05 | End: 2020-10-09 | Stop reason: HOSPADM

## 2020-10-05 RX ORDER — MELOXICAM 7.5 MG/1
7.5 TABLET ORAL DAILY
Status: DISCONTINUED | OUTPATIENT
Start: 2020-10-05 | End: 2020-10-09 | Stop reason: HOSPADM

## 2020-10-05 RX ORDER — TIZANIDINE 4 MG/1
4 TABLET ORAL NIGHTLY
Status: DISCONTINUED | OUTPATIENT
Start: 2020-10-05 | End: 2020-10-07

## 2020-10-05 RX ADMIN — GLIPIZIDE 10 MG: 5 TABLET ORAL at 06:48

## 2020-10-05 RX ADMIN — MELOXICAM 7.5 MG: 7.5 TABLET ORAL at 08:46

## 2020-10-05 RX ADMIN — GABAPENTIN 600 MG: 600 TABLET ORAL at 15:32

## 2020-10-05 RX ADMIN — GLIPIZIDE 10 MG: 5 TABLET ORAL at 15:32

## 2020-10-05 RX ADMIN — TIZANIDINE 4 MG: 4 TABLET ORAL at 19:59

## 2020-10-05 RX ADMIN — BACITRACIN ZINC: 500 OINTMENT TOPICAL at 20:00

## 2020-10-05 RX ADMIN — Medication 10 ML: at 19:59

## 2020-10-05 RX ADMIN — EZETIMIBE 10 MG: 10 TABLET ORAL at 17:13

## 2020-10-05 RX ADMIN — DOCUSATE SODIUM 50 MG AND SENNOSIDES 8.6 MG 2 TABLET: 8.6; 5 TABLET, FILM COATED ORAL at 10:48

## 2020-10-05 RX ADMIN — ASPIRIN 81 MG CHEWABLE TABLET 81 MG: 81 TABLET CHEWABLE at 08:45

## 2020-10-05 RX ADMIN — PROMETHAZINE HYDROCHLORIDE 12.5 MG: 25 TABLET ORAL at 23:13

## 2020-10-05 RX ADMIN — FERROUS SULFATE TAB 325 MG (65 MG ELEMENTAL FE) 325 MG: 325 (65 FE) TAB at 08:45

## 2020-10-05 RX ADMIN — CITALOPRAM 20 MG: 20 TABLET, FILM COATED ORAL at 08:47

## 2020-10-05 RX ADMIN — TIZANIDINE 4 MG: 4 TABLET ORAL at 03:51

## 2020-10-05 RX ADMIN — HYDROCODONE BITARTRATE AND ACETAMINOPHEN 1 TABLET: 5; 325 TABLET ORAL at 10:53

## 2020-10-05 RX ADMIN — METOPROLOL SUCCINATE 25 MG: 25 TABLET, EXTENDED RELEASE ORAL at 08:46

## 2020-10-05 RX ADMIN — FOLIC ACID 1 MG: 1 TABLET ORAL at 08:45

## 2020-10-05 RX ADMIN — LEVOTHYROXINE SODIUM 100 MCG: 0.1 TABLET ORAL at 06:48

## 2020-10-05 RX ADMIN — HYDROCODONE BITARTRATE AND ACETAMINOPHEN 1 TABLET: 5; 325 TABLET ORAL at 03:49

## 2020-10-05 RX ADMIN — ISOSORBIDE MONONITRATE 60 MG: 60 TABLET ORAL at 08:47

## 2020-10-05 RX ADMIN — GABAPENTIN 600 MG: 600 TABLET ORAL at 20:00

## 2020-10-05 RX ADMIN — PANTOPRAZOLE SODIUM 40 MG: 40 TABLET, DELAYED RELEASE ORAL at 06:48

## 2020-10-05 RX ADMIN — Medication 10 ML: at 08:47

## 2020-10-05 RX ADMIN — HYDROCODONE BITARTRATE AND ACETAMINOPHEN 1 TABLET: 5; 325 TABLET ORAL at 23:13

## 2020-10-05 RX ADMIN — GABAPENTIN 600 MG: 600 TABLET ORAL at 08:45

## 2020-10-05 RX ADMIN — FENOFIBRATE 54 MG: 54 TABLET ORAL at 08:47

## 2020-10-05 RX ADMIN — BACITRACIN ZINC: 500 OINTMENT TOPICAL at 08:49

## 2020-10-05 RX ADMIN — CLOPIDOGREL 75 MG: 75 TABLET, FILM COATED ORAL at 08:47

## 2020-10-05 RX ADMIN — HYDROCODONE BITARTRATE AND ACETAMINOPHEN 1 TABLET: 5; 325 TABLET ORAL at 17:13

## 2020-10-05 ASSESSMENT — PAIN DESCRIPTION - LOCATION
LOCATION: HEAD;NECK
LOCATION: NECK

## 2020-10-05 ASSESSMENT — PAIN SCALES - GENERAL
PAINLEVEL_OUTOF10: 9
PAINLEVEL_OUTOF10: 5
PAINLEVEL_OUTOF10: 7
PAINLEVEL_OUTOF10: 9
PAINLEVEL_OUTOF10: 0
PAINLEVEL_OUTOF10: 9
PAINLEVEL_OUTOF10: 5
PAINLEVEL_OUTOF10: 7

## 2020-10-05 ASSESSMENT — ENCOUNTER SYMPTOMS
ABDOMINAL PAIN: 0
PHOTOPHOBIA: 0
BACK PAIN: 1
SHORTNESS OF BREATH: 0
TROUBLE SWALLOWING: 0

## 2020-10-05 ASSESSMENT — PAIN DESCRIPTION - PAIN TYPE
TYPE: ACUTE PAIN
TYPE: ACUTE PAIN

## 2020-10-05 NOTE — PROCEDURES
10/5/20 2 pm - Spoke to Ludmila and let her know pt is not safe to have an MRI here due to our coil being receive only for spines.

## 2020-10-05 NOTE — PROGRESS NOTES
Physical Therapy    Facility/Department: [facility]  Initial Assessment    NAME: Jason Marcial  :   MRN: [id number]    Date of Service: 10/5/2020    Physical therapy orders received/treatment attempted and chart review completed. Patient to have spine MRI and not able to be seen. Will attempt at a later time/date as able. Thank you for the opportunity to assist in the care of this patient.     Marizol Alberts, PT, DPT  License [id number]

## 2020-10-05 NOTE — CARE COORDINATION
Met with patient at bedside to explain CM role and discuss transition of care. Patient lives alone in a 1 story home. She has a w walker and cane. Her PCP is Dr. Tejal De La Rosa and she uses [address] on [address]. At this time, she is declining [facility] but would be receptive to [address] and she has used [facility] in the past. Await PT/OT evals to further assist with discharge planning. CM/SW will continue to follow.

## 2020-10-05 NOTE — PROGRESS NOTES
OT SESSION ATTEMPT     Date:10/5/2020  Patient Name: Lieutenant Couch  MRN: [id number]  : 1945  [facility]     Attempted OT session this date:    [] unavailable due to other medical staff currently with pt   [x] on hold, await MRI of spine and neurosurgical recommendations. [] on hold per nursing staff secondary to lab / radiology results    [] declined Occupational Therapy  this date due to ___. Benefits of participation in therapy reviewed with pt.    [] off unit   [] Other:     Will reattempt OT eval at a later time.     [place], [place] [address]

## 2020-10-05 NOTE — PLAN OF CARE
Plan of care    Neurology is following for syncope    Patient had an episode of syncope following and hitting her head. She had been experiencing dizziness and unsteadiness prior to this admission. She has a history of prior lumbar back surgery and a spinal cord stimulator for pain management. Per Dr. Singh Shows her dizziness that she described was fairly significant of cervicogenic component. Due to her profound weakness in her legs neurology suggests PT/OT therapy to help with the management of this condition. Patient is a high fall risk. MRIs were unable to be completed due to her spinal cord stimulator being incompatible. Neurosurgery has been consulted to so evaluate patient.       Assessment:  Dizziness significant of cervicogenic component    Plan:  MRIs were canceled due to incompatibility with our MRI  Continue meloxicam 7.5 mg daily  Zanaflex 4 mg p.o. nightly  PT/OT eval and treat  Recommend OP Neurosurgery  No further testing from Neurology perspective

## 2020-10-05 NOTE — CONSULTS
NEUROSURGERY CONSULTATION     Chief Complaint: syncopal episode, SAH, neck pain    HPI:   Charles Harvey is a 70 y.o.  female who is known to our services. She has a hx of L4-S1 fusion and SCS placement. Pt presents to the hospital after a syncopal episode. She does not remember exactly what happened. Pt states she felt dizzy while in the shower and woke up on the ground. Currently c/o headache, neck pain, and low back pain. No acute fractures on CT scans. Head CT scan demonstrated small subarachnoid hemorrhage, resolved on repeat scan. Denies loss of bowel or bladder, saddle anesthesia, pain down the extremities, numbness, tingling, fever, chills, N/V, or SOB.       Past Medical History:   Diagnosis Date    Arthritis     CAD (coronary artery disease)     CHF [facility]congestive heart failure) ([facility]     Diabetes ([address]     Fibromyalgia     GERD (gastroesophageal reflux disease)     History of cardiovascular stress test 02/17/2014    lexiscan, also 4/21/2015    History of MI (myocardial infarction)     x4; most recent 2016    Hx of blood clots     DVT leg    Hyperlipidemia     Hypertension     Hypothyroidism     Intractable low back pain 6/19/2016    Non-insulin dependent type 2 diabetes mellitus ([address]     NSTEMI (non-ST elevated myocardial infarction) ([address] 11/19/2015     Past Surgical History:   Procedure Laterality Date    APPENDECTOMY      ARTHROCENTESIS Left 8/22/2019    LEFT HIP CORTICOSTERIOD INJECTION UNDER FLUOROSCOPIC GUIDANCE performed by Rod Galicia DO at [facility]  11/15/2017    Dr Ny Cm Right 7 7 15    CATARACT REMOVAL WITH IMPLANT Left 4/26/2016    CHOLECYSTECTOMY      COCCYX REMOVAL      COLONOSCOPY      CORONARY ANGIOPLASTY WITH [address] started needing cardiac care; 5 stents total    CORONARY ANGIOPLASTY WITH STENT PLACEMENT  11/19/2015    Dr Donnelly Half stent 3x18 prox Cx   Suyapa Gibbons; 3 bypass grafts    ENDOSCOPY, COLON, DIAGNOSTIC      HEMORRHOID SURGERY      HYSTERECTOMY      LUMBAR FUSION  6/7/2016    Dr. Srinivasan Mendoza Left 11 18 2015    left lumbar transforaminal nerve block l4-5 l5-s1    NERVE BLOCK Right 07/17/2017    lumbar transforaminal #1    NERVE BLOCK Right 09/06/2017    right knee injection#1    NERVE BLOCK Bilateral 10/24/2018    lumbar facet block     NERVE BLOCK Left 08/22/2019    hip     NERVE SURGERY N/A 6/15/2020    SPINAL CORD STIMULATOR IMPLANT WITH NERVO performed by Gibran Sanz MD at [address]      bilateral feet    OTHER SURGICAL HISTORY N/A 11/04/2019    spinal cord stimulator trial    NC ARTHRS KNE SURG W/MENISCECTOMY MED/LAT W/SHVG Right 6/6/2018    RIGHT KNEE ARTHROSCOPY MEDIAL AND LATERAL MENISECTOMY SYNOVECTOMY AND CHONDROPLASTY performed by Sobeida Barber DO at [address] DX/THER AGNT PARAVERT FACET JOINT, LUMBAR/SAC, 1ST LEVEL Bilateral 10/24/2018    BILATERAL LUMBAR FACET BLOCK L1-2 ,L3-4 WITH X-RAY performed by Edi May DO at [address] N/A 11/4/2019    SPINAL CORD STIMULATOR TRIAL WITH NEVRO performed by Gibran Sanz MD at [address] [facility]  06/15/2020    nevro     TONSILLECTOMY      VASCULAR SURGERY      laser on leaky veins      Family History   Problem Relation Age of Onset    Cancer Father [de-identified]        bladdder   Connye Sole Cancer Brother     COPD Brother     Heart Attack Mother 54    Heart Disease Sister     Diabetes Sister     Heart Disease Brother     Heart Disease Other 58             Diabetes Other     ADHD Other       Social History     Socioeconomic History    Marital status:       Spouse name: Not on file    Number of children: Not on file    Years of education: Not on file    Highest education level: Not on file   Occupational History    Not on file   Social Needs    Financial resource strain: Not on file    Food insecurity     Worry: Not on file     Inability: Not on file    Transportation needs     Medical: Not on file     Non-medical: Not on file   Tobacco Use    Smoking status: Never Smoker    Smokeless tobacco: Never Used   Substance and Sexual Activity    Alcohol use: No    Drug use: No    Sexual activity: Never   Lifestyle    Physical activity     Days per week: Not on file     Minutes per session: Not on file    Stress: Not on file   Relationships    Social connections     Talks on phone: Not on file     Gets together: Not on file     Attends Taoism service: Not on file     Active member of club or organization: Not on file     Attends meetings of clubs or organizations: Not on file     Relationship status: Not on file    Intimate partner violence     Fear of current or ex partner: Not on file     Emotionally abused: Not on file     Physically abused: Not on file     Forced sexual activity: Not on file   Other Topics Concern    Not on file   Social History Narrative    Not on file       Medications:   Current Facility-Administered Medications   Medication Dose Route Frequency Provider Last Rate Last Dose    meloxicam (MOBIC) tablet 7.5 mg  7.5 mg Oral Daily Margareth Barlow MD        tiZANidine (ZANAFLEX) tablet 4 mg  4 mg Oral Nightly Margareth Barlow MD   4 mg at 10/05/20 0351    HYDROcodone-acetaminophen (NORCO) 5-325 MG per tablet 1 tablet  1 tablet Oral Q6H PRN Margareth Barlow MD   1 tablet at 10/05/20 6222    bacitracin zinc ointment   Topical BID Tabitha E Asyaher, DO        perflutren lipid microspheres (DEFINITY) injection 1.65 mg  1.5 mL Intravenous ONCE PRN Hayden Delarosa DO        aspirin chewable tablet 81 mg  81 mg Oral Daily TRINIDAD Murphy - CNP   81 mg at 10/04/20 0813    citalopram (CELEXA) tablet 20 mg 20 mg Oral Daily [facility] , APR[facility] - CNP   20 mg at 10/04/20 0813    clopidogrel (PLAVIX) tablet 75 mg  75 mg Oral Daily [facility], APRN - CNP   75 mg at 10/04/20 0813    dapagliflozin (FARXIGA) tablet 10 mg  10 mg Oral Daily [facility], APRN - CNP        ezetimibe (ZETIA) tablet 10 mg  10 mg Oral QPM [facility] , APR[facility] - CNP   10 mg at 10/04/20 1711    fenofibrate (TRICOR) tablet 54 mg  54 mg Oral Daily [facility], APRN - CNP   54 mg at 10/04/20 0813    ferrous sulfate (IRON 325) tablet 325 mg  325 mg Oral Daily with breakfast [facility], APRN - CNP   325 mg at 62/64/91 1450    folic acid (FOLVITE) tablet 1 mg  1 mg Oral Daily [facility], APRN - CNP   1 mg at 10/04/20 0813    gabapentin (NEURONTIN) tablet 600 mg  600 mg Oral TID [facility] - [facility]   600 mg at 10/04/20 2029    glipiZIDE (GLUCOTROL) tablet 10 mg  10 mg Oral BID AC Xiaoher Sawyer APRN - CNP   10 mg at 10/05/20 0648    isosorbide mononitrate (IMDUR) extended release tablet 60 mg  60 mg Oral Daily [facility], [facility] - [facility]   60 mg at 10/04/20 0813    levothyroxine (SYNTHROID) tablet 100 mcg  100 mcg Oral Daily [facility], APRN - CNP   100 mcg at 10/05/20 9970    metoprolol succinate (TOPROL XL) extended release tablet 25 mg  25 mg Oral Daily [facility], APRN - CNP   25 mg at 10/04/20 0817    pantoprazole (PROTONIX) tablet 40 mg  40 mg Oral QAM AC Xiaoher Sawyer, APRN - CNP   40 mg at 10/05/20 0648    0.9 % sodium chloride infusion   Intravenous Continuous Verimelda Gan, APRN - CNP 50 mL/hr at 10/03/20 1232      sodium chloride flush 0.9 % injection 10 mL  10 mL Intravenous 2 times per day [facility], APRN - CNP   10 mL at 10/04/20 2029    sodium chloride flush 0.9 % injection 10 mL  10 mL Intravenous PRN Verdene Civil, APRN - CNP        promethazine (PHENERGAN) tablet 12.5 mg  12.5 mg Oral Q6H PRN Verdene , APRN - CNP        Or    ondansetron (ZOFRAN) injection 4 mg  4 mg Intravenous Q6H PRN Kendall Links, APRN - CNP        potassium chloride (KLOR-CON M) extended release tablet 40 mEq  40 mEq Oral PRN Kendall Links, APRN - CNP        Or    potassium bicarb-citric acid (EFFER-K) effervescent tablet 40 mEq  40 mEq Oral PRN Kendall Links, APRN - CNP        Or    potassium chloride 10 mEq/100 mL IVPB (Peripheral Line)  10 mEq Intravenous PRN Kendall Links, APRN - CNP        magnesium hydroxide (MILK OF MAGNESIA) 400 MG/5ML suspension 30 mL  30 mL Oral Daily PRN Kendall Links, APRN - CNP        levetiracetam (KEPPRA) 500 mg/100 mL IVPB  500 mg Intravenous Once Hipolito Thomas MD        Tetanus-Diphth-Acell Pertussis (BOOSTRIX) injection 0.5 mL  0.5 mL Intramuscular Once Hipolito Thomas MD            Allergies:    Lipitor; Statins; Atorvastatin; Cymbalta [duloxetine hcl]; Cipro xr; Diazepam; Duloxetine; Erythromycin; Indomethacin; Ketorolac; Ketorolac tromethamine; Lodine [etodolac]; Oscal 500-200 d-3 [oyster shell calcium-d]; Paxil [paroxetine hcl]; Ropinirole; Tromethamine; Trulicity [dulaglutide]; Zithromax [azithromycin]; and Requip [ropinirole hcl]       Review of Systems   Constitutional: Negative for fever. HENT: Negative for trouble swallowing. Eyes: Negative for photophobia. Respiratory: Negative for shortness of breath. Cardiovascular: Negative for chest pain. Gastrointestinal: Negative for abdominal pain. Endocrine: Negative for heat intolerance. Genitourinary: Negative for flank pain. Musculoskeletal: Positive for back pain, gait problem and neck pain. Skin: Negative for wound. Neurological: Positive for dizziness, syncope and headaches. Negative for seizures and numbness. Psychiatric/Behavioral: Negative for confusion. Physical Exam  Constitutional:       Appearance: Normal appearance. She is well-developed. HENT:      Head: Normocephalic.       Comments: Posterior scalp laceration noted  Eyes:      Extraocular Movements: Extraocular movements intact. Conjunctiva/sclera: Conjunctivae normal.      Pupils: Pupils are equal, round, and reactive to light. Neck:      Comments: In soft collar  Tenderness to palpation of cervical spine  Cardiovascular:      Rate and Rhythm: Normal rate. Pulmonary:      Effort: Pulmonary effort is normal.   Abdominal:      General: There is no distension. Skin:     General: Skin is warm and dry. Neurological:      Mental Status: She is alert. Comments: Alert and oriented to person, place, but not to year   CN3-12 intact  Moving all extremities well  Sensation intact to light touch     Psychiatric:         Thought Content:  Thought content normal.          /64   Pulse 66   Temp 97.2 °F (36.2 °C) (Temporal)   Resp 20   Wt 205 lb 11.2 oz (93.3 kg)   SpO2 94%   BMI 38.87 kg/m²        Assessment:   Cervical strain   Hx L4-S1 fusion and SCS placement    Plan:  -D/C MRI  -Outpatient referral to Dr. Elaina Norman in [place] for further evaluation of dizziness  -Continue collar   -Pain control      Electronically signed by Angelica Ward PA-C on 10/5/2020 at 8:11 AM     Agree with above    [employer]

## 2020-10-05 NOTE — PROGRESS NOTES
directed   clinically. .         CT THORACIC SPINE WO CONTRAST   Final Result   No fracture or malalignment of the thoracic spine. CT LUMBAR SPINE WO CONTRAST   Final Result   1. Stable, satisfactory alignment status post, posterior fusion of lumbar   spine from levels of L4-S1.      2.  No acute fracture or malalignment. CT HEAD WO CONTRAST   Final Result   Small volume of acute subarachnoid hemorrhage along the right inferior   temporal convexity. Multiple left parietal and occipital scalp soft tissue hematomas/lacerations. No evidence for acute cervical spine fracture. Findings discussed with the ER at the time of interpretation. CT CERVICAL SPINE WO CONTRAST   Final Result   Small volume of acute subarachnoid hemorrhage along the right inferior   temporal convexity. Multiple left parietal and occipital scalp soft tissue hematomas/lacerations. No evidence for acute cervical spine fracture. Findings discussed with the ER at the time of interpretation. XR PELVIS (1-2 VIEWS)   Final Result   Diffuse osteopenia without convincing evidence of acute fracture. If there   is persistent concern for an occult fracture, correlation with CT could be   made. XR CHEST PORTABLE   Final Result   No acute process.          MRI THORACIC SPINE WO CONTRAST    (Results Pending)   MRI CERVICAL SPINE WO CONTRAST    (Results Pending)   MRI LUMBAR SPINE WO CONTRAST    (Results Pending)       Assessment    Principal Problem:    Syncope and collapse  Active Problems:    DDD (degenerative disc disease), lumbar    Normocytic anemia    Falls frequently    Non-insulin dependent type 2 diabetes mellitus (HCC)    Hypothyroidism    HLD (hyperlipidemia)    GERD (gastroesophageal reflux disease)    DM II (diabetes mellitus, type II), controlled ([facility]    History of coronary artery bypass surgery    Closed head injury    Scalp laceration, initial encounter    Cervical

## 2020-10-06 ENCOUNTER — APPOINTMENT (OUTPATIENT)
Dept: CT IMAGING | Age: 75
DRG: 580 | End: 2020-10-06
Payer: MEDICARE

## 2020-10-06 LAB
B.E.: 2.2 MMOL/L (ref -3–3)
COHB: 1.1 % (ref 0–1.5)
CRITICAL: ABNORMAL
DATE ANALYZED: ABNORMAL
DATE OF COLLECTION: ABNORMAL
HCO3: 26.3 MMOL/L (ref 22–26)
HHB: 6.4 % (ref 0–5)
LAB: ABNORMAL
Lab: ABNORMAL
METER GLUCOSE: 113 MG/DL (ref 74–99)
METER GLUCOSE: 70 MG/DL (ref 74–99)
METER GLUCOSE: 70 MG/DL (ref 74–99)
METER GLUCOSE: 80 MG/DL (ref 74–99)
METER GLUCOSE: 93 MG/DL (ref 74–99)
METHB: 0.2 % (ref 0–1.5)
MODE: ABNORMAL
O2 CONTENT: 15.5 ML/DL
O2 SATURATION: 93.5 % (ref 92–98.5)
O2HB: 92.3 % (ref 94–97)
OPERATOR ID: 7874
PATIENT TEMP: 37 C
PCO2: 39.3 MMHG (ref 35–45)
PH BLOOD GAS: 7.44 (ref 7.35–7.45)
PO2: 66.3 MMHG (ref 75–100)
SOURCE, BLOOD GAS: ABNORMAL
THB: 11.9 G/DL (ref 11.5–16.5)
TIME ANALYZED: 1805

## 2020-10-06 PROCEDURE — 2060000000 HC ICU INTERMEDIATE R&B

## 2020-10-06 PROCEDURE — 97161 PT EVAL LOW COMPLEX 20 MIN: CPT

## 2020-10-06 PROCEDURE — 6370000000 HC RX 637 (ALT 250 FOR IP): Performed by: NURSE PRACTITIONER

## 2020-10-06 PROCEDURE — 97530 THERAPEUTIC ACTIVITIES: CPT

## 2020-10-06 PROCEDURE — 97166 OT EVAL MOD COMPLEX 45 MIN: CPT

## 2020-10-06 PROCEDURE — 82962 GLUCOSE BLOOD TEST: CPT

## 2020-10-06 PROCEDURE — 6370000000 HC RX 637 (ALT 250 FOR IP): Performed by: INTERNAL MEDICINE

## 2020-10-06 PROCEDURE — 70450 CT HEAD/BRAIN W/O DYE: CPT

## 2020-10-06 PROCEDURE — 2580000003 HC RX 258: Performed by: NURSE PRACTITIONER

## 2020-10-06 PROCEDURE — 6370000000 HC RX 637 (ALT 250 FOR IP): Performed by: PSYCHIATRY & NEUROLOGY

## 2020-10-06 PROCEDURE — 82805 BLOOD GASES W/O2 SATURATION: CPT

## 2020-10-06 RX ORDER — HYDROCODONE BITARTRATE AND ACETAMINOPHEN 5; 325 MG/1; MG/1
1 TABLET ORAL EVERY 6 HOURS PRN
Qty: 10 TABLET | Refills: 0 | Status: SHIPPED | OUTPATIENT
Start: 2020-10-06 | End: 2020-10-09

## 2020-10-06 RX ADMIN — ASPIRIN 81 MG CHEWABLE TABLET 81 MG: 81 TABLET CHEWABLE at 09:55

## 2020-10-06 RX ADMIN — GABAPENTIN 600 MG: 600 TABLET ORAL at 17:27

## 2020-10-06 RX ADMIN — Medication 10 ML: at 09:56

## 2020-10-06 RX ADMIN — LEVOTHYROXINE SODIUM 100 MCG: 0.1 TABLET ORAL at 06:03

## 2020-10-06 RX ADMIN — HYDROCODONE BITARTRATE AND ACETAMINOPHEN 1 TABLET: 5; 325 TABLET ORAL at 21:53

## 2020-10-06 RX ADMIN — GABAPENTIN 600 MG: 600 TABLET ORAL at 21:27

## 2020-10-06 RX ADMIN — BACITRACIN ZINC: 500 OINTMENT TOPICAL at 21:28

## 2020-10-06 RX ADMIN — PANTOPRAZOLE SODIUM 40 MG: 40 TABLET, DELAYED RELEASE ORAL at 06:03

## 2020-10-06 RX ADMIN — GABAPENTIN 600 MG: 600 TABLET ORAL at 09:55

## 2020-10-06 RX ADMIN — FOLIC ACID 1 MG: 1 TABLET ORAL at 09:55

## 2020-10-06 RX ADMIN — ISOSORBIDE MONONITRATE 60 MG: 60 TABLET ORAL at 09:56

## 2020-10-06 RX ADMIN — MELOXICAM 7.5 MG: 7.5 TABLET ORAL at 09:57

## 2020-10-06 RX ADMIN — HYDROCODONE BITARTRATE AND ACETAMINOPHEN 1 TABLET: 5; 325 TABLET ORAL at 09:56

## 2020-10-06 RX ADMIN — EZETIMIBE 10 MG: 10 TABLET ORAL at 17:28

## 2020-10-06 RX ADMIN — TIZANIDINE 4 MG: 4 TABLET ORAL at 21:27

## 2020-10-06 RX ADMIN — GLIPIZIDE 10 MG: 5 TABLET ORAL at 06:03

## 2020-10-06 RX ADMIN — Medication 10 ML: at 21:27

## 2020-10-06 RX ADMIN — FENOFIBRATE 54 MG: 54 TABLET ORAL at 09:57

## 2020-10-06 RX ADMIN — CITALOPRAM 20 MG: 20 TABLET, FILM COATED ORAL at 09:56

## 2020-10-06 RX ADMIN — BACITRACIN ZINC: 500 OINTMENT TOPICAL at 09:56

## 2020-10-06 RX ADMIN — METOPROLOL SUCCINATE 25 MG: 25 TABLET, EXTENDED RELEASE ORAL at 09:55

## 2020-10-06 RX ADMIN — CLOPIDOGREL 75 MG: 75 TABLET, FILM COATED ORAL at 09:56

## 2020-10-06 RX ADMIN — DOCUSATE SODIUM 50 MG AND SENNOSIDES 8.6 MG 2 TABLET: 8.6; 5 TABLET, FILM COATED ORAL at 09:55

## 2020-10-06 RX ADMIN — FERROUS SULFATE TAB 325 MG (65 MG ELEMENTAL FE) 325 MG: 325 (65 FE) TAB at 09:55

## 2020-10-06 ASSESSMENT — PAIN SCALES - GENERAL
PAINLEVEL_OUTOF10: 0
PAINLEVEL_OUTOF10: 8
PAINLEVEL_OUTOF10: 4
PAINLEVEL_OUTOF10: 10

## 2020-10-06 ASSESSMENT — PAIN DESCRIPTION - DESCRIPTORS: DESCRIPTORS: ACHING

## 2020-10-06 ASSESSMENT — PAIN DESCRIPTION - FREQUENCY: FREQUENCY: INTERMITTENT

## 2020-10-06 ASSESSMENT — PAIN DESCRIPTION - PAIN TYPE: TYPE: SURGICAL PAIN

## 2020-10-06 ASSESSMENT — PAIN DESCRIPTION - LOCATION: LOCATION: HEAD

## 2020-10-06 NOTE — PROGRESS NOTES
treatment. Patient found in the bathroom and assisted off the commode. Patient given [employer] for balance and safety as she reported being \"shaky\" when assisted into the bathroom. Patient with one small retro LOB while standing at the sink due to knees buckling and required hands on for safety. Patient demonstrated slow, guarded gait speed with short stride length, but steadier on her feet with WW. Patient fatigued and not able to ambulate farther. Patient assisted to seated in the bedside chair following gait assessment with call light and tray table in reach. Treatment:  Patient practiced and was instructed in the following treatment:     Bed mobility: NT   Transfer training: Verbal/tactile cues to facilitate proper hand placement, technique and safety during sit to stand task.  Gait training: Verbal and tactile cues to facilitate upright posture and safety as well as provided with physical assistance to complete task. Cues for increased stride length and gait speed.  Therapeutic exercises: As noted above    Pt's/ family goals   1. To go home. Patient and or family understand(s) diagnosis, prognosis, and plan of care. Yes     PLAN OF CARE:    Current Treatment Recommendations     [x] Strengthening     [] ROM   [x] Balance Training   [x] Endurance Training   [x] Transfer Training   [x] Gait Training   [x] Stair Training   [x] Positioning   [x] Safety and Education Training   [x] Patient/Caregiver Education   [] HEP  [] Other       PT care will be provided in accordance with the objectives noted above and progressed and updated when appropriate. Exercises and functional mobility practice will be used as well as appropriate assistive devices or modalities to obtain goals. Patient and family education will also be administered as needed. Frequency of treatments: 2-5x/week x 1-2 weeks.     Time in  0902  Time out  0920    Total Treatment Time  10 minutes     Evaluation Time includes thorough review of current

## 2020-10-06 NOTE — PROGRESS NOTES
OCCUPATIONAL THERAPY INITIAL EVALUATION      Date:10/6/2020  Patient Name: Becca Olsen  MRN: [id number]  : 1945  Room: [address] [place], [place] [address]      [address], OTR/L [address][id number]    AM-PAC Daily Activity Raw Score: 15/24  Recommended Adaptive Equipment: w/w, TBD for additional AE    Diagnosis: SAH (subarachnoid hemorrhage) ([facility] [I60.9]  Syncope and collapse [R55]     Pt presented to ED on 10/2/20 following fall w/ syncope. SAH, multiple left parietal and occipital scalp hematomas or lacerations. Cervical strain  S/p scalp laceration repair on 10/2  Referring physician: TRINIDAD Vargas - CNP     Pertinent Medical History: arthritis, CAD, CHF, diabetes, fibromyalgia, MI, DVT, HTN, NSTEMI, spinal cord stimulator (2020)    Precautions:  Falls, bed alarm, c-collar (cervical strain), dizziness     Home Living: Pt lives alone in 1 floor home.  2-3 ACE, 1 handrail   Bathroom setup: tub/shower with grab bar   Equipment owned: [facility], shower chair    Prior Level of Function: independent with ADLs , independent with IADLs; ambulated independently w/ SPC PRN  Driving: yes    Pain Level: Pt c/o 7/10 neck and back pain this session ; reinforced pain management strategies    Cognition: A&O: 3/4; Follows 1 step directions inconsistently (~75% of session)  Mild confusion noted intermittently - cues to redirect provided   Memory:  fair    Sequencing:  fair    Problem solving:  fair    Judgement/safety:  fair      Functional Assessment:   Initial Eval Status  Date: 10/6/20 Treatment Status  Date: Short Term Goals/LTG  Treatment frequency: 1-4x/wk   Feeding Stand by Assist   Modified [place]    Grooming Minimal Assist   Seated position - unable to tolerate in standing d/t dizziness  Modified [place]    UB Dressing Minimal Assist   Modified [place]    LB Dressing Moderate Assist   Stand by Assist    Bathing Moderate Assist  Stand by Assist    Toileting Moderate Assist   Stand by Assist    Bed Mobility Rolling: Stand by Assist   Supine to sit: NT  Sit to supine: Minimal Assist   Rolling: Independent   Supine to sit: Modified [place]   Sit to supine: Modified [place]    Functional Transfers Min A  Supervision   Functional Mobility Min A w/ w/w  Steps from chair>EOB  Limited by c/o dizziness  Supervision   Balance Sitting: SBA  Standing: Min A w/ w/w     Activity Tolerance Fair-  Fair+   Visual/  Perceptual Glasses: readers                Hand dominance: R   Strength ROM Additional Info:    RUE  Distal: 3/5   Deferred proximal d/t neck/back pain WFL   good  and wfl FMC/dexterity noted during ADL tasks       LUE Distal: 3/5  WFL   good  and wfl FMC/dexterity noted during ADL tasks       Hearing: WFL  Sensation:  No c/o numbness or tingling  Tone: WFL   Edema: none noted                   Comments: Upon arrival patient seated in chair. Pt agreeable to OT session this date. RN clearance. At end of session, patient lying in bed (bed alarm on) with call light and phone within reach, all lines and tubes intact. Overall patient demonstrated decreased independence and safety during completion of ADL/functional transfer/mobility tasks. Pt would benefit from continued skilled OT to increase safety and independence with completion of ADL/IADL tasks for functional independence and quality of life. Treatment: OT treatment provided this date includes:  Facilitation of unsupported sitting balance (addressing posture and weight shifting impacting ADL's), functional transfers (various surfaces w/ education/mod cues for safety/hand placement and sequencing), standing tolerance tasks (addressing posture, safety, balance and activity tolerance to prep for ADL's), steps from chair>EOB with w/w (in preparation to/from bathroom w/ education/cuing on posture, w/w management, sequencing and safety.  Distance limited by c/o dizziness and noted fatigue) and bed mobility (sit>supine utilizing logroll technique w/ cues and education). Therapist facilitated self-care retraining: UB/LB self-care tasks (simulated gown, socks), simulated toileting task and seated grooming tasks while educating pt on modified techniques, posture, safety and energy conservation techniques. Skilled monitoring of HR, O2 sats and pts response to treatment. BP (seated in chair): 157/84  BP (semi-supine following transfers/light mobility): 174/65  RN notified/aware    mod  Profile and History- med (extensive chart review)  Assessment of Occupational Performance and Identification of Deficits- med  Clinical Decision Making- med    Assessment of current deficits   Functional mobility [x]  ADLs [x] Strength [x]  Cognition [x]  Functional transfers  [x] IADLs [x] Safety Awareness [x]  Endurance [x]  Fine Motor Coordination [] Balance [x] Vision/perception [] Sensation []   Gross Motor Coordination [] ROM [] Delirium []                  Motor Control []    Plan of Care: OT 1-3x/week for 5-7 days PRN   [x] ADL retraining/AE recommendations   [x] Energy Conservation Techniques/Strategies    [x] Neuromuscular Re-Education   [x] Functional Transfer Training         [x] Functional Mobility Training          [x] Cognitive Re-Training         [] Splinting/Positioning Needs           [x] Therapeutic Activity   [x]Therapeutic Exercise   [] Visual/Perceptual   [] Delirium Prevention/Treatment   [x] Positioning to Improve Functional [place], Safety, and Skin Integrity   [x] Patient and/or Family Education to Increase Safety and Functional [place]   [] Other:    Rehab Potential: Good for established goals    Patient / Family Goal: Not stated     Patient and/or family were instructed on diagnosis, prognosis/goals and plan of care. Demonstrated fair understanding. [] Malnutrition indicators have been identified and nursing has been notified to ensure a dietitian consult is ordered.        Mod Evaluation completed +              Time In: 09:31  Time Out: 09:55  Total Treatment Time: 15 minutes   Min Units   OT Eval Low 67117     OT Eval Medium 97166 X 1   OT Eval High 83391     OT Re-Eval 90258     Therapeutic Ex 63472     Therapeutic Activities 27928 10 1   ADL/Self Care 87880 5 0   Orthotic Management 27245     Neuro Re-Ed 44916     Non-Billable Time     TOTAL TIMED TREATMENT 15 645 [facility] [place], OTR/L #[id number]

## 2020-10-06 NOTE — PLAN OF CARE
Was advised that patient was becoming more confused. Went to check on patient and she was alert and oriented x3. She does have a headache but could not tell me if it was worse than before. BS was 70 and stat CTH was ordered to rule out stroke or extension of bleed.

## 2020-10-06 NOTE — PROGRESS NOTES
Notified neurology of patient becoming more lethargic and dizzy. STAT CT head ordered. Notified Dr. Gary Steven.

## 2020-10-06 NOTE — PROGRESS NOTES
Subjective:  Feeling better   No CP or SOB  No fever or chills   No uncontrolled pain  No vomiting or diarrhea     Objective:    /60   Pulse 58   Temp 97.4 °F (36.3 °C) (Temporal)   Resp 20   Wt 197 lb 3.2 oz (89.4 kg)   SpO2 93%   BMI 37.26 kg/m²     24HR INTAKE/OUTPUT:      Intake/Output Summary (Last 24 hours) at 10/6/2020 0737  Last data filed at 10/5/2020 1200  Gross per 24 hour   Intake 900 ml   Output --   Net 900 ml       General appearance: NAD, conversant  Neck: FROM, supple   Lungs: Clear bilaterally no wheezes, no rhonchi, no crackles  CV: RRR, no MRGs; normal carotid upstroke and amplitude without Bruits  Abdomen: Soft, non-tender; no masses or HSM  Extremities: No edema, no cyanosis, no clubbing  Skin: Intact no rash, no lesions, no ulcers    Psych: Alert and oriented normal affect  Neuro: Nonfocal  Most Recent Labs  Lab Results   Component Value Date    WBC 7.2 10/02/2020    HGB 11.7 10/02/2020    HCT 35.4 10/02/2020     10/02/2020     10/02/2020    K 4.3 10/02/2020     10/02/2020    CREATININE 1.2 (H) 10/02/2020    BUN 30 (H) 10/02/2020    CO2 27 10/02/2020    GLUCOSE 135 (H) 10/02/2020    ALT 25 10/02/2020    AST 24 10/02/2020    INR 1.2 10/02/2020    TSH 1.070 07/05/2020    LABA1C 6.1 (H) 07/05/2020    LABMICR <12.0 07/24/2019     No results for input(s): MG in the last 72 hours. Lab Results   Component Value Date    CALCIUM 9.3 10/02/2020    PHOS 3.4 07/30/2020        CT HEAD WO CONTRAST   Final Result   1. Mild soft tissue swelling, small subcutaneous/subgaleal hematoma, and   minimal soft tissue emphysema, are scattered about the left parietal   high-convexity scalp, lessening inferiorly over the left occipital and   suboccipital regions. Within the limits of this exam, no definite associated   fracture is identified.       2.   Negative for acute intracranial process, within the limits of this   non-contrast CT exam.  Consider follow-up CT versus MR imaging, as directed   clinically. .         CT THORACIC SPINE WO CONTRAST   Final Result   No fracture or malalignment of the thoracic spine. CT LUMBAR SPINE WO CONTRAST   Final Result   1. Stable, satisfactory alignment status post, posterior fusion of lumbar   spine from levels of L4-S1.      2.  No acute fracture or malalignment. CT HEAD WO CONTRAST   Final Result   Small volume of acute subarachnoid hemorrhage along the right inferior   temporal convexity. Multiple left parietal and occipital scalp soft tissue hematomas/lacerations. No evidence for acute cervical spine fracture. Findings discussed with the ER at the time of interpretation. CT CERVICAL SPINE WO CONTRAST   Final Result   Small volume of acute subarachnoid hemorrhage along the right inferior   temporal convexity. Multiple left parietal and occipital scalp soft tissue hematomas/lacerations. No evidence for acute cervical spine fracture. Findings discussed with the ER at the time of interpretation. XR PELVIS (1-2 VIEWS)   Final Result   Diffuse osteopenia without convincing evidence of acute fracture. If there   is persistent concern for an occult fracture, correlation with CT could be   made. XR CHEST PORTABLE   Final Result   No acute process. Assessment    Principal Problem:    Syncope and collapse  Active Problems:    DDD (degenerative disc disease), lumbar    Normocytic anemia    Falls frequently    Non-insulin dependent type 2 diabetes mellitus (HCC)    Hypothyroidism    HLD (hyperlipidemia)    GERD (gastroesophageal reflux disease)    DM II (diabetes mellitus, type II), controlled ([facility] [address]    History of coronary artery bypass surgery    Closed head injury    Scalp laceration, initial encounter    Cervical strain  Resolved Problems:    * No resolved hospital problems.  *      Plan:  74-year-old female history of diabetes, hypertension, CAD admitted with syncope, fall,
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
## 2020-10-07 LAB
METER GLUCOSE: 104 MG/DL (ref 74–99)
METER GLUCOSE: 175 MG/DL (ref 74–99)
METER GLUCOSE: 49 MG/DL (ref 74–99)
METER GLUCOSE: 51 MG/DL (ref 74–99)
METER GLUCOSE: 59 MG/DL (ref 74–99)
METER GLUCOSE: 67 MG/DL (ref 74–99)
METER GLUCOSE: 79 MG/DL (ref 74–99)
METER GLUCOSE: 96 MG/DL (ref 74–99)

## 2020-10-07 PROCEDURE — 2580000003 HC RX 258: Performed by: INTERNAL MEDICINE

## 2020-10-07 PROCEDURE — 6360000002 HC RX W HCPCS: Performed by: NURSE PRACTITIONER

## 2020-10-07 PROCEDURE — 6370000000 HC RX 637 (ALT 250 FOR IP): Performed by: INTERNAL MEDICINE

## 2020-10-07 PROCEDURE — 6370000000 HC RX 637 (ALT 250 FOR IP): Performed by: PSYCHIATRY & NEUROLOGY

## 2020-10-07 PROCEDURE — 82962 GLUCOSE BLOOD TEST: CPT

## 2020-10-07 PROCEDURE — 6370000000 HC RX 637 (ALT 250 FOR IP): Performed by: NURSE PRACTITIONER

## 2020-10-07 PROCEDURE — 99232 SBSQ HOSP IP/OBS MODERATE 35: CPT | Performed by: NURSE PRACTITIONER

## 2020-10-07 PROCEDURE — 96375 TX/PRO/DX INJ NEW DRUG ADDON: CPT

## 2020-10-07 PROCEDURE — 2580000003 HC RX 258: Performed by: NURSE PRACTITIONER

## 2020-10-07 PROCEDURE — 1200000000 HC SEMI PRIVATE

## 2020-10-07 RX ORDER — GLIPIZIDE 5 MG/1
5 TABLET ORAL
Qty: 60 TABLET | Refills: 3 | DISCHARGE
Start: 2020-10-08 | End: 2021-04-22

## 2020-10-07 RX ORDER — DEXTROSE MONOHYDRATE 50 MG/ML
100 INJECTION, SOLUTION INTRAVENOUS PRN
Status: DISCONTINUED | OUTPATIENT
Start: 2020-10-07 | End: 2020-10-09 | Stop reason: HOSPADM

## 2020-10-07 RX ORDER — DEXTROSE MONOHYDRATE 25 G/50ML
12.5 INJECTION, SOLUTION INTRAVENOUS PRN
Status: DISCONTINUED | OUTPATIENT
Start: 2020-10-07 | End: 2020-10-09 | Stop reason: HOSPADM

## 2020-10-07 RX ORDER — GLIPIZIDE 5 MG/1
5 TABLET ORAL
Status: DISCONTINUED | OUTPATIENT
Start: 2020-10-08 | End: 2020-10-09 | Stop reason: HOSPADM

## 2020-10-07 RX ORDER — POLYETHYLENE GLYCOL 3350 17 G/17G
17 POWDER, FOR SOLUTION ORAL DAILY
Status: DISCONTINUED | OUTPATIENT
Start: 2020-10-07 | End: 2020-10-09 | Stop reason: HOSPADM

## 2020-10-07 RX ORDER — NICOTINE POLACRILEX 4 MG
15 LOZENGE BUCCAL PRN
Status: DISCONTINUED | OUTPATIENT
Start: 2020-10-07 | End: 2020-10-09 | Stop reason: HOSPADM

## 2020-10-07 RX ORDER — SENNA AND DOCUSATE SODIUM 50; 8.6 MG/1; MG/1
2 TABLET, FILM COATED ORAL DAILY
DISCHARGE
Start: 2020-10-08 | End: 2022-07-05 | Stop reason: ALTCHOICE

## 2020-10-07 RX ADMIN — CITALOPRAM 20 MG: 20 TABLET, FILM COATED ORAL at 08:34

## 2020-10-07 RX ADMIN — Medication 10 ML: at 08:34

## 2020-10-07 RX ADMIN — PANTOPRAZOLE SODIUM 40 MG: 40 TABLET, DELAYED RELEASE ORAL at 06:37

## 2020-10-07 RX ADMIN — BACITRACIN ZINC: 500 OINTMENT TOPICAL at 08:35

## 2020-10-07 RX ADMIN — MELOXICAM 7.5 MG: 7.5 TABLET ORAL at 08:34

## 2020-10-07 RX ADMIN — EZETIMIBE 10 MG: 10 TABLET ORAL at 18:20

## 2020-10-07 RX ADMIN — ISOSORBIDE MONONITRATE 60 MG: 60 TABLET ORAL at 08:34

## 2020-10-07 RX ADMIN — GABAPENTIN 600 MG: 600 TABLET ORAL at 13:44

## 2020-10-07 RX ADMIN — Medication 10 ML: at 20:08

## 2020-10-07 RX ADMIN — LEVOTHYROXINE SODIUM 100 MCG: 0.1 TABLET ORAL at 05:03

## 2020-10-07 RX ADMIN — HYDROCODONE BITARTRATE AND ACETAMINOPHEN 1 TABLET: 5; 325 TABLET ORAL at 06:37

## 2020-10-07 RX ADMIN — DEXTROSE MONOHYDRATE 12.5 G: 25 INJECTION, SOLUTION INTRAVENOUS at 10:44

## 2020-10-07 RX ADMIN — DOCUSATE SODIUM 50 MG AND SENNOSIDES 8.6 MG 2 TABLET: 8.6; 5 TABLET, FILM COATED ORAL at 08:34

## 2020-10-07 RX ADMIN — POLYETHYLENE GLYCOL 3350 17 G: 17 POWDER, FOR SOLUTION ORAL at 13:44

## 2020-10-07 RX ADMIN — GABAPENTIN 600 MG: 600 TABLET ORAL at 08:34

## 2020-10-07 RX ADMIN — BACITRACIN ZINC: 500 OINTMENT TOPICAL at 20:09

## 2020-10-07 RX ADMIN — FOLIC ACID 1 MG: 1 TABLET ORAL at 08:33

## 2020-10-07 RX ADMIN — HYDROCODONE BITARTRATE AND ACETAMINOPHEN 1 TABLET: 5; 325 TABLET ORAL at 15:53

## 2020-10-07 RX ADMIN — FENOFIBRATE 54 MG: 54 TABLET ORAL at 08:34

## 2020-10-07 RX ADMIN — FERROUS SULFATE TAB 325 MG (65 MG ELEMENTAL FE) 325 MG: 325 (65 FE) TAB at 08:34

## 2020-10-07 RX ADMIN — METOPROLOL SUCCINATE 25 MG: 25 TABLET, EXTENDED RELEASE ORAL at 08:34

## 2020-10-07 RX ADMIN — GABAPENTIN 600 MG: 600 TABLET ORAL at 20:07

## 2020-10-07 RX ADMIN — GLIPIZIDE 10 MG: 5 TABLET ORAL at 06:37

## 2020-10-07 RX ADMIN — ONDANSETRON 4 MG: 2 INJECTION INTRAMUSCULAR; INTRAVENOUS at 20:18

## 2020-10-07 ASSESSMENT — PAIN SCALES - GENERAL
PAINLEVEL_OUTOF10: 2
PAINLEVEL_OUTOF10: 9
PAINLEVEL_OUTOF10: 9

## 2020-10-07 NOTE — PROGRESS NOTES
Consult received and chart reviewed with Dr Jun Guerrero. Patient appropriate for ARU. Will accept pending medical stability, completed testing, precert approval, negative COVID and bed availability.  Speech and cognition eval ordered

## 2020-10-07 NOTE — PROGRESS NOTES
Acute Rehab Pre-Admission Screen      Referral date: 10/7/20  Onset/Hospital Admit Date: 10/2/2020  8:02 AM    Current Location: 8522/8522-B    Name: Charles Harvey  Birthdate: [birth year]  Age: 70 y.o. Admitting Diagnosis: SAH and TBI with LOC   Address: [address] [place], [place] [address]  Home Phone: [phone] (home)  Freddy Rushing #:     Sex: female  Race:   Marital Status:    Ethnic/Cultural/Moravian Considerations: Adventist    Advanced Directives: [x] Full Code  [] [address] [] Medications only       [] Living Will  [] DPOA      []Organ donor      [] No mechanical breathing or ventilation     [] no tube feeding, nutrition or hydration      [x] Patient does not have advanced directives or living will         COVERAGE INFORMATION   Primary Insurer: [employer] medicare  Payor Contact:   Phone:   [facility]:[phone]  Authorization #: [id number]    Verified coverage: [] Patient  [] Family/caregiver    [x] financial department [] insurance carrier    COVID SCREEN DATE: 10/8/20 Result: (negative, positive)      MEDICAL UPDATE:  History of present admission: 70year old female admitted 10/2/20 after an episode of syncope and collapse. Patient was in the bathroom when she began to feel dizzy. She had been having history of spinning sensations for some time. She was in the shower bent over and became extremely dizzy and fell. This was her first fall, falling backwards hitting her head and losing consciousness. This caused a laceration to the back of her head with bleeding associated. Unclear as to how long she was unconscious. Initial CT showed small SAH. Laceration repair was performed     MRI brain and C-spine were ordered but due to patient's spinal cord stimulator being incompatible to her MRI machine this was not completed. Cervical strain. C-collar ordered    10/6- episode of confusion and stat CT head was completed to rule out any acute findings.   CT of head was stable with no new SALIVARY GLAND SURGERY      SPINAL CORD STIMULATOR SURGERY  06/15/2020    nevro     TONSILLECTOMY      VASCULAR SURGERY      laser on leaky veins       Past Medical:  Past Medical History:   Diagnosis Date    Arthritis     CAD (coronary artery disease)     CHF (congestive heart failure) (HCC)     Diabetes (HCC)     Fibromyalgia     GERD (gastroesophageal reflux disease)     History of cardiovascular stress test 02/17/2014    lexiscan, also 4/21/2015    History of MI (myocardial infarction)     x4; most recent 2016    Hx of blood clots     DVT leg    Hyperlipidemia     Hypertension     Hypothyroidism     Intractable low back pain 6/19/2016    Non-insulin dependent type 2 diabetes mellitus ([facility] [address]     NSTEMI (non-ST elevated myocardial infarction) ([facility] [address] 11/19/2015       Current Co-morbidities:  [] Alzheimer's   [] Dysphasia     [] Parkinsonism  [] Amputation   [] GERD  [] Peripheral artery disease   [] Anemia      [] Encephalopathy  [] Peripheral vascular disease  [] Anxiety   [] Gangrene   [] Pneumonia  [] Aphasia   [] Gout    [] Polyneuropathy  [] Asthma   [] Heart Failure (diastolic) [] Post-polio syndrome  [] Atrial fibrillation  [] Heart Failure (left-sided) [] Pseudomonas enteritis   [] Blind    [] Heart Failure (right-sided) [] Pulmonary embolism  [] Cellulitis     [] Heart Failure (systolic) [] Renal dialysis  [] Clostridium difficile  [] Hemiparesis   [] Renal failure  [x] Congestive heart failure [x] Hypertension   [] Rheumatoid arthritis  [] COPD   [] Hypotension   [] Seizure disorder   [x] Coronary Artery Disease [] Hypothyroidism  [] Septicemia   [] Deaf    [] Hyperlipidemia   [] Sleep apnea  [] Depression   [] Morbid obesity  [] Spinal cord injury  [x] Diabetes   [] MRSA   [] Stroke  [] Diabetic nephropathy  [x] Myocardial infarction  [] Tracheostomy  [] Diabetic neuropathy  [x] Osteoarthritis  [x] Traumatic brain injury   [] Diabetic retinopathy  [] Osteoporosis   [] Urinary tract infection  [] DVT    [] Pancytopenia  [] Vocal cord paralysis  []  Spinal stenosis   []  kidney disease [] VRE  [] Post op    [x] fibromyalgia   [x] spinal cord stimulator       Medical/Functional Conditions requiring inpatient rehabilitation: Requires multidisciplinary treatment including PM&R physician daily care, 24 hour rehabilitation nursing, physical therapy, occupational therapy, rehabilitation psychology, recreation therapy and rehabilitation social work, nutrition services due to new deficits     Risk for Medical/Clinical Complications: Falls, injury, pain, skin breakdown, abnormal vitals, abnormal labs, DVT, PE, pneumonia, decreased mobility, neuro changes     CLINICAL DATA:     Height : 5'1     Weight:  195 lbs   BMI: 36.94       Date: 10/7/20 Date: 10/8/20 Date:    temperature 97.4 97.5    pulse 61 55    respirations 18 16    Blood pressure 135/64 139/59    Pulse oximeter 95% room air        ALLERGIES: Lipitor; Statins; Atorvastatin; Cymbalta [duloxetine hcl]; Cipro xr; Diazepam; Duloxetine; Erythromycin; Indomethacin; Ketorolac; Ketorolac tromethamine; Lodine [etodolac]; Oscal 500-200 d-3 [oyster shell calcium-d]; Paxil [paroxetine hcl]; Ropinirole; Tromethamine; Trulicity [dulaglutide];  Zithromax [azithromycin]; and Requip [ropinirole hcl]    DIET : DIET CARB CONTROL;    Current Lab and Diagnostic Tests:   Recent Results (from the past 24 hour(s))   POCT Glucose    Collection Time: 10/07/20 10:18 AM   Result Value Ref Range    Meter Glucose 51 (L) 74 - 99 mg/dL   POCT Glucose    Collection Time: 10/07/20 10:39 AM   Result Value Ref Range    Meter Glucose 59 (L) 74 - 99 mg/dL   POCT Glucose    Collection Time: 10/07/20 11:02 AM   Result Value Ref Range    Meter Glucose 175 (H) 74 - 99 mg/dL   POCT Glucose    Collection Time: 10/07/20  6:19 PM   Result Value Ref Range    Meter Glucose 67 (L) 74 - 99 mg/dL   POCT Glucose    Collection Time: 10/07/20  7:54 PM   Result Value Ref Range    Meter Glucose 104 malalignment. Xr Chest Portable  Result Date: 10/2/2020  No acute process.        Additional labs or diagnostic studies needed before admission to rehabilitation unit:  none    Medications:   glipiZIDE  5 mg Oral QAM AC    polyethylene glycol  17 g Oral Daily    meloxicam  7.5 mg Oral Daily    sennosides-docusate sodium  2 tablet Oral Daily    bacitracin zinc   Topical BID    aspirin  81 mg Oral Daily    citalopram  20 mg Oral Daily    clopidogrel  75 mg Oral Daily    dapagliflozin  10 mg Oral Daily    ezetimibe  10 mg Oral QPM    fenofibrate  54 mg Oral Daily    ferrous sulfate  325 mg Oral Daily with breakfast    folic acid  1 mg Oral Daily    gabapentin  600 mg Oral TID    isosorbide mononitrate  60 mg Oral Daily    levothyroxine  100 mcg Oral Daily    metoprolol succinate  25 mg Oral Daily    pantoprazole  40 mg Oral QAM AC    sodium chloride flush  10 mL Intravenous 2 times per day    levetiracetam  500 mg Intravenous Once    Tetanus-Diphth-Acell Pertussis  0.5 mL Intramuscular Once      dextrose       glucose, dextrose, glucagon (rDNA), dextrose, HYDROcodone 5 mg - acetaminophen, perflutren lipid microspheres, sodium chloride flush, promethazine **OR** ondansetron, potassium chloride **OR** potassium alternative oral replacement **OR** potassium chloride, magnesium hydroxide    SPECIAL PRECAUTIONS: [x] No current precautions  [] Cardiac  [] Renal [] Sternal [] Respiratory      [] Neurological           [] Hip  [] Spinal [] Seizure  [] Aspiration  [] Isolation precautions:    [] Contact   [] Respiratory   [] Protective     [] Droplet    [x] Weight Bearing precautions:         [] Non Weight Bearing :         [] Toe Touch Weight Bearing :        [] Partial Weight Bearing :         [x] Weight Bearing as Tolerated :         [x] Fall Risk:   [x] Recent history of falls [x] Falls risk level (Rodriguez Scale): high      [x] Bed Alarm    [] Do not leave alone in the bathroom    [x] Chair Alarm    [] Cognitive impairment      [] One to One supervision  [] Sitter / Tele sitter   [] Safety enclosure bed  [x] Decreased balance     SPECIAL REHABILITATION NEEDS:   [x] IV Therapy: [x] PRN Adapter  [] Midline  [] PICC      [] Central Line    [] TPN       [] Oxygen: [] Trach [] Bi-PAP [] CPAP  [] Nasal cannula  [] Liters:      [x] Wound Care:   [] Pressure ulcers(stage and location) -    [] Wound vac   [x] Wound or incision care- head laceration repaired    [x] Pain Management (level of pain, meds): 10-0, back, neck, head.  Oxycodone     [] Incontinence Bladder [] Demarco  Insertion date:    []Hemodialysis and  Frequency:   [] Incontinence Bowel    [x] Last bowel movement : 10/7/20    Substance use history: [] Yes  [x] No   [] Tobacco  [] Alcohol  [] Other     [] Ethnic  [] Cultural  [] Spiritual  [] Language [] Needs  [] Other than English  [] Hearing Impaired  [] Visually Impaired  [] Speaking Impaired  [] Blind  [] Special equipment:  [] Devices/Splints  [] Type   [x] Brace   [x] Type- c collar  [] Bariatric bed  [] Extra wide commode  [] Extra wide wheelchair [] Extra wide walker  [] Gerard walker  [] Gerard wheelchair  [] Transfer lift    [] Other equipment     FUNCTIONAL STATUS PT / [place] / Hannah Walton:  Orvel Pronto / EVAL Discipline 1nitial: 10/6/20 Follow Up: 10/8/20 Current:    Eating OT Stand by Assist .Modified Independent    Grooming OT Minimum assistance Stand by Assist    Bathing OT Moderate Assist Minimum assistance    Dressing Upper Extremity OT Minimum assistance Stand by Assist    Dressing Lower Extremity OT Moderate Assist Minimum assistance    Toileting OT Moderate Assist Stand by Assist    Toilet Transfers OT Minimum assistance Minimum assistance    Tub/Shower Transfers OT nt nt    Homemaking OT nt nt    Bed Mobility PT Minimum assistance nt    Bed/Wheelchair Transfers PT Minimum assistance Minimum assistance    Locomotion Walk / Wheelchair  Device:  Distance: PT 80 ft with [employer] Minimum assistance 45 ft x2 with [employer] Minimum assistance      Endurance PT Fair- fair    Expression SP      Social Interaction SP      Problem Solving SP      Memory SP      Comprehension SP      Swallowing SP      Bowel Management NSG continent     Bladder Management NSG continent continent      Comments on Functional Status: Able to tolerate 3 hours of therapy a day with increased verbal cues.      [x] Able to participate a minimum of 3 hours per day of therapy intervention    Required treatments/services: [x] Rehabilitation nursing [] Dietitian / nurtition                 [x] Case management  [] Respiratory Therapy      [x] Social work   [] Other     Required Therapy:  Therapy Hours per Day Days per Week Therapeutic Interventions Required   [x] Physical Therapy 1 5-7 Gait, transfers, Safety, strength, education, endurance   [x] Occupational Therapy 1 5-7 ADLs, IADLs, Safety, strength, education, endurance   [x] Speech Pathology 1 5-7 Speech, cognition, safety, education   [] Prosthetics / Orthotics       []         Anticipated Discharge Plan:   Anticipated DME Needs:  [x] Home     [] Commode   [] Alone    [] Wheelchair   [x] Supervised    [] Walker   [] Assist    [] Oxygen        [] Hospital Bed  [] Assisted Living    [] Ramp        [x] To Be Determined    Anticipated Home Health Services:  Anticipated Outpatient Services:  [] PT       [] PT  [] OT      [] OT  [] Speech     [] Speech  [] Nursing     [] Dialysis  [] Aide      [x] To Be Determined  [x] To Be Determined    Anticipated support group:  [] Amputation  [] Multiple Sclerosis  [] Stroke  [x] Brain Injury  [] Spinal cord injury  [] Other     Barriers to discharge: Impaired mobility, impaired self care, impaired balance    Discharge Support: [] Patient lives alone and does not have a caregiver available     [x] Patient has a caregiver available     [x] Discharge plan has been verified with patient's caregiver      [x] Caregiver is in agreement with the discharge plan     Expected functional status for safe discharge: Modified independent    Patient/support person goals: go home independently    Expected length of stay: 1-2 weeks    Discussed expected length of stay and agreeable to IRF plan: [x] Yes   [] No    Impairment Group Category: 2.22    Etiological Diagnosis: TBI    Primary Rehabilitation Diagnosis: TBI    Electronically signed by Carmine Thornton RN on 10/8/2020 at 9:03 AM    Prescreen completed __________________________________ (signature of prescreener)    Date:    Time:      JUSTIFICATION FOR ADMISSION TO ACUTE REHABILITATION:  Patient has suffered decline in functional abilities for gait, transfers, speech, swallowing, cognition,  ADL's and IADL's as well as endurance. Patient has functional deficits requiring intensive therapy across multiple disciplines in order to return home safely. Patient will need physician oversight for respiratory issues, abnormal vital signs, nutritional and hydration status, safety issues, medications and therapy modalities. PT, OT and speech will work on deficits as noted in evaluations. Case management and social work will provide services for DME and management of a safe discharge home.         RECOMMEND LEVEL OF CARE  Recommend inpatient rehabilitation: [x] Yes   [] No  If no indicate reason:  [] Functional level too high  [] Unmotivated  [x] No insurance carrier approval [] Unlikely to return to community  [] No medical necessity  [] Patient or family chose other facility  [] Too medically complex  [] Inadequate discharge plan  [] Rehabilitation bed unavailable [] Functional level too low  [] patient or family refused ARU    If patient not accepted for IRF admission, recommended level of care:  [] [address]  [] [facility]  [] [place]   [] Home Care  [] Other      [] LTAC       Physician Assigned:  [] Dr. Linus Trevino         [] Dr. Dhruv Muhammad              [] Dr. Lj Murguia [x] Dr. Moe Ballard  [] Dr. Yisel Makua DETERMINATION AND REVIEW:    ____________________________________________________________________  ____________________________________________________________________  ____________________________________________________________________  ____________________________________________________________________  ____________________________________________________________________      Physician Signature:_____________________________________    Print Signature:_________________________________________    Date:    Time:        PRE-SCREEN ASSESSMENT UPDATE (if not admitted within 48 hours of initial pre-screen)    Medical Update/Changes:    Functional Update/Changes:     Reviewer Signature:_____________________________________    Date:   Time:     PHYSICIAN ADMISSION DETERMINATION AND REVIEW UPDATE:     ____________________________________________________________________  ____________________________________________________________________  ____________________________________________________________________  ____________________________________________________________________  ____________________________________________________________________    Physician Signature:_____________________________________    Print Signature:_________________________________________    Date:     Time:

## 2020-10-07 NOTE — PROGRESS NOTES
Michelle Carmichael is a 70 y.o. right handed female     Neurology following for syncope    PMH of CAD, CHF, IDDM, DVT, HTN and HLD    Presented to ED with an episode of syncope and collapse. Patient was in the bathroom when she began to feel dizzy. She had been having history of spinning sensations for some time. She was in the shower bent over and became extremely dizzy and fell. This was her first fall, falling backwards hitting her head and losing consciousness. This caused a laceration to the back of her head with bleeding associated. Unclear as to how long she was unconscious. MRI brain and C-spine were ordered but due to patient's spinal cord stimulator being incompatible to her MRI machine this was not completed. Yesterday patient had an episode of confusion and stat CT head was completed to rule out any acute findings. CT of head was stable with no new findings. Today patient appears to be back at her baseline with no confusion. No chest pain or palpitations  + falls, tripping or stumbling  No incontinence of bowels or bladder  No numbness, tingling or focal arm/leg weakness    ROS otherwise negative      Objective:     BP (!) 126/56   Pulse 73   Temp 97.5 °F (36.4 °C) (Temporal)   Resp 18   Ht 5' 1\" (1.549 m)   Wt 194 lb 11.2 oz (88.3 kg)   SpO2 93%   BMI 36.79 kg/m²     General appearance: alert, appears stated age, cooperative and no distress  Head: normocephalic, without obvious abnormality, atraumatic  Eyes: conjunctivae/corneas clear  Neck: symmetrical, trachea midline   Lungs: clear to auscultation bilaterally  Heart: regular rate and rhythm, S1, S2 normal  Abdomen: soft, non-tender; bowel sounds normal  Extremities:  normal, atraumatic, no cyanosis or edema  Pulses: 2+ and symmetric  Skin: color, texture, turgor normal---no rashes or lesions      Mental Status: Alert and oriented to self, time and place. Follows commands.     Appropriate attention/concentration  Intact fundus of knowledge  Repetition intact  Intact memories      Speech: No dysarthria  Language: No aphasias    Cranial Nerves:  I: smell NA   II: visual acuity  NA   II: visual fields Full to confrontation   II: pupils RHETT   III,VII: ptosis None   III,IV,VI: extraocular muscles  Full ROM   V: mastication Normal   V: facial light touch sensation  Normal   V,VII: corneal reflex     VII: facial muscle function - upper  Normal   VII: facial muscle function - lower Normal   VIII: hearing Normal   IX: soft palate elevation  Normal   IX,X: gag reflex    XI: trapezius strength  5/5   XI: sternocleidomastoid strength 5/5   XI: neck extension strength  5/5   XII: tongue strength  Normal     Motor:  5/5 throughout  Normal bulk and tone  No abnormal movements    Sensory:  LT normal    Coordination:   FN, FFM and PAUL normal    DTR:   1+ throughout    No Babinskis    Laboratory/Radiology:     CBC with Differential:    Lab Results   Component Value Date    WBC 7.2 10/02/2020    RBC 3.74 10/02/2020    HGB 11.7 10/02/2020    HCT 35.4 10/02/2020     10/02/2020    MCV 94.7 10/02/2020    MCH 31.3 10/02/2020    MCHC 33.1 10/02/2020    RDW 13.5 10/02/2020    SEGSPCT 34 02/18/2014    LYMPHOPCT 17.2 10/02/2020    MONOPCT 7.1 10/02/2020    BASOPCT 0.4 10/02/2020    MONOSABS 0.51 10/02/2020    LYMPHSABS 1.24 10/02/2020    EOSABS 0.20 10/02/2020    BASOSABS 0.03 10/02/2020     CMP:    Lab Results   Component Value Date     10/02/2020    K 4.3 10/02/2020     10/02/2020    CO2 27 10/02/2020    BUN 30 10/02/2020    CREATININE 1.2 10/02/2020    GFRAA 53 10/02/2020    LABGLOM 44 10/02/2020    GLUCOSE 135 10/02/2020    GLUCOSE 147 01/31/2012    PROT 6.2 10/02/2020    LABALBU 3.8 10/02/2020    LABALBU 4.4 01/31/2012    CALCIUM 9.3 10/02/2020    BILITOT 0.4 10/02/2020    ALKPHOS 50 10/02/2020    AST 24 10/02/2020    ALT 25 10/02/2020     Hepatic Function Panel:    Lab Results   Component Value Date    ALKPHOS 50 10/02/2020    ALT 25 10/02/2020    AST 24 10/02/2020    PROT 6.2 10/02/2020    BILITOT 0.4 10/02/2020    BILIDIR <0.2 03/29/2016    IBILI -0.1 03/29/2016    LABALBU 3.8 10/02/2020    LABALBU 4.4 01/31/2012     HgBA1c:    Lab Results   Component Value Date    LABA1C 6.1 07/05/2020     FLP:    Lab Results   Component Value Date    TRIG 129 07/05/2020    HDL 35 07/05/2020    LDLCALC 109 07/05/2020    LABVLDL 26 07/05/2020     First CT head:Small volume of acute subarachnoid hemorrhage along the right inferior   temporal convexity. Multiple left parietal and occipital scalp soft tissue hematomas/lacerations. No evidence for acute cervical spine fracture. Most recent CT head: No evidence for acute intracranial findings. Evolving left occipital and parietal scalp soft tissue hematoma/lacerations   and small volume of subcutaneous emphysema. Echo: Summary   Normal left ventricle size. Estimated left ventricle ejection fraction 55   %. No gross regional wall motion abnormality. Normal right ventricular size and function. Technically difficult study: no gross valvular pathology noted within   these limitations.     All labs and imaging studies reviewed independently today    Assessment:     Syncopal episode causing a fall and traumatic head injury  --- Small SAH right temporal area which is resolved   --- Scalp laceration to occipital region  --- Spinal cord stimulator and history of back surgery due to chronic low back pain  --- Most recent CT head is stable  --- Patient is no longer confused    Plan:     Continue meloxicam 7.5 mg daily   Continue Zanaflex 4 mg po qhs  PT/OT  Neurology to sign off      TRINIDAD Corley, CNP  12:16 PM  10/7/2020

## 2020-10-07 NOTE — DISCHARGE INSTR - COC
NERVE BLOCK Right 09/06/2017    right knee injection#1    NERVE BLOCK Bilateral 10/24/2018    lumbar facet block     NERVE BLOCK Left 08/22/2019    hip     NERVE SURGERY N/A 6/15/2020    SPINAL CORD STIMULATOR IMPLANT WITH NERVO performed by Andrzej Beckett MD at [facility]      bilateral feet    OTHER SURGICAL HISTORY N/A 11/04/2019    spinal cord stimulator trial    [place] ARTHRS KNE SURG W/MENISCECTOMY MED/LAT W/SHVG Right 6/6/2018    RIGHT KNEE ARTHROSCOPY MEDIAL AND LATERAL MENISECTOMY SYNOVECTOMY AND CHONDROPLASTY performed by Pao Gleason DO at [facility] DX/THER AGNT PARAVERT FACET JOINT, LUMBAR/SAC, 1ST LEVEL Bilateral 10/24/2018    BILATERAL LUMBAR FACET BLOCK L1-2 ,L3-4 WITH X-RAY performed by Bruce Nicole DO at [facility] / [address] [place] [address] N/A 11/4/2019    SPINAL CORD STIMULATOR TRIAL WITH NEVRO performed by Andrzej Beckett MD at [address] [place] [address]  06/15/2020    nevro     TONSILLECTOMY      VASCULAR SURGERY      laser on leaky veins       Immunization History:   Immunization History   Administered Date(s) Administered    Tdap (Boostrix, Adacel) 10/02/2020       Active Problems:  Patient Active Problem List   Diagnosis Code    Non-insulin dependent type 2 diabetes mellitus (HCC) E11.9    GERD (gastroesophageal reflux disease) K21.9    Hypothyroidism E03.9    History of MI (myocardial infarction) I25.2    Hypertension I10    HLD (hyperlipidemia) E78.5    Acute renal failure (ARF) (HCC) N17.9    Normocytic anemia D64.9    Proximal weakness of limb M62.89    Falls frequently R29.6    Cervical spinal stenosis M48.02    Neural foraminal stenosis of cervical spine M99.71    Facet arthropathy, cervical M47.812    Right cataract H26.9    Lumbar radiculopathy M54.16    Neural foraminal stenosis of lumbar spine M99.73    DDD (degenerative disc disease), lumbar M51.36    Protruded lumbar disc M51.26    DM II (diabetes mellitus, type II), controlled ([facility] [address] E11.9    NSTEMI (non-ST elevated myocardial infarction) ([facility] [address] I21.4    Left cataract H26.9    Colitis K52.9    Primary osteoarthritis of right knee M17.11    Primary osteoarthritis of both knees M17.0    Pseudoaneurysm following procedure ([facility] [address] T81.718A, I72.9    History of coronary artery bypass surgery Z95.1    Hx of hypercholesterolemia Z86.39    Acute lateral meniscus tear of right knee S83.281A    L-S radiculopathy M54.17    Lumbar postlaminectomy syndrome M96.1    Lumbar facet arthropathy M47.816    Lumbar spondylosis M47.816    Lumbar disc disorder M51.9    Greater trochanteric bursitis of left hip M70.62    Greater trochanteric bursitis of right hip M70.61    Primary osteoarthritis of both hips M16.0    Osteoarthritis of left hip M16.12    Chest pain R07.9    SAH (subarachnoid hemorrhage) ([facility]) I60.9    Closed head injury S09.90XA    Syncope and collapse R55    Scalp laceration, initial encounter S01. 01XA    Cervical strain S16. 1XXA       Isolation/Infection:   Isolation            No Isolation          Patient Infection Status       Infection Onset Added Last Indicated Last Indicated By Review Planned Expiration Resolved Resolved By    None active    Resolved    COVID-19 Rule Out 06/08/20 06/08/20 06/08/20 COVID-19 Ambulatory (Ordered)   06/11/20 Rule-Out Test Resulted    MDRO (multi-drug resistant organism)  08/15/16 08/15/16 Daphnie Levy RN   05/04/18 Daphnie Levy RN    Mdr e coli urine 8/11/16            Nurse Assessment:  Last Vital Signs: BP (!) 126/56   Pulse 73   Temp 97.5 °F (36.4 °C) (Temporal)   Resp 18   Ht 5' 1\" (1.549 m)   Wt 194 lb 11.2 oz (88.3 kg)   SpO2 93%   BMI 36.79 kg/m²     Last documented pain score (0-10 scale): Pain Level: 2  Last Weight:   Wt Readings from Last 1 Encounters:   10/07/20 194 lb 11.2 oz (88.3 kg)     Mental Status:  {IP PT MENTAL STATUS:20030:::0}    IV Access:  {MH GOSIA IV ACCESS:222265377:::0}    Nursing Mobility/ADLs:  Walking   {CHP DME ADLs:503650216:::0}  Transfer  {CHP DME ADLs:854048473:::0}  Bathing  {CHP DME ADLs:755980203:::0}  Dressing  {CHP DME ADLs:034643903:::0}  Toileting  {CHP DME ADLs:644495576:::0}  Feeding  {CHP DME ADLs:731422872:::0}  Med Admin  {CHP DME ADLs:690831895:::0}  Med Delivery   {MH GOSIA MED Delivery:556077886:::0}    Wound Care Documentation and Therapy:  Wound 10/02/20 Head (Active)   Dressing/Treatment Open to air 10/07/20 1200   Wound Assessment Other (Comment) 10/07/20 1200   Drainage Amount Scant 10/07/20 1200   Drainage Description Serosanguinous 10/07/20 1200   Emmie-wound Assessment Intact 10/07/20 1200   Number of days: 4        Elimination:  Continence: Bowel: {YES / KQ:72370}  Bladder: {YES / FB:81771}  Urinary Catheter: {Urinary Catheter:618298762:::0}   Colostomy/Ileostomy/Ileal Conduit: {YES / RY:79873}       Date of Last BM: ***    Intake/Output Summary (Last 24 hours) at 10/7/2020 1515  Last data filed at 10/7/2020 0643  Gross per 24 hour   Intake 340 ml   Output --   Net 340 ml     I/O last 3 completed shifts:   In: 0 [P.O.:340]  Out: -     Safety Concerns:     508 j-Grab Safety Concerns:921324553:::0}    Impairments/Disabilities:      [address]Grab Impairments/Disabilities:216285805:::0}    Nutrition Therapy:  Current Nutrition Therapy:   [address]-Grab Diet List:834961661:::0}    Routes of Feeding: {CHP DME Other Feedings:097643612:::0}  Liquids: {Slp liquid thickness:31529}  Daily Fluid Restriction: {CHP DME Yes amt example:339978803:::0}  Last Modified Barium Swallow with Video (Video Swallowing Test): {Done Not Done VEYR:171374180:::6}    Treatments at the Time of Hospital Discharge:   Respiratory Treatments: ***  Oxygen Therapy:  {Therapy; copd oxygen:88310:::0}  Ventilator:    {LJ LANE Vent List:741932692:::0}    Rehab Therapies: {THERAPEUTIC INTERVENTION:0862470187}  Weight Bearing Status/Restrictions: {LJ LANE Weight Bearin:::0}  Other Medical Equipment (for information only, NOT a DME order):  {EQUIPMENT:749771658}  Other Treatments: ***    Patient's personal belongings (please select all that are sent with patient):  {CHP DME Belongings:410051170:::0}    RN SIGNATURE:  {Esignature:783036535:::0}    CASE MANAGEMENT/SOCIAL WORK SECTION    Inpatient Status Date: ***    Readmission Risk Assessment Score:  Readmission Risk              Risk of Unplanned Readmission:        12           Discharging to Facility/ Agency   Name:   Address:  Phone:  Fax:    Dialysis Facility (if applicable)   Name:  Address:  Dialysis Schedule:  Phone:  Fax:    / signature: {Esignature:092135382:::0}    PHYSICIAN SECTION    Prognosis: {Prognosis:0902515637:::0}    Condition at Discharge: Miguel Zuniga Antonio Patient Condition:127252504:::0}    Rehab Potential (if transferring to Rehab): {Prognosis:9301448433:::0}    Recommended Labs or Other Treatments After Discharge: ***    Physician Certification: I certify the above information and transfer of Tg Castro  is necessary for the continuing treatment of the diagnosis listed and that she requires {Admit to Appropriate Level of Care:42115:::0} for {GREATER/LESS:253695439} 30 days.      Update Admission H&P: {CHP DME Changes in HandP:200759737:::0}    PHYSICIAN SIGNATURE:  Electronically signed by Sergio Box MD on 10/7/20 at 3:15 PM EDT

## 2020-10-07 NOTE — PROGRESS NOTES
Patients blood sugar check this AM around 0630 was 96. This RN called into room because patient complaining of feeling sweaty & not right. Blood sugar checked and reading was 49. Patient provided orange juice with added sugar and breakfast tray was placed in front of patient. 15 minutes later blood sugar was checked again & reading was 79. Patient was actively eating breakfast and stated she felt a little better. RN again called into room around 1015 by [facility]. Patient was in bathroom getting washed up and stated her vision went dark and felt like she was going to pass out. Blood sugar checked again and result was 51. Patient again given orange juice with sugar while awaiting orders for low blood sugar protocol. HCA remains in bathroom with patient in assisting getting cleaned up. Will check blood sugar again. Recheck was 59. 1/2 amp of D50 given. Recheck 15 minutes later, 175. Will continue to monitor and assess.

## 2020-10-07 NOTE — CARE COORDINATION
SW spoke with patients daughters, Pritesh Harding and Estevan Mayfield via phone conference. We discussed transition of care at length. They would like rehab now, not C. We discussed options, they would like us to try for acute rehab, [facility]. Requested PM and R consult for them to review, if able to accept, will need a precert.

## 2020-10-07 NOTE — PROGRESS NOTES
Subjective:  Feeling better notes some continued neck pain and headache  No CP or SOB  No fever or chills   No uncontrolled pain  No vomiting or diarrhea     Objective:    BP (!) 136/58   Pulse 75   Temp 98.8 °F (37.1 °C) (Oral)   Resp 20   Wt 194 lb 11.2 oz (88.3 kg)   SpO2 93%   BMI 36.79 kg/m²     24HR INTAKE/OUTPUT:      Intake/Output Summary (Last 24 hours) at 10/7/2020 0738  Last data filed at 10/7/2020 0643  Gross per 24 hour   Intake 700 ml   Output --   Net 700 ml       General appearance: NAD, conversant  Neck: FROM, supple   Lungs: Clear bilaterally no wheezes, no rhonchi, no crackles  CV: RRR, no MRGs; normal carotid upstroke and amplitude without Bruits  Abdomen: Soft, non-tender; no masses or HSM  Extremities: No edema, no cyanosis, no clubbing  Skin: Intact no rash, no lesions, no ulcers    Psych: Alert and oriented normal affect  Neuro: Nonfocal  Most Recent Labs  Lab Results   Component Value Date    WBC 7.2 10/02/2020    HGB 11.7 10/02/2020    HCT 35.4 10/02/2020     10/02/2020     10/02/2020    K 4.3 10/02/2020     10/02/2020    CREATININE 1.2 (H) 10/02/2020    BUN 30 (H) 10/02/2020    CO2 27 10/02/2020    GLUCOSE 135 (H) 10/02/2020    ALT 25 10/02/2020    AST 24 10/02/2020    INR 1.2 10/02/2020    TSH 1.070 07/05/2020    LABA1C 6.1 (H) 07/05/2020    LABMICR <12.0 07/24/2019     No results for input(s): MG in the last 72 hours. Lab Results   Component Value Date    CALCIUM 9.3 10/02/2020    PHOS 3.4 07/30/2020        CT HEAD WO CONTRAST   Final Result   No evidence for acute intracranial findings. Evolving left occipital and parietal scalp soft tissue hematoma/lacerations   and small volume of subcutaneous emphysema. CT HEAD WO CONTRAST   Final Result   1.   Mild soft tissue swelling, small subcutaneous/subgaleal hematoma, and   minimal soft tissue emphysema, are scattered about the left parietal   high-convexity scalp, lessening inferiorly over the left occipital and   suboccipital regions. Within the limits of this exam, no definite associated   fracture is identified. 2.   Negative for acute intracranial process, within the limits of this   non-contrast CT exam.  Consider follow-up CT versus MR imaging, as directed   clinically. .         CT THORACIC SPINE WO CONTRAST   Final Result   No fracture or malalignment of the thoracic spine. CT LUMBAR SPINE WO CONTRAST   Final Result   1. Stable, satisfactory alignment status post, posterior fusion of lumbar   spine from levels of L4-S1.      2.  No acute fracture or malalignment. CT HEAD WO CONTRAST   Final Result   Small volume of acute subarachnoid hemorrhage along the right inferior   temporal convexity. Multiple left parietal and occipital scalp soft tissue hematomas/lacerations. No evidence for acute cervical spine fracture. Findings discussed with the ER at the time of interpretation. CT CERVICAL SPINE WO CONTRAST   Final Result   Small volume of acute subarachnoid hemorrhage along the right inferior   temporal convexity. Multiple left parietal and occipital scalp soft tissue hematomas/lacerations. No evidence for acute cervical spine fracture. Findings discussed with the ER at the time of interpretation. XR PELVIS (1-2 VIEWS)   Final Result   Diffuse osteopenia without convincing evidence of acute fracture. If there   is persistent concern for an occult fracture, correlation with CT could be   made. XR CHEST PORTABLE   Final Result   No acute process.              Assessment    Principal Problem:    Syncope and collapse  Active Problems:    DDD (degenerative disc disease), lumbar    Normocytic anemia    Falls frequently    Non-insulin dependent type 2 diabetes mellitus (HCC)    Hypothyroidism    HLD (hyperlipidemia)    GERD (gastroesophageal reflux disease)    DM II (diabetes mellitus, type II), controlled ([facility] [address]    History of coronary artery bypass surgery    Closed head injury    Scalp laceration, initial encounter    Cervical strain  Resolved Problems:    * No resolved hospital problems.  *      Plan:  82-year-old female history of diabetes, hypertension, CAD admitted with syncope, fall, closed head injury with scalp lac    Likely multifactorial delirium 2/2 meds, constipation  C-collar  Pain control  Bowel regimen  CT head 10/2-- no stroke or hemorrhage evident  Stat CT head 10/6-- no stroke or hemorrhage evident  MRIs pending--unable due to incompatibility of neurostimulator  Diabetes control  Hypertension controlled  Neurology consult appreciated-- discussed case  Trauma surgery consult appreciated  Cardiology consult appreciated  Discussed with Neurosurgery--Doubt SAH, likely artifact with no evidence of ICH on multiple repeat CT-- OK for DAPT  Medications for other co morbidities cont as appropriate w dosage adjustments as necessary   PT/OT  DVT PPx  DC planning SNF pending bed          Electronically signed by Ole Dotson MD on 10/7/2020 at 7:38 AM

## 2020-10-08 PROBLEM — K59.00 FECAL RETENTION: Status: ACTIVE | Noted: 2020-10-08

## 2020-10-08 LAB
METER GLUCOSE: 103 MG/DL (ref 74–99)
METER GLUCOSE: 116 MG/DL (ref 74–99)
METER GLUCOSE: 242 MG/DL (ref 74–99)
SARS-COV-2, NAAT: NOT DETECTED

## 2020-10-08 PROCEDURE — U0002 COVID-19 LAB TEST NON-CDC: HCPCS

## 2020-10-08 PROCEDURE — 6370000000 HC RX 637 (ALT 250 FOR IP): Performed by: PSYCHIATRY & NEUROLOGY

## 2020-10-08 PROCEDURE — 97535 SELF CARE MNGMENT TRAINING: CPT

## 2020-10-08 PROCEDURE — 97530 THERAPEUTIC ACTIVITIES: CPT

## 2020-10-08 PROCEDURE — 82962 GLUCOSE BLOOD TEST: CPT

## 2020-10-08 PROCEDURE — 6370000000 HC RX 637 (ALT 250 FOR IP): Performed by: NURSE PRACTITIONER

## 2020-10-08 PROCEDURE — 6370000000 HC RX 637 (ALT 250 FOR IP): Performed by: PHYSICIAN ASSISTANT

## 2020-10-08 PROCEDURE — 6370000000 HC RX 637 (ALT 250 FOR IP): Performed by: INTERNAL MEDICINE

## 2020-10-08 PROCEDURE — 2580000003 HC RX 258: Performed by: NURSE PRACTITIONER

## 2020-10-08 PROCEDURE — 96125 COGNITIVE TEST BY HC PRO: CPT

## 2020-10-08 PROCEDURE — 1200000000 HC SEMI PRIVATE

## 2020-10-08 RX ORDER — OXYCODONE HYDROCHLORIDE AND ACETAMINOPHEN 5; 325 MG/1; MG/1
1 TABLET ORAL ONCE
Status: COMPLETED | OUTPATIENT
Start: 2020-10-08 | End: 2020-10-08

## 2020-10-08 RX ADMIN — FENOFIBRATE 54 MG: 54 TABLET ORAL at 09:12

## 2020-10-08 RX ADMIN — ASPIRIN 81 MG CHEWABLE TABLET 81 MG: 81 TABLET CHEWABLE at 09:12

## 2020-10-08 RX ADMIN — PANTOPRAZOLE SODIUM 40 MG: 40 TABLET, DELAYED RELEASE ORAL at 06:53

## 2020-10-08 RX ADMIN — EZETIMIBE 10 MG: 10 TABLET ORAL at 17:10

## 2020-10-08 RX ADMIN — BACITRACIN ZINC: 500 OINTMENT TOPICAL at 20:01

## 2020-10-08 RX ADMIN — HYDROCODONE BITARTRATE AND ACETAMINOPHEN 1 TABLET: 5; 325 TABLET ORAL at 16:21

## 2020-10-08 RX ADMIN — OXYCODONE HYDROCHLORIDE AND ACETAMINOPHEN 1 TABLET: 5; 325 TABLET ORAL at 01:37

## 2020-10-08 RX ADMIN — CITALOPRAM 20 MG: 20 TABLET, FILM COATED ORAL at 09:12

## 2020-10-08 RX ADMIN — MELOXICAM 7.5 MG: 7.5 TABLET ORAL at 09:12

## 2020-10-08 RX ADMIN — GABAPENTIN 600 MG: 600 TABLET ORAL at 12:52

## 2020-10-08 RX ADMIN — FERROUS SULFATE TAB 325 MG (65 MG ELEMENTAL FE) 325 MG: 325 (65 FE) TAB at 09:12

## 2020-10-08 RX ADMIN — ISOSORBIDE MONONITRATE 60 MG: 60 TABLET ORAL at 09:12

## 2020-10-08 RX ADMIN — GABAPENTIN 600 MG: 600 TABLET ORAL at 09:12

## 2020-10-08 RX ADMIN — CLOPIDOGREL 75 MG: 75 TABLET, FILM COATED ORAL at 09:12

## 2020-10-08 RX ADMIN — Medication 10 ML: at 20:01

## 2020-10-08 RX ADMIN — HYDROCODONE BITARTRATE AND ACETAMINOPHEN 1 TABLET: 5; 325 TABLET ORAL at 00:13

## 2020-10-08 RX ADMIN — LEVOTHYROXINE SODIUM 100 MCG: 0.1 TABLET ORAL at 05:06

## 2020-10-08 RX ADMIN — DOCUSATE SODIUM 50 MG AND SENNOSIDES 8.6 MG 2 TABLET: 8.6; 5 TABLET, FILM COATED ORAL at 09:12

## 2020-10-08 RX ADMIN — GLIPIZIDE 5 MG: 5 TABLET ORAL at 06:53

## 2020-10-08 RX ADMIN — Medication 10 ML: at 09:12

## 2020-10-08 RX ADMIN — METOPROLOL SUCCINATE 25 MG: 25 TABLET, EXTENDED RELEASE ORAL at 09:12

## 2020-10-08 RX ADMIN — GABAPENTIN 600 MG: 600 TABLET ORAL at 20:01

## 2020-10-08 RX ADMIN — BACITRACIN ZINC: 500 OINTMENT TOPICAL at 09:13

## 2020-10-08 RX ADMIN — FOLIC ACID 1 MG: 1 TABLET ORAL at 09:12

## 2020-10-08 RX ADMIN — POLYETHYLENE GLYCOL 3350 17 G: 17 POWDER, FOR SOLUTION ORAL at 09:12

## 2020-10-08 ASSESSMENT — PAIN DESCRIPTION - DESCRIPTORS
DESCRIPTORS: ACHING
DESCRIPTORS: THROBBING
DESCRIPTORS: ACHING;DISCOMFORT;DULL

## 2020-10-08 ASSESSMENT — PAIN DESCRIPTION - ORIENTATION: ORIENTATION: RIGHT;LEFT;MID

## 2020-10-08 ASSESSMENT — PAIN DESCRIPTION - LOCATION
LOCATION: HEAD;NECK
LOCATION: HEAD;NECK
LOCATION: HEAD

## 2020-10-08 ASSESSMENT — PAIN SCALES - GENERAL
PAINLEVEL_OUTOF10: 9
PAINLEVEL_OUTOF10: 9
PAINLEVEL_OUTOF10: 0
PAINLEVEL_OUTOF10: 9
PAINLEVEL_OUTOF10: 5
PAINLEVEL_OUTOF10: 2
PAINLEVEL_OUTOF10: 0
PAINLEVEL_OUTOF10: 6
PAINLEVEL_OUTOF10: 9

## 2020-10-08 ASSESSMENT — PAIN DESCRIPTION - PAIN TYPE
TYPE: ACUTE PAIN

## 2020-10-08 ASSESSMENT — PAIN DESCRIPTION - FREQUENCY
FREQUENCY: INTERMITTENT
FREQUENCY: INTERMITTENT
FREQUENCY: CONTINUOUS

## 2020-10-08 ASSESSMENT — PAIN DESCRIPTION - ONSET: ONSET: ON-GOING

## 2020-10-08 ASSESSMENT — PAIN - FUNCTIONAL ASSESSMENT: PAIN_FUNCTIONAL_ASSESSMENT: PREVENTS OR INTERFERES SOME ACTIVE ACTIVITIES AND ADLS

## 2020-10-08 ASSESSMENT — PAIN DESCRIPTION - PROGRESSION: CLINICAL_PROGRESSION: GRADUALLY WORSENING

## 2020-10-08 NOTE — PROGRESS NOTES
Requesting another pain medication for headache. She says the Norco isn't helping. Message sent to Dr. Diana Funes answering service.

## 2020-10-08 NOTE — PROGRESS NOTES
Subjective:  Feeling better notes some improvement neck pain and headache  No CP or SOB  No fever or chills   No uncontrolled pain  No vomiting or diarrhea     Objective:    /64   Pulse 61   Temp 97.4 °F (36.3 °C) (Temporal)   Resp 18   Ht 5' 1\" (1.549 m)   Wt 195 lb 8 oz (88.7 kg)   SpO2 96%   BMI 36.94 kg/m²     24HR INTAKE/OUTPUT:      Intake/Output Summary (Last 24 hours) at 10/8/2020 0707  Last data filed at 10/8/2020 0139  Gross per 24 hour   Intake 340 ml   Output --   Net 340 ml       General appearance: NAD, conversant  Neck: C collar  Lungs: Clear bilaterally no wheezes, no rhonchi, no crackles  CV: RRR, no MRGs; normal carotid upstroke and amplitude without Bruits  Abdomen: Soft, non-tender; no masses or HSM  Extremities: No edema, no cyanosis, no clubbing  Skin: Intact no rash, no lesions, no ulcers    Psych: Alert and oriented normal affect  Neuro: Nonfocal  Most Recent Labs  Lab Results   Component Value Date    WBC 7.2 10/02/2020    HGB 11.7 10/02/2020    HCT 35.4 10/02/2020     10/02/2020     10/02/2020    K 4.3 10/02/2020     10/02/2020    CREATININE 1.2 (H) 10/02/2020    BUN 30 (H) 10/02/2020    CO2 27 10/02/2020    GLUCOSE 135 (H) 10/02/2020    ALT 25 10/02/2020    AST 24 10/02/2020    INR 1.2 10/02/2020    TSH 1.070 07/05/2020    LABA1C 6.1 (H) 07/05/2020    LABMICR <12.0 07/24/2019     No results for input(s): MG in the last 72 hours. Lab Results   Component Value Date    CALCIUM 9.3 10/02/2020    PHOS 3.4 07/30/2020        CT HEAD WO CONTRAST   Final Result   No evidence for acute intracranial findings. Evolving left occipital and parietal scalp soft tissue hematoma/lacerations   and small volume of subcutaneous emphysema. CT HEAD WO CONTRAST   Final Result   1.   Mild soft tissue swelling, small subcutaneous/subgaleal hematoma, and   minimal soft tissue emphysema, are scattered about the left parietal   high-convexity scalp, lessening inferiorly over the left occipital and   suboccipital regions. Within the limits of this exam, no definite associated   fracture is identified. 2.   Negative for acute intracranial process, within the limits of this   non-contrast CT exam.  Consider follow-up CT versus MR imaging, as directed   clinically. .         CT THORACIC SPINE WO CONTRAST   Final Result   No fracture or malalignment of the thoracic spine. CT LUMBAR SPINE WO CONTRAST   Final Result   1. Stable, satisfactory alignment status post, posterior fusion of lumbar   spine from levels of L4-S1.      2.  No acute fracture or malalignment. CT HEAD WO CONTRAST   Final Result   Small volume of acute subarachnoid hemorrhage along the right inferior   temporal convexity. Multiple left parietal and occipital scalp soft tissue hematomas/lacerations. No evidence for acute cervical spine fracture. Findings discussed with the ER at the time of interpretation. CT CERVICAL SPINE WO CONTRAST   Final Result   Small volume of acute subarachnoid hemorrhage along the right inferior   temporal convexity. Multiple left parietal and occipital scalp soft tissue hematomas/lacerations. No evidence for acute cervical spine fracture. Findings discussed with the ER at the time of interpretation. XR PELVIS (1-2 VIEWS)   Final Result   Diffuse osteopenia without convincing evidence of acute fracture. If there   is persistent concern for an occult fracture, correlation with CT could be   made. XR CHEST PORTABLE   Final Result   No acute process.              Assessment    Principal Problem:    Syncope and collapse  Active Problems:    DDD (degenerative disc disease), lumbar    Normocytic anemia    Falls frequently    Non-insulin dependent type 2 diabetes mellitus (HCC)    Hypothyroidism    HLD (hyperlipidemia)    GERD (gastroesophageal reflux disease)    DM II (diabetes mellitus, type II), controlled ([address] History of coronary artery bypass surgery    Concussion    Scalp laceration, initial encounter    Cervical strain    Fecal retention  Resolved Problems:    * No resolved hospital problems.  *      Plan:  82-year-old female history of diabetes, hypertension, CAD admitted with syncope, fall, head injury with scalp lac    Likely multifactorial delirium 2/2 meds, constipation, concussion  C-collar  Pain control  Bowel regimen  CT head 10/2-- no stroke or hemorrhage evident  Stat CT head 10/6-- no stroke or hemorrhage evident  MRIs pending--unable due to incompatibility of neurostimulator  Diabetes control  Hypertension controlled  Neurology consult appreciated-- discussed case  Trauma surgery consult appreciated  Cardiology consult appreciated  Discussed with Neurosurgery--Doubtful SAH, likely artifact with no evidence of ICH on multiple repeat CT-- OK for DAPT  Medications for other co morbidities cont as appropriate w dosage adjustments as necessary   PT/OT  DVT PPx  DC planning Rehab pending bed          Electronically signed by Molina Medrano MD on 10/8/2020 at 7:07 AM

## 2020-10-08 NOTE — CARE COORDINATION
Patient accepted to acute rehab, they have started precert yesterday. Await auth. Dry Covid given to Charge RN.

## 2020-10-08 NOTE — PROGRESS NOTES
OT BEDSIDE TREATMENT NOTE      Date:10/8/2020  Patient Name: Abida Castillo  MRN: [id number]  : 1945  Room: [address] [place] [address]     Per OT Eval:    Evaluating [facility], OTR/L #[id number]     AM-PAC Daily Activity Raw Score: 1624  Recommended Adaptive Equipment: w/w, TBD for additional AE     Diagnosis: SAH (subarachnoid hemorrhage) ([facility] [I60.9]  Syncope and collapse [R55]     Pt presented to ED on 10/2/20 following fall w/ syncope. SAH, multiple left parietal and occipital scalp hematomas or lacerations. Cervical strain  S/p scalp laceration repair on 10/2  Referring physician: TRINIDAD Morris - CNP      Pertinent Medical History: arthritis, CAD, CHF, diabetes, fibromyalgia, MI, DVT, HTN, NSTEMI, spinal cord stimulator (2020)     Precautions:  Falls, bed alarm, c-collar (cervical strain), dizziness     Home Living: Pt lives alone in 1 floor home. 2-3 ACE, 1 handrail   Bathroom setup: tub/shower with grab bar   Equipment owned: [facility], shower chair     Prior Level of Function: independent with ADLs , independent with IADLs; ambulated independently w/ SPC PRN  Driving: yes     Pain Level: Pt c/o 8/10 neck pain & headache this session ; reinforced pain management strategies  appeared comfortable & conversing through out.  Pleasant & cooperative, highly motivated      Cognition: A&O: 3/4; Follows 2 step directions, no confusion noted this date, pleasant & cooperative               Memory:  fair               Sequencing:  fair               Problem solving:  fair               Judgement/safety:  fair                 Functional Assessment:    Initial Eval Status  Date: 10/6/20 Treatment Status  Date:  10/8/20 Short Term Goals/LTG  Treatment frequency: 1-4x/wk   Feeding Stand by Assist  Mod Indep  Seated in chair for breakfast meal, upon first attempt to see pt  Modified [place]    Grooming Minimal Assist   Seated position - unable to tolerate in standing d/t dizziness  CGA  Standing at the sink during multiple grooming tasks, pt able to open & mange all containers Modified [place]    UB Dressing Minimal Assist   CGA  Standing doffing & donning gown  Modified [place]    LB Dressing Moderate Assist   Min A  With increased time, seated using cross over technique to complete tasks, doffing & donning socks/pants  Stand by Assist    Bathing Moderate Assist  UB: Min A  LB: Min A  Sponge bathing standing & seated when needed Stand by Assist    Toileting Moderate Assist   CGA  Pt able to manage clothing & hygiene, assist for balance & steadiness Stand by Assist    Bed Mobility  Rolling: Stand by Assist   Supine to sit: NT  Sit to supine: Minimal Assist   NT Rolling: Independent   Supine to sit: Modified [place]   Sit to supine: Modified [place]    Functional Transfers Min A  Min A  Cues for hand placement & safety from  Multiple surfaces Supervision   Functional Mobility Min A w/ w/w  Steps from chair>EOB  Limited by c/o dizziness  Min/CGA  Short distances in pt's room, using walker Supervision   Balance Sitting: SBA  Standing: Min A w/ w/w  Sitting: SBA  Standing: Min/CGA   w/ w/w     Activity Tolerance Fair-  Fair Fair+   Visual/  Perceptual Glasses: readers            Education:  Pt was educated through out treatment regarding proper technique & safety with functional transfers & mobility, walker management in tight spaces & ADL compensatory strategies to ease tasks to improve safety & prevent falls and allow pt to return home safely. Educated pt on cervical & back precautions & spinal alignment for pain control with Fair+ understanding. Comments: Upon arrival pt was seated in chair & agreeable for therapy. At end of session pt requested to return to bed, fatigued from ADL tasks & sitting up in chair this morning, all lines and tubes intact, call light within reach.  Spoke with nsg regarding soft C collar, if pt is able to removed at times, seated in chair & bed due to no fractures, nsg will follow up & inform pt. · Pt has made Fair+ progress towards set goals. · Continue with current plan of care    Treatment Time In: 10:20            Treatment Time Out: 11:13            Treatment Charges: Mins Units   Ther Ex  [id number]     Manual Therapy [address] [facility]     Thera Activities 92536 10 1   ADL/Home Mgt 21611 43 3   Neuro Re-ed 89797     Group Therapy      Orthotic manage/training  03259     Non-Billable Time     Total Timed Treatment 53 4       Holly MOTLEY  [address] [place] [address] Drive, [address] [place] [address]

## 2020-10-08 NOTE — PLAN OF CARE
Problem: Falls - Risk of:  Goal: Will remain free from falls  Description: Will remain free from falls  10/8/2020 0611 by Saray Tolbert RN  Outcome: Met This Shift  10/8/2020 0201 by Saray oTlbert RN  Outcome: Met This Shift  Goal: Absence of physical injury  Description: Absence of physical injury  10/8/2020 0611 by Saray Tolbert RN  Outcome: Met This Shift  10/8/2020 0201 by Saray Tolbert RN  Outcome: Met This Shift

## 2020-10-08 NOTE — PROGRESS NOTES
Physical Therapy    Name: Huel Dubin  :   MRN: [id number]    Referring Provider:  TRINIDAD Galvin CNP     Date of Service: 10/8/2020    Evaluating PT:  Isatu Al PT, DPT    Room #:  2653/6991-P  Diagnosis:  SAH  PMHx/PSHx:  MI, DM2, HTN, GERD, Arthritis, CAD, NSTEMI, Fibromyalgia, CHF, Lumbar fusion, Spinal cord stimulator 6/15/2020  Procedure/Surgery:  NA  Precautions:  Falls  Equipment Needs:  Has [facility]  S/p fall at home with head lacerations    SUBJECTIVE:    Pt lives alone in a 1 story home with 2+2 stairs to enter and 1 rail(s) on first 2 steps. Pt ambulated with no AD PTA. Pt reported independent with ADLs. Pt is actively driving. OBJECTIVE:   Initial Evaluation  Date: 10/6/2020 Treatment  10/8 Short Term/ Long Term   Goals   AM-PAC 6 Clicks  68/58    Was pt agreeable to Eval/treatment? Yes  yes    Does pt have pain? No c/o pain No c/o pain    Bed Mobility  NT, patient in the bathroom N/T pt sitting up in chair finishing breakfast. Supervision   Transfers Sit to stand: Min A  Stand to sit: Min A  Stand pivot: Min A Sit to stand Octavio  Stand to sit Octavio  Stand pivot Octavio Supervision   Ambulation    80 feet with WW with Min A 45 feet x2 reps with ww Octavio >150 feet with [employer] with Supervision   Stair negotiation: ascended and descended  NT N/T 4 steps with 1 rail with Supervision   ROM BUE:  WFL  BLE:  WFL     Strength BUE:  4/5  BLE:  4/5  Increase 1/3 grade MMT   Balance Sitting EOB:  Supervision  Dynamic Standing:  Min A with [employer] Dynamic standing Octavio with ww Sitting EOB:  Independent  Dynamic Standing:  Supervision     Pt is A & O x 3  Sensation:  Pt denied numbness and tingling to extremities  Edema:  None noted    Therapeutic Exercises: Ankle pumps 30x, LAQS 2x15, hip flexion 2x10, sit <> stand x4 reps shakiness noted but improved with more reps.     Patient education  Pt educated on purpose of PT assessment, importance of mobility, safety with mobility, tranfers,

## 2020-10-08 NOTE — PLAN OF CARE
Problem: Falls - Risk of:  Goal: Will remain free from falls  Description: Will remain free from falls  10/8/2020 0657 by Bhaskar Mcneal RN  Outcome: Met This Shift  10/8/2020 0611 by Bhaskar Mcneal RN  Outcome: Met This Shift  10/8/2020 0201 by Bhaskar Mcneal RN  Outcome: Met This Shift  Goal: Absence of physical injury  Description: Absence of physical injury  10/8/2020 0657 by Bhaskar Mcneal RN  Outcome: Met This Shift  10/8/2020 0611 by Bhaskar Mcneal RN  Outcome: Met This Shift  10/8/2020 0201 by Bhaskar Mcneal RN  Outcome: Met This Shift

## 2020-10-09 VITALS
DIASTOLIC BLOOD PRESSURE: 67 MMHG | RESPIRATION RATE: 18 BRPM | BODY MASS INDEX: 36.97 KG/M2 | WEIGHT: 195.8 LBS | HEART RATE: 61 BPM | SYSTOLIC BLOOD PRESSURE: 144 MMHG | HEIGHT: 61 IN | OXYGEN SATURATION: 97 % | TEMPERATURE: 98.2 F

## 2020-10-09 LAB — METER GLUCOSE: 156 MG/DL (ref 74–99)

## 2020-10-09 PROCEDURE — 6370000000 HC RX 637 (ALT 250 FOR IP): Performed by: PSYCHIATRY & NEUROLOGY

## 2020-10-09 PROCEDURE — 2580000003 HC RX 258: Performed by: NURSE PRACTITIONER

## 2020-10-09 PROCEDURE — 82962 GLUCOSE BLOOD TEST: CPT

## 2020-10-09 PROCEDURE — 97129 THER IVNTJ 1ST 15 MIN: CPT

## 2020-10-09 PROCEDURE — 6370000000 HC RX 637 (ALT 250 FOR IP): Performed by: NURSE PRACTITIONER

## 2020-10-09 PROCEDURE — 6360000002 HC RX W HCPCS: Performed by: NURSE PRACTITIONER

## 2020-10-09 PROCEDURE — 96376 TX/PRO/DX INJ SAME DRUG ADON: CPT

## 2020-10-09 PROCEDURE — 6370000000 HC RX 637 (ALT 250 FOR IP): Performed by: INTERNAL MEDICINE

## 2020-10-09 PROCEDURE — 97530 THERAPEUTIC ACTIVITIES: CPT

## 2020-10-09 PROCEDURE — 97130 THER IVNTJ EA ADDL 15 MIN: CPT

## 2020-10-09 RX ORDER — BISACODYL 10 MG
10 SUPPOSITORY, RECTAL RECTAL DAILY
Status: DISCONTINUED | OUTPATIENT
Start: 2020-10-09 | End: 2020-10-09 | Stop reason: HOSPADM

## 2020-10-09 RX ORDER — MELOXICAM 7.5 MG/1
7.5 TABLET ORAL DAILY
Qty: 30 TABLET | Refills: 3 | DISCHARGE
Start: 2020-10-10 | End: 2021-04-22

## 2020-10-09 RX ORDER — POLYETHYLENE GLYCOL 3350 17 G/17G
17 POWDER, FOR SOLUTION ORAL DAILY
Qty: 527 G | Refills: 1 | DISCHARGE
Start: 2020-10-10 | End: 2020-11-09

## 2020-10-09 RX ORDER — BISACODYL 10 MG
10 SUPPOSITORY, RECTAL RECTAL DAILY PRN
Qty: 30 SUPPOSITORY | Refills: 0 | DISCHARGE
Start: 2020-10-09 | End: 2020-11-08

## 2020-10-09 RX ADMIN — LEVOTHYROXINE SODIUM 100 MCG: 0.1 TABLET ORAL at 05:21

## 2020-10-09 RX ADMIN — CLOPIDOGREL 75 MG: 75 TABLET, FILM COATED ORAL at 09:10

## 2020-10-09 RX ADMIN — ASPIRIN 81 MG CHEWABLE TABLET 81 MG: 81 TABLET CHEWABLE at 09:10

## 2020-10-09 RX ADMIN — BACITRACIN ZINC: 500 OINTMENT TOPICAL at 09:14

## 2020-10-09 RX ADMIN — FERROUS SULFATE TAB 325 MG (65 MG ELEMENTAL FE) 325 MG: 325 (65 FE) TAB at 09:10

## 2020-10-09 RX ADMIN — GABAPENTIN 600 MG: 600 TABLET ORAL at 09:10

## 2020-10-09 RX ADMIN — MELOXICAM 7.5 MG: 7.5 TABLET ORAL at 09:11

## 2020-10-09 RX ADMIN — DOCUSATE SODIUM 50 MG AND SENNOSIDES 8.6 MG 2 TABLET: 8.6; 5 TABLET, FILM COATED ORAL at 09:10

## 2020-10-09 RX ADMIN — ISOSORBIDE MONONITRATE 60 MG: 60 TABLET ORAL at 09:11

## 2020-10-09 RX ADMIN — FENOFIBRATE 54 MG: 54 TABLET ORAL at 09:11

## 2020-10-09 RX ADMIN — Medication 10 ML: at 09:11

## 2020-10-09 RX ADMIN — BISACODYL 10 MG: 10 SUPPOSITORY RECTAL at 09:13

## 2020-10-09 RX ADMIN — METOPROLOL SUCCINATE 25 MG: 25 TABLET, EXTENDED RELEASE ORAL at 09:10

## 2020-10-09 RX ADMIN — GLIPIZIDE 5 MG: 5 TABLET ORAL at 05:21

## 2020-10-09 RX ADMIN — ONDANSETRON 4 MG: 2 INJECTION INTRAMUSCULAR; INTRAVENOUS at 10:57

## 2020-10-09 RX ADMIN — CITALOPRAM 20 MG: 20 TABLET, FILM COATED ORAL at 09:11

## 2020-10-09 RX ADMIN — PANTOPRAZOLE SODIUM 40 MG: 40 TABLET, DELAYED RELEASE ORAL at 05:21

## 2020-10-09 RX ADMIN — POLYETHYLENE GLYCOL 3350 17 G: 17 POWDER, FOR SOLUTION ORAL at 09:10

## 2020-10-09 RX ADMIN — FOLIC ACID 1 MG: 1 TABLET ORAL at 09:10

## 2020-10-09 ASSESSMENT — PAIN SCALES - GENERAL
PAINLEVEL_OUTOF10: 0
PAINLEVEL_OUTOF10: 0

## 2020-10-09 NOTE — CARE COORDINATION
Received call from Daughter, [facility] again. She wants to know if Dr Bhakti Mcelroy will do Peer to Peer since Dr Michael Qiu won't. [facility] for [place] [facility] for information regarding Peer to Peer to give to Dr Bhakti Mcelroy.

## 2020-10-09 NOTE — PROGRESS NOTES
Physical Therapy    Name: Abraham Mcdonald  :   MRN: [id number]    Referring Provider:  Leopold Rei, APRN - CNP     Date of Service: 10/9/2020    Evaluating PT:  Mariia Valladares, PT, DPT    Room #:  0543/3596-P  Diagnosis:  SAH  PMHx/PSHx:  MI, DM2, HTN, GERD, Arthritis, CAD, NSTEMI, Fibromyalgia, CHF, Lumbar fusion, Spinal cord stimulator 6/15/2020  Procedure/Surgery:  NA  Precautions:  Falls  Equipment Needs:  Has [address] [place] [address]  S/p fall at home with head lacerations    SUBJECTIVE:    Pt lives alone in a 1 story home with 2+2 stairs to enter and 1 rail(s) on first 2 steps. Pt ambulated with no AD PTA. Pt reported independent with ADLs. Pt is actively driving. OBJECTIVE:   Initial Evaluation  Date: 10/6/2020 Treatment  10/9 Short Term/ Long Term   Goals   AM-PAC 6 Clicks     Was pt agreeable to Eval/treatment? Yes  yes    Does pt have pain? No c/o pain No c/o pain    Bed Mobility  NT, patient in the bathroom N/T pt sitting up in chair finishing breakfast. Supervision   Transfers Sit to stand: Min A  Stand to sit: Min A  Stand pivot: Min A Sit to stand Supervision  Stand to sit supervision  Stand pivot Supervision with ww Supervision   Ambulation    80 feet with WW with Min A 125 feet x1 with ww SBA >150 feet with WW with Supervision   Stair negotiation: ascended and descended  NT N/T 4 steps with 1 rail with Supervision   ROM BUE:  WFL  BLE:  WFL     Strength BUE:  4/5  BLE:  4/5  Increase 1/3 grade MMT   Balance Sitting EOB:  Supervision  Dynamic Standing:  Min A with [address] [place] [address] Dynamic standing SBA with ww Sitting EOB:  Independent  Dynamic Standing:  Supervision     Pt is A & O x 3  Sensation:  Pt denied numbness and tingling to extremities  Edema:  None noted    Therapeutic Exercises:   Ankle pumps 30x,  hip flexion 2x10,   Patient education  Pt educated on purpose of PT assessment, importance of mobility, safety with mobility, tranfers, walker safety/approximation, gait    Patient response to education:   Pt verbalized understanding Pt demonstrated skill Pt requires further education in this area   Yes  Yes with verbal cues and assist Yes      ASSESSMENT:    Comments:  Patient cleared by RN and agreeable to treatment. Pt in bathroom and agreed to tx. Pt amb with ww SBA. Increased amb distance with ww. Pt returned to chair and performed ex. Pt given call light. Treatment:  Patient practiced and was instructed in the following treatment:     Bed mobility: NT   Transfer training: Verbal/tactile cues to facilitate proper hand placement, technique and safety during sit to stand task.  Gait training: Verbal and tactile cues to facilitate upright posture and safety as well as provided with physical assistance to complete task. Cues for increased stride length and gait speed.  Therapeutic exercises: As noted above        PLAN OF CARE:    Current Treatment Recommendations     [x] Strengthening     [] ROM   [x] Balance Training   [x] Endurance Training   [x] Transfer Training   [x] Gait Training   [x] Stair Training   [x] Positioning   [x] Safety and Education Training   [x] Patient/Caregiver Education   [] HEP  [] Other       PT care will be provided in accordance with the objectives noted above and progressed and updated when appropriate. Exercises and functional mobility practice will be used as well as appropriate assistive devices or modalities to obtain goals. Patient and family education will also be administered as needed. Frequency of treatments: 2-5x/week x 1-2 weeks.     Time in  11:15  Time out 11:28    Total Treatment Time  13 minutes       CPT codes:  [] Low Complexity PT evaluation 70385  [] Moderate Complexity PT evaluation 10686  [] High Complexity PT evaluation 50877  [] PT Re-evaluation 35084  [] Gait training 79353 - minutes  [] Manual therapy 47420 - minutes  [x] Therapeutic activities 74897 13 minutes  [] Therapeutic exercises 20190 - minutes  [] Neuromuscular reeducation 30154

## 2020-10-09 NOTE — CARE COORDINATION
Call placed to daughter, Juanjo Martinez regarding therapy update. She did not answer, left message regarding patient was able to do stairs and was able to walk 125 ft. Informed her via message again that patient is discharged, [facility] is set up. If she chooses to take her to her house we will need address to send to [facility] for them to see there, otherwise they will see her at home.

## 2020-10-09 NOTE — CARE COORDINATION
Precert denied for acute rehab. Have called daughterSloane and spoke with her regarding denial.  She is very upset but does state understanding. Offered C and they are agreeable. They would like [facility] homecare. Referral made to Rosita Martínez at [facility]. SW did notify Sloane griffin that patient will discharge today.

## 2020-10-09 NOTE — DISCHARGE SUMMARY
Physician Discharge Summary     Patient ID:  Mariusz Larsen  [id number]  30 y.o.  1945    Admit date: 10/2/2020    Discharge date and time:  10/9/2020    Discharge Diagnoses: Principal Problem:    Syncope and collapse  Active Problems:    DDD (degenerative disc disease), lumbar    Normocytic anemia    Falls frequently    Non-insulin dependent type 2 diabetes mellitus ([facility]    Hypothyroidism    HLD (hyperlipidemia)    GERD (gastroesophageal reflux disease)    DM II (diabetes mellitus, type II), controlled ([facility] [address]    History of coronary artery bypass surgery    Concussion    Scalp laceration, initial encounter    Cervical strain    Fecal retention  Resolved Problems:    * No resolved hospital problems.  *      Consults: IP CONSULT TO TRAUMA SURGERY  IP CONSULT TO NEUROSURGERY  IP CONSULT TO CARDIOLOGY  IP CONSULT TO HOSPITALIST  IP CONSULT TO SOCIAL WORK  IP CONSULT TO NEUROSURGERY  IP CONSULT TO HOME CARE NEEDS  IP CONSULT TO PHYSICAL MEDICINE REHAB    Procedures: See below    Hospital Course: 74-year-old female history of diabetes, hypertension, CAD admitted with syncope, fall, head injury with scalp lac     Likely multifactorial delirium 2/2 meds, constipation, concussion  C-collar  Pain control  Bowel regimen  CT head 10/2-- no stroke or hemorrhage evident  Stat CT head 10/6-- no stroke or hemorrhage evident  MRIs pending--unable due to incompatibility of neurostimulator  Diabetes control  Hypertension controlled  Neurology consult appreciated-- discussed case  Trauma surgery consult appreciated  Cardiology consult appreciated  Discussed with Neurosurgery--Doubtful SAH, likely artifact with no evidence of ICH on multiple repeat CT-- OK for DAPT  Medications for other co morbidities cont as appropriate w dosage adjustments as necessary   PT/OT  DVT PPx  DC planning denied for acute rehab    [facility] with Carin Enriquez    Discharge Exam:  See progress note from today    Condition:  Stable    Disposition: home    Patient Instructions:   Current Discharge Medication List      START taking these medications    Details   meloxicam (MOBIC) 7.5 MG tablet Take 1 tablet by mouth daily  Qty: 30 tablet, Refills: 3      polyethylene glycol (GLYCOLAX) 17 g packet Take 17 g by mouth daily  Qty: 527 g, Refills: 1      bisacodyl (DULCOLAX) 10 MG suppository Place 1 suppository rectally daily as needed for Constipation  Qty: 30 suppository, Refills: 0      sennosides-docusate sodium (SENOKOT-S) 8.6-50 MG tablet Take 2 tablets by mouth daily  Qty:        HYDROcodone-acetaminophen (NORCO) 5-325 MG per tablet Take 1 tablet by mouth every 6 hours as needed (neck pain and low back pain.) for up to 3 days. Qty: 10 tablet, Refills: 0    Comments: Reduce doses taken as pain becomes manageable  Associated Diagnoses: Strain of neck muscle, initial encounter         CONTINUE these medications which have CHANGED    Details   glipiZIDE (GLUCOTROL) 5 MG tablet Take 1 tablet by mouth every morning (before breakfast)  Qty: 60 tablet, Refills: 3         CONTINUE these medications which have NOT CHANGED    Details   ferrous sulfate (IRON 325) 325 (65 Fe) MG tablet Take 325 mg by mouth daily (with breakfast)      metoprolol succinate (TOPROL XL) 25 MG extended release tablet Take 25 mg by mouth daily      Semaglutide, 1 MG/DOSE, 2 MG/1.5ML SOPN Inject 2 mg into the skin once a week Given Friday      baclofen (LIORESAL) 10 MG tablet Take 1 tablet by mouth nightly  Qty: 90 tablet, Refills: 3      gabapentin (NEURONTIN) 600 MG tablet Take 1 tablet by mouth 3 times daily for 180 days.   Qty: 180 tablet, Refills: 5      fenofibrate (TRICOR) 54 MG tablet Take 1 tablet by mouth daily  Qty: 90 tablet, Refills: 3    Comments: Clarification of dose      citalopram (CELEXA) 20 MG tablet Take 20 mg by mouth daily       Multiple Vitamins-Minerals (EYE HEALTH) CAPS Take 1 capsule by mouth 2 times daily       dapagliflozin (FARXIGA) 10 MG tablet Take 10 mg by mouth daily aspirin 81 MG tablet Take 81 mg by mouth daily Stopped 5 days pre op      omeprazole (PRILOSEC) 20 MG delayed release capsule Take 20 mg by mouth daily      furosemide (LASIX) 20 MG tablet Take 20 mg by mouth daily      clopidogrel (PLAVIX) 75 MG tablet Take 75 mg by mouth daily       folic acid (FOLVITE) 677 MCG tablet Take 800 mcg by mouth daily       vitamin D 1000 UNITS CAPS Take 1,000 Units by mouth every evening       isosorbide mononitrate (IMDUR) 60 MG CR tablet Take 60 mg by mouth daily       ezetimibe (ZETIA) 10 MG tablet Take 10 mg by mouth every evening       Multiple Vitamins-Minerals (CENTRUM SILVER) TABS Take 1 tablet by mouth daily       levothyroxine (SYNTHROID) 100 MCG tablet Take 100 mcg by mouth daily. nitroGLYCERIN (NITROSTAT) 0.4 MG SL tablet Place 0.4 mg under the tongue every 5 minutes as needed for Chest pain up to max of 3 total doses. If no relief after 1 dose, call 911.          STOP taking these medications       propranolol (INDERAL) 10 MG tablet Comments:   Reason for Stopping:         oxyCODONE-acetaminophen (PERCOCET) 5-325 MG per tablet Comments:   Reason for Stopping:         docusate sodium (COLACE) 100 MG capsule Comments:   Reason for Stopping:         potassium chloride (KLOR-CON) 10 MEQ extended release tablet Comments:   Reason for Stopping:         Semaglutide (OZEMPIC) 0.25 or 0.5 MG/DOSE SOPN Comments:   Reason for Stopping:         lisinopril (PRINIVIL;ZESTRIL) 5 MG tablet Comments:   Reason for Stopping:             Activity: activity as tolerated  Diet: diabetic diet    Follow-up with 1wk PCP    Note that over 30 minutes was spent in preparing discharge papers, discussing discharge with patient, staff, consultants, medication review, arranging follow up, etc.    Signed:  Ghassan Burnett MD  10/9/2020  2:26 PM

## 2020-10-09 NOTE — CARE COORDINATION
Received information for Peer to Peer, call placed to Dr Ivonne Ching. He does not feel peer to peer is necessary, he agrees with discharge home and [facility]. Call placed to Daughter, Vidhi Portillo, she is so upset. Doesn't understand why no one is helping, much support provided. Again explained we have arranged for [facility]. Again asked if she would take patient to her house for more monitoring if she feels necessary, she states in order to go to her house she has to do 15 steps up and 15 steps down. Will ask therapy to see again and do steps with her. [facility] is already arranged for [facility] and discharge home today.

## 2020-10-09 NOTE — CARE COORDINATION
Daughter again called SW back upset about rehab not being approved. Went over therapy evals in depth and at length again as well as all other information and informed her that patient is appropriate for home, qualifies for [facility], we have arranged for [facility] homecare per mothers choice and insurance will not approve acute rehab because she is doing too good at this time. Daughter states understanding but keeps saying how frustrated she is, again assured her that other than private pay for inpatient rehab only other option is home at this time. Also inquired if patient could go to [facility] for a little bit if she would feel more comfortable but daughter states she has a 3 story home and doesn't feel she will be able to do all the stairs.

## 2020-10-09 NOTE — PROGRESS NOTES
understanding Pt demonstrated skill Pt requires further education in this area   Yes  Yes with verbal cues and assist Yes      ASSESSMENT:    Comments:  Patient cleared by RN and agreeable to treatment. Pt sitting in chair and agreed to tx session. Pt amb with ww increased amb distance than earlier. Pt instructed in and performed steps x3 reps with use of rails non reciprocally. Pt amb back to her room with ww. Pt requested to use the bathroom. Pt assisted into bathroom and on commode. Pt assisted out of bathroom and to bedside chair. Pt given call light. Treatment:  Patient practiced and was instructed in the following treatment:     Bed mobility: NT   Transfer training: Verbal/tactile cues to facilitate proper hand placement, technique and safety during sit to stand task.  Gait training: Verbal and tactile cues to facilitate upright posture and safety as well as provided with physical assistance to complete task. Cues for increased stride length and gait speed.  Stairs as noted        PLAN OF CARE:    Current Treatment Recommendations     [x] Strengthening     [] ROM   [x] Balance Training   [x] Endurance Training   [x] Transfer Training   [x] Gait Training   [x] Stair Training   [x] Positioning   [x] Safety and Education Training   [x] Patient/Caregiver Education   [] HEP  [] Other       PT care will be provided in accordance with the objectives noted above and progressed and updated when appropriate. Exercises and functional mobility practice will be used as well as appropriate assistive devices or modalities to obtain goals. Patient and family education will also be administered as needed. Frequency of treatments: 2-5x/week x 1-2 weeks.     Time in  14:40  Time out  15:05    Total Treatment Time  25 minutes       CPT codes:  [] Low Complexity PT evaluation 53720  [] Moderate Complexity PT evaluation 26213  [] High Complexity PT evaluation 96499  [] PT Re-evaluation 38834  [] Gait training 53262 - minutes  [] Manual therapy 08253 - minutes  [x] Therapeutic activities 84507 24 minutes  [] Therapeutic exercises 95465 - minutes  [] Neuromuscular reeducation 16172 - minutes     Chi Michel [id number]

## 2020-10-09 NOTE — PROGRESS NOTES
Subjective:  Feeling better  No CP or SOB  No fever or chills   No uncontrolled pain  No vomiting or diarrhea     Objective:    BP (!) 144/67   Pulse 61   Temp 98.2 °F (36.8 °C) (Oral)   Resp 18   Ht 5' 1\" (1.549 m)   Wt 195 lb 12.8 oz (88.8 kg)   SpO2 97%   BMI 37.00 kg/m²     24HR INTAKE/OUTPUT:      Intake/Output Summary (Last 24 hours) at 10/9/2020 1234  Last data filed at 10/9/2020 0958  Gross per 24 hour   Intake 240 ml   Output --   Net 240 ml       General appearance: NAD, conversant  Neck: C collar full range of motion with minimal pain   lungs: Clear bilaterally no wheezes, no rhonchi, no crackles  CV: RRR, no MRGs; normal carotid upstroke and amplitude without Bruits  Abdomen: Soft, non-tender; no masses or HSM  Extremities: No edema, no cyanosis, no clubbing  Skin: Intact no rash, no lesions, no ulcers    Psych: Alert and oriented normal affect  Neuro: Nonfocal  Most Recent Labs  Lab Results   Component Value Date    WBC 7.2 10/02/2020    HGB 11.7 10/02/2020    HCT 35.4 10/02/2020     10/02/2020     10/02/2020    K 4.3 10/02/2020     10/02/2020    CREATININE 1.2 (H) 10/02/2020    BUN 30 (H) 10/02/2020    CO2 27 10/02/2020    GLUCOSE 135 (H) 10/02/2020    ALT 25 10/02/2020    AST 24 10/02/2020    INR 1.2 10/02/2020    TSH 1.070 07/05/2020    LABA1C 6.1 (H) 07/05/2020    LABMICR <12.0 07/24/2019     No results for input(s): MG in the last 72 hours. Lab Results   Component Value Date    CALCIUM 9.3 10/02/2020    PHOS 3.4 07/30/2020        CT HEAD WO CONTRAST   Final Result   No evidence for acute intracranial findings. Evolving left occipital and parietal scalp soft tissue hematoma/lacerations   and small volume of subcutaneous emphysema. CT HEAD WO CONTRAST   Final Result   1.   Mild soft tissue swelling, small subcutaneous/subgaleal hematoma, and   minimal soft tissue emphysema, are scattered about the left parietal   high-convexity scalp, lessening inferiorly over the left occipital and   suboccipital regions. Within the limits of this exam, no definite associated   fracture is identified. 2.   Negative for acute intracranial process, within the limits of this   non-contrast CT exam.  Consider follow-up CT versus MR imaging, as directed   clinically. .         CT THORACIC SPINE WO CONTRAST   Final Result   No fracture or malalignment of the thoracic spine. CT LUMBAR SPINE WO CONTRAST   Final Result   1. Stable, satisfactory alignment status post, posterior fusion of lumbar   spine from levels of L4-S1.      2.  No acute fracture or malalignment. CT HEAD WO CONTRAST   Final Result   Small volume of acute subarachnoid hemorrhage along the right inferior   temporal convexity. Multiple left parietal and occipital scalp soft tissue hematomas/lacerations. No evidence for acute cervical spine fracture. Findings discussed with the ER at the time of interpretation. CT CERVICAL SPINE WO CONTRAST   Final Result   Small volume of acute subarachnoid hemorrhage along the right inferior   temporal convexity. Multiple left parietal and occipital scalp soft tissue hematomas/lacerations. No evidence for acute cervical spine fracture. Findings discussed with the ER at the time of interpretation. XR PELVIS (1-2 VIEWS)   Final Result   Diffuse osteopenia without convincing evidence of acute fracture. If there   is persistent concern for an occult fracture, correlation with CT could be   made. XR CHEST PORTABLE   Final Result   No acute process.              Assessment    Principal Problem:    Syncope and collapse  Active Problems:    DDD (degenerative disc disease), lumbar    Normocytic anemia    Falls frequently    Non-insulin dependent type 2 diabetes mellitus (HCC)    Hypothyroidism    HLD (hyperlipidemia)    GERD (gastroesophageal reflux disease)    DM II (diabetes mellitus, type II), controlled ([address]    History of coronary artery bypass surgery    Concussion    Scalp laceration, initial encounter    Cervical strain    Fecal retention  Resolved Problems:    * No resolved hospital problems.  *      Plan:  46-year-old female history of diabetes, hypertension, CAD admitted with syncope, fall, head injury with scalp lac    Likely multifactorial delirium 2/2 meds, constipation, concussion  C-collar  Pain control  Bowel regimen  CT head 10/2-- no stroke or hemorrhage evident  Stat CT head 10/6-- no stroke or hemorrhage evident  MRIs pending--unable due to incompatibility of neurostimulator  Diabetes control  Hypertension controlled  Neurology consult appreciated-- discussed case  Trauma surgery consult appreciated  Cardiology consult appreciated  Discussed with Neurosurgery--Doubtful SAH, likely artifact with no evidence of ICH on multiple repeat CT-- OK for DAPT  Medications for other co morbidities cont as appropriate w dosage adjustments as necessary   PT/OT  DVT PPx  DC planning denied for acute rehab  Stable for discharge        Electronically signed by Monie Bradford MD on 10/9/2020 at 12:34 PM

## 2020-10-09 NOTE — PRE-CERTIFICATION NOTE
Precert for acute rehab denied per Irving Garcia. Dr. Michael Qiu notified and will NOT do a peer to peer.

## 2020-10-12 ENCOUNTER — TELEPHONE (OUTPATIENT)
Dept: SURGERY | Age: 75
End: 2020-10-12

## 2020-10-12 NOTE — TELEPHONE ENCOUNTER
JULIO C Pandey received a call from patient wanting to schedule an appointment. MA scheduled pt for 10/20/2020 @ 2pm with Trauma clinic. Patient verbalized appointment date, time and location. MA verified number that was good to do reminder call on was the one listed in chart. MA advised patient where to park and enter in the building for the appointment.     Electronically signed by Daya Barry MA on 10/12/20 at 2:10 PM EDT

## 2020-10-20 ENCOUNTER — OFFICE VISIT (OUTPATIENT)
Dept: SURGERY | Age: 75
End: 2020-10-20
Payer: MEDICARE

## 2020-10-20 VITALS
DIASTOLIC BLOOD PRESSURE: 62 MMHG | RESPIRATION RATE: 16 BRPM | TEMPERATURE: 97.5 F | HEART RATE: 66 BPM | OXYGEN SATURATION: 95 % | WEIGHT: 195 LBS | BODY MASS INDEX: 36.82 KG/M2 | SYSTOLIC BLOOD PRESSURE: 145 MMHG | HEIGHT: 61 IN

## 2020-10-20 PROCEDURE — 99212 OFFICE O/P EST SF 10 MIN: CPT | Performed by: NURSE PRACTITIONER

## 2020-10-20 RX ORDER — OXYCODONE HYDROCHLORIDE AND ACETAMINOPHEN 5; 325 MG/1; MG/1
1 TABLET ORAL EVERY 4 HOURS PRN
COMMUNITY
End: 2020-11-16 | Stop reason: ALTCHOICE

## 2020-10-20 RX ORDER — ONDANSETRON 4 MG/1
4 TABLET, FILM COATED ORAL EVERY 8 HOURS PRN
COMMUNITY
End: 2021-04-22

## 2020-10-20 RX ORDER — HYDROCODONE BITARTRATE AND ACETAMINOPHEN 5; 325 MG/1; MG/1
1 TABLET ORAL EVERY 6 HOURS PRN
COMMUNITY
End: 2020-11-18 | Stop reason: SDUPTHER

## 2020-10-20 NOTE — PATIENT INSTRUCTIONS
[facility]  Trauma Services  Additional Discharge Instructions    Call [phone] for any questions/concerns. Please follow the instructions checked below:   Please follow up with your primary care provider. ACTIVITY INSTRUCTIONS  Increase activity as tolerated  No heavy lifting or strenuous activity  Take your incentive spirometer home and use 4-6 times/day   [x]  No driving until cleared by Cardiology & Neurology. WOUND/DRESSING INSTRUCTIONS:  You may shower. No sitting in bath tub, hot tub or swimming until cleared by physician. Ice to areas of pain for first 24 hours. Heat to areas of pain after that. Wash areas of lacerations/abrasions with soap & water. Rinse well. Pat dry with clean towel. Apply thin layer of Bacitracin, Neosporin, or triple antibiotic cream to affected area 2-3 times per day. Keep wounds clean and dry. MEDICATION INSTRUCTIONS  Take medication as prescribed. When taking pain medications, you may experience dizziness or drowsiness. Do not drink alcohol or drive when taking these medications. You may experience constipation while taking pain medication. You may take over the counter stool softeners such as docusate (Colace), sennosides S (Senokot-S), or Miralax. [x]  You may take acetaminophen (Tylenol) products if you are taking Percocet or Norco, as these contain Tylenol. Do NOT take more than 4000mg of Tylenol in 24h. OPIOID MEDICATION INSTRUCTIONS  Read the medication guide that is included with your prescription. Take your medication exactly as prescribed. Store medication away from children and in a safe place. Do NOT share your medication with others. Do NOT take medication unless it is prescribed for you. Do NOT drink alcohol while taking opioids (I.e., Norco, Percocet, Oxycodone, etc).   Discuss with the Trauma Clinic staff if the dose of medication you are taking does not control your pain and any side effects that you may be having. CALL 911 OR YOUR LOCAL EMERGENCY SERVICE:  --If you take too much medication  --If you have trouble breathing or shortness of breath  --A child has taken this medication. WORK:  You may not return to work until you receive follow-up with the Trauma Clinic or clearance by all consultants. Call the trauma clinic for any of the following or for questions/concerns;  --fever over 101F  --redness, swelling, hardness or warmth at the wound site(s).   --Unrelieved nausea/vomiting  --Foul smelling or cloudy drainage at the wound site(s)  --Unrelieved pain or increase in pain  --Increase in shortness of breath    Follow-up:  Trauma Clinic: [phone] [address]  [place], [phone] [address] [place]

## 2020-10-20 NOTE — PROGRESS NOTES
Trauma Clinic Progress Note   10/20/2020     Date of injury: October 2, 2020         GAVI/Injuries:   Patient Active Problem List   Diagnosis Code    Non-insulin dependent type 2 diabetes mellitus ([facility] [address] E11.9    GERD (gastroesophageal reflux disease) K21.9    Hypothyroidism E03.9    History of MI (myocardial infarction) I25.2    Hypertension I10    HLD (hyperlipidemia) E78.5    Acute renal failure (ARF) ([facility]) N17.9    Normocytic anemia D64.9    Proximal weakness of limb M62.89    Falls frequently R29.6    Cervical spinal stenosis M48.02    Neural foraminal stenosis of cervical spine M99.71    Facet arthropathy, cervical M47.812    Right cataract H26.9    Lumbar radiculopathy M54.16    Neural foraminal stenosis of lumbar spine M99.73    DDD (degenerative disc disease), lumbar M51.36    Protruded lumbar disc M51.26    DM II (diabetes mellitus, type II), controlled ([facility]) E11.9    NSTEMI (non-ST elevated myocardial infarction) ([facility]) I21.4    Left cataract H26.9    Colitis K52.9    Primary osteoarthritis of right knee M17.11    Primary osteoarthritis of both knees M17.0    Pseudoaneurysm following procedure ([facility]) T81.718A, I72.9    History of coronary artery bypass surgery Z95.1    Hx of hypercholesterolemia Z86.39    Acute lateral meniscus tear of right knee S83.281A    L-S radiculopathy M54.17    Lumbar postlaminectomy syndrome M96.1    Lumbar facet arthropathy M47.816    Lumbar spondylosis M47.816    Lumbar disc disorder M51.9    Greater trochanteric bursitis of left hip M70.62    Greater trochanteric bursitis of right hip M70.61    Primary osteoarthritis of both hips M16.0    Osteoarthritis of left hip M16.12    Chest pain R07.9    SAH (subarachnoid hemorrhage) ([facility]) I60.9    Concussion S06. 0X9A    Syncope and collapse R55    Scalp laceration, initial encounter S01. 01XA    Cervical strain S16. 1XXA    Fecal retention K59.00     Surgeries:   Past Surgical History:   Procedure SALIVARY GLAND SURGERY      SPINAL CORD STIMULATOR SURGERY  06/15/2020    nevro     TONSILLECTOMY      VASCULAR SURGERY      laser on leaky veins     Vital signs:    BP (!) 145/62   Pulse 66   Temp 97.5 °F (36.4 °C) (Infrared)   Resp 16   Ht 5' 1\" (1.549 m)   Wt 195 lb (88.5 kg)   SpO2 95%   BMI 36.84 kg/m²     Medications:    Prior to Admission medications    Medication Sig Start Date End Date Taking? Authorizing Provider   oxyCODONE-acetaminophen (PERCOCET) 5-325 MG per tablet Take 1 tablet by mouth every 4 hours as needed for Pain. Yes Historical Provider, MD   ondansetron (ZOFRAN) 4 MG tablet Take 4 mg by mouth every 8 hours as needed for Nausea or Vomiting   Yes Historical Provider, MD   HYDROcodone-acetaminophen (NORCO) 5-325 MG per tablet Take 1 tablet by mouth every 6 hours as needed for Pain.    Yes Historical Provider, MD   meloxicam (MOBIC) 7.5 MG tablet Take 1 tablet by mouth daily 10/10/20  Yes Zee Mosquera MD   polyethylene glycol [facility]) 17 g packet Take 17 g by mouth daily 10/10/20 11/9/20 Yes Zee Mosquera MD   bisacodyl (DULCOLAX) 10 MG suppository Place 1 suppository rectally daily as needed for Constipation 10/9/20 11/8/20 Yes Zee Mosquera MD   glipiZIDE (GLUCOTROL) 5 MG tablet Take 1 tablet by mouth every morning (before breakfast) 10/8/20  Yes Zee Mosquera MD   sennosides-docusate sodium (SENOKOT-S) 8.6-50 MG tablet Take 2 tablets by mouth daily 10/8/20  Yes Zee Mosquera MD   ferrous sulfate (IRON 325) 325 (65 Fe) MG tablet Take 325 mg by mouth daily (with breakfast)   Yes Historical Provider, MD   metoprolol succinate (TOPROL XL) 25 MG extended release tablet Take 25 mg by mouth daily   Yes Historical Provider, MD   Semaglutide, 1 MG/DOSE, 2 MG/1.5ML SOPN Inject 2 mg into the skin once a week Given Friday   Yes Historical Provider, MD   baclofen (LIORESAL) 10 MG tablet Take 1 tablet by mouth nightly 6/25/20 6/25/21 Yes Karlos Edwards., MD gabapentin (NEURONTIN) 600 MG tablet Take 1 tablet by mouth 3 times daily for 180 days. 6/25/20 12/22/20 Yes Seven Minaya MD   fenofibrate (TRICOR) 54 MG tablet Take 1 tablet by mouth daily 6/25/20  Yes Seven Minaya MD   citalopram (CELEXA) 20 MG tablet Take 20 mg by mouth daily    Yes Historical Provider, MD   Multiple Vitamins-Minerals (EYE HEALTH) CAPS Take 1 capsule by mouth 2 times daily    Yes Historical Provider, MD   dapagliflozin (FARXIGA) 10 MG tablet Take 10 mg by mouth daily    Yes Historical Provider, MD   aspirin 81 MG tablet Take 81 mg by mouth daily Stopped 5 days pre op   Yes Historical Provider, MD   omeprazole (PRILOSEC) 20 MG delayed release capsule Take 20 mg by mouth daily   Yes Historical Provider, MD   furosemide (LASIX) 20 MG tablet Take 20 mg by mouth daily   Yes Historical Provider, MD   clopidogrel (PLAVIX) 75 MG tablet Take 75 mg by mouth daily    Yes Historical Provider, MD   folic acid (FOLVITE) 178 MCG tablet Take 800 mcg by mouth daily    Yes Historical Provider, MD   vitamin D 1000 UNITS CAPS Take 1,000 Units by mouth every evening    Yes Historical Provider, MD   isosorbide mononitrate (IMDUR) 60 MG CR tablet Take 60 mg by mouth daily    Yes Historical Provider, MD   ezetimibe (ZETIA) 10 MG tablet Take 10 mg by mouth every evening    Yes Historical Provider, MD   Multiple Vitamins-Minerals (CENTRUM SILVER) TABS Take 1 tablet by mouth daily    Yes Historical Provider, MD   levothyroxine (SYNTHROID) 100 MCG tablet Take 100 mcg by mouth daily. Yes Historical Provider, MD   nitroGLYCERIN (NITROSTAT) 0.4 MG SL tablet Place 0.4 mg under the tongue every 5 minutes as needed for Chest pain up to max of 3 total doses. If no relief after 1 dose, call 911. Historical Provider, MD        CC:  Trauma follow up    This 82-year-old woman presents to the clinic after sustaining a syncopal fall while in the bath tub.   She did sustain a loss of consciousness with a scalp left hip M70.62    Greater trochanteric bursitis of right hip M70.61    Primary osteoarthritis of both hips M16.0    Osteoarthritis of left hip M16.12    Chest pain R07.9    SAH (subarachnoid hemorrhage) ([facility]) I60.9    Concussion S06. 0X9A    Syncope and collapse R55    Scalp laceration, initial encounter S01. 01XA    Cervical strain S16. 1XXA    Fecal retention K59.00     Plan  Return to clinic as needed. Follow up with neurology as scheduled. Follow-up with primary care provider. NOTE: This report was transcribed using voice recognition software.  Every effort was made to ensure accuracy; however, inadvertent computerized transcription errors may be present

## 2020-10-21 NOTE — PROGRESS NOTES
Physician Progress Note      PATIENT:               Jonah Neves  CSN #:                  [id number]  :                       1945  ADMIT DATE:       10/2/2020 8:02 AM  DISCH DATE:        10/9/2020 5:42 PM  RESPONDING  PROVIDER #:        Lesa DOVE DO          QUERY TEXT:    Dear Dr Minnie Thorpe and associates,    Patient admitted with closed head injury and scalp laceration following   standing height fall. Per Op note dated 10/2 documentation of \"minimal   debridement performed\". To accurately reflect the procedure performed please   document if debridement was excisional or nonexcisional and the deepest depth   of tissue removed as down to and including: The medical record reflects the following:  Risk Factors: standing height fall closed head injury  Clinical Indicators: per procedure note \"Minimal debridement was preformed,   flaps were aligned. No foreign body was identified. Treatment: repair of scalp laceration  Options provided:  -- Nonexcisional debridement of skin  -- Excisional debridement of skin  -- Nonexcisional debridement of subcutaneous tissue  -- Excisional debridement of subcutaneous tissue  -- Nonexcisional debridement of fascia  -- Excisional debridement of fascia  -- Nonexcisional debridement of muscle  -- Other - I will add my own diagnosis  -- Disagree - Not applicable / Not valid  -- Disagree - Clinically unable to determine / Unknown  -- Refer to Clinical Documentation Reviewer    PROVIDER RESPONSE TEXT:    Non-excisional debridement of subcutaneous tissue of 10/2 (scalp laceration)   was performed during procedure on 10/2 (scalp laceration).     Query created by: Rena Calero on 10/19/2020 10:37 AM      Electronically signed by:  Crystal DOVE DO 10/21/2020 2:59 PM

## 2020-10-22 ENCOUNTER — HOSPITAL ENCOUNTER (OUTPATIENT)
Dept: ULTRASOUND IMAGING | Age: 75
End: 2020-10-22
Payer: MEDICARE

## 2020-10-22 ENCOUNTER — HOSPITAL ENCOUNTER (OUTPATIENT)
Dept: ULTRASOUND IMAGING | Age: 75
Discharge: HOME | End: 2020-10-22
Payer: MEDICARE

## 2020-10-22 PROCEDURE — 76775 US EXAM ABDO BACK WALL LIM: CPT

## 2020-11-03 PROBLEM — M47.816 LUMBAR SPONDYLOSIS: Status: RESOLVED | Noted: 2019-08-21 | Resolved: 2020-11-03

## 2020-11-16 ENCOUNTER — OFFICE VISIT (OUTPATIENT)
Dept: NEUROLOGY | Age: 75
End: 2020-11-16
Payer: MEDICARE

## 2020-11-16 VITALS
DIASTOLIC BLOOD PRESSURE: 78 MMHG | BODY MASS INDEX: 34.74 KG/M2 | TEMPERATURE: 97.1 F | WEIGHT: 184 LBS | SYSTOLIC BLOOD PRESSURE: 145 MMHG | OXYGEN SATURATION: 97 % | HEIGHT: 61 IN | HEART RATE: 67 BPM

## 2020-11-16 PROCEDURE — 99215 OFFICE O/P EST HI 40 MIN: CPT | Performed by: PSYCHIATRY & NEUROLOGY

## 2020-11-16 RX ORDER — MECLIZINE HCL 12.5 MG/1
12.5 TABLET ORAL 2 TIMES DAILY
Qty: 60 TABLET | Refills: 5 | Status: SHIPPED | OUTPATIENT
Start: 2020-11-16 | End: 2021-05-15

## 2020-11-16 NOTE — PROGRESS NOTES
This 70year-old right-handed woman was referred for evaluation and management of gait instability, leg weakness and memory issues. She remained an excellent historian. She was accompanied by a close friend, who was also an excellent historian. Her medications were now semaglutide, glipizide, omeprazole, lisinopril, furosemide, Plavix, aspirin, Nitrostat, folic acid, Farxiga, Glucophage, Toprol, gabapentin, TriCor, multivitamins, Imdur, Percocet, tizanidine and Synthroid. Her past medical history was remarkable for diabetes mellitus for over 40 years. This disorder continued well controlled with recent hemoglobin A1c levels of 6.7. She admitted to diabetic peripheral neuropathy, but no other complications of that disease. She also suffered from hypertension, coronary artery disease, gastroesophageal reflux disease, diverticulosis and severe degenerative joint disease. She underwent 5 stents and subsequent three-vessel bypass surgery. She initially responded well to the surgical invention, without recurrent heart attacks. However, past echocardiography revealed congestive heart failure. Fortunately, recent cardiac work-up proved unrevealing. She again denied any swelling, chest pains or resting dyspnea. She also experienced severe, painful lumbosacral radicular disease. A recent nerve stimulator markedly improved her pains. She continued on gabapentin and baclofen. She wished to continue with those drugs as well as Norco as needed. Fortunately, she had not fallen again. She is no longer tripping of her right foot with hightop shoes. There were no additional tremors of her hands. She denied other neurological issues. Unfortunately, she recently fell in her bathroom upon arising from the bathtub. She felt lightheaded and then lost consciousness. She struck her head to the floor and was hospitalized. Scalp hematomas were seen, but there were no intracranial lesions.   She still felt somewhat unsteady from this insult, but denying any serious sequelae. She is still not drinking adequate amounts of fluid. She denied other medical problems. She was sleeping and eating better. Her blood sugars continued well controlled. Review of systems was otherwise unremarkable. Physical examination displayed stable vital signs. However, orthostatic blood pressures revealed a drop of 20 from sitting to standing; she was asymptomatic at that time. She was an elderly woman in no acute distress who was alert, cooperative and oriented. She remained pleasant. Her lungs were clear to auscultation. Cardiac testing was unrevealing with a well-healed bypass scar. Her peripheral pulses are +1 without bruits. Her limbs were unremarkable except for her multiple surgical scars. I again found no edema or cyanosis. Neurological examination produced an intact mental status. Cranial nerve testing was unrevealing. I found normal tone and bulk with 5/5 strength throughout. Reflexes were +1 in the right arm and absent in the left. There were no reflexes in her legs. Sensation was intact to light touch. Pinprick and vibration were decreased to both mid calves. Joint position sense was impaired in the feet. She walked with a slightly wide-based, cautious gait. She still displayed a minimal right foot drop. There were no changes on neurological testing. Laboratory data included a CMP with a GFR of only 34. Recent blood sugars were 103 extending to 242. Hemoglobin A1c was 6.9. CBC was normal.    This individual initially presented with memory issues. I again found none. Those difficulties were previously related to her inadequate sleep. Her lumbosacral radicular disease was producing her pains and right foot drop. Her gait issues are from the lumbosacral motor radiculopathies. Fortunately, she continued responding well to the new stimulator.   Hopefully, in the future gabapentin, Norco and baclofen could be reduced. In the interim, I again recommend hightop tennis shoes or winter boots. Her tremors were related to anxiety. These have not returned. Medically, she suffered a serious syncopal episode from orthostasis. Fortunately, she was only concussed, without serious intracranial trauma. She must increase her fluid intakes. She will return in 6 months. For now, gabapentin was maintained at 600 mg 3 times daily and baclofen at 20 mg per night. She will call at any time if problems arise. She will also wear hightop tennis shoes. She will call any time if problems arise. I spent 40 minutes with the patient with over 50 % spent in counseling and disease management. All patient issues were addressed and all questions were answered.

## 2020-11-18 ENCOUNTER — OFFICE VISIT (OUTPATIENT)
Dept: PAIN MANAGEMENT | Age: 75
End: 2020-11-18
Payer: MEDICARE

## 2020-11-18 VITALS
TEMPERATURE: 96.9 F | HEART RATE: 60 BPM | WEIGHT: 184 LBS | DIASTOLIC BLOOD PRESSURE: 70 MMHG | SYSTOLIC BLOOD PRESSURE: 115 MMHG | OXYGEN SATURATION: 98 % | RESPIRATION RATE: 16 BRPM | HEIGHT: 61 IN | BODY MASS INDEX: 34.74 KG/M2

## 2020-11-18 PROCEDURE — 99213 OFFICE O/P EST LOW 20 MIN: CPT | Performed by: PAIN MEDICINE

## 2020-11-18 PROCEDURE — 99214 OFFICE O/P EST MOD 30 MIN: CPT | Performed by: PAIN MEDICINE

## 2020-11-18 RX ORDER — HYDROCODONE BITARTRATE AND ACETAMINOPHEN 5; 325 MG/1; MG/1
1 TABLET ORAL 2 TIMES DAILY PRN
Qty: 60 TABLET | Refills: 0 | Status: SHIPPED | OUTPATIENT
Start: 2020-11-18 | End: 2020-12-18

## 2020-11-18 RX ORDER — POTASSIUM CHLORIDE 750 MG/1
10 CAPSULE, EXTENDED RELEASE ORAL DAILY
COMMUNITY
End: 2022-06-07

## 2020-11-18 RX ORDER — CYCLOBENZAPRINE HCL 5 MG
5 TABLET ORAL DAILY PRN
Qty: 30 TABLET | Refills: 0 | Status: SHIPPED | OUTPATIENT
Start: 2020-11-18 | End: 2020-12-18

## 2020-11-18 RX ORDER — CYCLOBENZAPRINE HCL 5 MG
5 TABLET ORAL PRN
COMMUNITY
End: 2020-11-18 | Stop reason: SDUPTHER

## 2020-11-18 RX ORDER — METHYLPREDNISOLONE 4 MG/1
TABLET ORAL
Qty: 1 KIT | Refills: 0 | Status: SHIPPED | OUTPATIENT
Start: 2020-11-18 | End: 2020-11-24

## 2020-11-18 NOTE — PROGRESS NOTES
[address]  [address] [facility], [address] [place] [address] [place]  [phone]    Follow up Note      Ignacio Carroll     Date of Visit:  11/18/2020    CC:  Patient presents for follow up   Chief Complaint   Patient presents with    Lower Back Pain     HPI:    Low back pain is managed with SCS. Patient had fell recently and had went to the ER. Change in quality of symptoms:no. Medication side effects:not applicable . Recent diagnostic testing:cervical/thoracic and lumbar spine CT. Recent interventional procedures:none    Imaging:   Lumbar spine CT 07/2019  Remote postsurgical changes and multilevel degenerative changes as   described. Mild levoscoliosis. Moderate spinal canal stenosis at the   L3-L4 interspace level. No evidence of herniated nucleus pulposus. No   evidence of any level with critical spinal stenosis. No evidence of   remote postop complication.      Left hip Xray 05/2019  Stable hip joint arthritis. No acute findings.     Previous treatments: Surgery L4-5-S1 bilateral laminectomies 2016 with fusion and medications. .      Potential Aberrant Drug-Related Behavior:  None    Urine Drug Screening:  None, no opioids started     OARRS report:  11/2020 consistent  One script for [place] from ER.     Past Medical History:   Diagnosis Date    Arthritis     CAD (coronary artery disease)     CHF (congestive heart failure) (HCC)     Current use of long term anticoagulation     Plavix    Fibromyalgia     GERD (gastroesophageal reflux disease)     History of cardiovascular stress test 02/17/2014    lexiscan, also 4/21/2015    History of MI (myocardial infarction)     x4; most recent 2016    Hx of blood clots     DVT leg    Hyperlipidemia     Hypertension     Hypothyroidism     Intractable low back pain 6/19/2016    Non-insulin dependent type 2 diabetes mellitus ([address]     NSTEMI (non-ST elevated myocardial infarction) ([address] 11/19/2015    Syncope and collapse 10/2020 SERAFIN OR    ROTATOR CUFF REPAIR      SALIVARY GLAND SURGERY      SPINAL CORD STIMULATOR SURGERY  06/15/2020    nevro     TONSILLECTOMY      VASCULAR SURGERY      laser on leaky veins     Prior to Admission medications    Medication Sig Start Date End Date Taking? Authorizing Provider   meclizine (ANTIVERT) 12.5 MG tablet Take 1 tablet by mouth 2 times daily 11/16/20 5/15/21 Yes Seven Minaya MD   ondansetron (ZOFRAN) 4 MG tablet Take 4 mg by mouth every 8 hours as needed for Nausea or Vomiting   Yes Historical Provider, MD   HYDROcodone-acetaminophen (NORCO) 5-325 MG per tablet Take 1 tablet by mouth every 6 hours as needed for Pain. Yes Historical Provider, MD   glipiZIDE (GLUCOTROL) 5 MG tablet Take 1 tablet by mouth every morning (before breakfast) 10/8/20  Yes Vicente Hilton MD   sennosides-docusate sodium (SENOKOT-S) 8.6-50 MG tablet Take 2 tablets by mouth daily 10/8/20  Yes Vicente Hilton MD   ferrous sulfate (IRON 325) 325 (65 Fe) MG tablet Take 325 mg by mouth daily (with breakfast)   Yes Historical Provider, MD   metoprolol succinate (TOPROL XL) 25 MG extended release tablet Take 25 mg by mouth daily   Yes Historical Provider, MD   Semaglutide, 1 MG/DOSE, 2 MG/1.5ML SOPN Inject 2 mg into the skin once a week Given Friday   Yes Historical Provider, MD   baclofen (LIORESAL) 10 MG tablet Take 1 tablet by mouth nightly 6/25/20 6/25/21 Yes Seven Minaya MD   gabapentin (NEURONTIN) 600 MG tablet Take 1 tablet by mouth 3 times daily for 180 days.  6/25/20 12/22/20 Yes Seven Minaya MD   fenofibrate (TRICOR) 54 MG tablet Take 1 tablet by mouth daily 6/25/20  Yes Seven Minaya MD   citalopram (CELEXA) 20 MG tablet Take 20 mg by mouth daily    Yes Historical Provider, MD   Multiple Vitamins-Minerals (EYE HEALTH) CAPS Take 1 capsule by mouth 2 times daily    Yes Historical Provider, MD   dapagliflozin (FARXIGA) 10 MG tablet Take 10 mg by mouth daily    Yes Historical Provider, MD   aspirin 81 MG tablet Take 81 mg by mouth daily Stopped 5 days pre op   Yes Historical Provider, MD   omeprazole (PRILOSEC) 20 MG delayed release capsule Take 20 mg by mouth daily   Yes Historical Provider, MD   furosemide (LASIX) 20 MG tablet Take 20 mg by mouth daily   Yes Historical Provider, MD   clopidogrel (PLAVIX) 75 MG tablet Take 75 mg by mouth daily    Yes Historical Provider, MD   nitroGLYCERIN (NITROSTAT) 0.4 MG SL tablet Place 0.4 mg under the tongue every 5 minutes as needed for Chest pain up to max of 3 total doses. If no relief after 1 dose, call 911. Yes Historical Provider, MD   folic acid (FOLVITE) 486 MCG tablet Take 800 mcg by mouth daily    Yes Historical Provider, MD   vitamin D 1000 UNITS CAPS Take 1,000 Units by mouth every evening    Yes Historical Provider, MD   isosorbide mononitrate (IMDUR) 60 MG CR tablet Take 60 mg by mouth daily    Yes Historical Provider, MD   ezetimibe (ZETIA) 10 MG tablet Take 10 mg by mouth every evening    Yes Historical Provider, MD   Multiple Vitamins-Minerals (CENTRUM SILVER) TABS Take 1 tablet by mouth daily    Yes Historical Provider, MD   levothyroxine (SYNTHROID) 100 MCG tablet Take 100 mcg by mouth daily.    Yes Historical Provider, MD   meloxicam (MOBIC) 7.5 MG tablet Take 1 tablet by mouth daily  Patient not taking: Reported on 11/16/2020 10/10/20   Earl Reed MD     Allergies   Allergen Reactions    Lipitor Other (See Comments)     Severe leg pain    Statins Other (See Comments)     Muscles go weak and she falls    Atorvastatin      Makes me sick severe leg cramps    Cymbalta [Duloxetine Hcl] Nausea Only    Cipro Xr      Reaction unknown    Diazepam     Duloxetine Nausea Only    Erythromycin      Hurt my stomach    Indomethacin     Ketorolac     Ketorolac Tromethamine Itching    Lodine [Etodolac]     Oscal 500-200 D-3 [Oyster Shell Calcium-D]     Paxil [Paroxetine Hcl]     Ropinirole     Tromethamine  Trulicity [Dulaglutide] Swelling    Zithromax [Azithromycin]     Requip [Ropinirole Hcl] Nausea And Vomiting     Social History     Socioeconomic History    Marital status:      Spouse name: Not on file    Number of children: Not on file    Years of education: Not on file    Highest education level: Not on file   Occupational History    Not on file   Social Needs    Financial resource strain: Not on file    Food insecurity     Worry: Not on file     Inability: Not on file    Transportation needs     Medical: Not on file     Non-medical: Not on file   Tobacco Use    Smoking status: Never Smoker    Smokeless tobacco: Never Used   Substance and Sexual Activity    Alcohol use: No    Drug use: No    Sexual activity: Never   Lifestyle    Physical activity     Days per week: Not on file     Minutes per session: Not on file    Stress: Not on file   Relationships    Social connections     Talks on phone: Not on file     Gets together: Not on file     Attends Sabianism service: Not on file     Active member of club or organization: Not on file     Attends meetings of clubs or organizations: Not on file     Relationship status: Not on file    Intimate partner violence     Fear of current or ex partner: Not on file     Emotionally abused: Not on file     Physically abused: Not on file     Forced sexual activity: Not on file   Other Topics Concern    Not on file   Social History Narrative    Not on file     Family History   Problem Relation Age of Onset    Cancer Father [de-identified]        bladdder    Cancer Brother     COPD Brother     Heart Attack Mother 54    Heart Disease Sister     Diabetes Sister     Heart Disease Brother     Heart Disease Other 58             Diabetes Other     ADHD Other      REVIEW OF SYSTEMS:     Nina Garsia denies fever/chills, chest pain, shortness of breath, new bowel or bladder complaints. All other review of systems was negative.     PHYSICAL EXAMINATION:      BP 115/70   Pulse 60   Temp 96.9 °F (36.1 °C) (Infrared)   Resp 16   Ht 5' 1\" (1.549 m)   Wt 184 lb (83.5 kg)   SpO2 98%   BMI 34.77 kg/m²     General:       General appearance:   elderly, pleasant and well-hydrated. , in mild discomfort and A & O x3  Build:Overweight     HEENT:     Head:normocephalic and atraumatic  Pupils:regular, round and equal.  Sclera: icterus absent,      Lungs:     Breathing:Breathing Pattern: regular, no distress     Abdomen:     Shape:non-distended and normal     Lumbar spine:     Spine inspection:surgical incision scar   Generator Left buttocks not flipped     Musculoskeletal:     SLR:negative right, negative left, sitting      Extremities:     Tremors:None bilaterally upper and lower  Intact: Yes     Neurological:     Sensory:diminished to light touch and pin prick bilateral legs  Motor:                             Right Quadriceps4/5          Left Quadriceps4/5           Right Gastrocnemius4/5    Left Gastrocnemius4/5  Right Ant Tibialis4/5  Left Ant Tibialis4/5  Reflexes:    Right Quadriceps reflex0  Left Quadriceps reflex0  Right Achilles reflex0  Left Achilles reflex0  Gait:antalgic     Dermatology:     Skin:no unusual rashes and no skin lesions     Impression:  Chronic low back pain with radiation to both lower extremities   Lumbar spine MRI 2019 L4-5-S1 bilateral laminectomies with fusion  Patient had seen neurosurgery and is not a candidate for surgery, SCS was recommended   Plan:  Follow up on her low back pain. Patient had fell recently, she had been to the ER. Patient had cervical/thoracic and lumbar spine CT (reviewed). Thoracic spine confirmed correct placement of the leads(no lead migration)  Will have patient contact [facility] for SCS reprogramming. Currently on Gabapentin 1500 mg daily. Continue with Norco 5/325 QD PRN(patient given 60 pills to last three month)  Will start patient on Flexeril 5 mg QD PRN.   Will start patient on Medrol dose marisela for flare ups of her pain. OARRS report reviewed 11/202o, one script for [place] from ER. Urine screen next office visit. Patient encouraged to stay active and to lose weight. Treatment plan discussed with the patient including medications side effects. We discussed with the patient that combining opioids, benzodiazepines, alcohol, illicit drugs or sleep aids increases the risk of respiratory depression including death. We discussed that these medications may cause drowsiness, sedation or dizziness and have counseled the patient not to drive or operate machinery. We have discussed that these medications will be prescribed only by one provider. We have discussed with the patient about age related risk factors and have thoroughly discussed the importance of taking these medications as prescribed. The patient verbalizes understanding. bryant Montiel M.D.

## 2021-01-19 RX ORDER — GABAPENTIN 600 MG/1
TABLET ORAL
Qty: 270 TABLET | Refills: 1 | Status: SHIPPED
Start: 2021-01-19 | End: 2021-07-22 | Stop reason: ALTCHOICE

## 2021-01-19 RX ORDER — BACLOFEN 10 MG/1
10 TABLET ORAL NIGHTLY
Qty: 30 TABLET | Refills: 11 | Status: SHIPPED
Start: 2021-01-19 | End: 2021-05-18 | Stop reason: SDUPTHER

## 2021-04-09 ENCOUNTER — HOSPITAL ENCOUNTER (OUTPATIENT)
Age: 76
Discharge: HOME | End: 2021-04-09
Payer: MEDICARE

## 2021-04-09 LAB
ALBUMIN SERPL-MCNC: 4 G/DL (ref 3.5–5.2)
ALP BLD-CCNC: 73 U/L (ref 35–104)
ALT SERPL-CCNC: 19 U/L (ref 0–32)
ANION GAP SERPL CALCULATED.3IONS-SCNC: 13 MMOL/L (ref 7–16)
AST SERPL-CCNC: 19 U/L (ref 0–31)
BILIRUB SERPL-MCNC: 0.2 MG/DL (ref 0–1.2)
BUN BLDV-MCNC: 27 MG/DL (ref 8–23)
CALCIUM SERPL-MCNC: 9.3 MG/DL (ref 8.6–10.2)
CHLORIDE BLD-SCNC: 103 MMOL/L (ref 98–107)
CO2: 27 MMOL/L (ref 22–29)
CREAT SERPL-MCNC: 1.1 MG/DL (ref 0.5–1)
GFR AFRICAN AMERICAN: 59
GFR NON-AFRICAN AMERICAN: 49 ML/MIN/1.73
GLUCOSE BLD-MCNC: 243 MG/DL (ref 74–99)
HCT VFR BLD CALC: 39.2 % (ref 34–48)
HEMOGLOBIN: 12.8 G/DL (ref 11.5–15.5)
MCH RBC QN AUTO: 30.8 PG (ref 26–35)
MCHC RBC AUTO-ENTMCNC: 32.7 % (ref 32–34.5)
MCV RBC AUTO: 94.2 FL (ref 80–99.9)
PARATHYROID HORMONE INTACT: 50 PG/ML (ref 15–65)
PDW BLD-RTO: 13.3 FL (ref 11.5–15)
PHOSPHORUS: 3.2 MG/DL (ref 2.5–4.5)
PLATELET # BLD: 257 E9/L (ref 130–450)
PMV BLD AUTO: 9.7 FL (ref 7–12)
POTASSIUM SERPL-SCNC: 3.8 MMOL/L (ref 3.5–5)
RBC # BLD: 4.16 E12/L (ref 3.5–5.5)
SODIUM BLD-SCNC: 143 MMOL/L (ref 132–146)
TOTAL PROTEIN: 6.8 G/DL (ref 6.4–8.3)
VITAMIN D 25-HYDROXY: 69 NG/ML (ref 30–100)
WBC # BLD: 4.1 E9/L (ref 4.5–11.5)

## 2021-04-09 PROCEDURE — 83970 ASSAY OF PARATHORMONE: CPT

## 2021-04-09 PROCEDURE — 36415 COLL VENOUS BLD VENIPUNCTURE: CPT

## 2021-04-09 PROCEDURE — 82306 VITAMIN D 25 HYDROXY: CPT

## 2021-04-09 PROCEDURE — 85027 COMPLETE CBC AUTOMATED: CPT

## 2021-04-09 PROCEDURE — 80053 COMPREHEN METABOLIC PANEL: CPT

## 2021-04-09 PROCEDURE — 84100 ASSAY OF PHOSPHORUS: CPT

## 2021-04-11 DIAGNOSIS — M25.521 RIGHT ELBOW PAIN: Primary | ICD-10-CM

## 2021-04-13 ENCOUNTER — OFFICE VISIT (OUTPATIENT)
Dept: ORTHOPEDIC SURGERY | Age: 76
End: 2021-04-13
Payer: MEDICARE

## 2021-04-13 VITALS — BODY MASS INDEX: 33.99 KG/M2 | TEMPERATURE: 98 F | WEIGHT: 180 LBS | HEIGHT: 61 IN

## 2021-04-13 DIAGNOSIS — M77.01 MEDIAL EPICONDYLITIS OF ELBOW, RIGHT: Primary | ICD-10-CM

## 2021-04-13 PROCEDURE — G8427 DOCREV CUR MEDS BY ELIG CLIN: HCPCS | Performed by: ORTHOPAEDIC SURGERY

## 2021-04-13 PROCEDURE — 20550 NJX 1 TENDON SHEATH/LIGAMENT: CPT | Performed by: ORTHOPAEDIC SURGERY

## 2021-04-13 PROCEDURE — 4040F PNEUMOC VAC/ADMIN/RCVD: CPT | Performed by: ORTHOPAEDIC SURGERY

## 2021-04-13 PROCEDURE — 1036F TOBACCO NON-USER: CPT | Performed by: ORTHOPAEDIC SURGERY

## 2021-04-13 PROCEDURE — 99213 OFFICE O/P EST LOW 20 MIN: CPT | Performed by: ORTHOPAEDIC SURGERY

## 2021-04-13 PROCEDURE — 1090F PRES/ABSN URINE INCON ASSESS: CPT | Performed by: ORTHOPAEDIC SURGERY

## 2021-04-13 PROCEDURE — G8417 CALC BMI ABV UP PARAM F/U: HCPCS | Performed by: ORTHOPAEDIC SURGERY

## 2021-04-13 PROCEDURE — G8400 PT W/DXA NO RESULTS DOC: HCPCS | Performed by: ORTHOPAEDIC SURGERY

## 2021-04-13 PROCEDURE — 3017F COLORECTAL CA SCREEN DOC REV: CPT | Performed by: ORTHOPAEDIC SURGERY

## 2021-04-13 PROCEDURE — 1123F ACP DISCUSS/DSCN MKR DOCD: CPT | Performed by: ORTHOPAEDIC SURGERY

## 2021-04-13 RX ORDER — TRIAMCINOLONE ACETONIDE 40 MG/ML
20 INJECTION, SUSPENSION INTRA-ARTICULAR; INTRAMUSCULAR ONCE
Status: COMPLETED | OUTPATIENT
Start: 2021-04-13 | End: 2021-04-13

## 2021-04-13 RX ORDER — OXYCODONE HYDROCHLORIDE AND ACETAMINOPHEN 5; 325 MG/1; MG/1
1 TABLET ORAL EVERY 4 HOURS PRN
COMMUNITY
End: 2021-04-22

## 2021-04-13 RX ORDER — HYDROCODONE BITARTRATE AND ACETAMINOPHEN 5; 325 MG/1; MG/1
1 TABLET ORAL EVERY 6 HOURS PRN
COMMUNITY
End: 2021-04-22

## 2021-04-13 RX ORDER — CYCLOBENZAPRINE HCL 5 MG
5 TABLET ORAL 3 TIMES DAILY PRN
COMMUNITY
End: 2021-05-18 | Stop reason: ALTCHOICE

## 2021-04-13 RX ORDER — AMPICILLIN TRIHYDRATE 250 MG
200 CAPSULE ORAL DAILY
COMMUNITY

## 2021-04-13 RX ADMIN — TRIAMCINOLONE ACETONIDE 20 MG: 40 INJECTION, SUSPENSION INTRA-ARTICULAR; INTRAMUSCULAR at 11:43

## 2021-04-13 NOTE — PROGRESS NOTES
Chief Complaint   Patient presents with    Elbow Pain     f/u right elbow pain. Patient received a CSI on 7/27/20 but states she cant remember if she had any relief or not. Subjective:     Patient ID: Linsey Hernandez is a 70 y.o. [place] female    Knee Pain  Patient complains of right elbow pain. She had csi with good relief.     Past Medical History:   Diagnosis Date    Arthritis     CAD (coronary artery disease)     CHF (congestive heart failure) (HCC)     Current use of long term anticoagulation     Plavix    Fibromyalgia     GERD (gastroesophageal reflux disease)     History of cardiovascular stress test 02/17/2014    lexiscan, also 4/21/2015    History of MI (myocardial infarction)     x4; most recent 2016    Hx of blood clots     DVT leg    Hyperlipidemia     Hypertension     Hypothyroidism     Intractable low back pain 6/19/2016    Non-insulin dependent type 2 diabetes mellitus ([facility] [address]     NSTEMI (non-ST elevated myocardial infarction) ([facility] [address] 11/19/2015    Syncope and collapse 10/2020     Past Surgical History:   Procedure Laterality Date    APPENDECTOMY      ARTHROCENTESIS Left 8/22/2019    LEFT HIP CORTICOSTERIOD INJECTION UNDER FLUOROSCOPIC GUIDANCE performed by Margaret Simmons DO at [address] [facility]  11/15/2017    Dr Favio Steele Right 7 7 15    CATARACT REMOVAL WITH IMPLANT Left 4/26/2016    CHOLECYSTECTOMY      COCCYX REMOVAL      COLONOSCOPY      CORONARY ANGIOPLASTY WITH [address] started needing cardiac care; 5 stents total    CORONARY ANGIOPLASTY WITH STENT PLACEMENT  11/19/2015    Dr Beena Mendez stent 3x18 prox Cx   Mary Lou Toth; 3 bypass grafts    ENDOSCOPY, COLON, DIAGNOSTIC      HEMORRHOID SURGERY      HYSTERECTOMY      LUMBAR FUSION  6/7/2016    Dr. Zechariah Alvarado Left 11 18 2015    left lumbar transforaminal nerve block l4-5 l5-s1    NERVE BLOCK Right 07/17/2017    lumbar transforaminal #1    NERVE BLOCK Right 09/06/2017    right knee injection#1    NERVE BLOCK Bilateral 10/24/2018    lumbar facet block     NERVE BLOCK Left 08/22/2019    hip     NERVE SURGERY N/A 6/15/2020    SPINAL CORD STIMULATOR IMPLANT WITH NERVO performed by Mery Dailey MD at [address]      bilateral feet    OTHER SURGICAL HISTORY N/A 11/04/2019    spinal cord stimulator trial    [facility] ARTHRS KNE SURG W/MENISCECTOMY MED/LAT W/SHVG Right 6/6/2018    RIGHT KNEE ARTHROSCOPY MEDIAL AND LATERAL MENISECTOMY SYNOVECTOMY AND CHONDROPLASTY performed by German Cosby DO at [facility] [facility] DX/THER AGNT PARAVERT FACET JOINT, LUMBAR/SAC, 1ST LEVEL Bilateral 10/24/2018    BILATERAL LUMBAR FACET BLOCK L1-2 ,L3-4 WITH X-RAY performed by Venita Morfin DO at [address] / [address] N/A 11/4/2019    SPINAL CORD STIMULATOR TRIAL WITH NEVRO performed by Mery Dailey MD at [address] [facility] [facility] [place] [facility]  06/15/2020    nevro     TONSILLECTOMY      VASCULAR SURGERY      laser on leaky veins       Current Outpatient Medications:     cyclobenzaprine (FLEXERIL) 5 MG tablet, Take 5 mg by mouth 3 times daily as needed for Muscle spasms, Disp: , Rfl:     HYDROcodone-acetaminophen (NORCO) 5-325 MG per tablet, Take 1 tablet by mouth every 6 hours as needed for Pain., Disp: , Rfl:     oxyCODONE-acetaminophen (PERCOCET) 5-325 MG per tablet, Take 1 tablet by mouth every 4 hours as needed for Pain., Disp: , Rfl:     Coenzyme Q10 (COQ10) 200 MG CAPS, Take by mouth, Disp: , Rfl:     gabapentin (NEURONTIN) 600 MG tablet, TAKE 1 TABLET BY MOUTH THREE TIMES DAILY, Disp: 270 tablet, Rfl: 1    baclofen (LIORESAL) 10 MG tablet, Take 1 tablet by mouth nightly, Disp: 30 tablet, Rfl: 11    potassium chloride (MICRO-K) 10 MEQ extended release capsule, Take 10 mEq by mouth 2 times daily, Disp: , Rfl:     meclizine (ANTIVERT) 12.5 MG tablet, Take 1 tablet by mouth 2 times daily, Disp: 60 tablet, Rfl: 5    glipiZIDE (GLUCOTROL) 5 MG tablet, Take 1 tablet by mouth every morning (before breakfast), Disp: 60 tablet, Rfl: 3    sennosides-docusate sodium (SENOKOT-S) 8.6-50 MG tablet, Take 2 tablets by mouth daily, Disp:  , Rfl:     ferrous sulfate (IRON 325) 325 (65 Fe) MG tablet, Take 325 mg by mouth daily (with breakfast), Disp: , Rfl:     metoprolol succinate (TOPROL XL) 25 MG extended release tablet, Take 25 mg by mouth daily, Disp: , Rfl:     Semaglutide, 1 MG/DOSE, 2 MG/1.5ML SOPN, Inject 2 mg into the skin once a week Given Friday, Disp: , Rfl:     fenofibrate (TRICOR) 54 MG tablet, Take 1 tablet by mouth daily (Patient taking differently: Take 145 mg by mouth daily ), Disp: 90 tablet, Rfl: 3    citalopram (CELEXA) 20 MG tablet, Take 20 mg by mouth daily , Disp: , Rfl:     Multiple Vitamins-Minerals (EYE HEALTH) CAPS, Take 1 capsule by mouth 2 times daily , Disp: , Rfl:     dapagliflozin (FARXIGA) 10 MG tablet, Take 10 mg by mouth daily , Disp: , Rfl:     aspirin 81 MG tablet, Take 81 mg by mouth daily Stopped 5 days pre op, Disp: , Rfl:     omeprazole (PRILOSEC) 20 MG delayed release capsule, Take 20 mg by mouth daily, Disp: , Rfl:     furosemide (LASIX) 20 MG tablet, Take 20 mg by mouth daily, Disp: , Rfl:     clopidogrel (PLAVIX) 75 MG tablet, Take 75 mg by mouth daily , Disp: , Rfl:     nitroGLYCERIN (NITROSTAT) 0.4 MG SL tablet, Place 0.4 mg under the tongue every 5 minutes as needed for Chest pain up to max of 3 total doses.  If no relief after 1 dose, call 911., Disp: , Rfl:     folic acid (FOLVITE) 424 MCG tablet, Take 800 mcg by mouth daily , Disp: , Rfl:     vitamin D 1000 UNITS CAPS, Take 1,000 Units by mouth every evening , Disp: , Rfl:     isosorbide mononitrate (IMDUR) 60 MG CR tablet, Take 60 mg by mouth daily , Disp: , Rfl:     ezetimibe (ZETIA) 10 MG tablet, Take 10 mg by mouth every evening , Disp: , Rfl:     Multiple Vitamins-Minerals (CENTRUM SILVER) TABS, Take 1 tablet by mouth daily , Disp: , Rfl:     levothyroxine (SYNTHROID) 100 MCG tablet, Take 100 mcg by mouth daily. , Disp: , Rfl:     ondansetron (ZOFRAN) 4 MG tablet, Take 4 mg by mouth every 8 hours as needed for Nausea or Vomiting, Disp: , Rfl:     meloxicam (MOBIC) 7.5 MG tablet, Take 1 tablet by mouth daily (Patient not taking: Reported on 4/13/2021), Disp: 30 tablet, Rfl: 3  Allergies   Allergen Reactions    Lipitor Other (See Comments)     Severe leg pain    Statins Other (See Comments)     Muscles go weak and she falls    Atorvastatin      Makes me sick severe leg cramps    Cymbalta [Duloxetine Hcl] Nausea Only    Cipro Xr      Reaction unknown    Diazepam     Duloxetine Nausea Only    Erythromycin      Hurt my stomach    Indomethacin     Iodine     Ketorolac     Ketorolac Tromethamine Itching    Lodine [Etodolac]     Oscal 500-200 D-3 [Oyster Shell Calcium-D]     Paxil [Paroxetine Hcl]     Ropinirole     Tromethamine     Trulicity [Dulaglutide] Swelling    Zithromax [Azithromycin]     Requip [Ropinirole Hcl] Nausea And Vomiting     Social History     Socioeconomic History    Marital status:       Spouse name: Not on file    Number of children: Not on file    Years of education: Not on file    Highest education level: Not on file   Occupational History    Not on file   Social Needs    Financial resource strain: Not on file    Food insecurity     Worry: Not on file     Inability: Not on file    Transportation needs     Medical: Not on file     Non-medical: Not on file   Tobacco Use    Smoking status: Never Smoker    Smokeless tobacco: Never Used   Substance and Sexual Activity    Alcohol use: No    Drug use: No    Sexual activity: Never   Lifestyle    Physical activity     Days per week: Not on file Minutes per session: Not on file    Stress: Not on file   Relationships    Social connections     Talks on phone: Not on file     Gets together: Not on file     Attends Baptism service: Not on file     Active member of club or organization: Not on file     Attends meetings of clubs or organizations: Not on file     Relationship status: Not on file    Intimate partner violence     Fear of current or ex partner: Not on file     Emotionally abused: Not on file     Physically abused: Not on file     Forced sexual activity: Not on file   Other Topics Concern    Not on file   Social History Narrative    Not on file     Family History   Problem Relation Age of Onset    Cancer Father [de-identified]        bladdder    Cancer Brother     COPD Brother     Heart Attack Mother 54    Heart Disease Sister     Diabetes Sister     Heart Disease Brother     Heart Disease Other 58             Diabetes Other     ADHD Other          REVIEW OF SYSTEMS:     General/Constitution:  (-)weight loss, (-)fever, (-)chills, (-)weakness. Skin: (-) rash,(-) psoriasis,(-) eczema, (-)skin cancer. Musculoskeletal: (-) fractures,  (-) dislocations,(-) collagen vascular disease, (-) fibromyalgia, (-) multiple sclerosis, (-) muscular dystrophy, (-) RSD,(-) joint pain (-)swelling, (-) joint pain,swelling. Neurologic: (-) epilepsy, (-)seizures,(-) brain tumor,(-) TIA, (-)stroke, (-)headaches, (-)Parkinson disease,(-) memory loss, (-) LOC. Cardiovascular: (-) Chest pain, (-) swelling in legs/feet, (-) SOB, (-) cramping in legs/feet with walking. Respiratory: (-) SOB, (-) Coughing, (-) night sweats. GI: (-) nausea, (-) vomiting, (-) diarrhea, (-) blood in stool, (-) gastric ulcer. Psychiatric: (-) Depression, (-) Anxiety, (-) bipolar disease, (-) Alzheimer's Disease  Allergic/Immunologic: (-) allergies latex, (-) allergies metal, (-) skin sensitivity.   Hematlogic: (-) anemia, (-) blood transfusion, (-) DVT/PE, (-) Clotting disorders      Subjective:  Vital signs are stable. In general, patient is awake, alert and oriented X3, in no apparent distress. Examination of HENT reveals normocephalic, atraumatic. PERRLA/EOMI sclera are white. Conjunctivae are clear. TM's are intact. Pharynx is pink and moist.  Uvula and tongue are midline. Heart: Positive S1 and positive S2 with regular rate and rhythm. Lungs: Clear to auscultation bilaterally without rales, rhonchi or wheezes. Abdomen: soft, nontender. Positive bowel sounds. No organomegaly. No guarding or rigidity. Constitution:    Temp 98 °F (36.7 °C)   Ht 5' 1\" (1.549 m)   Wt 180 lb (81.6 kg)   BMI 34.01 kg/m²       Psycihatric:    The patient is alert and oriented x 3, appears to be stated age and in no distress. Respiratory:    Respiratory effort is not labored. Patient is not gasping. Palpation of the chest reveals no tactile fremitus. Skin:    Upon inspection: the skin appears warm, dry and intact. There is  a previous scar over the affected area. There is any cellulitis, lymphedema or cutaneous lesions noted in the lower extremities. Upon palpation there is no induration noted. Neurologic:    Gait: normal;  Motor exam of the lower extremities show ; quadriceps, hamstrings, foot dorsi and plantar flexors intact R.  5/5 and L. 5/5. Deep tendon reflexes are 2/4 at the knees and 2/4 at the ankles with strong extensor hallicus longus motor strength bilaterally. Sensory to both feet is intact to all sensory roots. Cardiovascular: The vascular exam is normal and is well perfused to distal extremities. Distal pulses DP/PT: R. 2+; L. 2+. There is cap refill noted less than two seconds in all digits. There is not edema of the bilateral lower extremities. There is not varicosities noted in the distal extremities. Lymph:    Upon palpation,  there is no lymphadenopathy noted in bilateral lower extremities.       Right  elbow: pain is located

## 2021-04-22 ENCOUNTER — OFFICE VISIT (OUTPATIENT)
Dept: PAIN MANAGEMENT | Age: 76
End: 2021-04-22
Payer: MEDICARE

## 2021-04-22 VITALS
HEIGHT: 61 IN | WEIGHT: 180 LBS | RESPIRATION RATE: 16 BRPM | OXYGEN SATURATION: 98 % | BODY MASS INDEX: 33.99 KG/M2 | DIASTOLIC BLOOD PRESSURE: 78 MMHG | SYSTOLIC BLOOD PRESSURE: 122 MMHG | TEMPERATURE: 97.3 F | HEART RATE: 70 BPM

## 2021-04-22 DIAGNOSIS — M51.9 LUMBAR DISC DISORDER: Primary | ICD-10-CM

## 2021-04-22 DIAGNOSIS — M51.36 DDD (DEGENERATIVE DISC DISEASE), LUMBAR: ICD-10-CM

## 2021-04-22 DIAGNOSIS — G89.4 CHRONIC PAIN SYNDROME: ICD-10-CM

## 2021-04-22 DIAGNOSIS — M47.816 LUMBAR FACET ARTHROPATHY: ICD-10-CM

## 2021-04-22 DIAGNOSIS — M47.814 THORACIC SPONDYLOSIS: ICD-10-CM

## 2021-04-22 DIAGNOSIS — M16.0 PRIMARY OSTEOARTHRITIS OF BOTH HIPS: ICD-10-CM

## 2021-04-22 DIAGNOSIS — M47.816 LUMBAR SPONDYLOSIS: ICD-10-CM

## 2021-04-22 DIAGNOSIS — M96.1 LUMBAR POSTLAMINECTOMY SYNDROME: ICD-10-CM

## 2021-04-22 DIAGNOSIS — M54.16 LUMBAR RADICULOPATHY: ICD-10-CM

## 2021-04-22 PROCEDURE — 1036F TOBACCO NON-USER: CPT | Performed by: PAIN MEDICINE

## 2021-04-22 PROCEDURE — G8400 PT W/DXA NO RESULTS DOC: HCPCS | Performed by: PAIN MEDICINE

## 2021-04-22 PROCEDURE — 3017F COLORECTAL CA SCREEN DOC REV: CPT | Performed by: PAIN MEDICINE

## 2021-04-22 PROCEDURE — 99214 OFFICE O/P EST MOD 30 MIN: CPT | Performed by: PAIN MEDICINE

## 2021-04-22 PROCEDURE — 1123F ACP DISCUSS/DSCN MKR DOCD: CPT | Performed by: PAIN MEDICINE

## 2021-04-22 PROCEDURE — 1090F PRES/ABSN URINE INCON ASSESS: CPT | Performed by: PAIN MEDICINE

## 2021-04-22 PROCEDURE — 99213 OFFICE O/P EST LOW 20 MIN: CPT | Performed by: PAIN MEDICINE

## 2021-04-22 PROCEDURE — G8427 DOCREV CUR MEDS BY ELIG CLIN: HCPCS | Performed by: PAIN MEDICINE

## 2021-04-22 PROCEDURE — G8417 CALC BMI ABV UP PARAM F/U: HCPCS | Performed by: PAIN MEDICINE

## 2021-04-22 PROCEDURE — 4040F PNEUMOC VAC/ADMIN/RCVD: CPT | Performed by: PAIN MEDICINE

## 2021-04-22 RX ORDER — FENOFIBRATE 145 MG/1
145 TABLET, COATED ORAL
COMMUNITY
Start: 2021-04-06

## 2021-04-22 RX ORDER — LISINOPRIL 2.5 MG/1
2.5 TABLET ORAL DAILY
COMMUNITY
Start: 2021-04-09 | End: 2022-06-07

## 2021-04-22 RX ORDER — CYCLOBENZAPRINE HCL 5 MG
5 TABLET ORAL DAILY PRN
Qty: 30 TABLET | Refills: 0 | Status: SHIPPED
Start: 2021-04-22 | End: 2021-05-18 | Stop reason: ALTCHOICE

## 2021-04-22 RX ORDER — GABAPENTIN 600 MG/1
600 TABLET ORAL 3 TIMES DAILY
COMMUNITY
Start: 2020-08-21 | End: 2021-04-22

## 2021-04-22 RX ORDER — GLIPIZIDE 10 MG/1
10 TABLET ORAL
COMMUNITY
Start: 2021-04-06

## 2021-04-22 RX ORDER — ISOSORBIDE MONONITRATE 30 MG/1
30 TABLET, EXTENDED RELEASE ORAL DAILY
COMMUNITY
Start: 2021-03-10

## 2021-04-22 RX ORDER — HYDROCODONE BITARTRATE AND ACETAMINOPHEN 10; 325 MG/1; MG/1
1 TABLET ORAL DAILY PRN
Qty: 30 TABLET | Refills: 0 | Status: SHIPPED
Start: 2021-04-22 | End: 2021-04-23 | Stop reason: SDUPTHER

## 2021-04-22 RX ORDER — FUROSEMIDE 40 MG/1
TABLET ORAL
COMMUNITY
Start: 2021-03-10 | End: 2021-04-22

## 2021-04-22 RX ORDER — SEMAGLUTIDE 1.34 MG/ML
0.5 INJECTION, SOLUTION SUBCUTANEOUS WEEKLY
COMMUNITY
Start: 2021-04-19

## 2021-04-22 NOTE — PROGRESS NOTES
elevated myocardial infarction) ([facility] [address] 11/19/2015    Syncope and collapse 10/2020     Past Surgical History:   Procedure Laterality Date    APPENDECTOMY      ARTHROCENTESIS Left 8/22/2019    LEFT HIP CORTICOSTERIOD INJECTION UNDER FLUOROSCOPIC GUIDANCE performed by Chantale Rojo DO at [address]  11/15/2017    Dr Jonah Tao Right 7 7 15    CATARACT REMOVAL WITH IMPLANT Left 4/26/2016    CHOLECYSTECTOMY      COCCYX REMOVAL      COLONOSCOPY      CORONARY ANGIOPLASTY WITH STENT PLACEMENT      1996 started needing cardiac care; 5 stents total    CORONARY ANGIOPLASTY WITH STENT PLACEMENT  11/19/2015    Dr Sam Peabody stent 3x18 prox Cx   [facility] Dr. Sonia Cuevas; 3 bypass grafts    ENDOSCOPY, COLON, DIAGNOSTIC      HEMORRHOID SURGERY      HYSTERECTOMY      LUMBAR FUSION  6/7/2016    Dr. Charlie Walton Left 11 18 2015    left lumbar transforaminal nerve block l4-5 l5-s1    NERVE BLOCK Right 07/17/2017    lumbar transforaminal #1    NERVE BLOCK Right 09/06/2017    right knee injection#1    NERVE BLOCK Bilateral 10/24/2018    lumbar facet block     NERVE BLOCK Left 08/22/2019    hip     NERVE SURGERY N/A 6/15/2020    SPINAL CORD STIMULATOR IMPLANT WITH NERVO performed by Tamara Schreiber MD at [address]      bilateral feet    OTHER SURGICAL HISTORY N/A 11/04/2019    spinal cord stimulator trial    [place] ARTHRS KNE SURG W/MENISCECTOMY MED/LAT W/SHVG Right 6/6/2018    RIGHT KNEE ARTHROSCOPY MEDIAL AND LATERAL MENISECTOMY SYNOVECTOMY AND CHONDROPLASTY performed by Bryan Crandall DO at [facility] [address] [facility] DX/THER AGNT PARAVERT FACET JOINT, LUMBAR/SAC, 1ST LEVEL Bilateral 10/24/2018    BILATERAL LUMBAR FACET BLOCK L1-2 ,L3-4 WITH X-RAY performed by Maryam Barger DO at [address] / [address] N/A 11/4/2019 SPINAL CORD STIMULATOR TRIAL WITH NEVRO performed by Yasmin Roberson MD at [address] [facility]  06/15/2020    nevro     TONSILLECTOMY      VASCULAR SURGERY      laser on leaky veins     Prior to Admission medications    Medication Sig Start Date End Date Taking?  Authorizing Provider   fenofibrate (TRICOR) 145 MG tablet  4/6/21  Yes Historical Provider, MD   lisinopril (PRINIVIL;ZESTRIL) 2.5 MG tablet TAKE 1 TABLET BY MOUTH ONCE DAILY 4/9/21  Yes Historical Provider, MD   glipiZIDE (GLUCOTROL) 10 MG tablet  4/6/21  Yes Historical Provider, MD   isosorbide mononitrate (IMDUR) 30 MG extended release tablet  3/10/21  Yes Historical Provider, MD   OZEMPIC, 0.25 OR 0.5 MG/DOSE, 2 MG/1.5ML SOPN  4/19/21  Yes Historical Provider, MD   cyclobenzaprine (FLEXERIL) 5 MG tablet Take 5 mg by mouth 3 times daily as needed for Muscle spasms   Yes Historical Provider, MD   Coenzyme Q10 (COQ10) 200 MG CAPS Take by mouth   Yes Historical Provider, MD   gabapentin (NEURONTIN) 600 MG tablet TAKE 1 TABLET BY MOUTH THREE TIMES DAILY 1/19/21 7/18/21 Yes Renaldo Herrera MD   baclofen (LIORESAL) 10 MG tablet Take 1 tablet by mouth nightly 1/19/21 1/19/22 Yes Renaldo Herrera MD   potassium chloride (MICRO-K) 10 MEQ extended release capsule Take 10 mEq by mouth 2 times daily   Yes Historical Provider, MD   meclizine (ANTIVERT) 12.5 MG tablet Take 1 tablet by mouth 2 times daily 11/16/20 5/15/21 Yes Renaldo Herrera MD   sennosides-docusate sodium (SENOKOT-S) 8.6-50 MG tablet Take 2 tablets by mouth daily 10/8/20  Yes Hoang Bowels, MD   ferrous sulfate (IRON 325) 325 (65 Fe) MG tablet Take 325 mg by mouth daily (with breakfast)   Yes Historical Provider, MD   metoprolol succinate (TOPROL XL) 25 MG extended release tablet Take 25 mg by mouth daily   Yes Historical Provider, MD   citalopram (CELEXA) 20 MG tablet Take 20 mg by mouth daily    Yes Historical Provider, MD   Multiple Vitamins-Minerals (EYE HEALTH) CAPS Take 1 capsule by mouth 2 times daily    Yes Historical Provider, MD   dapagliflozin (FARXIGA) 10 MG tablet Take 10 mg by mouth daily    Yes Historical Provider, MD   aspirin 81 MG tablet Take 81 mg by mouth daily Stopped 5 days pre op   Yes Historical Provider, MD   omeprazole (PRILOSEC) 20 MG delayed release capsule Take 20 mg by mouth daily   Yes Historical Provider, MD   furosemide (LASIX) 20 MG tablet Take 20 mg by mouth daily   Yes Historical Provider, MD   clopidogrel (PLAVIX) 75 MG tablet Take 75 mg by mouth daily    Yes Historical Provider, MD   nitroGLYCERIN (NITROSTAT) 0.4 MG SL tablet Place 0.4 mg under the tongue every 5 minutes as needed for Chest pain up to max of 3 total doses. If no relief after 1 dose, call 911. Yes Historical Provider, MD   folic acid (FOLVITE) 139 MCG tablet Take 800 mcg by mouth daily    Yes Historical Provider, MD   vitamin D 1000 UNITS CAPS Take 1,000 Units by mouth every evening    Yes Historical Provider, MD   ezetimibe (ZETIA) 10 MG tablet Take 10 mg by mouth every evening    Yes Historical Provider, MD   Multiple Vitamins-Minerals (CENTRUM SILVER) TABS Take 1 tablet by mouth daily    Yes Historical Provider, MD   levothyroxine (SYNTHROID) 100 MCG tablet Take 100 mcg by mouth daily.    Yes Historical Provider, MD     Allergies   Allergen Reactions    Lipitor Other (See Comments)     Severe leg pain    Statins Other (See Comments)     Muscles go weak and she falls    Atorvastatin      Makes me sick severe leg cramps    Cymbalta [Duloxetine Hcl] Nausea Only    Cipro Xr      Reaction unknown    Diazepam     Duloxetine Nausea Only    Erythromycin      Hurt my stomach    Indomethacin     Iodine     Ketorolac     Ketorolac Tromethamine Itching    Lodine [Etodolac]     Oscal 500-200 D-3 [Oyster Shell Calcium-D]     Paxil [Paroxetine Hcl]     Ropinirole     Tromethamine     122/78   Pulse 70   Temp 97.3 °F (36.3 °C) (Infrared)   Resp 16   Ht 5' 1\" (1.549 m)   Wt 180 lb (81.6 kg)   SpO2 98%   BMI 34.01 kg/m²     General:       General appearance:   elderly, pleasant and well-hydrated. , in moderate discomfort and A & O x3  Build:Overweight     HEENT:     Head:normocephalic and atraumatic  Sclera: icterus absent,      Lungs:     Breathing:Breathing Pattern: regular, no distress     Abdomen:     Shape:non-distended and normal     Lumbar spine:     Spine inspection:surgical incision scar   Generator Left buttocks not flipped     Musculoskeletal:     SLR:negative right, negative left, sitting      Extremities:     Tremors:None bilaterally upper and lower  Intact: Yes     Neurological:     Sensory:diminished to light touch and pin prick bilateral legs  Motor:                             Right Quadriceps4/5          Left Quadriceps4/5           Right Gastrocnemius4/5    Left Gastrocnemius4/5  Right Ant Tibialis4/5  Left Ant Tibialis4/5  Reflexes:    Right Quadriceps reflex0  Left Quadriceps reflex0  Right Achilles reflex0  Left Achilles reflex0  Gait:antalgic     Dermatology:     Skin:no unusual rashes and no skin lesions     Impression:  Chronic low back pain with radiation to both lower extremities   Lumbar spine MRI 2019 L4-5-S1 bilateral laminectomies with fusion  Patient had seen neurosurgery and is not a candidate for surgery, SCS was recommended   Plan:  Office extension for increased low back pain, last seen 11/2020. Patient had fell twice recently. Patient was admitted to the hospital for one week. Patient met with [employer] rep for reprogramming, according to her there is displacement in one of her leads. Will schedule patient for thoracic spine Xray  Currently on Gabapentin 1500 mg daily. Continue with Norco but will change to 10/325 QD PRN  Continue with Flexeril 5 mg QD PRN. [place]ARRS report reviewed 04/2020 consistent.   Opioid agreement and saliva screen today  Patient encouraged to stay active and to lose weight. Treatment plan discussed with the patient including medications side effects. We discussed with the patient that combining opioids, benzodiazepines, alcohol, illicit drugs or sleep aids increases the risk of respiratory depression including death. We discussed that these medications may cause drowsiness, sedation or dizziness and have counseled the patient not to drive or operate machinery. We have discussed that these medications will be prescribed only by one provider. We have discussed with the patient about age related risk factors and have thoroughly discussed the importance of taking these medications as prescribed. The patient verbalizes understanding. bryant Haque M.D.

## 2021-04-22 NOTE — PROGRESS NOTES
Do you currently have any of the following:    Fever: No  Headache:  No  Cough: No  Shortness of breath: No  Exposed to anyone with these symptoms: Antonella Hernandez presents to the [address] [address] on 4/22/2021. Polo Robles is complaining of pain in her lower back. . The pain is constant. The pain is described as aching, throbbing, shooting and it goes into her groin. . Pain is rated on her best day at a 5, on her worst day at a 10, and on average at a 7 on the VAS scale. She took her last dose of tylenol and flexeril . Polo Robles does have issues with constipation. Any procedures since your last visit: No    She is not on NSAIDS and  is  on anticoagulation medications to include ASA and Plavix and is managed by Ever Pedraza DO  . Pacemaker or defibrillator: No Physician managing device is NA. Medication Contract and Consent for Opioid Use Documents Filed     Patient Documents       Type of Document Status Date Received Received By Description     Medication Contract [Status Missing]  Georgette Mayfield 7/151/6 Neurosurgery Medication Contract                   /78   Pulse 70   Temp 97.3 °F (36.3 °C) (Infrared)   Resp 16   Ht 5' 1\" (1.549 m)   Wt 180 lb (81.6 kg)   SpO2 98%   BMI 34.01 kg/m²      No LMP recorded. Patient has had a hysterectomy.

## 2021-04-23 DIAGNOSIS — M96.1 LUMBAR POSTLAMINECTOMY SYNDROME: ICD-10-CM

## 2021-04-23 RX ORDER — HYDROCODONE BITARTRATE AND ACETAMINOPHEN 10; 325 MG/1; MG/1
1 TABLET ORAL DAILY PRN
Qty: 23 TABLET | Refills: 0 | Status: SHIPPED
Start: 2021-04-29 | End: 2021-05-18 | Stop reason: ALTCHOICE

## 2021-04-23 NOTE — TELEPHONE ENCOUNTER
Lynnell Nation called in, the insurance company will only let her have 1 week of Khushboo Ramos so she will need a new script for the next 3 weeks, which is 23 tabs to be dated to start on 4-29-21. Script pended.

## 2021-05-04 ENCOUNTER — HOSPITAL ENCOUNTER (OUTPATIENT)
Dept: GENERAL RADIOLOGY | Age: 76
Discharge: HOME | End: 2021-05-06
Payer: MEDICARE

## 2021-05-04 ENCOUNTER — HOSPITAL ENCOUNTER (OUTPATIENT)
Age: 76
Discharge: HOME | End: 2021-05-06
Payer: MEDICARE

## 2021-05-04 DIAGNOSIS — M47.814 THORACIC SPONDYLOSIS: ICD-10-CM

## 2021-05-04 PROCEDURE — 72072 X-RAY EXAM THORAC SPINE 3VWS: CPT

## 2021-05-18 ENCOUNTER — OFFICE VISIT (OUTPATIENT)
Dept: NEUROLOGY | Age: 76
End: 2021-05-18
Payer: MEDICARE

## 2021-05-18 ENCOUNTER — OFFICE VISIT (OUTPATIENT)
Dept: PAIN MANAGEMENT | Age: 76
End: 2021-05-18
Payer: MEDICARE

## 2021-05-18 VITALS
DIASTOLIC BLOOD PRESSURE: 50 MMHG | TEMPERATURE: 97.5 F | SYSTOLIC BLOOD PRESSURE: 122 MMHG | BODY MASS INDEX: 35.33 KG/M2 | RESPIRATION RATE: 18 BRPM | HEART RATE: 65 BPM | OXYGEN SATURATION: 97 % | WEIGHT: 187 LBS

## 2021-05-18 VITALS
HEIGHT: 61 IN | WEIGHT: 187 LBS | HEART RATE: 82 BPM | TEMPERATURE: 97.3 F | BODY MASS INDEX: 35.3 KG/M2 | RESPIRATION RATE: 16 BRPM | DIASTOLIC BLOOD PRESSURE: 80 MMHG | OXYGEN SATURATION: 96 % | SYSTOLIC BLOOD PRESSURE: 122 MMHG

## 2021-05-18 DIAGNOSIS — G89.4 CHRONIC PAIN SYNDROME: ICD-10-CM

## 2021-05-18 DIAGNOSIS — M16.0 PRIMARY OSTEOARTHRITIS OF BOTH HIPS: ICD-10-CM

## 2021-05-18 DIAGNOSIS — M47.816 LUMBAR SPONDYLOSIS: ICD-10-CM

## 2021-05-18 DIAGNOSIS — M47.816 LUMBAR FACET ARTHROPATHY: ICD-10-CM

## 2021-05-18 DIAGNOSIS — M51.36 DDD (DEGENERATIVE DISC DISEASE), LUMBAR: ICD-10-CM

## 2021-05-18 DIAGNOSIS — M54.16 LUMBAR RADICULOPATHY: Primary | ICD-10-CM

## 2021-05-18 DIAGNOSIS — E11.42 DIABETIC PERIPHERAL NEUROPATHY (HCC): ICD-10-CM

## 2021-05-18 DIAGNOSIS — M47.814 THORACIC SPONDYLOSIS: Primary | ICD-10-CM

## 2021-05-18 DIAGNOSIS — S06.0X1D CONCUSSION WITH LOSS OF CONSCIOUSNESS OF 30 MINUTES OR LESS, SUBSEQUENT ENCOUNTER: ICD-10-CM

## 2021-05-18 DIAGNOSIS — M96.1 LUMBAR POSTLAMINECTOMY SYNDROME: ICD-10-CM

## 2021-05-18 DIAGNOSIS — M54.16 LUMBAR RADICULOPATHY: ICD-10-CM

## 2021-05-18 DIAGNOSIS — M51.9 LUMBAR DISC DISORDER: ICD-10-CM

## 2021-05-18 DIAGNOSIS — H81.10 BENIGN PAROXYSMAL POSITIONAL VERTIGO, UNSPECIFIED LATERALITY: ICD-10-CM

## 2021-05-18 PROCEDURE — 1123F ACP DISCUSS/DSCN MKR DOCD: CPT | Performed by: PSYCHIATRY & NEUROLOGY

## 2021-05-18 PROCEDURE — 3046F HEMOGLOBIN A1C LEVEL >9.0%: CPT | Performed by: PSYCHIATRY & NEUROLOGY

## 2021-05-18 PROCEDURE — 3017F COLORECTAL CA SCREEN DOC REV: CPT | Performed by: PSYCHIATRY & NEUROLOGY

## 2021-05-18 PROCEDURE — 99214 OFFICE O/P EST MOD 30 MIN: CPT | Performed by: PAIN MEDICINE

## 2021-05-18 PROCEDURE — G8400 PT W/DXA NO RESULTS DOC: HCPCS | Performed by: PSYCHIATRY & NEUROLOGY

## 2021-05-18 PROCEDURE — 1036F TOBACCO NON-USER: CPT | Performed by: PAIN MEDICINE

## 2021-05-18 PROCEDURE — G8417 CALC BMI ABV UP PARAM F/U: HCPCS | Performed by: PAIN MEDICINE

## 2021-05-18 PROCEDURE — 4040F PNEUMOC VAC/ADMIN/RCVD: CPT | Performed by: PSYCHIATRY & NEUROLOGY

## 2021-05-18 PROCEDURE — G8400 PT W/DXA NO RESULTS DOC: HCPCS | Performed by: PAIN MEDICINE

## 2021-05-18 PROCEDURE — 2022F DILAT RTA XM EVC RTNOPTHY: CPT | Performed by: PSYCHIATRY & NEUROLOGY

## 2021-05-18 PROCEDURE — 1123F ACP DISCUSS/DSCN MKR DOCD: CPT | Performed by: PAIN MEDICINE

## 2021-05-18 PROCEDURE — 99214 OFFICE O/P EST MOD 30 MIN: CPT

## 2021-05-18 PROCEDURE — 1090F PRES/ABSN URINE INCON ASSESS: CPT | Performed by: PAIN MEDICINE

## 2021-05-18 PROCEDURE — 4040F PNEUMOC VAC/ADMIN/RCVD: CPT | Performed by: PAIN MEDICINE

## 2021-05-18 PROCEDURE — G8417 CALC BMI ABV UP PARAM F/U: HCPCS | Performed by: PSYCHIATRY & NEUROLOGY

## 2021-05-18 PROCEDURE — G8427 DOCREV CUR MEDS BY ELIG CLIN: HCPCS | Performed by: PAIN MEDICINE

## 2021-05-18 PROCEDURE — 1036F TOBACCO NON-USER: CPT | Performed by: PSYCHIATRY & NEUROLOGY

## 2021-05-18 PROCEDURE — G8427 DOCREV CUR MEDS BY ELIG CLIN: HCPCS | Performed by: PSYCHIATRY & NEUROLOGY

## 2021-05-18 PROCEDURE — 1090F PRES/ABSN URINE INCON ASSESS: CPT | Performed by: PSYCHIATRY & NEUROLOGY

## 2021-05-18 PROCEDURE — 3017F COLORECTAL CA SCREEN DOC REV: CPT | Performed by: PAIN MEDICINE

## 2021-05-18 PROCEDURE — 99215 OFFICE O/P EST HI 40 MIN: CPT | Performed by: PSYCHIATRY & NEUROLOGY

## 2021-05-18 RX ORDER — BACLOFEN 10 MG/1
10 TABLET ORAL 3 TIMES DAILY
Qty: 90 TABLET | Refills: 11 | Status: SHIPPED
Start: 2021-05-18 | End: 2021-07-22 | Stop reason: ALTCHOICE

## 2021-05-18 NOTE — PROGRESS NOTES
[address]  [address] [facility], [address] [place] [address]  [phone]    Follow up Note      Brooklyn Luevano     Date of Visit:  5/18/2021    CC:  Patient presents for follow up   Chief Complaint   Patient presents with    Follow-up     no pain     HPI:    Follow up on her low back pain with no acute issues, patient mentioned that her pain is better since her IPG was reprogrammed. Change in quality of symptoms:no. Medication side effects:none. Recent diagnostic testing:none  Recent interventional procedures:none    Imaging:   Lumbar spine CT 07/2019  Remote postsurgical changes and multilevel degenerative changes as   described. Mild levoscoliosis. Moderate spinal canal stenosis at the   L3-L4 interspace level. No evidence of herniated nucleus pulposus. No   evidence of any level with critical spinal stenosis. No evidence of   remote postop complication.      Left hip Xray 05/2019  Stable hip joint arthritis. No acute findings.     Previous treatments: Surgery L4-5-S1 bilateral laminectomies 2016 with fusion and medications. .      Potential Aberrant Drug-Related Behavior:  None    Urine Drug Screening:  Saliva screen 03/2021 showed no narcotics which is consistent     OARRS report:  05/2021 consistent      Opioid agreement:  Date started:04/2021  Renewal date:04/2022    Past Medical History:   Diagnosis Date    Arthritis     CAD (coronary artery disease)     CHF (congestive heart failure) (HCC)     Current use of long term anticoagulation     Plavix    Fibromyalgia     GERD (gastroesophageal reflux disease)     History of cardiovascular stress test 02/17/2014    lexiscan, also 4/21/2015    History of MI (myocardial infarction)     x4; most recent 2016    Hx of blood clots     DVT leg    Hyperlipidemia     Hypertension     Hypothyroidism     Intractable low back pain 6/19/2016    Non-insulin dependent type 2 diabetes mellitus ([facility] [address]     NSTEMI (non-ST elevated myocardial infarction) ([facility] [address] 11/19/2015    Syncope and collapse 10/2020     Past Surgical History:   Procedure Laterality Date    APPENDECTOMY      ARTHROCENTESIS Left 8/22/2019    LEFT HIP CORTICOSTERIOD INJECTION UNDER FLUOROSCOPIC GUIDANCE performed by Brittani Doyle DO at [address]  11/15/2017    Dr Timmy Enriquez Right 7 7 15    CATARACT REMOVAL WITH IMPLANT Left 4/26/2016    CHOLECYSTECTOMY      COCCYX REMOVAL      COLONOSCOPY      CORONARY ANGIOPLASTY WITH STENT PLACEMENT      1996 started needing cardiac care; 5 stents total    CORONARY ANGIOPLASTY WITH STENT PLACEMENT  11/19/2015    Dr Carol Eller stent 3x18 prox Cx   Lesia Namanju Rodriguez Titi; 3 bypass grafts    ENDOSCOPY, COLON, DIAGNOSTIC      HEMORRHOID SURGERY      HYSTERECTOMY      LUMBAR FUSION  6/7/2016    Dr. Thierry Villa Left 11 18 2015    left lumbar transforaminal nerve block l4-5 l5-s1    NERVE BLOCK Right 07/17/2017    lumbar transforaminal #1    NERVE BLOCK Right 09/06/2017    right knee injection#1    NERVE BLOCK Bilateral 10/24/2018    lumbar facet block     NERVE BLOCK Left 08/22/2019    hip     NERVE SURGERY N/A 6/15/2020    SPINAL CORD STIMULATOR IMPLANT WITH NERVO performed by Nirmal Corona MD at [address] [facility] [address]      bilateral feet    OTHER SURGICAL HISTORY N/A 11/04/2019    spinal cord stimulator trial    W[place] ARTHRS KNE SURG W/MENISCECTOMY MED/LAT W/SHVG Right 6/6/2018    RIGHT KNEE ARTHROSCOPY MEDIAL AND LATERAL MENISECTOMY SYNOVECTOMY AND CHONDROPLASTY performed by Salazar Dee DO at [facility] [address] DX/THER AGNT PARAVERT FACET JOINT, LUMBAR/SAC, 1ST LEVEL Bilateral 10/24/2018    BILATERAL LUMBAR FACET BLOCK L1-2 ,L3-4 WITH X-RAY performed by Michelle Hernandez DO at [address] N/A 11/4/2019    SPINAL CORD STIMULATOR TRIAL WITH NEVRO performed by Rolando Ferrera MD at [address] [facility]  06/15/2020    nevro     TONSILLECTOMY      VASCULAR SURGERY      laser on leaky veins     Prior to Admission medications    Medication Sig Start Date End Date Taking?  Authorizing Provider   baclofen (LIORESAL) 10 MG tablet Take 1 tablet by mouth 3 times daily 5/18/21 5/18/22 Yes Hang Llanos MD   fenofibrate (TRICOR) 145 MG tablet m-w-f 4/6/21  Yes Historical Provider, MD   lisinopril (PRINIVIL;ZESTRIL) 2.5 MG tablet TAKE 1 TABLET BY MOUTH ONCE DAILY 4/9/21  Yes Historical Provider, MD   glipiZIDE (GLUCOTROL) 10 MG tablet Take by mouth 2 times daily (before meals)  4/6/21  Yes Historical Provider, MD   isosorbide mononitrate (IMDUR) 30 MG extended release tablet Take 30 mg by mouth daily  3/10/21  Yes Historical Provider, MD   OZEMPIC, 0.25 OR 0.5 MG/DOSE, 2 MG/1.5ML SOPN once a week  4/19/21  Yes Historical Provider, MD   Coenzyme Q10 (COQ10) 200 MG CAPS Take by mouth   Yes Historical Provider, MD   gabapentin (NEURONTIN) 600 MG tablet TAKE 1 TABLET BY MOUTH THREE TIMES DAILY 1/19/21 7/18/21 Yes Hang Llanos MD   potassium chloride (MICRO-K) 10 MEQ extended release capsule Take 10 mEq by mouth daily    Yes Historical Provider, MD   sennosides-docusate sodium (SENOKOT-S) 8.6-50 MG tablet Take 2 tablets by mouth daily 10/8/20  Yes Zamzam Toscano MD   ferrous sulfate (IRON 325) 325 (65 Fe) MG tablet Take 325 mg by mouth daily (with breakfast)   Yes Historical Provider, MD   metoprolol succinate (TOPROL XL) 25 MG extended release tablet Take 25 mg by mouth daily   Yes Historical Provider, MD   citalopram (CELEXA) 20 MG tablet Take 20 mg by mouth daily    Yes Historical Provider, MD   Multiple Vitamins-Minerals (EYE HEALTH) CAPS Take 1 capsule by mouth 2 times daily    Yes Historical Provider, MD   dapagliflozin (FARXIGA) 10 MG tablet Take 10 mg by mouth daily    Yes Historical Provider, MD   aspirin 81 MG tablet Take 81 mg by mouth daily Stopped 5 days pre op   Yes Historical Provider, MD   omeprazole (PRILOSEC) 20 MG delayed release capsule Take 20 mg by mouth daily   Yes Historical Provider, MD   furosemide (LASIX) 20 MG tablet Take 20 mg by mouth daily   Yes Historical Provider, MD   clopidogrel (PLAVIX) 75 MG tablet Take 75 mg by mouth daily    Yes Historical Provider, MD   nitroGLYCERIN (NITROSTAT) 0.4 MG SL tablet Place 0.4 mg under the tongue every 5 minutes as needed for Chest pain up to max of 3 total doses. If no relief after 1 dose, call 911. Yes Historical Provider, MD   vitamin D 1000 UNITS CAPS Take 1,000 Units by mouth every evening    Yes Historical Provider, MD   ezetimibe (ZETIA) 10 MG tablet Take 10 mg by mouth every evening    Yes Historical Provider, MD   Multiple Vitamins-Minerals (CENTRUM SILVER) TABS Take 1 tablet by mouth daily    Yes Historical Provider, MD   levothyroxine (SYNTHROID) 100 MCG tablet Take 100 mcg by mouth daily. Yes Historical Provider, MD     Allergies   Allergen Reactions    Lipitor Other (See Comments)     Severe leg pain    Statins Other (See Comments)     Muscles go weak and she falls    Atorvastatin      Makes me sick severe leg cramps    Cymbalta [Duloxetine Hcl] Nausea Only    Cipro Xr      Reaction unknown    Diazepam     Duloxetine Nausea Only    Erythromycin      Hurt my stomach    Indomethacin     Iodine     Ketorolac     Ketorolac Tromethamine Itching    Lodine [Etodolac]     Oscal 500-200 D-3 [Oyster Shell Calcium-D]     Paxil [Paroxetine Hcl]     Ropinirole     Tromethamine     Trulicity [Dulaglutide] Swelling    Zithromax [Azithromycin]     Requip [Ropinirole Hcl] Nausea And Vomiting     Social History     Socioeconomic History    Marital status:       Spouse name: Not on file    Number of children: Not on file    Years of education: appearance:   elderly, pleasant and well-hydrated. , in mild discomfort and A & O x3  Build:Overweight     HEENT:     Head:normocephalic and atraumatic  Sclera: icterus absent,      Lungs:     Breathing:Breathing Pattern: regular, no distress     Abdomen:     Shape:non-distended and normal     Lumbar spine:     Spine inspection:surgical incision scar   Generator Left buttocks not flipped     Musculoskeletal:     SLR:negative right, negative left, sitting      Extremities:     Tremors:None bilaterally upper and lower  Intact: Yes     Neurological:     Sensory:diminished to light touch and pin prick bilateral legs  Motor:                             Right Quadriceps4/5          Left Quadriceps4/5           Right Gastrocnemius4/5    Left Gastrocnemius4/5  Right Ant Tibialis4/5  Left Ant Tibialis4/5  Reflexes:    Right Quadriceps reflex0  Left Quadriceps reflex0  Right Achilles reflex0  Left Achilles reflex0  Gait:antalgic     Dermatology:     Skin:no unusual rashes and no skin lesions     Impression:  Chronic low back pain with radiation to both lower extremities   Lumbar spine MRI 2019 L4-5-S1 bilateral laminectomies with fusion  Patient had seen neurosurgery and is not a candidate for surgery, SCS was recommended   Plan:  Follow up on her low back pain with no acute issues, patient mentioned that her pain is better managed since her SCS was adjusted. Patient had seen ([place]) who discontinued her Norco/Flexeril and was started on Baclofen 10 mg TID(reviewed his notes). Reviewed thoracic spine Xray and discussed with the patient, minimal migration of Left lead. Currently on Gabapentin 1800 mg daily. Discontinue Norco.  Discontinue Flexeril 5 mg QD PRN. OARRS report reviewed 05/2020 consistent. Reviewed last office visit saliva screen. Patient encouraged to stay active and to lose weight. Treatment plan discussed with the patient including medications side effects.      We discussed with the patient that combining opioids, benzodiazepines, alcohol, illicit drugs or sleep aids increases the risk of respiratory depression including death. We discussed that these medications may cause drowsiness, sedation or dizziness and have counseled the patient not to drive or operate machinery. We have discussed that these medications will be prescribed only by one provider. We have discussed with the patient about age related risk factors and have thoroughly discussed the importance of taking these medications as prescribed. The patient verbalizes understanding. ccrefcolleening amee Morrison M.D.

## 2021-05-18 NOTE — PROGRESS NOTES
This 70year-old right-handed woman was referred for evaluation and management of gait instability, leg weakness and memory issues. She remained an excellent historian. She was accompanied by her daughter-in-law, who was also an excellent historian. Her medications were now cyclobenzaprine, meclizine, glipizide, omeprazole, lisinopril, furosemide, Plavix, aspirin,, folic acid, Farxiga, Zetia gabapentin, TriCor, multivitamins, Imdur, hydrocodone and levothyroxine    Her past medical history was remarkable for diabetes mellitus for over 40 years. This disorder remained well controlled with recent hemoglobin A1c levels of 6.7. There was diabetic peripheral neuropathy, but no other complications of that disease. She reported hypertension, coronary artery disease, gastroesophageal reflux disease, diverticulosis and severe degenerative joint disease. She underwent 5 stents and subsequent three-vessel bypass surgery. She initially responded well to surgical invention, without recurrent heart attacks. However, past echocardiography revealed congestive heart failure. More recent cardiac work-up proved unrevealing. She again denied any swelling, chest pains or shortness of breath. She still endured painful lumbosacral radicular disease. A nerve stimulator markedly improved her pains, but may have been dislodged after a recent fall. She will see neurosurgery soon. She continued on gabapentin and baclofen. She was more recently placed on cyclobenzaprine for continued pains in her legs. She had fallen down a flight of stairs with a concussion. She now reported more fatigue since that fall. Her episodic unsteadiness and spinning sensations had worsened after the fall. She was taking only 1/2 tablet of meclizine at night. She denied any additional complaints, visual abnormalities, speech or swallowing difficulties, seizure-like activity or loss of consciousness.     She reported no other other neurological issues. She denied other medical problems. She was sleeping and eating better. Her blood sugars continued well controlled. She was drinking more fluids. She refused the COVID-19 vaccination. Review of systems was otherwise unremarkable. Physical examination displayed stable vital signs. Blood pressure was 122/50. She was an elderly woman in no acute distress who was alert, cooperative and oriented. She remained pleasant. Her lungs were clear to auscultation. Cardiac testing was unrevealing with a well-healed bypass scar. Her peripheral pulses are +1 without bruits. Her limbs were unremarkable except for her multiple surgical scars. There was no edema or cyanosis. Neurological examination produced an intact mental status. Cranial nerve testing was unrevealing. I found normal tone and bulk with 5/5 strength throughout. Reflexes were +1 in the right arm and absent in the left. There were no reflexes in her legs. Sensation was intact to light touch. Pinprick and vibration were decreased to both mid calves. Joint position sense was impaired in the feet. She walked with a slightly wide-based, cautious gait. She still displayed a minimal right foot drop. There were no changes on neurological testing. Laboratory data included a recent CMP with a GFR of 49. Recent blood sugars were 103 extending to 243. Hemoglobin A1c was 6.9. CBC was normal.    This individual initially presented with memory issues. I again found none. Her fatigue is likely related to her multiple medications. Therefore, hydrocodone, cyclobenzaprine and folic acid were discontinued. Her lumbosacral radicular disease was producing her pains and right foot drop. Her gait issues are from these radiculopathies. She was responding moderately well to the new stimulator. The stimulator will be reinvestigated soon. Hopefully, in the future, gabapentin and baclofen could be reduced.     Her tremors were related to anxiety. These have not returned. Her unsteadiness is related to her nonrotational vertigo. She will increase meclizine to 1 tablet at night, to be increased more as needed. Medically, she suffered a serious syncopal episode from orthostasis. Fortunately, she was only concussed, without serious intracranial trauma. She has increased her fluid intakes. She refused COVID-19 vaccination. She will return in 4 months. For now, gabapentin was maintained at 600 mg thrice daily and baclofen at 10 mg 3 times daily. She will call at any time if problems arise. She will call any time if problems arise. I spent 40 minutes with the patient with over 50 % spent in counseling and disease management. All patient issues were addressed and all questions were answered.

## 2021-05-18 NOTE — PROGRESS NOTES
Do you currently have any of the following:    Fever: No  Headache:  No  Cough: No  Shortness of breath: No  Exposed to anyone with these symptoms: No                                                                                                                Rosa Mar presents to the [address] [address] on 5/18/2021. Lamberto Remy is complaining of pain no pain today. The pain is intermittent. The pain is described as na. Pain is rated on her best day at a 0, on her worst day at a 10, and on average at a 0 on the VAS scale. She took her last dose of Neurontin morning. Lamberto Remy does not have issues with constipation. Any procedures since your last visit: No, with  % relief. She is not on NSAIDS and  is  on anticoagulation medications to include Plavix and is managed by Tammi Contreras DO  . Pacemaker or defibrillator: No Physician managing device is . Medication Contract and Consent for Opioid Use Documents Filed     Patient Documents       Type of Document Status Date Received Received By Description     Medication Contract [Status Missing]  Lola Rivera 7/151/6 Neurosurgery Medication Contract                   /80   Pulse 82   Temp 97.3 °F (36.3 °C) (Infrared)   Resp 16   Ht 5' 1\" (1.549 m)   Wt 187 lb (84.8 kg)   SpO2 96%   BMI 35.33 kg/m²      No LMP recorded. Patient has had a hysterectomy.

## 2021-06-15 ENCOUNTER — OFFICE VISIT (OUTPATIENT)
Dept: ORTHOPEDIC SURGERY | Age: 76
End: 2021-06-15
Payer: MEDICARE

## 2021-06-15 VITALS — HEIGHT: 61 IN | BODY MASS INDEX: 34.93 KG/M2 | WEIGHT: 185 LBS

## 2021-06-15 DIAGNOSIS — M77.01 MEDIAL EPICONDYLITIS OF ELBOW, RIGHT: Primary | ICD-10-CM

## 2021-06-15 PROCEDURE — 3017F COLORECTAL CA SCREEN DOC REV: CPT | Performed by: ORTHOPAEDIC SURGERY

## 2021-06-15 PROCEDURE — G8400 PT W/DXA NO RESULTS DOC: HCPCS | Performed by: ORTHOPAEDIC SURGERY

## 2021-06-15 PROCEDURE — 99214 OFFICE O/P EST MOD 30 MIN: CPT | Performed by: ORTHOPAEDIC SURGERY

## 2021-06-15 PROCEDURE — G8417 CALC BMI ABV UP PARAM F/U: HCPCS | Performed by: ORTHOPAEDIC SURGERY

## 2021-06-15 PROCEDURE — 1090F PRES/ABSN URINE INCON ASSESS: CPT | Performed by: ORTHOPAEDIC SURGERY

## 2021-06-15 PROCEDURE — 4040F PNEUMOC VAC/ADMIN/RCVD: CPT | Performed by: ORTHOPAEDIC SURGERY

## 2021-06-15 PROCEDURE — G8427 DOCREV CUR MEDS BY ELIG CLIN: HCPCS | Performed by: ORTHOPAEDIC SURGERY

## 2021-06-15 PROCEDURE — 1123F ACP DISCUSS/DSCN MKR DOCD: CPT | Performed by: ORTHOPAEDIC SURGERY

## 2021-06-15 PROCEDURE — 1036F TOBACCO NON-USER: CPT | Performed by: ORTHOPAEDIC SURGERY

## 2021-06-15 NOTE — PROGRESS NOTES
Chief Complaint   Patient presents with    Elbow Pain     patient here today for right elbow pain. patient states that her pain level becomes very severe to where she is unable to move her arm. Cristal Young is in for a follow up for right elbow pain. She  feels that the pain is moderately worse since the previous appointment. The pain is aching in nature.   It is worse with movement and better with rest.        Past Medical History:   Diagnosis Date    Arthritis     CAD (coronary artery disease)     CHF (congestive heart failure) (HCC)     Current use of long term anticoagulation     Plavix    Fibromyalgia     GERD (gastroesophageal reflux disease)     History of cardiovascular stress test 02/17/2014    lexiscan, also 4/21/2015    History of MI (myocardial infarction)     x4; most recent 2016    Hx of blood clots     DVT leg    Hyperlipidemia     Hypertension     Hypothyroidism     Intractable low back pain 6/19/2016    Non-insulin dependent type 2 diabetes mellitus ([facility]     NSTEMI (non-ST elevated myocardial infarction) ([facility] 11/19/2015    Syncope and collapse 10/2020     Past Surgical History:   Procedure Laterality Date    APPENDECTOMY      ARTHROCENTESIS Left 8/22/2019    LEFT HIP CORTICOSTERIOD INJECTION UNDER FLUOROSCOPIC GUIDANCE performed by Nicci Chen DO at [address] [facility]  11/15/2017    Dr Germaine Llanes Right 7 7 15    CATARACT REMOVAL WITH IMPLANT Left 4/26/2016    CHOLECYSTECTOMY      COCCYX REMOVAL      COLONOSCOPY      CORONARY ANGIOPLASTY WITH [address] started needing cardiac care; 5 stents total    CORONARY ANGIOPLASTY WITH STENT PLACEMENT  11/19/2015    Dr Rudolph Holloway stent 3x18 prox Cx   Sp[facility]er Anne Stacks; 3 bypass grafts    ENDOSCOPY, COLON, DIAGNOSTIC      HEMORRHOID SURGERY      HYSTERECTOMY      LUMBAR FUSION  6/7/2016    Dr. Pal Perry Left 11 18 2015    left lumbar transforaminal nerve block l4-5 l5-s1    NERVE BLOCK Right 07/17/2017    lumbar transforaminal #1    NERVE BLOCK Right 09/06/2017    right knee injection#1    NERVE BLOCK Bilateral 10/24/2018    lumbar facet block     NERVE BLOCK Left 08/22/2019    hip     NERVE SURGERY N/A 6/15/2020    SPINAL CORD STIMULATOR IMPLANT WITH NERVO performed by Jayda Beltran MD at [address]      bilateral feet    OTHER SURGICAL HISTORY N/A 11/04/2019    spinal cord stimulator trial    NH ARTHRS KNE SURG W/MENISCECTOMY MED/LAT W/SHVG Right 6/6/2018    RIGHT KNEE ARTHROSCOPY MEDIAL AND LATERAL MENISECTOMY SYNOVECTOMY AND CHONDROPLASTY performed by Fadumo Serrato DO at [place] [address]. DX/THER AGNT PARAVERT FACET JOINT, LUMBAR/SAC, 1ST LEVEL Bilateral 10/24/2018    BILATERAL LUMBAR FACET BLOCK L1-2 ,L3-4 WITH X-RAY performed by Barb Cid DO at [address] / [address] N/A 11/4/2019    SPINAL CORD STIMULATOR TRIAL WITH NEVRO performed by Jayda Beltran MD at [address] [facility]  06/15/2020    nevro     TONSILLECTOMY      VASCULAR SURGERY      laser on leaky veins       Current Outpatient Medications:     baclofen (LIORESAL) 10 MG tablet, Take 1 tablet by mouth 3 times daily, Disp: 90 tablet, Rfl: 11    fenofibrate (TRICOR) 145 MG tablet, m-w-f, Disp: , Rfl:     lisinopril (PRINIVIL;ZESTRIL) 2.5 MG tablet, TAKE 1 TABLET BY MOUTH ONCE DAILY, Disp: , Rfl:     glipiZIDE (GLUCOTROL) 10 MG tablet, Take by mouth 2 times daily (before meals) , Disp: , Rfl:     isosorbide mononitrate (IMDUR) 30 MG extended release tablet, Take 30 mg by mouth daily , Disp: , Rfl:     OZEMPIC, 0.25 OR 0.5 MG/DOSE, 2 MG/1.5ML SOPN, once a week , Disp: , Rfl:     Coenzyme Q10 (COQ10) 200 MG CAPS, Take by mouth, Disp: , Rfl:     gabapentin (NEURONTIN) 600 MG tablet, TAKE 1 TABLET BY MOUTH THREE TIMES DAILY, Disp: 270 tablet, Rfl: 1    potassium chloride (MICRO-K) 10 MEQ extended release capsule, Take 10 mEq by mouth daily , Disp: , Rfl:     sennosides-docusate sodium (SENOKOT-S) 8.6-50 MG tablet, Take 2 tablets by mouth daily, Disp:  , Rfl:     ferrous sulfate (IRON 325) 325 (65 Fe) MG tablet, Take 325 mg by mouth daily (with breakfast), Disp: , Rfl:     metoprolol succinate (TOPROL XL) 25 MG extended release tablet, Take 25 mg by mouth daily, Disp: , Rfl:     citalopram (CELEXA) 20 MG tablet, Take 20 mg by mouth daily , Disp: , Rfl:     Multiple Vitamins-Minerals (EYE HEALTH) CAPS, Take 1 capsule by mouth 2 times daily , Disp: , Rfl:     dapagliflozin (FARXIGA) 10 MG tablet, Take 10 mg by mouth daily , Disp: , Rfl:     aspirin 81 MG tablet, Take 81 mg by mouth daily Stopped 5 days pre op, Disp: , Rfl:     omeprazole (PRILOSEC) 20 MG delayed release capsule, Take 20 mg by mouth daily, Disp: , Rfl:     furosemide (LASIX) 20 MG tablet, Take 20 mg by mouth daily, Disp: , Rfl:     clopidogrel (PLAVIX) 75 MG tablet, Take 75 mg by mouth daily , Disp: , Rfl:     nitroGLYCERIN (NITROSTAT) 0.4 MG SL tablet, Place 0.4 mg under the tongue every 5 minutes as needed for Chest pain up to max of 3 total doses. If no relief after 1 dose, call 911., Disp: , Rfl:     vitamin D 1000 UNITS CAPS, Take 1,000 Units by mouth every evening , Disp: , Rfl:     ezetimibe (ZETIA) 10 MG tablet, Take 10 mg by mouth every evening , Disp: , Rfl:     Multiple Vitamins-Minerals (CENTRUM SILVER) TABS, Take 1 tablet by mouth daily , Disp: , Rfl:     levothyroxine (SYNTHROID) 100 MCG tablet, Take 100 mcg by mouth daily. , Disp: , Rfl:   Allergies   Allergen Reactions    Lipitor Other (See Comments)     Severe leg pain    Statins Other (See Comments)     Muscles go weak and she falls    Atorvastatin      Makes me sick severe leg cramps    Cymbalta [Duloxetine Hcl] Nausea Only    Cipro Xr      Reaction unknown    Diazepam     Duloxetine Nausea Only    Erythromycin      Hurt my stomach    Indomethacin     Iodine     Ketorolac     Ketorolac Tromethamine Itching    Lodine [Etodolac]     Oscal 500-200 D-3 [Oyster Shell Calcium-D]     Paxil [Paroxetine Hcl]     Ropinirole     Tromethamine     Trulicity [Dulaglutide] Swelling    Zithromax [Azithromycin]     Requip [Ropinirole Hcl] Nausea And Vomiting     Social History     Socioeconomic History    Marital status:      Spouse name: Not on file    Number of children: Not on file    Years of education: Not on file    Highest education level: Not on file   Occupational History    Not on file   Tobacco Use    Smoking status: Never Smoker    Smokeless tobacco: Never Used   Vaping Use    Vaping Use: Never used   Substance and Sexual Activity    Alcohol use: No    Drug use: No    Sexual activity: Never   Other Topics Concern    Not on file   Social History Narrative    Not on file     Social Determinants of Health     Financial Resource Strain:     Difficulty of Paying Living Expenses:    Food Insecurity:     Worried About Running Out of Food in the Last Year:     [address] in the Last Year:    Transportation Needs:     Lack of Transportation (Medical):      Lack of Transportation (Non-Medical):    Physical Activity:     Days of Exercise per Week:     Minutes of Exercise per Session:    Stress:     Feeling of Stress :    Social Connections:     Frequency of Communication with Friends and Family:     Frequency of Social Gatherings with Friends and Family:     Attends Confucianist Services:     Active Member of Clubs or Organizations:     Attends Club or Organization Meetings:     Marital Status:    Intimate Partner Violence:     Fear of Current or Ex-Partner:     Emotionally Abused:     Physically Abused:     Sexually Abused:      Family History   Problem Relation Age of Onset    Cancer Father [de-identified]        bladdder    Cancer Brother     COPD Brother     Heart Attack Mother 54    Heart Disease Sister     Diabetes Sister     Heart Disease Brother     Heart Disease Other 58             Diabetes Other     ADHD Other        REVIEW OF SYSTEMS:     General/Constitution:  (-)weight loss, (-)fever, (-)chills, (-)weakness. Skin: (-) rash,(-) psoriasis,(-) eczema, (-)skin cancer. Musculoskeletal: (-) fractures,  (-) dislocations,(-) collagen vascular disease, (-) fibromyalgia, (-) multiple sclerosis, (-) muscular dystrophy, (-) RSD,(-) joint pain (-)swelling, (-) joint pain,swelling. Neurologic: (-) epilepsy, (-)seizures,(-) brain tumor,(-) TIA, (-)stroke, (-)headaches, (-)Parkinson disease,(-) memory loss, (-) LOC. Cardiovascular: (-) Chest pain, (-) swelling in legs/feet, (-) SOB, (-) cramping in legs/feet with walking. Constitution:    The patient is alert and oriented x 3, appears to be stated age and in no distress. Ht 5' 1\" (1.549 m)   Wt 185 lb (83.9 kg)   BMI 34.96 kg/m²     Skin:  Upon inspection: the skin appears warm, dry and intact. There is not a previous scar over the affected area. There is not any cellulitis, lymphedema or cutaneous lesions noted in the lower extremities. Upon palpation there is no induration noted. Neurologic:  Gait: normal;  Motor exam of the upper extremities show: The reflexes in biceps/triceps/brachioradialis are equal and symmetric. Sensory exam C5-T1 are normal bilaterally. Cardiovascular: The vascular exam is normal and is well perfused to distal extremities. There are 2+ radial pulses bilaterally, and motor and sensation is intact to median, ulnar, and radial, musclocutaneus, and axillary nerve distribution and grossly symmetric bilaterally. There is cap refill noted less than two seconds in all digits. There is not edema of the bilateral upper extremities.   There is not varicosities noted in the distal extremities. Lymph:  Upon palpation,  there is no lymphadenopathy noted in bilateral upper extremities. Musculoskeletal:  Cervical Exam:    On physical exam, Aide Rivera is well-developed, well-nourished, oriented to person, place and time. her gait is normal.  On evaluation of cervical spine, there is full range of motion of the cervical spine without pain. There is no cervical tenderness to palpation. Shoulder Exam:  On evaluation of her bilateral upper extremities, her bilateral shoulder has no deformity. There is not evidence of scapular dyskinesis. There is not muscle atrophy in shoulder girdle. The range of motion for the Right Shoulder is 150/50/t8 and for the Left shoulder is 150/50/t8. Right shoulder Motor strength is 5/5 in the supraspinatus, 5/5 internal rotation and 5/5 in external rotation, and Left shoulder motor strength 5/5 in supraspinatus, 5/5 in internal rotation, 5/5 in external rotation. Right shoulder:  (-) Impingement , (-) Posada , (-) Speeds, (-) Apprehension ,(-) Horn Load Shift, (-) [employer], (-) Cross arm test.     Left shoulder:  (-) Impingement, (-) Posada, (-) Speeds, (-) Apprehension,(-) Horn, (-) Load Shift, (-) [employer],(-)  Cross arm test    Right  elbow: pain is located medial elbow. Range of motion: R.Elbow flexion 140/extension 0; L. Elbow flexion 140/extension 0. ; Wrist ROM R wrist DF 90, VF 90, L wrist DF 90, VF 90, R pronation 90/ supination 90, L pronation 90/supination 90. There is swelling, there is not ecchymosis. Exam is compared bilaterally. Special tests: Cozen's sign negative. Reverse cozen sign positive    Xray:   n/a    MRI:   n/a  Radiographic findings reviewed with patient    Assessment:   Encounter Diagnosis   Name Primary?  Medial epicondylitis of elbow, right Yes       Plan:    Natural history and expected course discussed. Questions answered.   Rest, ice, compression, and elevation (RICE)

## 2021-07-09 ENCOUNTER — HOSPITAL ENCOUNTER (OUTPATIENT)
Dept: ULTRASOUND IMAGING | Age: 76
Discharge: HOME | End: 2021-07-09
Payer: MEDICARE

## 2021-07-09 DIAGNOSIS — M67.432 GANGLION OF LEFT WRIST: ICD-10-CM

## 2021-07-09 DIAGNOSIS — M25.831 OTHER SPECIFIED JOINT DISORDERS, RIGHT WRIST: ICD-10-CM

## 2021-07-09 PROCEDURE — 76999 ECHO EXAMINATION PROCEDURE: CPT

## 2021-07-22 RX ORDER — OXYCODONE HYDROCHLORIDE AND ACETAMINOPHEN 5; 325 MG/1; MG/1
1 TABLET ORAL 2 TIMES DAILY PRN
COMMUNITY
End: 2021-07-24 | Stop reason: ALTCHOICE

## 2021-07-22 RX ORDER — GABAPENTIN 600 MG/1
600 TABLET ORAL 2 TIMES DAILY
COMMUNITY
End: 2021-09-22 | Stop reason: SDUPTHER

## 2021-07-22 RX ORDER — MECLIZINE HCL 12.5 MG/1
12.5 TABLET ORAL NIGHTLY
COMMUNITY
End: 2021-12-17 | Stop reason: SDUPTHER

## 2021-07-22 RX ORDER — HYDROCODONE BITARTRATE AND ACETAMINOPHEN 5; 325 MG/1; MG/1
1 TABLET ORAL EVERY 8 HOURS PRN
COMMUNITY
End: 2021-11-16

## 2021-07-22 RX ORDER — CYCLOBENZAPRINE HCL 5 MG
5 TABLET ORAL NIGHTLY
COMMUNITY
End: 2021-11-16

## 2021-07-23 ENCOUNTER — HOSPITAL ENCOUNTER (OUTPATIENT)
Age: 76
Discharge: HOME | End: 2021-07-25
Payer: MEDICARE

## 2021-07-23 DIAGNOSIS — M77.01 MEDIAL EPICONDYLITIS OF ELBOW, RIGHT: ICD-10-CM

## 2021-07-23 PROCEDURE — U0003 INFECTIOUS AGENT DETECTION BY NUCLEIC ACID (DNA OR RNA); SEVERE ACUTE RESPIRATORY SYNDROME CORONAVIRUS 2 (SARS-COV-2) (CORONAVIRUS DISEASE [COVID-19]), AMPLIFIED PROBE TECHNIQUE, MAKING USE OF HIGH THROUGHPUT TECHNOLOGIES AS DESCRIBED BY CMS-2020-01-R: HCPCS

## 2021-07-23 PROCEDURE — U0005 INFEC AGEN DETEC AMPLI PROBE: HCPCS

## 2021-07-24 ENCOUNTER — HOSPITAL ENCOUNTER (EMERGENCY)
Age: 76
Discharge: HOME | End: 2021-07-24
Payer: MEDICARE

## 2021-07-24 VITALS
SYSTOLIC BLOOD PRESSURE: 150 MMHG | BODY MASS INDEX: 34.74 KG/M2 | HEIGHT: 61 IN | DIASTOLIC BLOOD PRESSURE: 80 MMHG | HEART RATE: 72 BPM | OXYGEN SATURATION: 98 % | RESPIRATION RATE: 18 BRPM | WEIGHT: 184 LBS | TEMPERATURE: 97.7 F

## 2021-07-24 DIAGNOSIS — H66.003 NON-RECURRENT ACUTE SUPPURATIVE OTITIS MEDIA OF BOTH EARS WITHOUT SPONTANEOUS RUPTURE OF TYMPANIC MEMBRANES: Primary | ICD-10-CM

## 2021-07-24 LAB — SARS-COV-2, PCR: NOT DETECTED

## 2021-07-24 PROCEDURE — 99211 OFF/OP EST MAY X REQ PHY/QHP: CPT

## 2021-07-24 RX ORDER — AMOXICILLIN AND CLAVULANATE POTASSIUM 875; 125 MG/1; MG/1
1 TABLET, FILM COATED ORAL 2 TIMES DAILY
Qty: 20 TABLET | Refills: 0 | Status: SHIPPED | OUTPATIENT
Start: 2021-07-24 | End: 2021-08-03

## 2021-07-24 ASSESSMENT — PAIN DESCRIPTION - PROGRESSION: CLINICAL_PROGRESSION: GRADUALLY WORSENING

## 2021-07-24 ASSESSMENT — PAIN - FUNCTIONAL ASSESSMENT: PAIN_FUNCTIONAL_ASSESSMENT: ACTIVITIES ARE NOT PREVENTED

## 2021-07-24 ASSESSMENT — PAIN DESCRIPTION - FREQUENCY: FREQUENCY: CONTINUOUS

## 2021-07-24 ASSESSMENT — PAIN DESCRIPTION - ORIENTATION: ORIENTATION: RIGHT;LEFT

## 2021-07-24 ASSESSMENT — PAIN SCALES - GENERAL: PAINLEVEL_OUTOF10: 7

## 2021-07-24 ASSESSMENT — PAIN DESCRIPTION - LOCATION: LOCATION: EAR

## 2021-07-24 ASSESSMENT — PAIN DESCRIPTION - DESCRIPTORS: DESCRIPTORS: ACHING

## 2021-07-24 ASSESSMENT — PAIN DESCRIPTION - ONSET: ONSET: ON-GOING

## 2021-07-24 NOTE — ED PROVIDER NOTES
Department of Emergency . [address] [facility]  Provider Note  Admit Date/RoomTime: 7/24/2021  2:01 PM  Room: 03/03  Chief Complaint   Otalgia (AU)    History of Present Illness   Source of history provided by:  Patient. History/Exam Limitations: None. Huel Dubin is a 78 y.o. female with a history of NIDDM and CAD. She reports a 1 week history of some sinus pressure and purulent rhinorrhea. Over the last few days, has developed bilateral ear fullness and discomfort. Denies any discharge or drainage from the ear. Denies any fevers or chills. Does not smoke. No myalgias or arthralgias. No anosmia or hypogeusia. No Covid exposures. Is not vaccinated. ROS    Pertinent positives and negatives are stated within HPI, all other systems reviewed and are negative.     Past Surgical History:   Procedure Laterality Date    APPENDECTOMY      ARTHROCENTESIS Left 8/22/2019    LEFT HIP CORTICOSTERIOD INJECTION UNDER FLUOROSCOPIC GUIDANCE performed by Jace Tracey DO at [facility]  11/15/2017    Dr Alivia Joshi Right 7 7 15    CATARACT REMOVAL WITH IMPLANT Left 4/26/2016    CHOLECYSTECTOMY      COCCYX REMOVAL      COLONOSCOPY      CORONARY ANGIOPLASTY WITH STENT PLACEMENT      1996 started needing cardiac care; 5 stents total    CORONARY ANGIOPLASTY WITH STENT PLACEMENT  11/19/2015    Dr Anne Cage stent 3x18 prox Cx   Ramonita Meadows Dura; 3 bypass grafts    ENDOSCOPY, COLON, DIAGNOSTIC      HEMORRHOID SURGERY      HYSTERECTOMY      LUMBAR FUSION  6/7/2016    Dr. Vamsi Delacruz Left 11 18 2015    left lumbar transforaminal nerve block l4-5 l5-s1    NERVE BLOCK Right 07/17/2017    lumbar transforaminal #1    NERVE BLOCK Right 09/06/2017    right knee injection#1    NERVE BLOCK Bilateral 10/24/2018    lumbar facet block     NERVE BLOCK Left 08/22/2019    hip     NERVE SURGERY N/A 6/15/2020    SPINAL CORD STIMULATOR IMPLANT WITH NERVO performed by Yair Dudley MD at [address]      bilateral feet    OTHER SURGICAL HISTORY N/A 11/04/2019    spinal cord stimulator trial    MO ARTHRS KNE SURG W/MENISCECTOMY MED/LAT W/SHVG Right 6/6/2018    RIGHT KNEE ARTHROSCOPY MEDIAL AND LATERAL MENISECTOMY SYNOVECTOMY AND CHONDROPLASTY performed by Juan Pablo Sutherland DO at [facility]. DX/THER AGNT PARAVERT FACET JOINT, LUMBAR/SAC, 1ST LEVEL Bilateral 10/24/2018    BILATERAL LUMBAR FACET BLOCK L1-2 ,L3-4 WITH X-RAY performed by Matt Knutson DO at [address] N/A 11/4/2019    SPINAL CORD STIMULATOR TRIAL WITH NEVRO performed by Yair Dudley MD at [address] [facility]  06/15/2020    nevro     TONSILLECTOMY      VASCULAR SURGERY      laser on leaky veins   Social History:  reports that she has never smoked. She has never used smokeless tobacco. She reports that she does not drink alcohol and does not use drugs. Family History: family history includes ADHD in an other family member; COPD in her brother; Cancer in her brother; Cancer (age of onset: [de-identified]) in her father; Diabetes in her sister and another family member; Heart Attack (age of onset: 54) in her mother; Heart Disease in her brother and sister; Heart Disease (age of onset: 58) in an other family member.    Allergies: Iodine, Lipitor, Statins, Atorvastatin, Cymbalta [duloxetine hcl], Cipro xr, Diazepam, Duloxetine, Erythromycin, Indomethacin, Ketorolac, Ketorolac tromethamine, Lodine [etodolac], Oscal 500-200 d-3 [oyster shell calcium-d], Paxil [paroxetine hcl], Ropinirole, Tromethamine, Trulicity [dulaglutide], Zithromax [azithromycin], and Requip [ropinirole hcl]    Physical Exam            ED Triage Vitals [07/24/21 1401] BP Temp Temp Source Pulse Resp SpO2 Height Weight   (!) 150/80 97.7 °F (36.5 °C) Temporal 72 18 98 % 5' 1\" (1.549 m) 184 lb (83.5 kg)      Oxygen Saturation Interpretation: Normal.    Gen.: Vitals noted no distress. Afebrile. Normal phonation, no stridor, no trismus. ENT: Bilateral TMs are erythematous and bulging. EACs unremarkable. Mastoids nontender. No tenderness with manipulation of the auricle or tragus. There is some purulent nasal discharge primarily on the left. No facial cellulitis or evidence of pre-/post septal cellulitis. Posterior oropharynx without exudate or swelling. Uvula is in the midline and nonedematous. No peritonsillar abscess. No William's Angina. Neck: Supple. No meningismus through full range of motion. No anterior or posterior cervical lymphadenopathy. Cardiac: Regular rate rhythm no murmur. Lungs: Good aeration throughout. No adventitious breath sounds. Abdomen: Soft, nontender, nonsurgical throughout. Normoactive bowel sounds. Extremities: No peripheral edema, negative Homans bilaterally no cords. Skin: No rash. Neuro: No gross neurologic deficits. Lab / Imaging Results   (All laboratory and radiology results have been personally reviewed by myself)  Labs:  No results found for this visit on 07/24/21. Imaging: All Radiology results interpreted by Radiologist unless otherwise noted. No orders to display     ED Course / Medical Decision Making   Medications - No data to display       Differential Diagnosis: Is extensive but includes viral URI, otitis media/externa, malignant otitis externa, mastoiditis, exudative pharyngitis, TM perforation, etc.    MDM:     This is a 78 y.o. female who presents with the above history. On exam, appears to have an acute otitis media without evidence of perforation or mastoiditis in addition to a likely bacterial sinusitis. Will be home-going with Augmentin to cover both.         Plan of Care: Normal progression of disease [address], [place]  07/24/21 2644

## 2021-07-29 ENCOUNTER — ANESTHESIA EVENT (OUTPATIENT)
Dept: OPERATING ROOM | Age: 76
End: 2021-07-29
Payer: MEDICARE

## 2021-07-29 ASSESSMENT — LIFESTYLE VARIABLES: SMOKING_STATUS: 0

## 2021-07-29 NOTE — PROGRESS NOTES
As I was doing the pre-op chart check it was noted that patient was seen in urgent care 7/24/2021 for an ear infection and nasal drainage and was started on an antibiotic for 10 days. Spoke with patient who denied fever, cough and stated she was feeling better. Dr. Latanya Bradford office staff and Dr. Maria Antonia Sosa were both notified of urgent care visit and being placed on an antibiotic.  No orders received to alter the course of the surgery

## 2021-07-29 NOTE — ANESTHESIA PRE PROCEDURE
Department of Anesthesiology  Preprocedure Note       Name:  Juana Whitley   Age:  78 y.o.  :  1945                                          MRN:  [id number]         Date:  2021      Surgeon: Ash Hendrickson): Suzanne Bustamante DO    Procedure: Procedure(s):  RIGHT ELBOW EPICONDYLECTOMYMEDIAL EPICONDLE WIT PRP INJECTION ([address])    Medications prior to admission:   Prior to Admission medications    Medication Sig Start Date End Date Taking? Authorizing Provider   cyclobenzaprine (FLEXERIL) 5 MG tablet Take 5 mg by mouth nightly   Yes Historical Provider, MD   meclizine (ANTIVERT) 12.5 MG tablet Take 12.5 mg by mouth nightly Takes 1/2 tab   Yes Historical Provider, MD   HYDROcodone-acetaminophen (NORCO) 5-325 MG per tablet Take 1 tablet by mouth every 8 hours as needed for Pain. Yes Historical Provider, MD   gabapentin (NEURONTIN) 600 MG tablet Take 600 mg by mouth 2 times daily.  1 tab AM  2 tabs PM   Yes Historical Provider, MD   fenofibrate (TRICOR) 145 MG tablet Take 145 mg by mouth three times a week 21  Yes Historical Provider, MD   lisinopril (PRINIVIL;ZESTRIL) 2.5 MG tablet Take 2.5 mg by mouth daily am 21  Yes Historical Provider, MD   glipiZIDE (GLUCOTROL) 10 MG tablet Take by mouth 2 times daily (before meals)  21  Yes Historical Provider, MD   isosorbide mononitrate (IMDUR) 30 MG extended release tablet Take 30 mg by mouth daily am 3/10/21  Yes Historical Provider, MD   OZEMPIC, 0.25 OR 0.5 MG/DOSE, 2 MG/1.5ML SOPN Inject 0.5 mg into the skin once a week 21  Yes Historical Provider, MD   Coenzyme Q10 (COQ10) 200 MG CAPS Take by mouth daily    Yes Historical Provider, MD   potassium chloride (MICRO-K) 10 MEQ extended release capsule Take 10 mEq by mouth daily    Yes Historical Provider, MD   sennosides-docusate sodium (SENOKOT-S) 8.6-50 MG tablet Take 2 tablets by mouth daily 10/8/20  Yes Josh Pillai MD   ferrous sulfate (IRON 325) 325 (65 Fe) MG tablet Take 325 mg by mouth daily (with breakfast)   Yes Historical Provider, MD   metoprolol succinate (TOPROL XL) 25 MG extended release tablet Take 25 mg by mouth daily am   Yes Historical Provider, MD   citalopram (CELEXA) 20 MG tablet Take 20 mg by mouth daily    Yes Historical Provider, MD   Multiple Vitamins-Minerals (EYE HEALTH) CAPS Take 1 capsule by mouth 2 times daily    Yes Historical Provider, MD   dapagliflozin (FARXIGA) 10 MG tablet Take 10 mg by mouth daily    Yes Historical Provider, MD   aspirin 81 MG tablet Take 81 mg by mouth daily Do not hold per Dr Vickey Pan   Yes Historical Provider, MD   omeprazole (PRILOSEC) 20 MG delayed release capsule Take 20 mg by mouth daily am   Yes Historical Provider, MD   furosemide (LASIX) 20 MG tablet Take 20 mg by mouth daily   Yes Historical Provider, MD   clopidogrel (PLAVIX) 75 MG tablet Take 75 mg by mouth daily Indications: Advised to discontinue patient would prefer to follow-up with her cardiologist Stopped 2 days pre op per Dr Elaine Mercado instructions   Yes Historical Provider, MD   nitroGLYCERIN (NITROSTAT) 0.4 MG SL tablet Place 0.4 mg under the tongue every 5 minutes as needed for Chest pain up to max of 3 total doses. If no relief after 1 dose, call 911. Yes Historical Provider, MD   vitamin D 1000 UNITS CAPS Take 1,000 Units by mouth every evening    Yes Historical Provider, MD   ezetimibe (ZETIA) 10 MG tablet Take 10 mg by mouth every evening    Yes Historical Provider, MD   Multiple Vitamins-Minerals (CENTRUM SILVER) TABS Take 1 tablet by mouth daily    Yes Historical Provider, MD   levothyroxine (SYNTHROID) 100 MCG tablet Take 100 mcg by mouth daily.    Yes Historical Provider, MD   amoxicillin-clavulanate (AUGMENTIN) 875-125 MG per tablet Take 1 tablet by mouth 2 times daily for 10 days 7/24/21 8/3/21  SKY Clayton       Current medications:    Current Facility-Administered Medications   Medication Dose Route Frequency Provider Last Rate Last Admin    clindamycin (CLEOCIN) 900 mg in dextrose 5 % 50 mL IVPB  900 mg Intravenous Once [place], APRN - CNP        0.9 % sodium chloride infusion   Intravenous Continuous Lamine Ibarra MD        meperidine (DEMEROL) injection 12.5 mg  12.5 mg Intravenous Q5 Min PRN Kindra Alamo MD        fentaNYL (SUBLIMAZE) injection 25 mcg  25 mcg Intravenous Q5 Min PRN Kindra Alamo MD        morphine (PF) injection 2 mg  2 mg Intravenous Q5 Min PRN Kindra Alamo MD        HYDROmorphone (DILAUDID) injection 0.5 mg  0.5 mg Intravenous Q5 Min PRN Kindra Alamo MD        HYDROcodone-acetaminophen [facility]) 5-325 MG per tablet 1 tablet  1 tablet Oral PRN Kindra Alamo MD        Or    HYDROcodone-acetaminophen [facility]) 5-325 MG per tablet 2 tablet  2 tablet Oral PRN Kindra Alamo MD        promethazine [facility]) injection 6.25 mg  6.25 mg Intramuscular Once PRN Kindra Alamo MD           Allergies:     Allergies   Allergen Reactions    Iodine Anaphylaxis     IVP DYE    Lipitor Other (See Comments)     Severe leg pain    Statins Other (See Comments)     Muscles go weak and she falls    Atorvastatin      Makes me sick severe leg cramps    Cymbalta [Duloxetine Hcl] Nausea Only    Cipro Xr      Reaction unknown    Diazepam     Duloxetine Nausea Only    Erythromycin      Hurt my stomach    Indomethacin     Ketorolac     Ketorolac Tromethamine Itching    Lodine [Etodolac]     Oscal 500-200 D-3 [Oyster Shell Calcium-D]     Paxil [Paroxetine Hcl]     Ropinirole     Tromethamine     Trulicity [Dulaglutide] Swelling    Zithromax [Azithromycin]     Requip [Ropinirole Hcl] Nausea And Vomiting       Problem List:    Patient Active Problem List   Diagnosis Code    Non-insulin dependent type 2 diabetes mellitus (HCC) E11.9    GERD (gastroesophageal reflux disease) K21.9    Hypothyroidism E03.9    History of MI (myocardial infarction) I25.2    Hypertension I10    HLD (hyperlipidemia) E78.5    Acute renal failure (ARF) Hyperlipidemia     Hypertension     Hypothyroidism     Intractable low back pain 06/19/2016    Non-insulin dependent type 2 diabetes mellitus [facility] [address]     NSTEMI (non-ST elevated myocardial infarction) ([facility] [address] 11/19/2015    Syncope and collapse 10/2020    x2  states was dt hypotension       Past Surgical History:        Procedure Laterality Date    APPENDECTOMY      ARTHROCENTESIS Left 8/22/2019    LEFT HIP CORTICOSTERIOD INJECTION UNDER FLUOROSCOPIC GUIDANCE performed by Alfonso Olvera DO at [address]  11/15/2017    Dr Wiliam Turner Right 7 7 15    CATARACT REMOVAL WITH IMPLANT Left 4/26/2016    CHOLECYSTECTOMY      COCCYX REMOVAL      COLONOSCOPY      CORONARY ANGIOPLASTY WITH [address] started needing cardiac care; 5 stents total    CORONARY ANGIOPLASTY WITH STENT PLACEMENT  11/19/2015    Dr Jason Samuel stent 3x18 prox Cx   Jesus Cindy Dr. Kassy Hays; 3 bypass grafts    ENDOSCOPY, COLON, DIAGNOSTIC      HEMORRHOID SURGERY      HYSTERECTOMY      LUMBAR FUSION  6/7/2016    Dr. Johnathon Mohs Left 11 18 2015    left lumbar transforaminal nerve block l4-5 l5-s1    NERVE BLOCK Right 07/17/2017    lumbar transforaminal #1    NERVE BLOCK Right 09/06/2017    right knee injection#1    NERVE BLOCK Bilateral 10/24/2018    lumbar facet block     NERVE BLOCK Left 08/22/2019    hip     NERVE SURGERY N/A 6/15/2020    SPINAL CORD STIMULATOR IMPLANT WITH NERVO performed by Maxi Cardenas MD at [address]      bilateral feet    OTHER SURGICAL HISTORY N/A 11/04/2019    spinal cord stimulator trial    WA ARTHRS KNE SURG W/MENISCECTOMY MED/LAT W/SHVG Right 6/6/2018    RIGHT KNEE ARTHROSCOPY MEDIAL AND LATERAL MENISECTOMY SYNOVECTOMY AND CHONDROPLASTY performed by Itz Chambers DO at [place] [address]. DX/THER AGNT PARAVERT FACET JOINT, LUMBAR/SAC, 1ST LEVEL Bilateral 10/24/2018    BILATERAL LUMBAR FACET BLOCK L1-2 ,L3-4 WITH X-RAY performed by Dayday Painting DO at [address] / [address] N/A 11/4/2019    SPINAL CORD STIMULATOR TRIAL WITH NEVRO performed by Alex Clements MD at [address] [facility] SURGERY  06/15/2020    nevro     TONSILLECTOMY      VASCULAR SURGERY      laser on leaky veins       Social History:    Social History     Tobacco Use    Smoking status: Never Smoker    Smokeless tobacco: Never Used   Substance Use Topics    Alcohol use: No                                Counseling given: Not Answered      Vital Signs (Current):   Vitals:    07/22/21 1604 07/30/21 0555   BP:  (!) 126/53   Pulse:  55   Resp:  16   Temp:  97 °F (36.1 °C)   TempSrc:  Skin   SpO2:  98%   Weight: 187 lb (84.8 kg) 188 lb (85.3 kg)   Height: 5' 1\" (1.549 m) 5' 1\" (1.549 m)                                              BP Readings from Last 3 Encounters:   07/30/21 (!) 126/53   07/24/21 (!) 150/80   05/18/21 122/80       NPO Status: Time of last liquid consumption: 1800                        Time of last solid consumption: 1800                        Date of last liquid consumption: 07/29/21                        Date of last solid food consumption: 07/29/21    BMI:   Wt Readings from Last 3 Encounters:   07/30/21 188 lb (85.3 kg)   07/24/21 184 lb (83.5 kg)   06/15/21 185 lb (83.9 kg)     Body mass index is 35.52 kg/m².     CBC:   Lab Results   Component Value Date    WBC 4.7 07/07/2021    RBC 3.95 07/07/2021    HGB 12.1 07/07/2021    HCT 38.0 07/07/2021    MCV 96.2 07/07/2021    RDW 13.9 07/07/2021     07/07/2021       CMP:   Lab Results   Component Value Date     07/07/2021    K 4.6 07/07/2021    K 4.3 10/02/2020     07/07/2021    CO2 25 07/07/2021    BUN 40 07/07/2021    CREATININE 1.5 07/07/2021    GFRAA 41 07/07/2021    LABGLOM 34 07/07/2021    GLUCOSE 102 07/07/2021    GLUCOSE 147 01/31/2012    PROT 6.6 07/07/2021    CALCIUM 9.2 07/07/2021    BILITOT 0.3 07/07/2021    ALKPHOS 52 07/07/2021    AST 37 07/07/2021    ALT 30 07/07/2021       POC Tests: No results for input(s): POCGLU, POCNA, POCK, POCCL, POCBUN, POCHEMO, POCHCT in the last 72 hours. Coags:   Lab Results   Component Value Date    PROTIME 13.0 10/02/2020    PROTIME 12.4 08/04/2011    INR 1.2 10/02/2020    APTT 23.3 10/02/2020       HCG (If Applicable): No results found for: PREGTESTUR, PREGSERUM, HCG, HCGQUANT     ABGs: No results found for: PHART, PO2ART, ZTD0KDJ, NBZ8ZCV, BEART, G7CLGFWJ     Type & Screen (If Applicable):  No results found for: LABABO, LABRH    Drug/Infectious Status (If Applicable):  No results found for: HIV, HEPCAB    COVID-19 Screening (If Applicable):   Lab Results   Component Value Date    COVID19 Not Detected 07/23/2021           Anesthesia Evaluation  Patient summary reviewed no history of anesthetic complications:   Airway: Mallampati: III  TM distance: >3 FB   Neck ROM: full  Mouth opening: > = 3 FB Dental:    (+) upper dentures and partials      Pulmonary:Negative Pulmonary ROS       (-) not a current smoker                           Cardiovascular:    (+) hypertension:, past MI:, CAD:, CABG/stent (Stent):, hyperlipidemia      ECG reviewed  Rhythm: regular  Rate: normal  Echocardiogram reviewed  Stress test reviewed             ROS comment: ECHO 10/2/2020  Summary   Normal left ventricle size. Estimated left ventricle ejection fraction 55   %. No gross regional wall motion abnormality. Normal right ventricular size and function. Technically difficult study: no gross valvular pathology noted within   these limitations. STRESS TEST 7/4/2020  1. No pharmacologically induced reversible perfusion defects to suggest  ischemia  2. Ejection fraction of 68%  3.  No focal wall motion abnormality         Neuro/Psych:   (+) neuromuscular disease:,              ROS comment: Cervical spinal stenosis  Neural foraminal stenosis of cervical spine  Facet arthropathy, cervical    Lumbar radiculopathy  Neural foraminal stenosis of lumbar spine  DDD (degenerative disc disease), lumbar  Protruded lumbar disc     GI/Hepatic/Renal:   (+) GERD:,           Endo/Other:    (+) DiabetesType II DM, , hypothyroidism, blood dyscrasia: anticoagulation therapy:., .                 Abdominal:   (+) obese,           Vascular:   + DVT, . Other Findings:           Anesthesia Plan      general     ASA 3       Induction: intravenous. MIPS: Postoperative opioids intended and Prophylactic antiemetics administered. Anesthetic plan and risks discussed with patient. Plan discussed with CRNA. PAT Chart Review:  Chart reviewed per routine on July 29, 2021 at 11:10 AM by Lauren Watt MD.  (Final assessment and plan per day of surgery team.)    DOS STAFF ADDENDUM:    Pt seen and examined, chart reviewed (including anesthesia, drug and allergy history). Anesthetic plan, risks, benefits, alternatives, and personnel involved discussed with patient. Patient verbalized an understanding and agrees to proceed. Plan discussed with care team members and agreed upon.     Billie Jorge MD  Staff Anesthesiologist  6:22 AM    Billie Jorge MD   7/30/2021

## 2021-07-30 ENCOUNTER — ANESTHESIA (OUTPATIENT)
Dept: OPERATING ROOM | Age: 76
End: 2021-07-30
Payer: MEDICARE

## 2021-07-30 ENCOUNTER — HOSPITAL ENCOUNTER (OUTPATIENT)
Age: 76
Setting detail: OUTPATIENT SURGERY
Discharge: HOME | End: 2021-07-30
Attending: ORTHOPAEDIC SURGERY | Admitting: ORTHOPAEDIC SURGERY
Payer: MEDICARE

## 2021-07-30 VITALS
SYSTOLIC BLOOD PRESSURE: 118 MMHG | OXYGEN SATURATION: 93 % | RESPIRATION RATE: 16 BRPM | HEART RATE: 63 BPM | DIASTOLIC BLOOD PRESSURE: 49 MMHG | WEIGHT: 188 LBS | BODY MASS INDEX: 35.5 KG/M2 | HEIGHT: 61 IN | TEMPERATURE: 97 F

## 2021-07-30 VITALS
SYSTOLIC BLOOD PRESSURE: 137 MMHG | OXYGEN SATURATION: 100 % | DIASTOLIC BLOOD PRESSURE: 70 MMHG | RESPIRATION RATE: 17 BRPM

## 2021-07-30 DIAGNOSIS — M77.01 MEDIAL EPICONDYLITIS OF ELBOW, RIGHT: Primary | ICD-10-CM

## 2021-07-30 LAB — METER GLUCOSE: 75 MG/DL (ref 74–99)

## 2021-07-30 PROCEDURE — 2580000003 HC RX 258: Performed by: ANESTHESIOLOGY

## 2021-07-30 PROCEDURE — 3700000000 HC ANESTHESIA ATTENDED CARE: Performed by: ORTHOPAEDIC SURGERY

## 2021-07-30 PROCEDURE — 2709999900 HC NON-CHARGEABLE SUPPLY: Performed by: ORTHOPAEDIC SURGERY

## 2021-07-30 PROCEDURE — 7100000010 HC PHASE II RECOVERY - FIRST 15 MIN: Performed by: ORTHOPAEDIC SURGERY

## 2021-07-30 PROCEDURE — 2500000003 HC RX 250 WO HCPCS: Performed by: NURSE PRACTITIONER

## 2021-07-30 PROCEDURE — 7100000001 HC PACU RECOVERY - ADDTL 15 MIN: Performed by: ORTHOPAEDIC SURGERY

## 2021-07-30 PROCEDURE — 2500000003 HC RX 250 WO HCPCS

## 2021-07-30 PROCEDURE — 82962 GLUCOSE BLOOD TEST: CPT | Performed by: ORTHOPAEDIC SURGERY

## 2021-07-30 PROCEDURE — 24359 REPAIR ELBOW DEB/ATTCH OPEN: CPT | Performed by: ORTHOPAEDIC SURGERY

## 2021-07-30 PROCEDURE — 2720000010 HC SURG SUPPLY STERILE: Performed by: ORTHOPAEDIC SURGERY

## 2021-07-30 PROCEDURE — 7100000011 HC PHASE II RECOVERY - ADDTL 15 MIN: Performed by: ORTHOPAEDIC SURGERY

## 2021-07-30 PROCEDURE — 3700000001 HC ADD 15 MINUTES (ANESTHESIA): Performed by: ORTHOPAEDIC SURGERY

## 2021-07-30 PROCEDURE — 82962 GLUCOSE BLOOD TEST: CPT

## 2021-07-30 PROCEDURE — 6360000002 HC RX W HCPCS

## 2021-07-30 PROCEDURE — 6370000000 HC RX 637 (ALT 250 FOR IP): Performed by: ANESTHESIOLOGY

## 2021-07-30 PROCEDURE — 7100000000 HC PACU RECOVERY - FIRST 15 MIN: Performed by: ORTHOPAEDIC SURGERY

## 2021-07-30 PROCEDURE — 3600000003 HC SURGERY LEVEL 3 BASE: Performed by: ORTHOPAEDIC SURGERY

## 2021-07-30 PROCEDURE — 3600000013 HC SURGERY LEVEL 3 ADDTL 15MIN: Performed by: ORTHOPAEDIC SURGERY

## 2021-07-30 RX ORDER — PROPOFOL 10 MG/ML
INJECTION, EMULSION INTRAVENOUS PRN
Status: DISCONTINUED | OUTPATIENT
Start: 2021-07-30 | End: 2021-07-30 | Stop reason: SDUPTHER

## 2021-07-30 RX ORDER — MIDAZOLAM HYDROCHLORIDE 1 MG/ML
INJECTION INTRAMUSCULAR; INTRAVENOUS PRN
Status: DISCONTINUED | OUTPATIENT
Start: 2021-07-30 | End: 2021-07-30 | Stop reason: SDUPTHER

## 2021-07-30 RX ORDER — SODIUM CHLORIDE 9 MG/ML
INJECTION, SOLUTION INTRAVENOUS CONTINUOUS
Status: DISCONTINUED | OUTPATIENT
Start: 2021-07-30 | End: 2021-07-30 | Stop reason: HOSPADM

## 2021-07-30 RX ORDER — CLINDAMYCIN PHOSPHATE 900 MG/50ML
900 INJECTION INTRAVENOUS ONCE
Status: COMPLETED | OUTPATIENT
Start: 2021-07-30 | End: 2021-07-30

## 2021-07-30 RX ORDER — MEPERIDINE HYDROCHLORIDE 25 MG/ML
12.5 INJECTION INTRAMUSCULAR; INTRAVENOUS; SUBCUTANEOUS EVERY 5 MIN PRN
Status: DISCONTINUED | OUTPATIENT
Start: 2021-07-30 | End: 2021-07-30 | Stop reason: HOSPADM

## 2021-07-30 RX ORDER — PROMETHAZINE HYDROCHLORIDE 25 MG/ML
6.25 INJECTION, SOLUTION INTRAMUSCULAR; INTRAVENOUS
Status: DISCONTINUED | OUTPATIENT
Start: 2021-07-30 | End: 2021-07-30 | Stop reason: HOSPADM

## 2021-07-30 RX ORDER — SODIUM CHLORIDE 9 MG/ML
INJECTION, SOLUTION INTRAVENOUS CONTINUOUS PRN
Status: DISCONTINUED | OUTPATIENT
Start: 2021-07-30 | End: 2021-07-30 | Stop reason: SDUPTHER

## 2021-07-30 RX ORDER — EPHEDRINE SULFATE/0.9% NACL/PF 50 MG/5 ML
SYRINGE (ML) INTRAVENOUS PRN
Status: DISCONTINUED | OUTPATIENT
Start: 2021-07-30 | End: 2021-07-30 | Stop reason: SDUPTHER

## 2021-07-30 RX ORDER — FENTANYL CITRATE 50 UG/ML
INJECTION, SOLUTION INTRAMUSCULAR; INTRAVENOUS PRN
Status: DISCONTINUED | OUTPATIENT
Start: 2021-07-30 | End: 2021-07-30 | Stop reason: SDUPTHER

## 2021-07-30 RX ORDER — MORPHINE SULFATE 2 MG/ML
2 INJECTION, SOLUTION INTRAMUSCULAR; INTRAVENOUS EVERY 5 MIN PRN
Status: DISCONTINUED | OUTPATIENT
Start: 2021-07-30 | End: 2021-07-30 | Stop reason: HOSPADM

## 2021-07-30 RX ORDER — ONDANSETRON 2 MG/ML
INJECTION INTRAMUSCULAR; INTRAVENOUS PRN
Status: DISCONTINUED | OUTPATIENT
Start: 2021-07-30 | End: 2021-07-30 | Stop reason: SDUPTHER

## 2021-07-30 RX ORDER — FENTANYL CITRATE 50 UG/ML
25 INJECTION, SOLUTION INTRAMUSCULAR; INTRAVENOUS EVERY 5 MIN PRN
Status: DISCONTINUED | OUTPATIENT
Start: 2021-07-30 | End: 2021-07-30 | Stop reason: HOSPADM

## 2021-07-30 RX ORDER — LIDOCAINE HYDROCHLORIDE 20 MG/ML
INJECTION, SOLUTION INTRAVENOUS PRN
Status: DISCONTINUED | OUTPATIENT
Start: 2021-07-30 | End: 2021-07-30 | Stop reason: SDUPTHER

## 2021-07-30 RX ORDER — HYDROCODONE BITARTRATE AND ACETAMINOPHEN 5; 325 MG/1; MG/1
1 TABLET ORAL PRN
Status: COMPLETED | OUTPATIENT
Start: 2021-07-30 | End: 2021-07-30

## 2021-07-30 RX ORDER — DEXAMETHASONE SODIUM PHOSPHATE 4 MG/ML
INJECTION, SOLUTION INTRA-ARTICULAR; INTRALESIONAL; INTRAMUSCULAR; INTRAVENOUS; SOFT TISSUE PRN
Status: DISCONTINUED | OUTPATIENT
Start: 2021-07-30 | End: 2021-07-30 | Stop reason: SDUPTHER

## 2021-07-30 RX ORDER — DIPHENHYDRAMINE HYDROCHLORIDE 50 MG/ML
INJECTION INTRAMUSCULAR; INTRAVENOUS PRN
Status: DISCONTINUED | OUTPATIENT
Start: 2021-07-30 | End: 2021-07-30 | Stop reason: SDUPTHER

## 2021-07-30 RX ORDER — HYDROCODONE BITARTRATE AND ACETAMINOPHEN 5; 325 MG/1; MG/1
2 TABLET ORAL PRN
Status: COMPLETED | OUTPATIENT
Start: 2021-07-30 | End: 2021-07-30

## 2021-07-30 RX ORDER — GLYCOPYRROLATE 1 MG/5 ML
SYRINGE (ML) INTRAVENOUS PRN
Status: DISCONTINUED | OUTPATIENT
Start: 2021-07-30 | End: 2021-07-30 | Stop reason: SDUPTHER

## 2021-07-30 RX ORDER — KETAMINE HYDROCHLORIDE 10 MG/ML
INJECTION, SOLUTION INTRAMUSCULAR; INTRAVENOUS PRN
Status: DISCONTINUED | OUTPATIENT
Start: 2021-07-30 | End: 2021-07-30 | Stop reason: SDUPTHER

## 2021-07-30 RX ADMIN — Medication 10 MG: at 06:58

## 2021-07-30 RX ADMIN — LIDOCAINE HYDROCHLORIDE 100 MG: 20 INJECTION, SOLUTION INTRAVENOUS at 06:50

## 2021-07-30 RX ADMIN — CLINDAMYCIN PHOSPHATE 900 MG: 18 INJECTION, SOLUTION INTRAVENOUS at 06:40

## 2021-07-30 RX ADMIN — SODIUM CHLORIDE: 9 INJECTION, SOLUTION INTRAVENOUS at 06:23

## 2021-07-30 RX ADMIN — KETAMINE HYDROCHLORIDE 25 MG: 10 INJECTION INTRAMUSCULAR; INTRAVENOUS at 06:50

## 2021-07-30 RX ADMIN — Medication 0.2 MG: at 06:50

## 2021-07-30 RX ADMIN — DIPHENHYDRAMINE HYDROCHLORIDE 12.5 MG: 50 INJECTION, SOLUTION INTRAMUSCULAR; INTRAVENOUS at 06:58

## 2021-07-30 RX ADMIN — ONDANSETRON 4 MG: 2 INJECTION INTRAMUSCULAR; INTRAVENOUS at 06:58

## 2021-07-30 RX ADMIN — DEXAMETHASONE SODIUM PHOSPHATE 10 MG: 4 INJECTION, SOLUTION INTRAMUSCULAR; INTRAVENOUS at 06:58

## 2021-07-30 RX ADMIN — FENTANYL CITRATE 50 MCG: 50 INJECTION, SOLUTION INTRAMUSCULAR; INTRAVENOUS at 06:50

## 2021-07-30 RX ADMIN — MIDAZOLAM 2 MG: 1 INJECTION INTRAMUSCULAR; INTRAVENOUS at 06:43

## 2021-07-30 RX ADMIN — Medication 0.2 MG: at 06:57

## 2021-07-30 RX ADMIN — SODIUM CHLORIDE: 9 INJECTION, SOLUTION INTRAVENOUS at 06:36

## 2021-07-30 RX ADMIN — PROPOFOL 150 MG: 10 INJECTION, EMULSION INTRAVENOUS at 06:50

## 2021-07-30 RX ADMIN — HYDROCODONE BITARTRATE AND ACETAMINOPHEN 1 TABLET: 5; 325 TABLET ORAL at 08:36

## 2021-07-30 ASSESSMENT — PULMONARY FUNCTION TESTS
PIF_VALUE: 2
PIF_VALUE: 18
PIF_VALUE: 1
PIF_VALUE: 20
PIF_VALUE: 18
PIF_VALUE: 18
PIF_VALUE: 10
PIF_VALUE: 7
PIF_VALUE: 13
PIF_VALUE: 20
PIF_VALUE: 13
PIF_VALUE: 13
PIF_VALUE: 20
PIF_VALUE: 14
PIF_VALUE: 1
PIF_VALUE: 15
PIF_VALUE: 20
PIF_VALUE: 13
PIF_VALUE: 21
PIF_VALUE: 15
PIF_VALUE: 18
PIF_VALUE: 20
PIF_VALUE: 18
PIF_VALUE: 13
PIF_VALUE: 2
PIF_VALUE: 0
PIF_VALUE: 13
PIF_VALUE: 20
PIF_VALUE: 18
PIF_VALUE: 18
PIF_VALUE: 2
PIF_VALUE: 18
PIF_VALUE: 20
PIF_VALUE: 2
PIF_VALUE: 20
PIF_VALUE: 20
PIF_VALUE: 13
PIF_VALUE: 18
PIF_VALUE: 20
PIF_VALUE: 20
PIF_VALUE: 13
PIF_VALUE: 6
PIF_VALUE: 3
PIF_VALUE: 14
PIF_VALUE: 13
PIF_VALUE: 18
PIF_VALUE: 20
PIF_VALUE: 18

## 2021-07-30 ASSESSMENT — PAIN DESCRIPTION - LOCATION
LOCATION: ELBOW

## 2021-07-30 ASSESSMENT — PAIN SCALES - GENERAL
PAINLEVEL_OUTOF10: 8
PAINLEVEL_OUTOF10: 7
PAINLEVEL_OUTOF10: 0
PAINLEVEL_OUTOF10: 7
PAINLEVEL_OUTOF10: 0
PAINLEVEL_OUTOF10: 7
PAINLEVEL_OUTOF10: 0
PAINLEVEL_OUTOF10: 7

## 2021-07-30 ASSESSMENT — PAIN DESCRIPTION - PAIN TYPE
TYPE: SURGICAL PAIN

## 2021-07-30 ASSESSMENT — PAIN DESCRIPTION - DESCRIPTORS
DESCRIPTORS: ACHING

## 2021-07-30 ASSESSMENT — PAIN DESCRIPTION - ORIENTATION
ORIENTATION: RIGHT

## 2021-07-30 ASSESSMENT — PAIN - FUNCTIONAL ASSESSMENT
PAIN_FUNCTIONAL_ASSESSMENT: PREVENTS OR INTERFERES SOME ACTIVE ACTIVITIES AND ADLS
PAIN_FUNCTIONAL_ASSESSMENT: 0-10

## 2021-07-30 NOTE — H&P
Department of Orthopedic Surgery  Resident H&P Note          CHIEF COMPLAINT:  Right elbow pain    HISTORY OF PRESENT ILLNESS:                The patient is a 78 y.o. female who presents with right elbow pain. Denies numbness/tingling/paresthesias. Denies pain elsewhere.       Past Medical History:        Diagnosis Date    Arthritis     CAD (coronary artery disease)     Current use of long term anticoagulation     Plavix    Fibromyalgia     GERD (gastroesophageal reflux disease)     History of cardiovascular stress test 02/17/2014    lexiscan, also 4/21/2015    History of MI (myocardial infarction)     x4; most recent 2016    Hx of blood clots     DVT leg  (pt states she was traveling)    Hyperlipidemia     Hypertension     Hypothyroidism     Intractable low back pain 06/19/2016    Non-insulin dependent type 2 diabetes mellitus ([facility] [address]     NSTEMI (non-ST elevated myocardial infarction) ([facility] [address] 11/19/2015    Syncope and collapse 10/2020    x2  states was dt hypotension     Past Surgical History:        Procedure Laterality Date    APPENDECTOMY      ARTHROCENTESIS Left 8/22/2019    LEFT HIP CORTICOSTERIOD INJECTION UNDER FLUOROSCOPIC GUIDANCE performed by Schemartínez-PlDO heena at [address] [facility]  11/15/2017    Dr Alva Confer Right 7 7 15    CATARACT REMOVAL WITH IMPLANT Left 4/26/2016    CHOLECYSTECTOMY      COCCYX REMOVAL      COLONOSCOPY      CORONARY ANGIOPLASTY WITH [address] started needing cardiac care; 5 stents total    CORONARY ANGIOPLASTY WITH STENT PLACEMENT  11/19/2015    Dr Handy Felton stent 3x18 prox Cx   Elva Bean; 3 bypass grafts    ENDOSCOPY, COLON, DIAGNOSTIC      HEMORRHOID SURGERY      HYSTERECTOMY      LUMBAR FUSION  6/7/2016    Dr. Ruby Pineda Left 11 18 2015    left lumbar transforaminal nerve block l4-5 l5-s1  NERVE BLOCK Right 07/17/2017    lumbar transforaminal #1    NERVE BLOCK Right 09/06/2017    right knee injection#1    NERVE BLOCK Bilateral 10/24/2018    lumbar facet block     NERVE BLOCK Left 08/22/2019    hip     NERVE SURGERY N/A 6/15/2020    SPINAL CORD STIMULATOR IMPLANT WITH NERVO performed by Cesar Negro MD at [address]      bilateral feet    OTHER SURGICAL HISTORY N/A 11/04/2019    spinal cord stimulator trial    ME ARTHRS KNE SURG W/MENISCECTOMY MED/LAT W/SHVG Right 6/6/2018    RIGHT KNEE ARTHROSCOPY MEDIAL AND LATERAL MENISECTOMY SYNOVECTOMY AND CHONDROPLASTY performed by Favio Carpenter DO at [facility] [address] DX/THER AGNT PARAVERT FACET JOINT, LUMBAR/SAC, 1ST LEVEL Bilateral 10/24/2018    BILATERAL LUMBAR FACET BLOCK L1-2 ,L3-4 WITH X-RAY performed by Katya Alvarado DO at [address] [facility] / [address] NERVE STIMULATOR N/A 11/4/2019    SPINAL CORD STIMULATOR TRIAL WITH NEVRO performed by Cesar Negro MD at [address] [facility] SURGERY  06/15/2020    nevro     TONSILLECTOMY      VASCULAR SURGERY      laser on leaky veins     Current Medications:   Current Facility-Administered Medications: clindamycin (CLEOCIN) 900 mg in dextrose 5 % 50 mL IVPB, 900 mg, Intravenous, Once  0.9 % sodium chloride infusion, , Intravenous, Continuous  meperidine (DEMEROL) injection 12.5 mg, 12.5 mg, Intravenous, Q5 Min PRN  fentaNYL (SUBLIMAZE) injection 25 mcg, 25 mcg, Intravenous, Q5 Min PRN  morphine (PF) injection 2 mg, 2 mg, Intravenous, Q5 Min PRN  HYDROmorphone (DILAUDID) injection 0.5 mg, 0.5 mg, Intravenous, Q5 Min PRN  HYDROcodone-acetaminophen (NORCO) 5-325 MG per tablet 1 tablet, 1 tablet, Oral, PRN **OR** HYDROcodone-acetaminophen (NORCO) 5-325 MG per tablet 2 tablet, 2 tablet, Oral, PRN  promethazine (PHENERGAN) injection 6.25 mg, 6.25 mg, Intramuscular, Once PRN  Allergies:  Iodine, Lipitor, Statins, Atorvastatin, Cymbalta [duloxetine hcl], Cipro xr, Diazepam, Duloxetine, Erythromycin, Indomethacin, Ketorolac, Ketorolac tromethamine, Lodine [etodolac], Oscal 500-200 d-3 [oyster shell calcium-d], Paxil [paroxetine hcl], Ropinirole, Tromethamine, Trulicity [dulaglutide], Zithromax [azithromycin], and Requip [ropinirole hcl]    Social History:   TOBACCO:   reports that she has never smoked. She has never used smokeless tobacco.  ETOH:   reports no history of alcohol use. DRUGS:   reports no history of drug use.   ACTIVITIES OF DAILY LIVING:    OCCUPATION:    Family History:       Problem Relation Age of Onset    Cancer Father [de-identified]        bladdder    Cancer Brother     COPD Brother     Heart Attack Mother 54    Heart Disease Sister     Diabetes Sister     Heart Disease Brother     Heart Disease Other 58             Diabetes Other     ADHD Other        REVIEW OF SYSTEMS:  CONSTITUTIONAL:  negative for  fevers, chills  EYES:  negative for blurred vision, visual disturbance  HEENT:  negative for  hearing loss, voice change  RESPIRATORY:  negative for  dyspnea, wheezing  CARDIOVASCULAR:  negative for  chest pain, palpitations  GASTROINTESTINAL:  negative for nausea, vomiting  GENITOURINARY:  negative for frequency, urinary incontinence  HEMATOLOGIC/LYMPHATIC:  negative for bleeding and petechiae  MUSCULOSKELETAL:  positive for right elbow pain  NEUROLOGICAL:  negative for headaches, dizziness  BEHAVIOR/PSYCH:  negative for increased agitation and anxiety    PHYSICAL EXAM:    VITALS:  BP (!) 126/53   Pulse 55   Temp 97 °F (36.1 °C) (Skin)   Resp 16   Ht 5' 1\" (1.549 m)   Wt 188 lb (85.3 kg)   SpO2 98%   BMI 35.52 kg/m²   CONSTITUTIONAL:  Awake, alert, answers questions appropriately  EYES:  Lids and lashes normal, pupils equal, round and reactive to light, extra ocular muscles intact  HENT:  Normocephalic, without obvious abnormality, atraumatic, neck supple, symmetric  LUNGS:  No increased work of breathing  CARDIOVASCULAR:  Brisk vascular capillary refill to all extremities  ABDOMEN:  Non-tender, Non-distended  NEUROLOGIC:  Awake, alert, oriented to name, place and time. Cranial nerves II-XII are grossly intact. MUSCULOSKELETAL:  Right upper Extremity:   pain is located medial elbow. Range of motion: R.Elbow flexion 140/extension 0; L. Elbow flexion 140/extension 0. ; Wrist ROM R wrist DF 90, VF 90, L wrist DF 90, VF 90, R pronation 90/ supination 90, L pronation 90/supination 90. There is swelling, there is not ecchymosis. Exam is compared bilaterally    DATA:    CBC:   Lab Results   Component Value Date    WBC 4.7 07/07/2021    RBC 3.95 07/07/2021    HGB 12.1 07/07/2021    HCT 38.0 07/07/2021    MCV 96.2 07/07/2021    MCH 30.6 07/07/2021    MCHC 31.8 07/07/2021    RDW 13.9 07/07/2021     07/07/2021    MPV 10.3 07/07/2021     PT/INR:    Lab Results   Component Value Date    PROTIME 13.0 10/02/2020    PROTIME 12.4 08/04/2011    INR 1.2 10/02/2020       IMPRESSION:  · Right elbow pain    PLAN:  · Plan for right elbow epicondylectomy medial epicondyle with PRP injection  · Discussed with Dr. Scott Martins    I was present with the resident during the history and exam. I discussed the case with the resident and agree with the findings and plan as documented in the resident's note.

## 2021-08-04 NOTE — OP NOTE
Operative Note      Patient: Kinza Fuller  YOB: 1945  MRN: [id number]    Date of Procedure: 7/30/2021    Pre-Op Diagnosis: RIGHT ELBOW MED EPICONDYLITIS    Post-Op Diagnosis: Same       Procedure(s):  RIGHT ELBOW EPICONDYLECTOMY, MEDIAL EPICONDYLE  WITH  PLASMA  RICH PLATELET  INJECTION ([address])    Surgeon(s): Jass Thurston DO    Assistant:   Resident: Radha Hendrix DO; Javad Cruz DO    Anesthesia: General    Estimated Blood Loss (mL): Minimal    Complications: None    Specimens:   * No specimens in log *    Implants:  * No implants in log *      Drains: * No LDAs found *    Findings: as above    Detailed Description of Procedure:   Below    PREOPERATIVE DIAGNOSIS:  Right elbow medial epicondylitis.     POSTOPERATIVE DIAGNOSIS:  as above     PROCEDURE:   right elbow microtenotomy medial epicondyle with platelet injection.     SURGEON: Angelica Ramsey DO     ASSISTANT:  as above     BLOOD LOSS:  Minimal.     COMPLICATIONS:  None.     OPERATIVE PROCEDURE:  The patient taken to operative suite, was given general  anesthesia, the right arm was identified with a preoperative timeout.  Placed  tourniquet right arm, prepped and draped the arm in sterile fashion.  Outlined  an incision along the medial epicondyle origin of the elbow.  Made an incision  through with a 15 blade through skin, subcutaneous tissue, dissected down to  the level of the mediall epicondyle.  All soft tissues were freed. the insertion of the   Flexor tendon group was then incised along the mid tendon in line with the tendon   Fibers. The insertion area was then elevated.  the diseased tendon was then  Debrided and the medial epicondyle was excised. A good bleeding bed was obtained.   The tendon was then repaired using an o-vicyryl stitch.     I then thoroughly irrigated the wound  upon closure.  Closed the incision with 4-0 Prolene in a horizontal mattress  type fashion.  I did obtain platelets from the patient which were spun and  injected through the incision site.  The patient was placed in a sterile  dressing, recovered in the recovery room without difficulty.  The patient  tolerated the procedure well    Electronically signed by Yeny Blakely DO on 8/4/2021 at 7:17 AM

## 2021-08-09 ENCOUNTER — HOSPITAL ENCOUNTER (OUTPATIENT)
Age: 76
Discharge: HOME | End: 2021-08-09
Payer: MEDICARE

## 2021-08-09 LAB
ANION GAP SERPL CALCULATED.3IONS-SCNC: 7 MMOL/L (ref 7–16)
BUN BLDV-MCNC: 27 MG/DL (ref 6–23)
CALCIUM SERPL-MCNC: 9.5 MG/DL (ref 8.6–10.2)
CHLORIDE BLD-SCNC: 107 MMOL/L (ref 98–107)
CO2: 27 MMOL/L (ref 22–29)
CREAT SERPL-MCNC: 1.1 MG/DL (ref 0.5–1)
GFR AFRICAN AMERICAN: 59
GFR NON-AFRICAN AMERICAN: 49 ML/MIN/1.73
GLUCOSE BLD-MCNC: 171 MG/DL (ref 74–99)
POTASSIUM SERPL-SCNC: 4.4 MMOL/L (ref 3.5–5)
SODIUM BLD-SCNC: 141 MMOL/L (ref 132–146)

## 2021-08-09 PROCEDURE — 80048 BASIC METABOLIC PNL TOTAL CA: CPT

## 2021-08-09 PROCEDURE — 36415 COLL VENOUS BLD VENIPUNCTURE: CPT

## 2021-08-13 ENCOUNTER — OFFICE VISIT (OUTPATIENT)
Dept: ORTHOPEDIC SURGERY | Age: 76
End: 2021-08-13

## 2021-08-13 VITALS — BODY MASS INDEX: 35.5 KG/M2 | HEIGHT: 61 IN | WEIGHT: 188 LBS | TEMPERATURE: 98 F

## 2021-08-13 DIAGNOSIS — M77.01 MEDIAL EPICONDYLITIS OF ELBOW, RIGHT: Primary | ICD-10-CM

## 2021-08-13 PROCEDURE — 99024 POSTOP FOLLOW-UP VISIT: CPT | Performed by: NURSE PRACTITIONER

## 2021-08-13 NOTE — PROGRESS NOTES
Subjective:     Huel Dubin is here for followup after right elbow microtenotomy with prp injection surgery. The patient is not having any pain. . The patient notes improvement in the following symptoms:strength, numbness, pain, sensation. The patient denies fever, wound drainage, increasing redness, pus, increasing pain, increasing swelling. Post op problems reported: none. Objective:      General :    alert, appears stated age and cooperative   Sutures:   Sutures in place and will be removed today. Incision:  healing well, no significant drainage, no dehiscence, no significant erythema   Tenderness:  none   Flexion ROM:  full range of motion   Extension ROM:  full range of motion   Effusion:  none        Assessment:     Encounter Diagnosis   Name Primary?  Medial epicondylitis of elbow, right Yes       Plan:   Sutures removed today. No lifting pushing or pulling RUE  Range of motion and rehabilitation exercises discussed with the patient. Follow up in 4 weeks.    Call with any questions or concerns at [phone]

## 2021-09-15 ENCOUNTER — OFFICE VISIT (OUTPATIENT)
Dept: NEUROLOGY | Age: 76
End: 2021-09-15
Payer: MEDICARE

## 2021-09-15 VITALS
HEART RATE: 68 BPM | RESPIRATION RATE: 18 BRPM | WEIGHT: 180 LBS | SYSTOLIC BLOOD PRESSURE: 113 MMHG | HEIGHT: 61 IN | OXYGEN SATURATION: 98 % | DIASTOLIC BLOOD PRESSURE: 63 MMHG | TEMPERATURE: 97.3 F | BODY MASS INDEX: 33.99 KG/M2

## 2021-09-15 DIAGNOSIS — M54.16 LUMBAR RADICULOPATHY: Primary | ICD-10-CM

## 2021-09-15 DIAGNOSIS — F07.81 POSTCONCUSSIVE SYNDROME: ICD-10-CM

## 2021-09-15 PROCEDURE — 1123F ACP DISCUSS/DSCN MKR DOCD: CPT | Performed by: PSYCHIATRY & NEUROLOGY

## 2021-09-15 PROCEDURE — G8417 CALC BMI ABV UP PARAM F/U: HCPCS | Performed by: PSYCHIATRY & NEUROLOGY

## 2021-09-15 PROCEDURE — 3017F COLORECTAL CA SCREEN DOC REV: CPT | Performed by: PSYCHIATRY & NEUROLOGY

## 2021-09-15 PROCEDURE — 1036F TOBACCO NON-USER: CPT | Performed by: PSYCHIATRY & NEUROLOGY

## 2021-09-15 PROCEDURE — 4040F PNEUMOC VAC/ADMIN/RCVD: CPT | Performed by: PSYCHIATRY & NEUROLOGY

## 2021-09-15 PROCEDURE — G8400 PT W/DXA NO RESULTS DOC: HCPCS | Performed by: PSYCHIATRY & NEUROLOGY

## 2021-09-15 PROCEDURE — G8427 DOCREV CUR MEDS BY ELIG CLIN: HCPCS | Performed by: PSYCHIATRY & NEUROLOGY

## 2021-09-15 PROCEDURE — 99215 OFFICE O/P EST HI 40 MIN: CPT | Performed by: PSYCHIATRY & NEUROLOGY

## 2021-09-15 PROCEDURE — 1090F PRES/ABSN URINE INCON ASSESS: CPT | Performed by: PSYCHIATRY & NEUROLOGY

## 2021-09-15 RX ORDER — BACLOFEN 10 MG/1
10 TABLET ORAL 3 TIMES DAILY
COMMUNITY
End: 2021-11-16 | Stop reason: DRUGHIGH

## 2021-09-15 NOTE — PROGRESS NOTES
This 78year-old right-handed woman was referred for evaluation and management of gait instability, leg weakness and memory issues. She remained an excellent historian. She was accompanied by her daughter-in-law, who was also an excellent historian. Her medications continued as cyclobenzaprine, meclizine, glipizide, omeprazole, lisinopril, furosemide, clopidogrel, aspirin, folic acid, dapagliflozin, cholecalciferol, ezetimibe, gabapentin, Ozempic, multivitamins, Imdur, hydrocodone and levothyroxine. Her medical history was remarkable for longstanding diabetes. This disorder remained well controlled with past hemoglobin A1c levels of 6.7. That disorder produced a peripheral neuropathy, but no other complications. There was also hypertension, coronary artery disease, gastroesophageal reflux disease, diverticulosis and severe degenerative joint disease. She underwent 5 stents and subsequent three-vessel bypass surgery. She initially responded well to surgical invention, without recurrent disease. However, past echocardiography revealed congestive heart failure. More recent cardiac work-up proved unrevealing. She again denied swelling, chest pains or shortness of breath. She still experienced painful lumbosacral radicular disease. A nerve stimulator improved her pains, but may have been dislodged after her fall 1 year ago. She was followed by pain management, remaining on gabapentin, hydrocodone, baclofen and cyclobenzaprine. She fell down a flight of stairs with resulting concussion 1 year ago. Her fatigue resolved from that fall. Unfortunately, there remained episodic unsteadiness and spinning sensations. These were moderately well controlled with low-dose meclizine. She noted headaches in the occipital regions, following that incident. She denied visual abnormalities, speech or swallowing difficulties, seizure-like activity or loss of consciousness.     She reported no other neurological issues. She denied other medical problems. She continues sleeping and eating well. Her blood sugars remained well controlled. She was drinking more fluids. She again refused the COVID-19 vaccination. Review of systems was otherwise unremarkable. Physical examination displayed stable vital signs. Her blood pressure was 113/63. She was an elderly woman in no acute distress who was alert, cooperative and oriented. She remained pleasant. There was tenderness along both occipital grooves, reproducing her headaches. Her lungs were clear to auscultation. Cardiac testing was unrevealing with a well-healed bypass scar. Her peripheral pulses were +1 without bruits. Her limbs were unremarkable except for her multiple surgical scars. There was no edema or cyanosis. Neurological examination produced an intact mental status. Cranial nerve testing was unrevealing. I found normal tone and bulk with 5/5 strength throughout. Reflexes were +1 in the right arm and absent in the left. There were no reflexes in her legs. Sensation was intact to light touch. Pinprick and vibration were decreased to both mid calves. Joint position sense was slightly impaired in the feet. She walked with a slightly wide-based, cautious gait. She still displayed a minimal right foot drop. There were no changes on this examination. Laboratory data included a recent CMP with a GFR of 49. Recent blood sugars were 103 extending to 243. Hemoglobin A1c was 6.9. CBC was normal.    This individual initially presented with memory issues. I again found none. These difficulties were likely related to her postconcussion syndrome. Her fatigue has resolved. She now suffers from bilateral occipital neuralgias; if these worsen, occipital nerve blocks would ensue. Her lumbosacral radicular disease continues producing her pains and right foot drop. Her gait issues are from these radiculopathies.   She was responding moderately well to the new stimulator. Hopefully, in the future, gabapentin and oxycodone could be reduced. Her tremors were related to anxiety. These have not returned. Her unsteadiness is related to her nonrotational vertigo. She will continue with her current dose of meclizine. Medically, she suffered a serious syncopal episode from orthostasis. She was concussed, without severe intracranial trauma, but persistent postconcussive issues. She has increased her fluid intakes. She again refused COVID-19 vaccination. She will return in 6 months. She will continue her current regimen. She will call any time if problems arise. I spent 40 minutes with the patient with over 50 % spent in counseling and disease management. All patient issues were addressed and all questions were answered.

## 2021-09-16 ENCOUNTER — OFFICE VISIT (OUTPATIENT)
Dept: ORTHOPEDIC SURGERY | Age: 76
End: 2021-09-16

## 2021-09-16 VITALS — TEMPERATURE: 98 F | HEIGHT: 61 IN | WEIGHT: 180 LBS | BODY MASS INDEX: 33.99 KG/M2

## 2021-09-16 DIAGNOSIS — M77.01 MEDIAL EPICONDYLITIS OF ELBOW, RIGHT: Primary | ICD-10-CM

## 2021-09-16 PROCEDURE — 99024 POSTOP FOLLOW-UP VISIT: CPT | Performed by: ORTHOPAEDIC SURGERY

## 2021-09-16 RX ORDER — BENZONATATE 100 MG/1
CAPSULE ORAL
COMMUNITY
Start: 2021-08-23 | End: 2022-06-07

## 2021-09-16 RX ORDER — BROMPHENIRAMINE MALEATE, PSEUDOEPHEDRINE HYDROCHLORIDE, AND DEXTROMETHORPHAN HYDROBROMIDE 2; 30; 10 MG/5ML; MG/5ML; MG/5ML
SYRUP ORAL
COMMUNITY
Start: 2021-08-31 | End: 2022-06-27

## 2021-09-16 RX ORDER — AMOXICILLIN AND CLAVULANATE POTASSIUM 875; 125 MG/1; MG/1
TABLET, FILM COATED ORAL
COMMUNITY
Start: 2021-08-31 | End: 2021-11-16 | Stop reason: ALTCHOICE

## 2021-09-16 NOTE — PROGRESS NOTES
Darnell Schaefer is here for followup after right elbow surgery. Pain is controlled without any medications. . The patient notes improvement in the following symptoms:numbness, sensation. The patient denies fever, wound drainage, increasing redness, pus, increasing pain, increasing swelling. Post op problems reported: none. Objective:         General :    alert, appears stated age and cooperative   Sutures:   Sutures in place and will be removed today. Incision:  healing well, no significant drainage, no dehiscence, no significant erythema   Tenderness:  mild   Flexion ROM:  diminished range of motion   Extension ROM:  diminished range of motion   Effusion:  mild         Assessment:        Encounter Diagnosis   Name Primary?  Medial epicondylitis of elbow, right Yes            Plan:     Sutures removed today. Range of motion and rehabilitation exercises discussed with the patient. HEP  Follow up: 4 weeks.

## 2021-09-22 RX ORDER — GABAPENTIN 600 MG/1
TABLET ORAL
Qty: 270 TABLET | Refills: 1 | Status: SHIPPED
Start: 2021-09-22 | End: 2022-04-26

## 2021-10-13 ENCOUNTER — APPOINTMENT (OUTPATIENT)
Dept: CT IMAGING | Age: 76
End: 2021-10-13
Payer: MEDICARE

## 2021-10-13 ENCOUNTER — HOSPITAL ENCOUNTER (EMERGENCY)
Age: 76
Discharge: HOME | End: 2021-10-13
Attending: EMERGENCY MEDICINE
Payer: MEDICARE

## 2021-10-13 VITALS
RESPIRATION RATE: 16 BRPM | TEMPERATURE: 97.8 F | SYSTOLIC BLOOD PRESSURE: 116 MMHG | DIASTOLIC BLOOD PRESSURE: 56 MMHG | OXYGEN SATURATION: 97 % | BODY MASS INDEX: 33.99 KG/M2 | WEIGHT: 180 LBS | HEIGHT: 61 IN | HEART RATE: 56 BPM

## 2021-10-13 DIAGNOSIS — R25.1 TREMOR: Primary | ICD-10-CM

## 2021-10-13 LAB
ANION GAP SERPL CALCULATED.3IONS-SCNC: 9 MMOL/L (ref 7–16)
BASOPHILS ABSOLUTE: 0.02 E9/L (ref 0–0.2)
BASOPHILS RELATIVE PERCENT: 0.5 % (ref 0–2)
BUN BLDV-MCNC: 26 MG/DL (ref 6–23)
CALCIUM SERPL-MCNC: 9.2 MG/DL (ref 8.6–10.2)
CHLORIDE BLD-SCNC: 102 MMOL/L (ref 98–107)
CO2: 29 MMOL/L (ref 22–29)
CREAT SERPL-MCNC: 1.3 MG/DL (ref 0.5–1)
EOSINOPHILS ABSOLUTE: 0.2 E9/L (ref 0.05–0.5)
EOSINOPHILS RELATIVE PERCENT: 4.9 % (ref 0–6)
GFR AFRICAN AMERICAN: 48
GFR NON-AFRICAN AMERICAN: 40 ML/MIN/1.73
GLUCOSE BLD-MCNC: 159 MG/DL (ref 74–99)
HCT VFR BLD CALC: 37.9 % (ref 34–48)
HEMOGLOBIN: 12.7 G/DL (ref 11.5–15.5)
IMMATURE GRANULOCYTES #: 0.01 E9/L
IMMATURE GRANULOCYTES %: 0.2 % (ref 0–5)
LYMPHOCYTES ABSOLUTE: 1.48 E9/L (ref 1.5–4)
LYMPHOCYTES RELATIVE PERCENT: 36.3 % (ref 20–42)
MCH RBC QN AUTO: 31.5 PG (ref 26–35)
MCHC RBC AUTO-ENTMCNC: 33.5 % (ref 32–34.5)
MCV RBC AUTO: 94 FL (ref 80–99.9)
MONOCYTES ABSOLUTE: 0.36 E9/L (ref 0.1–0.95)
MONOCYTES RELATIVE PERCENT: 8.8 % (ref 2–12)
NEUTROPHILS ABSOLUTE: 2.01 E9/L (ref 1.8–7.3)
NEUTROPHILS RELATIVE PERCENT: 49.3 % (ref 43–80)
PDW BLD-RTO: 13.4 FL (ref 11.5–15)
PLATELET # BLD: 248 E9/L (ref 130–450)
PMV BLD AUTO: 9.8 FL (ref 7–12)
POTASSIUM REFLEX MAGNESIUM: 4.1 MMOL/L (ref 3.5–5)
RBC # BLD: 4.03 E12/L (ref 3.5–5.5)
SODIUM BLD-SCNC: 140 MMOL/L (ref 132–146)
WBC # BLD: 4.1 E9/L (ref 4.5–11.5)

## 2021-10-13 PROCEDURE — 80048 BASIC METABOLIC PNL TOTAL CA: CPT

## 2021-10-13 PROCEDURE — 93005 ELECTROCARDIOGRAM TRACING: CPT | Performed by: EMERGENCY MEDICINE

## 2021-10-13 PROCEDURE — 36415 COLL VENOUS BLD VENIPUNCTURE: CPT

## 2021-10-13 PROCEDURE — 2580000003 HC RX 258: Performed by: EMERGENCY MEDICINE

## 2021-10-13 PROCEDURE — 70450 CT HEAD/BRAIN W/O DYE: CPT

## 2021-10-13 PROCEDURE — 99284 EMERGENCY DEPT VISIT MOD MDM: CPT

## 2021-10-13 PROCEDURE — 85025 COMPLETE CBC W/AUTO DIFF WBC: CPT

## 2021-10-13 RX ORDER — 0.9 % SODIUM CHLORIDE 0.9 %
1000 INTRAVENOUS SOLUTION INTRAVENOUS ONCE
Status: COMPLETED | OUTPATIENT
Start: 2021-10-13 | End: 2021-10-13

## 2021-10-13 RX ADMIN — SODIUM CHLORIDE 1000 ML: 9 INJECTION, SOLUTION INTRAVENOUS at 17:56

## 2021-10-13 ASSESSMENT — ENCOUNTER SYMPTOMS
NAUSEA: 1
DIARRHEA: 1
SHORTNESS OF BREATH: 0
RHINORRHEA: 0
BACK PAIN: 0
COLOR CHANGE: 0
COUGH: 0
ABDOMINAL PAIN: 0
VOMITING: 1

## 2021-10-13 NOTE — ED NOTES
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

## 2021-10-13 NOTE — ED PROVIDER NOTES
Presents to the ED with a complaint of tremoring. She states her tremors cannot be visually seen on the outside but she feels like she has shaking on the inside. She states sometimes it makes her unsteady. No recent falls or injuries. Patient states that this happens a couple times a week and has been ongoing for several months to even more than a year. She has been seen by her PCP for this as well as neurology. She states that neurology was unable to determine what was causing this. She was never started on any medications. A week ago she had an episode where she got sick at [facility]. She had nausea, vomiting, diarrhea, and then believes she passed out. She only felt ill for a day and then since then has been feeling fine. Has had no recurrence of these episodes. Denies any chest pain or shortness of breath. Denies any focal weakness or numbness. Denies any dizziness or lightheadedness. Symptoms started when she woke up. She states they normally last no more than half a day. She came in today because they have not stopped yet. Symptoms are mild to moderate and have been persistent. Review of Systems   Constitutional: Negative for chills, diaphoresis, fatigue and fever. HENT: Negative for congestion and rhinorrhea. Eyes: Negative for visual disturbance. Respiratory: Negative for cough and shortness of breath. Cardiovascular: Negative for chest pain and palpitations. Gastrointestinal: Positive for diarrhea, nausea and vomiting. Negative for abdominal pain. Genitourinary: Negative for difficulty urinating, flank pain, frequency, hematuria and urgency. Musculoskeletal: Negative for back pain, myalgias and neck pain. Skin: Negative for color change and pallor. Neurological: Positive for tremors and syncope. Negative for dizziness, weakness, light-headedness, numbness and headaches. Hematological: Does not bruise/bleed easily.    Psychiatric/Behavioral: Negative for confusion. All other systems reviewed and are negative. Physical Exam  Vitals and nursing note reviewed. Constitutional:       General: She is not in acute distress. Appearance: She is well-developed and normal weight. She is not diaphoretic. HENT:      Head: Normocephalic and atraumatic. Eyes:      Conjunctiva/sclera: Conjunctivae normal.   Cardiovascular:      Rate and Rhythm: Regular rhythm. Bradycardia present. Heart sounds: Normal heart sounds. No murmur heard. Pulmonary:      Effort: Pulmonary effort is normal. No respiratory distress. Breath sounds: Normal breath sounds. No wheezing or rales. Abdominal:      General: Bowel sounds are normal.      Palpations: Abdomen is soft. Tenderness: There is no abdominal tenderness. There is no guarding or rebound. Musculoskeletal:      Cervical back: Normal range of motion and neck supple. Skin:     General: Skin is warm and dry. Coloration: Skin is not pale. Neurological:      Mental Status: She is alert and oriented to person, place, and time. Comments: Patient has no visual tremors. No resting tremor or tremor with motor movement. Refer to NIH stroke scale below. Procedures     MDM   Patient presented to the ED with the sensation of tremoring. On exam there was no visible tremors present. Patient has been seen by her PCP as well as neurology without a diagnosis. Labs were assessed. CBC showed no evidence of anemia or leukocytosis. BMP showed no electrolyte abnormalities or evidence of significant renal insufficiency. CT of the head was obtained as well which showed no acute intracranial abnormalities. Patient is comfortably discharged home will follow up with her PCP. NIH Stroke Scale/Score at time of initial evaluation:  1519  1A: Level of Consciousness 0 - alert; keenly responsive   1B: Ask Month and Age 0 - answers both questions correctly   1C:  Tell Patient To Open and Close Eyes, then Hand  Squeeze 0 - performs both tasks correctly   2: Test Horizontal Extraocular Movements 0 - normal   3: Test Visual Fields 0 - no visual loss   4: Test Facial Palsy 0 - normal symmetric movement   5A: Test Left Arm Motor Drift 0 - no drift, limb holds 90 (or 45) degrees for full 10 seconds   5B: Test Right Arm Motor Drift 0 - no drift, limb holds 90 (or 45) degrees for full 10 seconds   6A: Test Left Leg Motor Drift 0 - no drift; leg holds 30 degree position for full 5 seconds   6B: Test Right Leg Motor Drift 0 - no drift; leg holds 30 degree position for full 5 seconds   7: Test Limb Ataxia   (FNF/Heel-Shin) 0 - absent   8: Test Sensation 0 - normal; no sensory loss   9: Test Language/Aphasia 0 - no aphasia, normal   10: Test Dysarthria 0 - normal   11: Test Extinction/Inattention 0 - no abnormality   Total 0       EKG Interpretation    Interpreted by emergency department physician    Rhythm: sinus bradycardia  Rate: 58  Axis: normal  Ectopy: none  Conduction: Low QRS voltage  ST Segments: no acute change  T Waves: no acute change  Q Waves: none    Clinical Impression: no acute changes    Enrique Mccloud DO      ED Course as of Oct 13 1659   Wed Oct 13, 2021   1656 Patient is resting in bed in no distress. Discussed results of labs and imaging with her. She states she feels a lot better but is still having the tremoring inside. She is comfortably discharged home and will follow up with her PCP. She understands if she has worsening symptoms or new concerns that she can return to the ED for further evaluation.     [MS]      ED Course User Index  [MS] Enrique Mccloud DO       --------------------------------------------- PAST HISTORY ---------------------------------------------  Past Medical History:  has a past medical history of Arthritis, CAD (coronary artery disease), Current use of long term anticoagulation, Fibromyalgia, GERD (gastroesophageal reflux disease), History of cardiovascular stress test, History of MI (myocardial infarction), Hx of blood clots, Hyperlipidemia, Hypertension, Hypothyroidism, Intractable low back pain, Non-insulin dependent type 2 diabetes mellitus ([facility] [address] NSTEMI (non-ST elevated myocardial infarction) ([facility] [address] and Syncope and collapse. Past Surgical History:  has a past surgical history that includes Salivary gland surgery; Rotator cuff repair; Cholecystectomy; Appendectomy; orthopedic surgery; Hysterectomy; Coccyx removal; Bladder surgery; Hemorrhoid surgery; Coronary angioplasty with stent; Coronary artery bypass graft; Tonsillectomy; Cataract removal with implant (Right, 7 7 15); Nerve Block (Left, 11 18 2015); Coronary angioplasty with stent (11/19/2015); Cataract removal with implant (Left, 4/26/2016); lumbar fusion (6/7/2016); Nerve Block (Right, 07/17/2017); Nerve Block (Right, 09/06/2017); Cardiac catheterization (11/15/2017); Colonoscopy; Endoscopy, colon, diagnostic; vascular surgery; pr arthrs kne surg w/meniscectomy med/lat w/shvg (Right, 6/6/2018); Nerve Block (Bilateral, 10/24/2018); pr inj dx/ther agnt paravert facet joint, lumbar/sac, 1st level (Bilateral, 10/24/2018); Nerve Block (Left, 08/22/2019); arthrocentesis (Left, 8/22/2019); other surgical history (N/A, 11/04/2019); REPLACE / REVISE VAGAL NERVE STIMULATOR (N/A, 11/4/2019); Spinal Cord Stimulator Surgery (06/15/2020); Nerve Surgery (N/A, 6/15/2020); and Elbow fracture surgery (Right, 7/30/2021). Social History:  reports that she has never smoked. She has never used smokeless tobacco. She reports that she does not drink alcohol and does not use drugs. Family History: family history includes ADHD in an other family member; COPD in her brother; Cancer in her brother; Cancer (age of onset: [de-identified]) in her father; Diabetes in her sister and another family member; Heart Attack (age of onset: 54) in her mother; Heart Disease in her brother and sister; Heart Disease (age of onset: 58) in an other family member. The patients home medications have been reviewed.     Allergies: Iodine, Lipitor, Statins, Atorvastatin, Cymbalta [duloxetine hcl], Cipro xr, Diazepam, Duloxetine, Erythromycin, Indomethacin, Ketorolac, Ketorolac tromethamine, Lodine [etodolac], Oscal 500-200 d-3 [oyster shell calcium-d], Paxil [paroxetine hcl], Ropinirole, Tromethamine, Trulicity [dulaglutide], Zithromax [azithromycin], and Requip [ropinirole hcl]    -------------------------------------------------- RESULTS -------------------------------------------------  Labs:  Results for orders placed or performed during the hospital encounter of 10/13/21   CBC Auto Differential   Result Value Ref Range    WBC 4.1 (L) 4.5 - 11.5 E9/L    RBC 4.03 3.50 - 5.50 E12/L    Hemoglobin 12.7 11.5 - 15.5 g/dL    Hematocrit 37.9 34.0 - 48.0 %    MCV 94.0 80.0 - 99.9 fL    MCH 31.5 26.0 - 35.0 pg    MCHC 33.5 32.0 - 34.5 %    RDW 13.4 11.5 - 15.0 fL    Platelets 696 840 - 865 E9/L    MPV 9.8 7.0 - 12.0 fL    Neutrophils % 49.3 43.0 - 80.0 %    Immature Granulocytes % 0.2 0.0 - 5.0 %    Lymphocytes % 36.3 20.0 - 42.0 %    Monocytes % 8.8 2.0 - 12.0 %    Eosinophils % 4.9 0.0 - 6.0 %    Basophils % 0.5 0.0 - 2.0 %    Neutrophils Absolute 2.01 1.80 - 7.30 E9/L    Immature Granulocytes # 0.01 E9/L    Lymphocytes Absolute 1.48 (L) 1.50 - 4.00 E9/L    Monocytes Absolute 0.36 0.10 - 0.95 E9/L    Eosinophils Absolute 0.20 0.05 - 0.50 E9/L    Basophils Absolute 0.02 0.00 - 0.20 G6/C   Basic Metabolic Panel w/ Reflex to MG   Result Value Ref Range    Sodium 140 132 - 146 mmol/L    Potassium reflex Magnesium 4.1 3.5 - 5.0 mmol/L    Chloride 102 98 - 107 mmol/L    CO2 29 22 - 29 mmol/L    Anion Gap 9 7 - 16 mmol/L    Glucose 159 (H) 74 - 99 mg/dL    BUN 26 (H) 6 - 23 mg/dL    CREATININE 1.3 (H) 0.5 - 1.0 mg/dL    GFR Non-African American 40 >=60 mL/min/1.73    GFR African American 48     Calcium 9.2 8.6 - 10.2 mg/dL   EKG 12 Lead   Result Value Ref Range    Ventricular Rate 58 BPM    Atrial Rate 58 BPM    P-R Interval 204 ms    QRS Duration 70 ms    Q-T Interval 446 ms    QTc Calculation (Bazett) 437 ms    P Axis -4 degrees    R Axis 11 degrees    T Axis 13 degrees       Radiology:  CT HEAD WO CONTRAST   Final Result   No acute intracranial abnormality. Specifically, there is no acute   intracranial hemorrhage.             ------------------------- NURSING NOTES AND VITALS REVIEWED ---------------------------  Date / Time Roomed:  10/13/2021  3:05 PM  ED Bed Assignment:  [place] [id number]    The nursing notes within the ED encounter and vital signs as below have been reviewed. BP (!) 116/56   Pulse 56   Temp 97.8 °F (36.6 °C)   Resp 16   Ht 5' 1\" (1.549 m)   Wt 180 lb (81.6 kg)   SpO2 97%   BMI 34.01 kg/m²   Oxygen Saturation Interpretation: Normal      ------------------------------------------ PROGRESS NOTES ------------------------------------------  I have spoken with the patient and discussed todays results, in addition to providing specific details for the plan of care and counseling regarding the diagnosis and prognosis. Their questions are answered at this time and they are agreeable with the plan. I discussed at length with them reasons for immediate return here for re evaluation. They will followup with primary care by calling their office tomorrow. --------------------------------- ADDITIONAL PROVIDER NOTES ---------------------------------  At this time the patient is without objective evidence of an acute process requiring hospitalization or inpatient management. They have remained hemodynamically stable throughout their entire ED visit and are stable for discharge with outpatient follow-up. The plan has been discussed in detail and they are aware of the specific conditions for emergent return, as well as the importance of follow-up. New Prescriptions    No medications on file       Diagnosis:  1.  Tremor        Disposition:  Patient's disposition: Discharge to home  Patient's condition is stable.          Susan Juarez,   10/13/21 1420

## 2021-10-14 LAB
EKG ATRIAL RATE: 58 BPM
EKG P AXIS: -4 DEGREES
EKG P-R INTERVAL: 204 MS
EKG Q-T INTERVAL: 446 MS
EKG QRS DURATION: 70 MS
EKG QTC CALCULATION (BAZETT): 437 MS
EKG R AXIS: 11 DEGREES
EKG T AXIS: 13 DEGREES
EKG VENTRICULAR RATE: 58 BPM

## 2021-10-27 ENCOUNTER — TELEPHONE (OUTPATIENT)
Dept: ORTHOPEDIC SURGERY | Age: 76
End: 2021-10-27

## 2021-10-27 DIAGNOSIS — M67.40 GANGLION CYST: Primary | ICD-10-CM

## 2021-11-01 ENCOUNTER — OFFICE VISIT (OUTPATIENT)
Dept: ORTHOPEDIC SURGERY | Age: 76
End: 2021-11-01
Payer: MEDICARE

## 2021-11-01 VITALS — RESPIRATION RATE: 18 BRPM | WEIGHT: 188 LBS | BODY MASS INDEX: 35.5 KG/M2 | HEIGHT: 61 IN

## 2021-11-01 DIAGNOSIS — M79.642 LEFT HAND PAIN: ICD-10-CM

## 2021-11-01 DIAGNOSIS — M67.432 GANGLION CYST OF VOLAR ASPECT OF LEFT WRIST: ICD-10-CM

## 2021-11-01 DIAGNOSIS — M18.12 ARTHRITIS OF CARPOMETACARPAL (CMC) JOINT OF LEFT THUMB: ICD-10-CM

## 2021-11-01 DIAGNOSIS — M67.40 GANGLION CYST: Primary | ICD-10-CM

## 2021-11-01 PROCEDURE — 1090F PRES/ABSN URINE INCON ASSESS: CPT | Performed by: ORTHOPAEDIC SURGERY

## 2021-11-01 PROCEDURE — G8427 DOCREV CUR MEDS BY ELIG CLIN: HCPCS | Performed by: ORTHOPAEDIC SURGERY

## 2021-11-01 PROCEDURE — 1123F ACP DISCUSS/DSCN MKR DOCD: CPT | Performed by: ORTHOPAEDIC SURGERY

## 2021-11-01 PROCEDURE — 4040F PNEUMOC VAC/ADMIN/RCVD: CPT | Performed by: ORTHOPAEDIC SURGERY

## 2021-11-01 PROCEDURE — G8400 PT W/DXA NO RESULTS DOC: HCPCS | Performed by: ORTHOPAEDIC SURGERY

## 2021-11-01 PROCEDURE — G8417 CALC BMI ABV UP PARAM F/U: HCPCS | Performed by: ORTHOPAEDIC SURGERY

## 2021-11-01 PROCEDURE — 1036F TOBACCO NON-USER: CPT | Performed by: ORTHOPAEDIC SURGERY

## 2021-11-01 PROCEDURE — 99214 OFFICE O/P EST MOD 30 MIN: CPT | Performed by: ORTHOPAEDIC SURGERY

## 2021-11-01 PROCEDURE — G8484 FLU IMMUNIZE NO ADMIN: HCPCS | Performed by: ORTHOPAEDIC SURGERY

## 2021-11-01 PROCEDURE — 20600 DRAIN/INJ JOINT/BURSA W/O US: CPT | Performed by: ORTHOPAEDIC SURGERY

## 2021-11-01 PROCEDURE — 3017F COLORECTAL CA SCREEN DOC REV: CPT | Performed by: ORTHOPAEDIC SURGERY

## 2021-11-01 RX ORDER — BETAMETHASONE SODIUM PHOSPHATE AND BETAMETHASONE ACETATE 3; 3 MG/ML; MG/ML
6 INJECTION, SUSPENSION INTRA-ARTICULAR; INTRALESIONAL; INTRAMUSCULAR; SOFT TISSUE ONCE
Status: COMPLETED | OUTPATIENT
Start: 2021-11-01 | End: 2021-11-01

## 2021-11-01 RX ADMIN — BETAMETHASONE SODIUM PHOSPHATE AND BETAMETHASONE ACETATE 6 MG: 3; 3 INJECTION, SUSPENSION INTRA-ARTICULAR; INTRALESIONAL; INTRAMUSCULAR; SOFT TISSUE at 17:45

## 2021-11-01 NOTE — PROGRESS NOTES
Department of Orthopedic Surgery  History and Physical      CHIEF COMPLAINT: Left wrist bump with pain    HISTORY OF PRESENT ILLNESS:                The patient is a 78 y.o. female who presents with a ganglion cyst of the left wrist which was been confirmed under ultrasound with associated pain at the base of the thumb. She has had this cyst for a lengthy time. It was never been painful. Beginning a couple months ago she began having pain to the base of her wrist that would radiate to the dorsal side. Denies any preceding injury or inciting event. She does note some occasional numbness to select fingers when sleeping, however, she cannot recall which fingers.     Past Medical History:        Diagnosis Date    Arthritis     CAD (coronary artery disease)     Current use of long term anticoagulation     Plavix    Fibromyalgia     GERD (gastroesophageal reflux disease)     History of cardiovascular stress test 02/17/2014    lexiscan, also 4/21/2015    History of MI (myocardial infarction)     x4; most recent 2016    Hx of blood clots     DVT leg  (pt states she was traveling)    Hyperlipidemia     Hypertension     Hypothyroidism     Intractable low back pain 06/19/2016    Non-insulin dependent type 2 diabetes mellitus ([facility] [address]     NSTEMI (non-ST elevated myocardial infarction) ([address] 11/19/2015    Syncope and collapse 10/2020    x2  states was dt hypotension     Past Surgical History:        Procedure Laterality Date    APPENDECTOMY      ARTHROCENTESIS Left 8/22/2019    LEFT HIP CORTICOSTERIOD INJECTION UNDER FLUOROSCOPIC GUIDANCE performed by Zbigniew Rios DO at [address]  11/15/2017    Dr Robe Cerna Right 7 7 15    CATARACT REMOVAL WITH IMPLANT Left 4/26/2016    CHOLECYSTECTOMY      COCCYX REMOVAL      COLONOSCOPY      CORONARY ANGIOPLASTY WITH [address] started needing cardiac care; 5 stents total    CORONARY ANGIOPLASTY WITH STENT PLACEMENT  11/19/2015    Dr Bhaskar Pearce stent 3x18 prox Cx   Harlen Muta Dr. Kellee Bui; 3 bypass grafts    ELBOW FRACTURE SURGERY Right 7/30/2021    RIGHT ELBOW EPICONDYLECTOMY, MEDIAL EPICONDYLE  WITH  PLASMA  RICH PLATELET  INJECTION ([address]) performed by Crystal Bermudez DO at [facility], COLON, DIAGNOSTIC      HEMORRHOID SURGERY      HYSTERECTOMY      LUMBAR FUSION  6/7/2016    Dr. Jamaal Ravi Left 11 18 2015    left lumbar transforaminal nerve block l4-5 l5-s1    NERVE BLOCK Right 07/17/2017    lumbar transforaminal #1    NERVE BLOCK Right 09/06/2017    right knee injection#1    NERVE BLOCK Bilateral 10/24/2018    lumbar facet block     NERVE BLOCK Left 08/22/2019    hip     NERVE SURGERY N/A 6/15/2020    SPINAL CORD STIMULATOR IMPLANT WITH NERVO performed by Niki Hernandez MD at [address]      bilateral feet    OTHER SURGICAL HISTORY N/A 11/04/2019    spinal cord stimulator trial    HI ARTHRS KNE SURG W/MENISCECTOMY MED/LAT W/SHVG Right 6/6/2018    RIGHT KNEE ARTHROSCOPY MEDIAL AND LATERAL MENISECTOMY SYNOVECTOMY AND CHONDROPLASTY performed by Crystal Bermudez DO at [address] DX/THER AGNT PARAVERT FACET JOINT, LUMBAR/SAC, 1ST LEVEL Bilateral 10/24/2018    BILATERAL LUMBAR FACET BLOCK L1-2 ,L3-4 WITH X-RAY performed by Bette Weiss DO at [address] N/A 11/4/2019    SPINAL CORD STIMULATOR TRIAL WITH NEVRO performed by Niki Hernandez MD at [address] [facility] SURGERY  06/15/2020    nevro     TONSILLECTOMY      VASCULAR SURGERY      laser on leaky veins     Current Medications:   No current facility-administered medications for this visit.   Allergies:  Iodine, Lipitor, Statins, Atorvastatin, Cymbalta [duloxetine hcl], Cipro xr, Diazepam, Duloxetine, Erythromycin, Indomethacin, Ketorolac, Ketorolac tromethamine, Lodine [etodolac], Oscal 500-200 d-3 [oyster shell calcium-d], Paxil [paroxetine hcl], Ropinirole, Tromethamine, Trulicity [dulaglutide], Zithromax [azithromycin], and Requip [ropinirole hcl]    Social History:   TOBACCO:   reports that she has never smoked. She has never used smokeless tobacco.  ETOH:   reports no history of alcohol use. DRUGS:   reports no history of drug use. ACTIVITIES OF DAILY LIVING:    OCCUPATION:    Family History:       Problem Relation Age of Onset    Cancer Father [de-identified]        bladdder    Cancer Brother     COPD Brother     Heart Attack Mother 54    Heart Disease Sister     Diabetes Sister     Heart Disease Brother     Heart Disease Other 58             Diabetes Other     ADHD Other        REVIEW OF SYSTEMS:  CONSTITUTIONAL:  negative  HEENT:  negative  RESPIRATORY:  negative  CARDIOVASCULAR:  negative  MUSCULOSKELETAL:  positive for pain at the base of the left thumb  NEUROLOGICAL:  negative  BEHAVIOR/PSYCH:  negative    PHYSICAL EXAM:    VITALS:  Resp 18   Ht 5' 1\" (1.549 m)   Wt 188 lb (85.3 kg)   BMI 35.52 kg/m²   CONSTITUTIONAL:  awake, alert, cooperative, no apparent distress, and appears stated age  EYES:  sclera clear, conjunctiva normal  ENT:  Normocephalic, without obvious abnormality, atraumatic  NECK:  Supple, symmetrical, trachea midline  LUNGS:  CTA  CARDIOVASCULAR:  2+ radial pulses, extremities warm and well perfused  ABDOMEN: Nondistended  CHEST:  Atraumatic   GENITAL/URINARY:  deferred  NEUROLOGIC:  Awake, alert, oriented to name, place and time. Cranial nerves II-XII are grossly intact. Motor is 5 out of 5 bilaterally.   Sensory is intact.  gait is normal.  MUSCULOSKELETAL:  Transillumination of the cyst  Finkelsteins positive  Thumb CMC grind positive  25 degrees hyperextension the MCPJ  Christopher's test negative  Tinel's:   Carpal tunnel negative   Cubital tunnel negative  Pulses:    Radial 2+               DATA:    CBC:   Lab Results   Component Value Date    WBC 4.1 10/13/2021    RBC 4.03 10/13/2021    HGB 12.7 10/13/2021    HCT 37.9 10/13/2021    MCV 94.0 10/13/2021    MCH 31.5 10/13/2021    MCHC 33.5 10/13/2021    RDW 13.4 10/13/2021     10/13/2021    MPV 9.8 10/13/2021     PT/INR:    Lab Results   Component Value Date    PROTIME 13.0 10/02/2020    PROTIME 12.4 08/04/2011    INR 1.2 10/02/2020       Radiology Review: 3 views of the left wrist taken and reviewed with the patient today, 111/1/21 demonstrating degenerative changes at the [facility] joint  Impression: [facility] joint arthritis    IMPRESSION:  · Left thumb CMC joint arthritis  · Left ganglion cyst  · CAD, fibromyalgia, history of MI, hypertension  · GERD  · History of DVT on anticoagulation    PLAN:  We injected the left thumb CMC joint. Patient will be out of town until March, we will see her at follow-up at that point. We will also provide her with information on how to obtain a brace for them. She will be given prescription for topical Voltaren. Procedure Note Wrist Thumb CMC Cortisone Injection    The left wrist thumb CMC joint was identified as the injection site. The risk and benefits of a cortisone injection were explained and the patient consented to the injection. Under sterile conditions, the wrist was injected with a mixture of 1 mL of 1% Lidocaine and 1 mL of 6 mg/mL Betamethasone without complication. A sterile bandage was applied    I have seen and evaluated the patient and agree with the above assessment and plan on today's visit. I have performed the key components of the history and physical examination with significant findings of today's findings were explained the patient. Most of her pain is arising from the thumb basal joint. She also has a adjacent ganglion cyst which is mildly symptomatic.   Treatment options for ganglion cyst as well as thumb basal joint arthritis were explained in detail. She consented to an injection which was provided under sterile conditions today in the office. Discussed surgical options in form of trapeziectomy with ligament reconstruction and tendon interposition arthroplasty with ganglion cyst excision as needed. Postoperative course and expectations were outlined. All questions answered. . I concur with the findings and plan as documented.     Sheeba Sutherland MD  11/1/2021

## 2021-11-16 ENCOUNTER — OFFICE VISIT (OUTPATIENT)
Dept: PAIN MANAGEMENT | Age: 76
End: 2021-11-16
Payer: MEDICARE

## 2021-11-16 ENCOUNTER — OFFICE VISIT (OUTPATIENT)
Dept: ORTHOPEDIC SURGERY | Age: 76
End: 2021-11-16
Payer: MEDICARE

## 2021-11-16 VITALS
DIASTOLIC BLOOD PRESSURE: 60 MMHG | HEART RATE: 71 BPM | RESPIRATION RATE: 16 BRPM | OXYGEN SATURATION: 98 % | WEIGHT: 188 LBS | HEIGHT: 61 IN | SYSTOLIC BLOOD PRESSURE: 104 MMHG | BODY MASS INDEX: 35.5 KG/M2 | TEMPERATURE: 97.1 F

## 2021-11-16 VITALS — TEMPERATURE: 98 F | BODY MASS INDEX: 35.5 KG/M2 | HEIGHT: 61 IN | WEIGHT: 188 LBS

## 2021-11-16 DIAGNOSIS — M54.16 LUMBAR RADICULOPATHY: Chronic | ICD-10-CM

## 2021-11-16 DIAGNOSIS — M16.0 PRIMARY OSTEOARTHRITIS OF BOTH HIPS: ICD-10-CM

## 2021-11-16 DIAGNOSIS — M47.816 LUMBAR SPONDYLOSIS: ICD-10-CM

## 2021-11-16 DIAGNOSIS — M51.9 LUMBAR DISC DISORDER: ICD-10-CM

## 2021-11-16 DIAGNOSIS — M96.1 LUMBAR POSTLAMINECTOMY SYNDROME: Primary | ICD-10-CM

## 2021-11-16 DIAGNOSIS — M47.814 THORACIC SPONDYLOSIS: ICD-10-CM

## 2021-11-16 DIAGNOSIS — M51.36 DDD (DEGENERATIVE DISC DISEASE), LUMBAR: ICD-10-CM

## 2021-11-16 DIAGNOSIS — M77.01 MEDIAL EPICONDYLITIS OF ELBOW, RIGHT: Primary | ICD-10-CM

## 2021-11-16 DIAGNOSIS — G89.4 CHRONIC PAIN SYNDROME: ICD-10-CM

## 2021-11-16 DIAGNOSIS — M47.816 LUMBAR FACET ARTHROPATHY: ICD-10-CM

## 2021-11-16 PROCEDURE — G8400 PT W/DXA NO RESULTS DOC: HCPCS | Performed by: ORTHOPAEDIC SURGERY

## 2021-11-16 PROCEDURE — G8427 DOCREV CUR MEDS BY ELIG CLIN: HCPCS | Performed by: ORTHOPAEDIC SURGERY

## 2021-11-16 PROCEDURE — 1036F TOBACCO NON-USER: CPT | Performed by: ORTHOPAEDIC SURGERY

## 2021-11-16 PROCEDURE — 99214 OFFICE O/P EST MOD 30 MIN: CPT | Performed by: PAIN MEDICINE

## 2021-11-16 PROCEDURE — 3017F COLORECTAL CA SCREEN DOC REV: CPT | Performed by: PAIN MEDICINE

## 2021-11-16 PROCEDURE — 1090F PRES/ABSN URINE INCON ASSESS: CPT | Performed by: ORTHOPAEDIC SURGERY

## 2021-11-16 PROCEDURE — G8484 FLU IMMUNIZE NO ADMIN: HCPCS | Performed by: PAIN MEDICINE

## 2021-11-16 PROCEDURE — G8400 PT W/DXA NO RESULTS DOC: HCPCS | Performed by: PAIN MEDICINE

## 2021-11-16 PROCEDURE — 1123F ACP DISCUSS/DSCN MKR DOCD: CPT | Performed by: PAIN MEDICINE

## 2021-11-16 PROCEDURE — 1036F TOBACCO NON-USER: CPT | Performed by: PAIN MEDICINE

## 2021-11-16 PROCEDURE — 4040F PNEUMOC VAC/ADMIN/RCVD: CPT | Performed by: ORTHOPAEDIC SURGERY

## 2021-11-16 PROCEDURE — 99212 OFFICE O/P EST SF 10 MIN: CPT | Performed by: ORTHOPAEDIC SURGERY

## 2021-11-16 PROCEDURE — G8427 DOCREV CUR MEDS BY ELIG CLIN: HCPCS | Performed by: PAIN MEDICINE

## 2021-11-16 PROCEDURE — G8484 FLU IMMUNIZE NO ADMIN: HCPCS | Performed by: ORTHOPAEDIC SURGERY

## 2021-11-16 PROCEDURE — 3017F COLORECTAL CA SCREEN DOC REV: CPT | Performed by: ORTHOPAEDIC SURGERY

## 2021-11-16 PROCEDURE — G8417 CALC BMI ABV UP PARAM F/U: HCPCS | Performed by: ORTHOPAEDIC SURGERY

## 2021-11-16 PROCEDURE — G8417 CALC BMI ABV UP PARAM F/U: HCPCS | Performed by: PAIN MEDICINE

## 2021-11-16 PROCEDURE — 99213 OFFICE O/P EST LOW 20 MIN: CPT | Performed by: PAIN MEDICINE

## 2021-11-16 PROCEDURE — 4040F PNEUMOC VAC/ADMIN/RCVD: CPT | Performed by: PAIN MEDICINE

## 2021-11-16 PROCEDURE — 1123F ACP DISCUSS/DSCN MKR DOCD: CPT | Performed by: ORTHOPAEDIC SURGERY

## 2021-11-16 PROCEDURE — 1090F PRES/ABSN URINE INCON ASSESS: CPT | Performed by: PAIN MEDICINE

## 2021-11-16 RX ORDER — BACLOFEN 5 MG/1
5 TABLET ORAL 2 TIMES DAILY
Qty: 60 TABLET | Refills: 0 | Status: SHIPPED | OUTPATIENT
Start: 2021-11-16 | End: 2021-12-16

## 2021-11-16 RX ORDER — HYDROCODONE BITARTRATE AND ACETAMINOPHEN 5; 325 MG/1; MG/1
1 TABLET ORAL DAILY PRN
Qty: 30 TABLET | Refills: 0 | Status: SHIPPED
Start: 2021-11-16 | End: 2021-11-17 | Stop reason: SDUPTHER

## 2021-11-16 RX ORDER — NITROFURANTOIN 25; 75 MG/1; MG/1
CAPSULE ORAL
COMMUNITY
Start: 2021-11-12 | End: 2022-06-07

## 2021-11-16 NOTE — PROGRESS NOTES
Subjective:     Anayeli Pastrana is here for followup after right elbow microtenotomy with prp injection surgery. The patient is not having any pain. . The patient notes improvement in the following symptoms:strength, numbness, pain, sensation. The patient denies fever, wound drainage, increasing redness, pus, increasing pain, increasing swelling. Post op problems reported: none. Objective:      General :    alert, appears stated age and cooperative   Sutures:   out. Incision:  healing well, no significant drainage, no dehiscence, no significant erythema   Tenderness:  none   Flexion ROM:  full range of motion   Extension ROM:  full range of motion   Effusion:  none        Assessment:     Encounter Diagnosis   Name Primary?     Medial epicondylitis of elbow, right Yes       Plan:   Doing well  Hep  Fu prn

## 2021-11-16 NOTE — PROGRESS NOTES
[address] [place] [address], [address] [place] [address] [place]  [phone]    Follow up Note      Bimal Corbin     Date of Visit:  11/16/2021    CC:  Patient presents for follow up   Chief Complaint   Patient presents with    Follow-up    Back Pain     waist line to battery     HPI:    Follow up on her low back pain with no acute issues. Change in quality of symptoms:no. Medication side effects:none. Recent diagnostic testing:none  Recent interventional procedures:none    Imaging:   Lumbar spine CT 07/2019  Remote postsurgical changes and multilevel degenerative changes as   described. Mild levoscoliosis. Moderate spinal canal stenosis at the   L3-L4 interspace level. No evidence of herniated nucleus pulposus. No   evidence of any level with critical spinal stenosis. No evidence of   remote postop complication.      Left hip Xray 05/2019  Stable hip joint arthritis. No acute findings.     Previous treatments: Surgery L4-5-S1 bilateral laminectomies 2016 with fusion and medications. .      Potential Aberrant Drug-Related Behavior:  None    Urine Drug Screening:  Saliva screen 03/2021 showed no narcotics which is consistent     OARRS report:  11/2021 consistent      Opioid agreement:  Date started:04/2021  Renewal date:04/2022    Past Medical History:   Diagnosis Date    Arthritis     CAD (coronary artery disease)     Current use of long term anticoagulation     Plavix    Fibromyalgia     GERD (gastroesophageal reflux disease)     History of cardiovascular stress test 02/17/2014    lexiscan, also 4/21/2015    History of MI (myocardial infarction)     x4; most recent 2016    Hx of blood clots     DVT leg  (pt states she was traveling)    Hyperlipidemia     Hypertension     Hypothyroidism     Intractable low back pain 06/19/2016    Non-insulin dependent type 2 diabetes mellitus ([facility] [address]     NSTEMI (non-ST elevated myocardial infarction) ([facility] [address] 11/19/2015    Syncope and collapse 10/2020    x2  states was dt hypotension     Past Surgical History:   Procedure Laterality Date    APPENDECTOMY      ARTHROCENTESIS Left 8/22/2019    LEFT HIP CORTICOSTERIOD INJECTION UNDER FLUOROSCOPIC GUIDANCE performed by Kathy Guillen DO at [address] [facility]  11/15/2017    Dr Lance Roger Right 7 7 15    CATARACT REMOVAL WITH IMPLANT Left 4/26/2016    CHOLECYSTECTOMY      COCCYX REMOVAL      COLONOSCOPY      CORONARY ANGIOPLASTY WITH STENT PLACEMENT      1996 started needing cardiac care; 5 stents total    CORONARY ANGIOPLASTY WITH STENT PLACEMENT  11/19/2015    Dr Paola Altamirano stent 3x18 prox Cx   Janace Roche Dr. Zain Kramer; 3 bypass grafts    ELBOW FRACTURE SURGERY Right 7/30/2021    RIGHT ELBOW EPICONDYLECTOMY, MEDIAL EPICONDYLE  WITH  PLASMA  RICH PLATELET  INJECTION ([address]) performed by Michelle Rivera DO at [place] [address], COLON, DIAGNOSTIC      HEMORRHOID SURGERY      HYSTERECTOMY      LUMBAR FUSION  6/7/2016    Dr. Karrie Osgood Left 11 18 2015    left lumbar transforaminal nerve block l4-5 l5-s1    NERVE BLOCK Right 07/17/2017    lumbar transforaminal #1    NERVE BLOCK Right 09/06/2017    right knee injection#1    NERVE BLOCK Bilateral 10/24/2018    lumbar facet block     NERVE BLOCK Left 08/22/2019    hip     NERVE SURGERY N/A 6/15/2020    SPINAL CORD STIMULATOR IMPLANT WITH NERVO performed by Arcadio Ferrera MD at [address] [facility] [address] [facility] [address]      bilateral feet    OTHER SURGICAL HISTORY N/A 11/04/2019    spinal cord stimulator trial    AL ARTHRS KNE SURG W/MENISCECTOMY MED/LAT W/SHVG Right 6/6/2018    RIGHT KNEE ARTHROSCOPY MEDIAL AND LATERAL MENISECTOMY SYNOVECTOMY AND CHONDROPLASTY performed by Michelle Rivera DO at [facility] [address] DX/THER AGNT PARAVERT FACET JOINT, LUMBAR/SAC, 1ST LEVEL Bilateral 10/24/2018 fridays 4/19/21  Yes Historical Provider, MD   Coenzyme Q10 (COQ10) 200 MG CAPS Take by mouth daily    Yes Historical Provider, MD   potassium chloride (MICRO-K) 10 MEQ extended release capsule Take 10 mEq by mouth daily    Yes Historical Provider, MD   sennosides-docusate sodium (SENOKOT-S) 8.6-50 MG tablet Take 2 tablets by mouth daily 10/8/20  Yes Quyen Holly MD   ferrous sulfate (IRON 325) 325 (65 Fe) MG tablet Take 325 mg by mouth daily (with breakfast)   Yes Historical Provider, MD   metoprolol succinate (TOPROL XL) 25 MG extended release tablet Take 25 mg by mouth daily am   Yes Historical Provider, MD   citalopram (CELEXA) 20 MG tablet Take 20 mg by mouth daily    Yes Historical Provider, MD   dapagliflozin (FARXIGA) 10 MG tablet Take 10 mg by mouth daily    Yes Historical Provider, MD   aspirin 81 MG tablet Take 81 mg by mouth daily Do not hold per Dr Halina Blanco   Yes Historical Provider, MD   omeprazole (PRILOSEC) 20 MG delayed release capsule Take 20 mg by mouth daily am   Yes Historical Provider, MD   furosemide (LASIX) 20 MG tablet Take 20 mg by mouth daily   Yes Historical Provider, MD   clopidogrel (PLAVIX) 75 MG tablet Take 75 mg by mouth daily Indications: Advised to discontinue patient would prefer to follow-up with her cardiologist Stopped 2 days pre op per Dr Salinas Buckley instructions   Yes Historical Provider, MD   nitroGLYCERIN (NITROSTAT) 0.4 MG SL tablet Place 0.4 mg under the tongue every 5 minutes as needed for Chest pain up to max of 3 total doses. If no relief after 1 dose, call 911. Yes Historical Provider, MD   vitamin D 1000 UNITS CAPS Take 1,000 Units by mouth every evening    Yes Historical Provider, MD   ezetimibe (ZETIA) 10 MG tablet Take 10 mg by mouth every evening    Yes Historical Provider, MD   Multiple Vitamins-Minerals (CENTRUM SILVER) TABS Take 1 tablet by mouth daily    Yes Historical Provider, MD   levothyroxine (SYNTHROID) 100 MCG tablet Take 100 mcg by mouth daily. Yes Historical Provider, MD     Allergies   Allergen Reactions    Iodine Anaphylaxis     IVP DYE    Lipitor Other (See Comments)     Severe leg pain    Statins Other (See Comments)     Muscles go weak and she falls    Atorvastatin      Makes me sick severe leg cramps    Cymbalta [Duloxetine Hcl] Nausea Only    Cipro Xr      Reaction unknown    Diazepam     Duloxetine Nausea Only    Erythromycin      Hurt my stomach    Indomethacin     Ketorolac     Ketorolac Tromethamine Itching    Lodine [Etodolac]     Oscal 500-200 D-3 [Oyster Shell Calcium-D]     Paxil [Paroxetine Hcl]     Ropinirole     Tromethamine     Trulicity [Dulaglutide] Swelling    Zithromax [Azithromycin]     Requip [Ropinirole Hcl] Nausea And Vomiting     Social History     Socioeconomic History    Marital status:      Spouse name: Not on file    Number of children: Not on file    Years of education: Not on file    Highest education level: Not on file   Occupational History    Not on file   Tobacco Use    Smoking status: Never Smoker    Smokeless tobacco: Never Used   Vaping Use    Vaping Use: Never used   Substance and Sexual Activity    Alcohol use: No    Drug use: No    Sexual activity: Never   Other Topics Concern    Not on file   Social History Narrative    Not on file     Social Determinants of Health     Financial Resource Strain:     Difficulty of Paying Living Expenses: Not on file   Food Insecurity:     Worried About Running Out of Food in the Last Year: Not on file    Edilberto of Food in the Last Year: Not on file   Transportation Needs:     Lack of Transportation (Medical): Not on file    Lack of Transportation (Non-Medical):  Not on file   Physical Activity:     Days of Exercise per Week: Not on file    Minutes of Exercise per Session: Not on file   Stress:     Feeling of Stress : Not on file   Social Connections:     Frequency of Communication with Friends and Family: Not on file    Frequency of Social Gatherings with Friends and Family: Not on file    Attends [employer] Services: Not on file    Active Member of Clubs or Organizations: Not on file    Attends Club or Organization Meetings: Not on file    Marital Status: Not on file   Intimate Partner Violence:     Fear of Current or Ex-Partner: Not on file    Emotionally Abused: Not on file    Physically Abused: Not on file    Sexually Abused: Not on file   Housing Stability:     Unable to Pay for Housing in the Last Year: Not on file    Number of [place] in the Last Year: Not on file    Unstable Housing in the Last Year: Not on file     Family History   Problem Relation Age of Onset    Cancer Father [de-identified]        bladdder    Cancer Brother     COPD Brother     Heart Attack Mother 54    Heart Disease Sister     Diabetes Sister     Heart Disease Brother     Heart Disease Other 58             Diabetes Other     ADHD Other      REVIEW OF SYSTEMS:     Karis Read denies fever/chills, chest pain, shortness of breath, new bowel or bladder complaints. All other review of systems was negative. PHYSICAL EXAMINATION:      /60   Pulse 71   Temp 97.1 °F (36.2 °C) (Infrared)   Resp 16   Ht 5' 1\" (1.549 m)   Wt 188 lb (85.3 kg)   SpO2 98%   BMI 35.52 kg/m²     General:       General appearance:   elderly, pleasant and well-hydrated. , in mild discomfort and A & O x3  Build:Overweight     HEENT:     Head:normocephalic and atraumatic  Sclera: icterus absent,      Lungs:     Breathing:Breathing Pattern: regular, no distress     Abdomen:     Shape:non-distended and normal     Lumbar spine:     Spine inspection:surgical incision scar   Generator Left buttocks not flipped/no skin erosions     Musculoskeletal:     SLR:negative right, negative left, sitting      Extremities:     Tremors:None bilaterally upper and lower  Intact: Yes     Neurological:     Sensory:diminished to light touch and pin prick bilateral legs  Motor: Right Quadriceps4/5          Left Quadriceps4/5           Right Gastrocnemius4/5    Left Gastrocnemius4/5  Right Ant Tibialis4/5  Left Ant Tibialis4/5  Reflexes:    Right Quadriceps reflex0  Left Quadriceps reflex0  Right Achilles reflex0  Left Achilles reflex0  Gait:antalgic     Dermatology:     Skin:no unusual rashes and no skin lesions     Impression:  Chronic low back pain with radiation to both lower extremities   Lumbar spine MRI 2019 L4-5-S1 bilateral laminectomies with fusion  Patient had seen neurosurgery and is not a candidate for surgery, SCS was recommended   Plan:  6 month follow up on her low back pain with no acute issues. Continues to benefit from SCS for pain control. Will start patient on [place] 5/325 QD PRN for moderate to severe pain. Will start patient on Baclofen 5 mg BID. OARRS report reviewed 11/2021 consistent. Patient encouraged to stay active and to lose weight. Treatment plan discussed with the patient including medications side effects. We discussed with the patient that combining opioids, benzodiazepines, alcohol, illicit drugs or sleep aids increases the risk of respiratory depression including death. We discussed that these medications may cause drowsiness, sedation or dizziness and have counseled the patient not to drive or operate machinery. We have discussed that these medications will be prescribed only by one provider. We have discussed with the patient about age related risk factors and have thoroughly discussed the importance of taking these medications as prescribed. The patient verbalizes understanding. bryant Bowles M.D.

## 2021-11-17 DIAGNOSIS — M96.1 LUMBAR POSTLAMINECTOMY SYNDROME: ICD-10-CM

## 2021-11-17 NOTE — TELEPHONE ENCOUNTER
Lukasz Schaefer called in, they only gave her 7 tabs of the Norco per insurance, but the pharmacist stated if we send in the remainder of #23 it should go through. Script pended. PDMP reviewed and consistent.  [place] sent to pharmacy on file

## 2021-11-18 RX ORDER — HYDROCODONE BITARTRATE AND ACETAMINOPHEN 5; 325 MG/1; MG/1
1 TABLET ORAL DAILY PRN
Qty: 23 TABLET | Refills: 0 | Status: SHIPPED | OUTPATIENT
Start: 2021-11-18 | End: 2021-12-11

## 2021-12-17 RX ORDER — MECLIZINE HCL 12.5 MG/1
12.5 TABLET ORAL NIGHTLY
Qty: 30 TABLET | Refills: 5 | Status: SHIPPED
Start: 2021-12-17 | End: 2022-04-26

## 2022-02-23 ENCOUNTER — TELEPHONE (OUTPATIENT)
Dept: NEUROLOGY | Age: 77
End: 2022-02-23

## 2022-02-23 NOTE — TELEPHONE ENCOUNTER
Patient called stating that her tremors are increased and is making her more tired than usual.  She is visiting [facility] Easter. Asked for something to be sent to [facility] in [place].  #[phone]

## 2022-02-25 NOTE — TELEPHONE ENCOUNTER
Her tremors were from her anxiety. She wished to have an anxiolytic?   Please ask her primary care physician first.  Thank you

## 2022-02-28 NOTE — TELEPHONE ENCOUNTER
Contacted patient and she stated she had already spoken with her PCP and they told her she would need to wait until she can be seen in the office

## 2022-04-15 RX ORDER — BACLOFEN 10 MG/1
TABLET ORAL
Qty: 90 TABLET | Refills: 1 | Status: SHIPPED | OUTPATIENT
Start: 2022-04-15

## 2022-04-26 ENCOUNTER — HOSPITAL ENCOUNTER (OUTPATIENT)
Age: 77
Discharge: HOME | End: 2022-04-26
Payer: MEDICARE

## 2022-04-26 LAB
ALBUMIN SERPL-MCNC: 4.1 G/DL (ref 3.5–5.2)
ALP BLD-CCNC: 67 U/L (ref 35–104)
ALT SERPL-CCNC: 32 U/L (ref 0–32)
ANION GAP SERPL CALCULATED.3IONS-SCNC: 10 MMOL/L (ref 7–16)
AST SERPL-CCNC: 25 U/L (ref 0–31)
BILIRUB SERPL-MCNC: 0.2 MG/DL (ref 0–1.2)
BUN BLDV-MCNC: 30 MG/DL (ref 6–23)
CALCIUM SERPL-MCNC: 9.6 MG/DL (ref 8.6–10.2)
CHLORIDE BLD-SCNC: 100 MMOL/L (ref 98–107)
CO2: 29 MMOL/L (ref 22–29)
CREAT SERPL-MCNC: 1.3 MG/DL (ref 0.5–1)
GFR AFRICAN AMERICAN: 48
GFR NON-AFRICAN AMERICAN: 40 ML/MIN/1.73
GLUCOSE BLD-MCNC: 212 MG/DL (ref 74–99)
HCT VFR BLD CALC: 38.7 % (ref 34–48)
HEMOGLOBIN: 13.1 G/DL (ref 11.5–15.5)
MCH RBC QN AUTO: 31.2 PG (ref 26–35)
MCHC RBC AUTO-ENTMCNC: 33.9 % (ref 32–34.5)
MCV RBC AUTO: 92.1 FL (ref 80–99.9)
PARATHYROID HORMONE INTACT: 51 PG/ML (ref 15–65)
PDW BLD-RTO: 13.1 FL (ref 11.5–15)
PHOSPHORUS: 3.5 MG/DL (ref 2.5–4.5)
PLATELET # BLD: 251 E9/L (ref 130–450)
PMV BLD AUTO: 9.6 FL (ref 7–12)
POTASSIUM SERPL-SCNC: 4 MMOL/L (ref 3.5–5)
RBC # BLD: 4.2 E12/L (ref 3.5–5.5)
SODIUM BLD-SCNC: 139 MMOL/L (ref 132–146)
TOTAL PROTEIN: 6.9 G/DL (ref 6.4–8.3)
VITAMIN D 25-HYDROXY: 55 NG/ML (ref 30–100)
WBC # BLD: 4.6 E9/L (ref 4.5–11.5)

## 2022-04-26 PROCEDURE — 84100 ASSAY OF PHOSPHORUS: CPT

## 2022-04-26 PROCEDURE — 82306 VITAMIN D 25 HYDROXY: CPT

## 2022-04-26 PROCEDURE — 80053 COMPREHEN METABOLIC PANEL: CPT

## 2022-04-26 PROCEDURE — 36415 COLL VENOUS BLD VENIPUNCTURE: CPT

## 2022-04-26 PROCEDURE — 85027 COMPLETE CBC AUTOMATED: CPT

## 2022-04-26 PROCEDURE — 83970 ASSAY OF PARATHORMONE: CPT

## 2022-04-26 RX ORDER — GABAPENTIN 600 MG/1
TABLET ORAL
Qty: 270 TABLET | Refills: 5 | Status: SHIPPED
Start: 2022-04-26 | End: 2022-07-05 | Stop reason: DRUGHIGH

## 2022-04-26 RX ORDER — MECLIZINE HCL 12.5 MG/1
TABLET ORAL
Qty: 60 TABLET | Refills: 5 | Status: SHIPPED
Start: 2022-04-26 | End: 2022-07-05 | Stop reason: DRUGHIGH

## 2022-04-28 ENCOUNTER — OFFICE VISIT (OUTPATIENT)
Dept: ORTHOPEDIC SURGERY | Age: 77
End: 2022-04-28
Payer: MEDICARE

## 2022-04-28 VITALS — WEIGHT: 184 LBS | BODY MASS INDEX: 34.74 KG/M2 | HEIGHT: 61 IN

## 2022-04-28 DIAGNOSIS — M20.012 MALLET FINGER OF LEFT HAND: ICD-10-CM

## 2022-04-28 DIAGNOSIS — S69.92XA INJURY OF LEFT MIDDLE FINGER, INITIAL ENCOUNTER: Primary | ICD-10-CM

## 2022-04-28 DIAGNOSIS — M18.12 ARTHRITIS OF CARPOMETACARPAL (CMC) JOINT OF LEFT THUMB: ICD-10-CM

## 2022-04-28 DIAGNOSIS — S62.639A MALLET FRACTURE, CLOSED, INITIAL ENCOUNTER: ICD-10-CM

## 2022-04-28 DIAGNOSIS — M20.019 MALLET FRACTURE, CLOSED, INITIAL ENCOUNTER: ICD-10-CM

## 2022-04-28 DIAGNOSIS — L03.012 PARONYCHIA, FINGER, LEFT: ICD-10-CM

## 2022-04-28 DIAGNOSIS — M18.12 PRIMARY OSTEOARTHRITIS OF FIRST CARPOMETACARPAL JOINT OF LEFT HAND: ICD-10-CM

## 2022-04-28 DIAGNOSIS — M67.432 GANGLION OF LEFT WRIST: ICD-10-CM

## 2022-04-28 PROCEDURE — 1090F PRES/ABSN URINE INCON ASSESS: CPT | Performed by: ORTHOPAEDIC SURGERY

## 2022-04-28 PROCEDURE — 99214 OFFICE O/P EST MOD 30 MIN: CPT | Performed by: ORTHOPAEDIC SURGERY

## 2022-04-28 PROCEDURE — G8417 CALC BMI ABV UP PARAM F/U: HCPCS | Performed by: ORTHOPAEDIC SURGERY

## 2022-04-28 PROCEDURE — 4040F PNEUMOC VAC/ADMIN/RCVD: CPT | Performed by: ORTHOPAEDIC SURGERY

## 2022-04-28 PROCEDURE — 1036F TOBACCO NON-USER: CPT | Performed by: ORTHOPAEDIC SURGERY

## 2022-04-28 PROCEDURE — 3017F COLORECTAL CA SCREEN DOC REV: CPT | Performed by: ORTHOPAEDIC SURGERY

## 2022-04-28 PROCEDURE — 1123F ACP DISCUSS/DSCN MKR DOCD: CPT | Performed by: ORTHOPAEDIC SURGERY

## 2022-04-28 PROCEDURE — G8400 PT W/DXA NO RESULTS DOC: HCPCS | Performed by: ORTHOPAEDIC SURGERY

## 2022-04-28 PROCEDURE — G8427 DOCREV CUR MEDS BY ELIG CLIN: HCPCS | Performed by: ORTHOPAEDIC SURGERY

## 2022-04-28 RX ORDER — CEPHALEXIN 500 MG/1
CAPSULE ORAL
COMMUNITY
Start: 2022-04-20 | End: 2022-06-07

## 2022-04-28 RX ORDER — SULFAMETHOXAZOLE AND TRIMETHOPRIM 800; 160 MG/1; MG/1
1 TABLET ORAL 2 TIMES DAILY
Qty: 20 TABLET | Refills: 0 | Status: SHIPPED | OUTPATIENT
Start: 2022-04-28 | End: 2022-05-08

## 2022-04-28 NOTE — PROGRESS NOTES
Department of Orthopedic Surgery  History and Physical      CHIEF COMPLAINT: Left wrist bump with pain    HISTORY OF PRESENT ILLNESS:                The patient is a 78 y.o. female who presents with a ganglion cyst of the left wrist which was been confirmed under ultrasound with associated pain at the base of the thumb. She has had this cyst for a lengthy time. It was never been painful. Beginning a couple months ago she began having pain to the base of her wrist that would radiate to the dorsal side. Denies any preceding injury or inciting event. She does note some occasional numbness to select fingers when sleeping, however, she cannot recall which fingers. Since her last visit she is now complaining of a mallet deformity to her middle finger. About a month ago she reports her dog leash got caught up in her middle finger. She had pain and swelling and some bleeding. She saw her PCP. There is concern for an infection. She was placed on Keflex. She reports no significant improvement in her symptoms. She reports that the injection she received at the base of her thumb was not helpful. She is inquiring about surgical options.     Past Medical History:        Diagnosis Date    Arthritis     CAD (coronary artery disease)     Current use of long term anticoagulation     Plavix    Fibromyalgia     GERD (gastroesophageal reflux disease)     History of cardiovascular stress test 02/17/2014    lexiscan, also 4/21/2015    History of MI (myocardial infarction)     x4; most recent 2016    Hx of blood clots     DVT leg  (pt states she was traveling)    Hyperlipidemia     Hypertension     Hypothyroidism     Intractable low back pain 06/19/2016    Non-insulin dependent type 2 diabetes mellitus ([facility] [address]     NSTEMI (non-ST elevated myocardial infarction) ([facility] [address] 11/19/2015    Syncope and collapse 10/2020    x2  states was dt hypotension     Past Surgical History:        Procedure Laterality Date    APPENDECTOMY      ARTHROCENTESIS Left 8/22/2019    LEFT HIP CORTICOSTERIOD INJECTION UNDER FLUOROSCOPIC GUIDANCE performed by Alban Jrodan DO at [address]  11/15/2017    Dr Kia Pro Right 7 7 15    CATARACT REMOVAL WITH IMPLANT Left 4/26/2016    CHOLECYSTECTOMY      COCCYX REMOVAL      COLONOSCOPY      CORONARY ANGIOPLASTY WITH STENT PLACEMENT      1996 started needing cardiac care; 5 stents total    CORONARY ANGIOPLASTY WITH STENT PLACEMENT  11/19/2015    Dr Shruthi Santamaria stent 3x18 prox Cx   Waylan Lazar Dr. Gissell Ybarra; 3 bypass grafts    ELBOW FRACTURE SURGERY Right 7/30/2021    RIGHT ELBOW EPICONDYLECTOMY, MEDIAL EPICONDYLE  WITH  PLASMA  RICH PLATELET  INJECTION ([address]) performed by Aaron Leal DO at [facility], COLON, DIAGNOSTIC      HEMORRHOID SURGERY      HYSTERECTOMY      LUMBAR FUSION  6/7/2016    Dr. Eva Good Left 11 18 2015    left lumbar transforaminal nerve block l4-5 l5-s1    NERVE BLOCK Right 07/17/2017    lumbar transforaminal #1    NERVE BLOCK Right 09/06/2017    right knee injection#1    NERVE BLOCK Bilateral 10/24/2018    lumbar facet block     NERVE BLOCK Left 08/22/2019    hip     NERVE SURGERY N/A 6/15/2020    SPINAL CORD STIMULATOR IMPLANT WITH NERVO performed by Eve Guzman MD at [address] [facility]      bilateral feet    OTHER SURGICAL HISTORY N/A 11/04/2019    spinal cord stimulator trial    ME ARTHRS KNE SURG W/MENISCECTOMY MED/LAT W/SHVG Right 6/6/2018    RIGHT KNEE ARTHROSCOPY MEDIAL AND LATERAL MENISECTOMY SYNOVECTOMY AND CHONDROPLASTY performed by Aaron Leal DO at [facility]. DX/THER AGNT PARAVERT FACET JOINT, LUMBAR/SAC, 1ST LEVEL Bilateral 10/24/2018    BILATERAL LUMBAR FACET BLOCK L1-2 ,L3-4 WITH X-RAY performed by Alden Lawrence DO at [facility] REPLACE / REVISE VAGAL NERVE STIMULATOR N/A 11/4/2019    SPINAL CORD STIMULATOR TRIAL WITH NEVRO performed by Diaz Curry MD at [address] SURGERY  06/15/2020    nevro     TONSILLECTOMY      VASCULAR SURGERY      laser on leaky veins     Current Medications:   No current facility-administered medications for this visit. Allergies:  Iodine, Lipitor, Statins, Atorvastatin, Cymbalta [duloxetine hcl], Cipro xr, Diazepam, Duloxetine, Erythromycin, Indomethacin, Ketorolac, Ketorolac tromethamine, Lodine [etodolac], Oscal 500-200 d-3 [oyster shell calcium-d], Paxil [paroxetine hcl], Ropinirole, Tromethamine, Trulicity [dulaglutide], Zithromax [azithromycin], and Requip [ropinirole hcl]    Social History:   TOBACCO:   reports that she has never smoked. She has never used smokeless tobacco.  ETOH:   reports no history of alcohol use. DRUGS:   reports no history of drug use.   ACTIVITIES OF DAILY LIVING:    OCCUPATION:    Family History:       Problem Relation Age of Onset    Cancer Father [de-identified]        bladdder    Cancer Brother     COPD Brother     Heart Attack Mother 54    Heart Disease Sister     Diabetes Sister     Heart Disease Brother     Heart Disease Other 58             Diabetes Other     ADHD Other        REVIEW OF SYSTEMS:  CONSTITUTIONAL:  negative  HEENT:  negative  RESPIRATORY:  negative  CARDIOVASCULAR:  negative  MUSCULOSKELETAL:  positive for pain at the base of the left thumb  NEUROLOGICAL:  negative  BEHAVIOR/PSYCH:  negative    PHYSICAL EXAM:    VITALS:  Ht 5' 1\" (1.549 m)   Wt 184 lb (83.5 kg)   BMI 34.77 kg/m²   CONSTITUTIONAL:  awake, alert, cooperative, no apparent distress, and appears stated age  EYES:  sclera clear, conjunctiva normal  ENT:  Normocephalic, without obvious abnormality, atraumatic  NECK:  Supple, symmetrical, trachea midline  LUNGS:  CTA  CARDIOVASCULAR:  2+ radial pulses, extremities warm and well perfused  ABDOMEN: Nondistended  CHEST:  Atraumatic   GENITAL/URINARY:  deferred  NEUROLOGIC:  Awake, alert, oriented to name, place and time. Cranial nerves II-XII are grossly intact. Motor is 5 out of 5 bilaterally. Sensory is intact.  gait is normal.  MUSCULOSKELETAL:  Transillumination of the cyst over volar radial aspect of wrist.  Finkelsteins positive  Thumb CMC grind positive  25 degrees hyperextension the MCPJ  Christopher's test negative  Tinel's:   Carpal tunnel negative   Cubital tunnel negative  Pulses:    Radial 2+    Mallet deformity of middle finger measuring approximately 15 degrees. Passive this can be fully extended. She has full flexion. There is some mild erythema/hyperemia over the dorsal ulnar nail fold. No drainage. Radial, ulnar, median nerves are intact light touch. 2+ radial pulse. Otherwise neurovascular intact. DATA:    CBC:   Lab Results   Component Value Date    WBC 4.6 04/26/2022    RBC 4.20 04/26/2022    HGB 13.1 04/26/2022    HCT 38.7 04/26/2022    MCV 92.1 04/26/2022    MCH 31.2 04/26/2022    MCHC 33.9 04/26/2022    RDW 13.1 04/26/2022     04/26/2022    MPV 9.6 04/26/2022     PT/INR:    Lab Results   Component Value Date    PROTIME 13.0 10/02/2020    PROTIME 12.4 08/04/2011    INR 1.2 10/02/2020       Radiology Review: 3 views of the left hand were obtained today in the office. This demonstrates basal joint arthritis of the thumb. There is also an associated fracture of the dorsal ulnar aspect of the distal phalanx of the middle finger.   Impression office x-rays: Avulsion corner fracture dorsal ulnar aspect of the middle finger as well as underlying thumb basal joint arthritis    IMPRESSION:  · Left thumb CMC joint arthritis  · Left ganglion cyst, symptomatic  · New onset middle finger mallet deformity with associated fracture  · CAD, fibromyalgia, history of MI, hypertension  · GERD  · History of DVT on anticoagulation    PLAN:    Today's findings were explained the patient. Discussed treatment options. We have fitted her with a DIP joint extension splint today in the office for mallet deformity. We have adjusted her Keflex to Bactrim. She is also advised on warm soapy water soaks/Epson salt/half-strength hydrogen peroxide soaks as needed. In addition she inquired about surgical options regards to her cyst as well as her continuing pain in the thumb basal joint. After discussion she like to proceed with surgical intervention. Plan for thumb LR TI with ligament reconstruction and ganglion cyst excision. I explained the risks, benefits, alternatives and complications of surgery with the patient including but not limited to the risks of infection, possible damage to nerves, vessels, or tendons, stiffness, loss of range of motion, scar sensitivity, wound healing complications, worsening symptoms, possible need for therapy, as well as the possible need further surgery and unanticipated complications. The patient voiced understanding and all questions were answered. The patient elected to proceed with surgical intervention.      Jenny Tapia MD  4/28/2022

## 2022-04-29 DIAGNOSIS — M18.12 PRIMARY OSTEOARTHRITIS OF FIRST CARPOMETACARPAL JOINT OF LEFT HAND: Primary | ICD-10-CM

## 2022-05-03 ENCOUNTER — TELEPHONE (OUTPATIENT)
Dept: ORTHOPEDIC SURGERY | Age: 77
End: 2022-05-03

## 2022-05-03 NOTE — TELEPHONE ENCOUNTER
Spoke with patient today about receiving clearance from Dr. Merlinda Areas. Patient educated to stop plavix 3 days prior to surgery per Dr. Syeda Koch instructions, patient states understanding.

## 2022-05-09 ENCOUNTER — OFFICE VISIT (OUTPATIENT)
Dept: NEUROLOGY | Age: 77
End: 2022-05-09
Payer: MEDICARE

## 2022-05-09 VITALS
BODY MASS INDEX: 35.68 KG/M2 | HEIGHT: 61 IN | HEART RATE: 58 BPM | TEMPERATURE: 97 F | DIASTOLIC BLOOD PRESSURE: 64 MMHG | WEIGHT: 189 LBS | SYSTOLIC BLOOD PRESSURE: 138 MMHG | OXYGEN SATURATION: 97 %

## 2022-05-09 DIAGNOSIS — R41.841 COGNITIVE COMMUNICATION DEFICIT: Primary | ICD-10-CM

## 2022-05-09 DIAGNOSIS — M25.562 LEFT KNEE PAIN, UNSPECIFIED CHRONICITY: Primary | ICD-10-CM

## 2022-05-09 PROCEDURE — 1090F PRES/ABSN URINE INCON ASSESS: CPT | Performed by: PSYCHIATRY & NEUROLOGY

## 2022-05-09 PROCEDURE — 1036F TOBACCO NON-USER: CPT | Performed by: PSYCHIATRY & NEUROLOGY

## 2022-05-09 PROCEDURE — G8400 PT W/DXA NO RESULTS DOC: HCPCS | Performed by: PSYCHIATRY & NEUROLOGY

## 2022-05-09 PROCEDURE — G8427 DOCREV CUR MEDS BY ELIG CLIN: HCPCS | Performed by: PSYCHIATRY & NEUROLOGY

## 2022-05-09 PROCEDURE — G8417 CALC BMI ABV UP PARAM F/U: HCPCS | Performed by: PSYCHIATRY & NEUROLOGY

## 2022-05-09 PROCEDURE — 1123F ACP DISCUSS/DSCN MKR DOCD: CPT | Performed by: PSYCHIATRY & NEUROLOGY

## 2022-05-09 PROCEDURE — 4040F PNEUMOC VAC/ADMIN/RCVD: CPT | Performed by: PSYCHIATRY & NEUROLOGY

## 2022-05-09 PROCEDURE — 99215 OFFICE O/P EST HI 40 MIN: CPT | Performed by: PSYCHIATRY & NEUROLOGY

## 2022-05-09 PROCEDURE — 3017F COLORECTAL CA SCREEN DOC REV: CPT | Performed by: PSYCHIATRY & NEUROLOGY

## 2022-05-09 NOTE — PROGRESS NOTES
This 78year-old right-handed woman was referred for evaluation and management of gait instability, leg weakness and memory issues. She was now a questionable historian. She was accompanied by her daughter-in-law, who was an excellent historian. Her daughter was also on the cell phone. Her medications were still multiple, including cyclobenzaprine, meclizine, glipizide, omeprazole, lisinopril, furosemide, clopidogrel, aspirin, folic acid, dapagliflozin, semaglutide, cholecalciferol, ezetimibe, gabapentin, dapagliflozin, multivitamins, nitroglycerin, hydrocodone and levothyroxine. Her medical history was remarkable for longstanding diabetes, controlled with past hemoglobin A1c levels of 6.7. That disorder produced a peripheral neuropathy, without other complications. There were also hypertension, coronary artery disease, gastroesophageal reflux disease, diverticulosis and severe degenerative joint disease. She underwent 5 stents and subsequent three-vessel bypass surgery. She initially responded well to surgical invention, without recurrent disease. Recent echocardiography revealed congestive heart failure. She again denied swelling, chest pains or shortness of breath. She still experienced painful lumbosacral radicular disease. A nerve stimulator improved her pains, but may have been dislodged after her fall several years ago. She was followed by pain management, remaining on gabapentin, baclofen and cyclobenzaprine. Fortunately, she is no longer taking oxycodone or butalbital.    She fell down a flight of stairs with resulting concussion almost 2 years ago. Unfortunately, there remained episodic unsteadiness and spinning sensations. These had been well controlled with low-dose meclizine. She claims she is no longer taking that drug. The occipital headaches have resolved. She denied visual abnormalities, speech or swallowing difficulties, seizure-like activity or loss of consciousness. However, her daughter's question memory loss. There were no abnormal behaviors. She also complained of occasional tremors in both hands. I felt these were related to stress. She now reported internal tremors, not relieved with citalopram.    She reported no other neurological issues. She denied other medical problems. She continued sleeping and eating well. Her blood sugars remained well controlled. She was drinking more fluids. She again refused the COVID-19 vaccinations. Review of systems was otherwise unremarkable. Physical examination displayed stable vital signs. Her blood pressure was 138/64. She was an elderly woman in no acute distress who was alert, cooperative and oriented. She remained pleasant. There was no longer tenderness along both occipital grooves. Her lungs were clear to auscultation. Cardiac testing was unrevealing with a well-healed bypass scar. Her peripheral pulses were +1 without bruits. Her limbs were unremarkable except for her multiple surgical scars, without edema or cyanosis. Neurological examination not produced minimal cognitive deficits, with confusion concerning her medications and treatments. Cranial nerve testing was unrevealing. There remained normal tone and bulk with 5/5 strength throughout. I found no reflexes today, or pathological ones. Sensation was intact to light touch. Pinprick and vibration were decreased to both mid calves. Joint position sense was slightly impaired in both feet. She walked with a slightly wide-based, cautious gait. I saw no foot drop today. I now questioned early memory deficits. Laboratory data included a recent CMP with a GFR of 40. Hemoglobin A1c was 6.6. CBC was normal.    This individual initially presented with memory issues. These initially resolved, but have returned. These difficulties were first related to her postconcussion syndrome. I now question early Alzheimer's disease.   Her reported tremors are likely related to anxiety and cognitive issues. Her lumbosacral radicular disease continues producing pains. Her gait issues are from these radiculopathies. She was responding moderately well to the new stimulator. Fortunately, she is now off oxycodone. Her unsteadiness is related to her nonrotational vertigo. She will return to meclizine. Medically, she suffered a serious syncopal episode from orthostasis. She was concussed, without severe intracranial trauma; her postconcussive syndrome has now resolved    She is medically stable otherwise. She again refused the COVID-19 vaccination. She will return in 4 months. She will discontinue cyclobenzaprine. She will reduce baclofen to twice daily. Formal cognitive evaluation was ordered. Her daughter will report back after the above testings have been completed. The family will call any time if problems arise. I spent 40 minutes with the patient with over 50 % spent in counseling and disease management. All patient issues were addressed and all questions were answered.

## 2022-05-12 ENCOUNTER — OFFICE VISIT (OUTPATIENT)
Dept: ORTHOPEDIC SURGERY | Age: 77
End: 2022-05-12
Payer: MEDICARE

## 2022-05-12 VITALS — BODY MASS INDEX: 35.68 KG/M2 | HEIGHT: 61 IN | WEIGHT: 189 LBS | TEMPERATURE: 98 F

## 2022-05-12 DIAGNOSIS — M54.12 CERVICAL RADICULOPATHY: ICD-10-CM

## 2022-05-12 DIAGNOSIS — S80.02XA CONTUSION OF LEFT KNEE, INITIAL ENCOUNTER: ICD-10-CM

## 2022-05-12 DIAGNOSIS — M17.12 PRIMARY OSTEOARTHRITIS OF LEFT KNEE: Primary | ICD-10-CM

## 2022-05-12 PROCEDURE — G8427 DOCREV CUR MEDS BY ELIG CLIN: HCPCS | Performed by: ORTHOPAEDIC SURGERY

## 2022-05-12 PROCEDURE — 99213 OFFICE O/P EST LOW 20 MIN: CPT | Performed by: ORTHOPAEDIC SURGERY

## 2022-05-12 PROCEDURE — 1090F PRES/ABSN URINE INCON ASSESS: CPT | Performed by: ORTHOPAEDIC SURGERY

## 2022-05-12 PROCEDURE — 1123F ACP DISCUSS/DSCN MKR DOCD: CPT | Performed by: ORTHOPAEDIC SURGERY

## 2022-05-12 PROCEDURE — 1036F TOBACCO NON-USER: CPT | Performed by: ORTHOPAEDIC SURGERY

## 2022-05-12 PROCEDURE — 3017F COLORECTAL CA SCREEN DOC REV: CPT | Performed by: ORTHOPAEDIC SURGERY

## 2022-05-12 PROCEDURE — G8400 PT W/DXA NO RESULTS DOC: HCPCS | Performed by: ORTHOPAEDIC SURGERY

## 2022-05-12 PROCEDURE — 20610 DRAIN/INJ JOINT/BURSA W/O US: CPT | Performed by: ORTHOPAEDIC SURGERY

## 2022-05-12 PROCEDURE — G8417 CALC BMI ABV UP PARAM F/U: HCPCS | Performed by: ORTHOPAEDIC SURGERY

## 2022-05-12 RX ORDER — METHYLPREDNISOLONE 4 MG/1
TABLET ORAL
Qty: 1 KIT | Refills: 0 | Status: SHIPPED | OUTPATIENT
Start: 2022-05-12 | End: 2022-05-18

## 2022-05-12 NOTE — PROGRESS NOTES
Chief Complaint   Patient presents with    Knee Pain     LEFT KNEE PAIN fell on it 5 times, fell a week ago at [employer]. knee cracks when she moves it       Subjective:     Patient ID: Abida Castillo is a 78 y.o..  female    Knee Pain  Patient complains of left knee pain. This is evaluated as a personal injury. There was a history of injury. Patient has fallen multiple times on to left knee. The pain began several weeks ago. The pain is located anterior, patellar, tibial tubercle. She describes  Her symptoms as aching and stabbing. She has experienced popping, clicking, locking, and giving way in the affected knee. The patient has had pain with kneeling, squating, and climbing stairs. Symptoms improve with rest. The symptoms are worse with activity, stair climbing, kneeling, deep knee bending. The knee has given out or felt unstable. The patient cannot bend and straighten the knee fully. The patient is active in none. Treatment to date has been ice, NSAID's, without significant relief. The patient is not working. The patient is retired.      Past Medical History:   Diagnosis Date    Arthritis     CAD (coronary artery disease)     Current use of long term anticoagulation     Plavix    Fibromyalgia     GERD (gastroesophageal reflux disease)     History of cardiovascular stress test 02/17/2014    lexiscan, also 4/21/2015    History of MI (myocardial infarction)     x4; most recent 2016    Hx of blood clots     DVT leg  (pt states she was traveling)    Hyperlipidemia     Hypertension     Hypothyroidism     Intractable low back pain 06/19/2016    Non-insulin dependent type 2 diabetes mellitus ([facility] [address]     NSTEMI (non-ST elevated myocardial infarction) ([facility] [address] [facility] 11/19/2015    Syncope and collapse 10/2020    x2  states was dt hypotension     Past Surgical History:   Procedure Laterality Date    APPENDECTOMY      ARTHROCENTESIS Left 8/22/2019    LEFT HIP CORTICOSTERIOD INJECTION UNDER FLUOROSCOPIC GUIDANCE performed by Bonifacio Baird DO at [address]  11/15/2017    Dr Kathy Soni Right 7 7 15    CATARACT REMOVAL WITH IMPLANT Left 4/26/2016    CHOLECYSTECTOMY      COCCYX REMOVAL      COLONOSCOPY      CORONARY ANGIOPLASTY WITH STENT PLACEMENT      1996 started needing cardiac care; 5 stents total    CORONARY ANGIOPLASTY WITH STENT PLACEMENT  11/19/2015    Dr Suad Guaman stent 3x18 prox Cx   Sydni Ziates Dr. Georges; 3 bypass grafts    ELBOW FRACTURE SURGERY Right 7/30/2021    RIGHT ELBOW EPICONDYLECTOMY, MEDIAL EPICONDYLE  WITH  PLASMA  RICH PLATELET  INJECTION ([address]) performed by Deysi Tracey DO at [facility], COLON, DIAGNOSTIC      HEMORRHOID SURGERY      HYSTERECTOMY      LUMBAR FUSION  6/7/2016    Dr. Carla Hauser Left 11 18 2015    left lumbar transforaminal nerve block l4-5 l5-s1    NERVE BLOCK Right 07/17/2017    lumbar transforaminal #1    NERVE BLOCK Right 09/06/2017    right knee injection#1    NERVE BLOCK Bilateral 10/24/2018    lumbar facet block     NERVE BLOCK Left 08/22/2019    hip     NERVE SURGERY N/A 6/15/2020    SPINAL CORD STIMULATOR IMPLANT WITH NERVO performed by Ace Richmond MD at [address]      bilateral feet    OTHER SURGICAL HISTORY N/A 11/04/2019    spinal cord stimulator trial    MS ARTHRS KNE SURG W/MENISCECTOMY MED/LAT W/SHVG Right 6/6/2018    RIGHT KNEE ARTHROSCOPY MEDIAL AND LATERAL MENISECTOMY SYNOVECTOMY AND CHONDROPLASTY performed by Deysi Tracey DO at [address] DX/THER AGNT PARAVERT FACET JOINT, LUMBAR/SAC, 1ST LEVEL Bilateral 10/24/2018    BILATERAL LUMBAR FACET BLOCK L1-2 ,L3-4 WITH X-RAY performed by Savage Agrawal DO at [address] N/A 11/4/2019    SPINAL CORD STIMULATOR TRIAL WITH NEVRO performed by April Cox MD at [address] [facility] SURGERY  06/15/2020    nevro     TONSILLECTOMY      VASCULAR SURGERY      laser on leaky veins       Current Outpatient Medications:     cephALEXin (KEFLEX) 500 MG capsule, TAKE 1 CAPSULE BY MOUTH EVERY 8 HOURS, Disp: , Rfl:     meclizine (ANTIVERT) 12.5 MG tablet, Take 1 tablet by mouth twice daily, Disp: 60 tablet, Rfl: 5    gabapentin (NEURONTIN) 600 MG tablet, TAKE 1 TABLET BY MOUTH IN THE MORNING, THEN TAKE  2 TABLETS BY MOUTH IN THE EVENING, Disp: 270 tablet, Rfl: 5    baclofen (LIORESAL) 10 MG tablet, TAKE 1 TABLET EVERY NIGHT, Disp: 90 tablet, Rfl: 1    nitrofurantoin, macrocrystal-monohydrate, (MACROBID) 100 MG capsule, , Disp: , Rfl:     diclofenac sodium (VOLTAREN) 1 % GEL, Apply 2 g topically 4 times daily as needed for Pain, Disp: 350 g, Rfl: 0    benzonatate (TESSALON) 100 MG capsule, TAKE 1 CAPSULE BY MOUTH THREE TIMES DAILY AS NEEDED, Disp: , Rfl:     brompheniramine-pseudoephedrine-DM 2-30-10 MG/5ML syrup, TAKE 1 TO 2 TEASPOONFULS BY MOUTH EVERY 6 HOURS AS NEEDED FOR COUGH, Disp: , Rfl:     fenofibrate (TRICOR) 145 MG tablet, Take 145 mg by mouth three times a week m-w-f, Disp: , Rfl:     lisinopril (PRINIVIL;ZESTRIL) 2.5 MG tablet, Take 2.5 mg by mouth daily am, Disp: , Rfl:     glipiZIDE (GLUCOTROL) 10 MG tablet, Take by mouth 2 times daily (before meals) , Disp: , Rfl:     isosorbide mononitrate (IMDUR) 30 MG extended release tablet, Take 30 mg by mouth daily am, Disp: , Rfl:     OZEMPIC, 0.25 OR 0.5 MG/DOSE, 2 MG/1.5ML SOPN, Inject 0.5 mg into the skin once a week fridays, Disp: , Rfl:     Coenzyme Q10 (COQ10) 200 MG CAPS, Take by mouth daily , Disp: , Rfl:     potassium chloride (MICRO-K) 10 MEQ extended release capsule, Take 10 mEq by mouth daily , Disp: , Rfl:     sennosides-docusate sodium (SENOKOT-S) 8.6-50 MG tablet, Take 2 tablets by mouth daily, Disp:  , Rfl:     ferrous sulfate (IRON 325) 325 (65 Fe) MG tablet, Take 325 mg by mouth daily (with breakfast), Disp: , Rfl:     metoprolol succinate (TOPROL XL) 25 MG extended release tablet, Take 25 mg by mouth daily am, Disp: , Rfl:     citalopram (CELEXA) 20 MG tablet, Take 20 mg by mouth daily , Disp: , Rfl:     dapagliflozin (FARXIGA) 10 MG tablet, Take 10 mg by mouth daily , Disp: , Rfl:     aspirin 81 MG tablet, Take 81 mg by mouth daily Do not hold per Dr Debbi Caba, Disp: , Rfl:     omeprazole (PRILOSEC) 20 MG delayed release capsule, Take 20 mg by mouth daily am, Disp: , Rfl:     furosemide (LASIX) 20 MG tablet, Take 20 mg by mouth daily, Disp: , Rfl:     clopidogrel (PLAVIX) 75 MG tablet, Take 75 mg by mouth daily Indications: Advised to discontinue patient would prefer to follow-up with her cardiologist Stopped 2 days pre op per Dr Aracelis Lamas instructions, Disp: , Rfl:     nitroGLYCERIN (NITROSTAT) 0.4 MG SL tablet, Place 0.4 mg under the tongue every 5 minutes as needed for Chest pain up to max of 3 total doses. If no relief after 1 dose, call 911., Disp: , Rfl:     vitamin D 1000 UNITS CAPS, Take 1,000 Units by mouth every evening , Disp: , Rfl:     ezetimibe (ZETIA) 10 MG tablet, Take 10 mg by mouth every evening , Disp: , Rfl:     Multiple Vitamins-Minerals (CENTRUM SILVER) TABS, Take 1 tablet by mouth daily , Disp: , Rfl:     levothyroxine (SYNTHROID) 100 MCG tablet, Take 100 mcg by mouth daily. , Disp: , Rfl:   Allergies   Allergen Reactions    Iodine Anaphylaxis     IVP DYE    Lipitor Other (See Comments)     Severe leg pain    Statins Other (See Comments)     Muscles go weak and she falls    Atorvastatin      Makes me sick severe leg cramps    Cymbalta [Duloxetine Hcl] Nausea Only    Cipro Xr      Reaction unknown    Diazepam     Duloxetine Nausea Only    Erythromycin      Hurt my stomach    Indomethacin     Ketorolac     Ketorolac Tromethamine Itching    Lodine [Etodolac]  Oscal 500-200 D-3 [employer] [facility] Calcium-D]     Paxil [Paroxetine Hcl]     Ropinirole     Tromethamine     Trulicity [Dulaglutide] Swelling    Zithromax [Azithromycin]     Requip [Ropinirole Hcl] Nausea And Vomiting     Social History     Socioeconomic History    Marital status:      Spouse name: Not on file    Number of children: Not on file    Years of education: Not on file    Highest education level: Not on file   Occupational History    Not on file   Tobacco Use    Smoking status: Never Smoker    Smokeless tobacco: Never Used   Vaping Use    Vaping Use: Never used   Substance and Sexual Activity    Alcohol use: No    Drug use: No    Sexual activity: Never   Other Topics Concern    Not on file   Social History Narrative    Not on file     Social Determinants of Health     Financial Resource Strain:     Difficulty of Paying Living Expenses: Not on file   Food Insecurity:     Worried About Running Out of Food in the Last Year: Not on file    Edilberto of Food in the Last Year: Not on file   Transportation Needs:     Lack of Transportation (Medical): Not on file    Lack of Transportation (Non-Medical):  Not on file   Physical Activity:     Days of Exercise per Week: Not on file    Minutes of Exercise per Session: Not on file   Stress:     Feeling of Stress : Not on file   Social Connections:     Frequency of Communication with Friends and Family: Not on file    Frequency of Social Gatherings with Friends and Family: Not on file    Attends Mu-ism Services: Not on file    Active Member of Clubs or Organizations: Not on file    Attends Club or Organization Meetings: Not on file    Marital Status: Not on file   Intimate Partner Violence:     Fear of Current or Ex-Partner: Not on file    Emotionally Abused: Not on file    Physically Abused: Not on file    Sexually Abused: Not on file   Housing Stability:     Unable to Pay for Housing in the Last Year: Not on file    Number of Places Lived in the Last Year: Not on file    Unstable Housing in the Last Year: Not on file     Family History   Problem Relation Age of Onset    Cancer Father [de-identified]        bladdder    Cancer Brother     COPD Brother     Heart Attack Mother 54    Heart Disease Sister     Diabetes Sister     Heart Disease Brother     Heart Disease Other 58             Diabetes Other     ADHD Other          REVIEW OF SYSTEMS:     General/Constitution:  (-)weight loss, (-)fever, (-)chills, (-)weakness. Skin: (-) rash,(-) psoriasis,(-) eczema, (-)skin cancer. Musculoskeletal: (-) fractures,  (-) dislocations,(-) collagen vascular disease, (-) fibromyalgia, (-) multiple sclerosis, (-) muscular dystrophy, (-) RSD,(-) joint pain (-)swelling, (-) joint pain,swelling. Neurologic: (-) epilepsy, (-)seizures,(-) brain tumor,(-) TIA, (-)stroke, (-)headaches, (-)Parkinson disease,(-) memory loss, (-) LOC. Cardiovascular: (-) Chest pain, (-) swelling in legs/feet, (-) SOB, (-) cramping in legs/feet with walking. Respiratory: (-) SOB, (-) Coughing, (-) night sweats. GI: (-) nausea, (-) vomiting, (-) diarrhea, (-) blood in stool, (-) gastric ulcer. Psychiatric: (-) Depression, (-) Anxiety, (-) bipolar disease, (-) Alzheimer's Disease  Allergic/Immunologic: (-) allergies latex, (-) allergies metal, (-) skin sensitivity. Hematlogic: (-) anemia, (-) blood transfusion, (-) DVT/PE, (-) Clotting disorders    Subjective:    Constitution:  Temp 98 °F (36.7 °C)   Ht 5' 1\" (1.549 m)   Wt 189 lb (85.7 kg)   BMI 35.71 kg/m²     Psycihatric:  The patient is alert and oriented x 3, appears to be stated age and in no distress. Respiratory:  Respiratory effort is not labored. Patient is not gasping. Palpation of the chest reveals no tactile fremitus. Skin:  Upon inspection: the skin appears warm, dry and intact. There is  a previous scar over the affected area. There is any cellulitis, lymphedema or cutaneous lesions noted in the lower extremities. Upon palpation there is no induration noted. Neurologic:  Gait: antalgic; Motor exam of the lower extremities show ; quadriceps, hamstrings, foot dorsi and plantar flexors intact R.  5/5 and L. 5/5. Deep tendon reflexes are 2/4 at the knees and 2/4 at the ankles with strong extensor hallicus longus motor strength bilaterally. Sensory to both feet is intact to all sensory roots. Cardiovascular: The vascular exam is normal and is well perfused to distal extremities. Distal pulses DP/PT: R. 2+; L. 2+. There is cap refill noted less than two seconds in all digits. There is not edema of the bilateral lower extremities. There is not varicosities noted in the distal extremities. Lymph:  Upon palpation,  there is no lymphadenopathy noted in bilateral lower extremities. Musculoskeletal:  Gait: antalgic; examination of the nails and digits reveal no cyanosis or clubbing. Lumbar exam:  On visual inspection, there is not deformity of the spine. full range of motion, no tenderness, palpable spasm or pain on motion. Special tests: Straight Leg Raise negative, Demetrius test negative. Hip exam:   Upon inspection, there is not deformity noted. Upon palpation there is not tenderness. ROM: is  full and symmetrical.   Strength: Hip Flexors 5/5; Hip Abductors 5/5; Hip Adduction 5/5. Knee exam:  Left knee exam shows;  range of motion of R. Knee is 0 to 120, and L. Knee is 0 to 110. The patient does have  pain on motion, effusion is mild, there is tenderness over the  anterior, patellar region, there are not any masses, there is not ligamentous instability, there is  deformity noted. Knee exam: left positive for moderate crepitations, some mild tenderness laxity is not noted with stress.   There is not a popliteal cyst.    R. Knee:  Lachman's negative, Anterior Drawer negative, Posterior Drawer negative  Dario's negative, Thallasy  negative,   PF grind test negative, Apprehension test negative, Patellar J sign  negative  L. Knee:  Lachman's negative, Anterior Drawer negative, Posterior Drawer negative  Dario's negative, Thallasy  negative,   PF grind test negative, Apprehension test negative,  Patellar J sign  negative    Xray Exam:  No evidence of acute fracture or dislocation.  No focal osseous lesion.  No   evidence of joint effusion. No focal soft tissue abnormality. Radiographic findings reviewed with patient    Assessment:  Encounter Diagnoses   Name Primary?  Primary osteoarthritis of left knee Yes    Contusion of left knee, initial encounter     Cervical radiculopathy        Plan:  Natural history and expected course discussed. Questions answered. Educational materials distributed. I had a lengthy discussion with the patient regarding their diagnosis. I explained treatment options including surgical vs non surgical treatment. I reviewed in detail the risks and benefits and outlined the procedure in detail with expected outcomes and possible complications. I also discussed non surgical treatment such as injections (CSI and visco supplementation), physical therapy, topical creams and NSAID's. They have elected for conservative management at this time. I will proceed with a cortisone injection in the Left knee. Verbal and written consent was obtained for the injections. The skin was prepped with alcohol. 1mL of Kenalog 40mg and 9mL of 0.25% Marcaine was  injected to Left knee. The injection was given through the lateral side of the knee. The patient tolerated the injection well.  I will see the patient back as needed      Medrol dose pack cervical radic

## 2022-05-20 ENCOUNTER — TELEPHONE (OUTPATIENT)
Dept: SPEECH THERAPY | Age: 77
End: 2022-05-20

## 2022-05-20 NOTE — TELEPHONE ENCOUNTER
Spoke with patient;  Cognitive eval scheduled for 05/24/2022 at 10 am.  Gosia Nagy.  Juanjo Miller MA/CCC-SLP  [id number]

## 2022-05-24 ENCOUNTER — EVALUATION (OUTPATIENT)
Dept: SPEECH THERAPY | Age: 77
End: 2022-05-24
Payer: MEDICARE

## 2022-05-24 DIAGNOSIS — R41.841 COGNITIVE COMMUNICATION DEFICIT: Primary | ICD-10-CM

## 2022-05-24 PROCEDURE — 96125 COGNITIVE TEST BY HC PRO: CPT | Performed by: SPEECH-LANGUAGE PATHOLOGIST

## 2022-05-25 NOTE — PROGRESS NOTES
[facility]  Outpatient Speech Therapy  Phone: [phone]   Fax:  [phone]        SPEECH/LANGUAGE PATHOLOGY  ROSS INFORMATION PROCESSING ASSESSMENT-2 (RIPA-2)   EVALUATION RESULTS and PLAN OF CARE    PATIENT NAME:  Sahra Haque  (female)     MRN:  [id number]    :  1945  (78 y.o.)  STATUS:  Outpatient clinic   TODAY'S DATE:  2022  REFERRING PROVIDER:    Dr. Valerio Villa       PROVIDER NPI:  [id number]  SPECIFIC PROVIDER ORDER: Ambulatory referral to Speech Therapy  Date of order:  2022  EVALUATING THERAPIST: HARSHIL Denson    CERTIFICATION/RECERTIFICATION PERIOD: 2022 to 22  INSURANCE PROVIDER:  Payor: Janet Valera / Plan: Genesis Lao / Product Type: *No Product type* /    INSURANCE ID:  [id number] - (Medicare Managed)       CPT Codes  EVALUATION: 81340  standardized cognitive performance testing (per hour)    TREATMENT:  Requesting treatment authorization for  12 visits over 12 weeks focusing on the following CPT codes:      17288  therapeutic interventions that focus on cognitive function , initial  15 min  97203  therapeutic interventions that focus on cognitive function, each additional 15 min    REFERRING/TREATMENT DIAGNOSIS:   Cognitive Communication Deficit    SPEECH THERAPY  PLAN OF CARE   The speech therapy  POC is established based on physician order, speech pathology diagnosis and results of clinical assessment     SPEECH PATHOLOGY DIAGNOSIS:    Mild cognitive communication deficit    Outpatient Speech Pathology intervention is recommended 1 time  per week for the above certification period. Conditions Requiring Skilled Therapeutic Intervention for speech, language and/or cognition    Decreased short term memory  Decreased thought organization    Specific Speech Therapy Interventions to Include: Therapeutic tasks for Cognition    Specific instructions for next treatment:     To initiate POC    SHORT/LONG TERM GOALS  Pt will improve immediate, short term, recent memory during structured and unstructured tasks with 90% accuracy   Pt will improve thought organization during structured and unstructured tasks with 90% accuracy     Patient goals: Patient/family involved in developing goals and treatment plan:   Treatment goals discussed with Patient and Family    The Patient and Family understand(s) the diagnosis, prognosis and plan of care   The patient/family Agreed with above,     This plan may be re-evaluated and revised as warranted. Rehabilitation Potential/Prognosis: sofi Bell was accompanied to this evaluation by her daughter, Dylan Correia. They report that Fermin Bell started having problems with her memory after a fall/head injury October 3, 2021. She reports that she still staggers and falls frequently and has short term memory problems. She states that she did receive home care PT/OT after the fall but denies ever having any speech therapy services. Dylan Correia reports that her mom's cognitive function fluctuates. She reports that her mother has poor safety awareness. Fermin Bell states that she is still driving and does admit to 'getting turned around' at times and has to call her daughter to help direct her home. Fermin Bell did report a recent incident where she left to go shopping and when she returned home, she noticed that she had left the door open/unlocked. Fermin Bell also states that she handles her own finances but admitted that she has forgotten to pay bills at times. Stimulus questions were obtained from the RIPA-2.   There are 30 possible points for each subtest.   Severity ratings for each subtest were determined as follows:     RAW SCORE  SEVERITY    28-30  Within functional limits Critical access hospital)   25-27  Mild deficit    22-24  Moderate deficit    19-21  Marked deficit    16-18 Severe deficit   Below 16 Profound deficit         Subtest  Raw Score /Severity Immediate Memory   23/30 MODERATE   Recent Memory   29/30 WFL   Temporal Orientation   (Recent Memory)   30/30 WFL   Temporal Orientation   (Remote Memory)   30/30 WFL   Spatial Orientation   30/30 WFL   Orientation to Environment   30/30 WFL   Recall of General Information   30/30 WFL   Problem Solving & Abstract Reasoning   30/30 WFL   Organization   25/30 MILD   Auditory Processing   & Retention   30/30 WFL     VARIANT OBSERVATIONS PRESENT DURING THE EVALATION:   Errors  Delayed response  Partially correct  Self corrected                             EDUCATION:   The Speech Language Pathologist (SLP) completed education regarding results of evaluation and that intervention is warranted at this time. Learner: Patient and Significant Other  Education: Reviewed results and recommendations of this evaluation  Evaluation of Education:  Verbalizes understanding    This plan may be re-evaluated and revised as warranted. Treatment goals discussed with Patient and Family   The Patient and Family understand(s) the diagnosis, prognosis and plan of care     Evaluation Time includes thorough review of current medical information, gathering information on past medical history/social history and prior level of function, completion of standardized testing/informal observation of tasks, assessment of data and education on plan of care and goals. The admitting diagnosis and active problem list, as listed below have been reviewed prior to initiation of this evaluation.         ACTIVE PROBLEM LIST:   Patient Active Problem List   Diagnosis    Non-insulin dependent type 2 diabetes mellitus (HCC)    GERD (gastroesophageal reflux disease)    Hypothyroidism    History of MI (myocardial infarction)    Hypertension    HLD (hyperlipidemia)    Acute renal failure (ARF) (HCC)    Normocytic anemia    Proximal weakness of limb    Falls frequently    Cervical spinal stenosis    Neural foraminal stenosis of cervical spine  Facet arthropathy, cervical    Right cataract    Lumbar radiculopathy    Neural foraminal stenosis of lumbar spine    DDD (degenerative disc disease), lumbar    Protruded lumbar disc    DM II (diabetes mellitus, type II), controlled ([facility]    NSTEMI (non-ST elevated myocardial infarction) ([facility]    Left cataract    Colitis    Primary osteoarthritis of right knee    Primary osteoarthritis of both knees    Pseudoaneurysm following procedure ([facility]    History of coronary artery bypass surgery    Hx of hypercholesterolemia    Acute lateral meniscus tear of right knee    L-S radiculopathy    Lumbar postlaminectomy syndrome    Lumbar facet arthropathy    Lumbar disc disorder    Greater trochanteric bursitis of left hip    Greater trochanteric bursitis of right hip    Primary osteoarthritis of both hips    Osteoarthritis of left hip    Chest pain    SAH (subarachnoid hemorrhage) ([facility]    Concussion    Syncope and collapse    Scalp laceration, initial encounter    Cervical strain    Fecal retention    Medial epicondylitis of elbow, right       Aida LANGSTONBret Mcleod MA/CCC-SLP  [address]           Phone: [phone]       If you have any questions or concerns, please don't hesitate to call. Thank you for your referral.    Physician/Provider Signature:________________________________Date:__________________  By signing above, the therapists plan is approved by the physician/provider. All patients under [employer] must be signed by physician/provider.

## 2022-05-26 RX ORDER — TRIAMCINOLONE ACETONIDE 40 MG/ML
40 INJECTION, SUSPENSION INTRA-ARTICULAR; INTRAMUSCULAR ONCE
Status: COMPLETED | OUTPATIENT
Start: 2022-05-26 | End: 2022-05-26

## 2022-05-26 RX ADMIN — TRIAMCINOLONE ACETONIDE 40 MG: 40 INJECTION, SUSPENSION INTRA-ARTICULAR; INTRAMUSCULAR at 06:23

## 2022-05-28 ENCOUNTER — HOSPITAL ENCOUNTER (EMERGENCY)
Age: 77
Discharge: HOME | End: 2022-05-28
Attending: EMERGENCY MEDICINE
Payer: MEDICARE

## 2022-05-28 VITALS
RESPIRATION RATE: 18 BRPM | DIASTOLIC BLOOD PRESSURE: 66 MMHG | OXYGEN SATURATION: 96 % | HEART RATE: 62 BPM | TEMPERATURE: 98 F | HEIGHT: 61 IN | SYSTOLIC BLOOD PRESSURE: 150 MMHG | BODY MASS INDEX: 34.74 KG/M2 | WEIGHT: 184 LBS

## 2022-05-28 DIAGNOSIS — R25.1 TREMOR: Primary | ICD-10-CM

## 2022-05-28 LAB
ANION GAP SERPL CALCULATED.3IONS-SCNC: 6 MMOL/L (ref 7–16)
BASOPHILS ABSOLUTE: 0.03 E9/L (ref 0–0.2)
BASOPHILS RELATIVE PERCENT: 0.6 % (ref 0–2)
BUN BLDV-MCNC: 22 MG/DL (ref 6–23)
CALCIUM SERPL-MCNC: 8.9 MG/DL (ref 8.6–10.2)
CHLORIDE BLD-SCNC: 106 MMOL/L (ref 98–107)
CO2: 27 MMOL/L (ref 22–29)
CREAT SERPL-MCNC: 1.1 MG/DL (ref 0.5–1)
EOSINOPHILS ABSOLUTE: 0.12 E9/L (ref 0.05–0.5)
EOSINOPHILS RELATIVE PERCENT: 2.4 % (ref 0–6)
GFR AFRICAN AMERICAN: 59
GFR NON-AFRICAN AMERICAN: 48 ML/MIN/1.73
GLUCOSE BLD-MCNC: 204 MG/DL (ref 74–99)
HCT VFR BLD CALC: 37.4 % (ref 34–48)
HEMOGLOBIN: 12.2 G/DL (ref 11.5–15.5)
IMMATURE GRANULOCYTES #: 0.03 E9/L
IMMATURE GRANULOCYTES %: 0.6 % (ref 0–5)
LYMPHOCYTES ABSOLUTE: 1.26 E9/L (ref 1.5–4)
LYMPHOCYTES RELATIVE PERCENT: 24.7 % (ref 20–42)
MAGNESIUM: 2.2 MG/DL (ref 1.6–2.6)
MCH RBC QN AUTO: 31.5 PG (ref 26–35)
MCHC RBC AUTO-ENTMCNC: 32.6 % (ref 32–34.5)
MCV RBC AUTO: 96.6 FL (ref 80–99.9)
MONOCYTES ABSOLUTE: 0.47 E9/L (ref 0.1–0.95)
MONOCYTES RELATIVE PERCENT: 9.2 % (ref 2–12)
NEUTROPHILS ABSOLUTE: 3.19 E9/L (ref 1.8–7.3)
NEUTROPHILS RELATIVE PERCENT: 62.5 % (ref 43–80)
PDW BLD-RTO: 12.8 FL (ref 11.5–15)
PLATELET # BLD: 194 E9/L (ref 130–450)
PMV BLD AUTO: 10.1 FL (ref 7–12)
POTASSIUM SERPL-SCNC: 4.3 MMOL/L (ref 3.5–5)
RBC # BLD: 3.87 E12/L (ref 3.5–5.5)
SODIUM BLD-SCNC: 139 MMOL/L (ref 132–146)
T4 TOTAL: 6.6 MCG/DL (ref 4.5–11.7)
TROPONIN, HIGH SENSITIVITY: 14 NG/L (ref 0–9)
TROPONIN, HIGH SENSITIVITY: 15 NG/L (ref 0–9)
TSH SERPL DL<=0.05 MIU/L-ACNC: 0.99 UIU/ML (ref 0.27–4.2)
WBC # BLD: 5.1 E9/L (ref 4.5–11.5)

## 2022-05-28 PROCEDURE — 80048 BASIC METABOLIC PNL TOTAL CA: CPT

## 2022-05-28 PROCEDURE — 84484 ASSAY OF TROPONIN QUANT: CPT

## 2022-05-28 PROCEDURE — 83735 ASSAY OF MAGNESIUM: CPT

## 2022-05-28 PROCEDURE — 85025 COMPLETE CBC W/AUTO DIFF WBC: CPT

## 2022-05-28 PROCEDURE — 99284 EMERGENCY DEPT VISIT MOD MDM: CPT

## 2022-05-28 PROCEDURE — 84443 ASSAY THYROID STIM HORMONE: CPT

## 2022-05-28 PROCEDURE — 93005 ELECTROCARDIOGRAM TRACING: CPT | Performed by: EMERGENCY MEDICINE

## 2022-05-28 PROCEDURE — 84436 ASSAY OF TOTAL THYROXINE: CPT

## 2022-05-28 ASSESSMENT — ENCOUNTER SYMPTOMS
DIARRHEA: 0
WHEEZING: 0
SORE THROAT: 0
ABDOMINAL PAIN: 0
SHORTNESS OF BREATH: 0
BACK PAIN: 0
COUGH: 0
NAUSEA: 0
CHEST TIGHTNESS: 0
VOMITING: 0

## 2022-05-28 ASSESSMENT — PAIN - FUNCTIONAL ASSESSMENT: PAIN_FUNCTIONAL_ASSESSMENT: NONE - DENIES PAIN

## 2022-05-28 NOTE — ED PROVIDER NOTES
Chief complaint:  Tremor, shaky    HPI history provided by the patient  Patient comes in stating she has been dealing with a shaky sensation mostly internally for the last year or so as well as some intermittent tremors. Symptoms she states started after some form of fall or injury she had a year ago. She has been following with neurology, she has been told she has some tremors and then told that they were also related to anxiety. She has had no seizures. She has no new symptoms, this is the same thing she always has, she states that nobody ever figured out or tells her what is wrong other than anxiety or tremor and she feels like it might of been worse yesterday so she is here today. She admits that right now she is not having the tremors. No treatment prior to arrival.  Nothing makes it better or worse. Review of Systems   Constitutional: Negative for chills, diaphoresis, fatigue and fever. HENT: Negative for congestion and sore throat. Respiratory: Negative for cough, chest tightness, shortness of breath and wheezing. Cardiovascular: Negative for chest pain, palpitations and leg swelling. Gastrointestinal: Negative for abdominal pain, diarrhea, nausea and vomiting. Genitourinary: Negative for dysuria, flank pain and frequency. Musculoskeletal: Negative for arthralgias, back pain, gait problem, joint swelling, myalgias, neck pain and neck stiffness. Skin: Negative for rash and wound. Neurological: Positive for tremors. Negative for dizziness, seizures, syncope, facial asymmetry, speech difficulty, weakness, light-headedness, numbness and headaches. All other systems reviewed and are negative. Physical Exam  Vitals and nursing note reviewed. Constitutional:       General: She is awake. She is not in acute distress. Appearance: She is well-developed. She is not ill-appearing, toxic-appearing or diaphoretic.       Comments: Patient resting comfortably in bed in no distress with no tremor   HENT:      Head: Normocephalic and atraumatic. Eyes:      General: No scleral icterus. Extraocular Movements:      Right eye: No nystagmus. Left eye: No nystagmus. Pupils: Pupils are equal, round, and reactive to light. Cardiovascular:      Rate and Rhythm: Normal rate and regular rhythm. Heart sounds: Normal heart sounds. No murmur heard. Pulmonary:      Effort: Pulmonary effort is normal. No respiratory distress. Breath sounds: Normal breath sounds. No stridor, decreased air movement or transmitted upper airway sounds. No decreased breath sounds, wheezing, rhonchi or rales. Chest:      Chest wall: No tenderness. Abdominal:      General: Bowel sounds are normal. There is no distension. Palpations: Abdomen is soft. Tenderness: There is no abdominal tenderness. There is no right CVA tenderness, left CVA tenderness, guarding or rebound. Musculoskeletal:         General: No swelling, tenderness, deformity or signs of injury. Cervical back: Normal range of motion and neck supple. No signs of trauma or rigidity. No pain with movement, spinous process tenderness or muscular tenderness. Normal range of motion. Right lower leg: No edema. Left lower leg: No edema. Comments: Arms and legs are neurovascular intact. No pretibial edema or calf pain. Skin:     General: Skin is warm and dry. Coloration: Skin is not cyanotic, jaundiced, mottled or pale. Findings: No erythema or rash. Neurological:      General: No focal deficit present. Mental Status: She is alert and oriented to person, place, and time. GCS: GCS eye subscore is 4. GCS verbal subscore is 5. GCS motor subscore is 6. Cranial Nerves: Cranial nerves are intact. No cranial nerve deficit. Sensory: Sensation is intact. Motor: Motor function is intact. No tremor or seizure activity. Coordination: Coordination is intact.  Coordination normal. Comments: Patient with no focal neurologic deficit, no tremors during exam.  No seizure-like activity. Psychiatric:         Behavior: Behavior is cooperative. Procedures     MDM        EKG Interpretation    Interpreted by emergency department physician    Rhythm: normal sinus   Rate: 61  Axis: normal  Ectopy: none  Conduction: normal  ST Segments: no acute change  T Waves: no acute change  Q Waves: none    Clinical Impression: no acute changes    Ana Patiño Cardinal, DO          --------------------------------------------- PAST HISTORY ---------------------------------------------  Past Medical History:  has a past medical history of Arthritis, CAD (coronary artery disease), Current use of long term anticoagulation, Fibromyalgia, GERD (gastroesophageal reflux disease), History of cardiovascular stress test, History of MI (myocardial infarction), Hx of blood clots, Hyperlipidemia, Hypertension, Hypothyroidism, Intractable low back pain, Non-insulin dependent type 2 diabetes mellitus ([facility] NSTEMI (non-ST elevated myocardial infarction) ([facility] and Syncope and collapse. Past Surgical History:  has a past surgical history that includes Salivary gland surgery; Rotator cuff repair; Cholecystectomy; Appendectomy; orthopedic surgery; Hysterectomy; Coccyx removal; Bladder surgery; Hemorrhoid surgery; Coronary angioplasty with stent; Coronary artery bypass graft; Tonsillectomy; Cataract removal with implant (Right, 7 7 15); Nerve Block (Left, 11 18 2015); Coronary angioplasty with stent (11/19/2015); Cataract removal with implant (Left, 4/26/2016); lumbar fusion (6/7/2016); Nerve Block (Right, 07/17/2017); Nerve Block (Right, 09/06/2017); Cardiac catheterization (11/15/2017); Colonoscopy; Endoscopy, colon, diagnostic; vascular surgery; pr arthrs kne surg w/meniscectomy med/lat w/shvg (Right, 6/6/2018);  Nerve Block (Bilateral, 10/24/2018); pr inj dx/ther agnt paravert facet joint, lumbar/sac, 1st level %    Eosinophils % 2.4 0.0 - 6.0 %    Basophils % 0.6 0.0 - 2.0 %    Neutrophils Absolute 3.19 1.80 - 7.30 E9/L    Immature Granulocytes # 0.03 E9/L    Lymphocytes Absolute 1.26 (L) 1.50 - 4.00 E9/L    Monocytes Absolute 0.47 0.10 - 0.95 E9/L    Eosinophils Absolute 0.12 0.05 - 0.50 E9/L    Basophils Absolute 0.03 0.00 - 0.20 O8/J   Basic Metabolic Panel   Result Value Ref Range    Sodium 139 132 - 146 mmol/L    Potassium 4.3 3.5 - 5.0 mmol/L    Chloride 106 98 - 107 mmol/L    CO2 27 22 - 29 mmol/L    Anion Gap 6 (L) 7 - 16 mmol/L    Glucose 204 (H) 74 - 99 mg/dL    BUN 22 6 - 23 mg/dL    CREATININE 1.1 (H) 0.5 - 1.0 mg/dL    GFR Non-African American 48 >=60 mL/min/1.73    GFR African American 59     Calcium 8.9 8.6 - 10.2 mg/dL   TSH   Result Value Ref Range    TSH 0.990 0.270 - 4.200 uIU/mL   T4   Result Value Ref Range    T4, Total 6.6 4.5 - 11.7 mcg/dL   Troponin   Result Value Ref Range    Troponin, High Sensitivity 15 (H) 0 - 9 ng/L   Magnesium   Result Value Ref Range    Magnesium 2.2 1.6 - 2.6 mg/dL   Troponin   Result Value Ref Range    Troponin, High Sensitivity 14 (H) 0 - 9 ng/L       Radiology:  No orders to display       ------------------------- NURSING NOTES AND VITALS REVIEWED ---------------------------  Date / Time Roomed:  5/28/2022 12:56 PM  ED Bed Assignment:  08/08    The nursing notes within the ED encounter and vital signs as below have been reviewed. BP (!) 150/66   Pulse 62   Temp 98 °F (36.7 °C) (Oral)   Resp 18   Ht 5' 1\" (1.549 m)   Wt 184 lb (83.5 kg)   SpO2 96%   BMI 34.77 kg/m²   Oxygen Saturation Interpretation: Normal      ------------------------------------------ PROGRESS NOTES ------------------------------------------  I have spoken with the patient and discussed todays results, in addition to providing specific details for the plan of care and counseling regarding the diagnosis and prognosis.   Their questions are answered at this time and they are agreeable with the plan. I discussed at length with them reasons for immediate return here for re evaluation. They will followup with primary care by calling their office tomorrow. --------------------------------- ADDITIONAL PROVIDER NOTES ---------------------------------  At this time the patient is without objective evidence of an acute process requiring hospitalization or inpatient management. They have remained hemodynamically stable throughout their entire ED visit and are stable for discharge with outpatient follow-up. The plan has been discussed in detail and they are aware of the specific conditions for emergent return, as well as the importance of follow-up. New Prescriptions    No medications on file       Diagnosis:  1. Tremor        Disposition:  Patient's disposition: Discharge to home  Patient's condition is stable.          Meryl Darden DO  05/28/22 [id number]

## 2022-05-29 LAB
EKG ATRIAL RATE: 61 BPM
EKG P AXIS: -13 DEGREES
EKG P-R INTERVAL: 194 MS
EKG Q-T INTERVAL: 434 MS
EKG QRS DURATION: 76 MS
EKG QTC CALCULATION (BAZETT): 436 MS
EKG R AXIS: -12 DEGREES
EKG T AXIS: 4 DEGREES
EKG VENTRICULAR RATE: 61 BPM

## 2022-06-07 ENCOUNTER — HOSPITAL ENCOUNTER (OUTPATIENT)
Age: 77
End: 2022-06-07
Payer: MEDICARE

## 2022-06-07 ENCOUNTER — OFFICE VISIT (OUTPATIENT)
Dept: PAIN MANAGEMENT | Age: 77
End: 2022-06-07
Payer: MEDICARE

## 2022-06-07 ENCOUNTER — HOSPITAL ENCOUNTER (OUTPATIENT)
Dept: GENERAL RADIOLOGY | Age: 77
Discharge: HOME | End: 2022-06-09
Payer: MEDICARE

## 2022-06-07 ENCOUNTER — HOSPITAL ENCOUNTER (OUTPATIENT)
Age: 77
Discharge: HOME | End: 2022-06-09
Payer: MEDICARE

## 2022-06-07 VITALS
TEMPERATURE: 96.9 F | SYSTOLIC BLOOD PRESSURE: 136 MMHG | HEART RATE: 68 BPM | DIASTOLIC BLOOD PRESSURE: 64 MMHG | BODY MASS INDEX: 34.74 KG/M2 | OXYGEN SATURATION: 98 % | RESPIRATION RATE: 14 BRPM | WEIGHT: 184 LBS | HEIGHT: 61 IN

## 2022-06-07 DIAGNOSIS — M51.36 DDD (DEGENERATIVE DISC DISEASE), LUMBAR: ICD-10-CM

## 2022-06-07 DIAGNOSIS — M16.0 PRIMARY OSTEOARTHRITIS OF BOTH HIPS: Primary | ICD-10-CM

## 2022-06-07 DIAGNOSIS — G89.4 CHRONIC PAIN SYNDROME: ICD-10-CM

## 2022-06-07 DIAGNOSIS — M47.816 LUMBAR FACET ARTHROPATHY: ICD-10-CM

## 2022-06-07 DIAGNOSIS — M70.61 GREATER TROCHANTERIC BURSITIS OF RIGHT HIP: ICD-10-CM

## 2022-06-07 DIAGNOSIS — M47.814 THORACIC SPONDYLOSIS: ICD-10-CM

## 2022-06-07 DIAGNOSIS — M54.16 LUMBAR RADICULOPATHY: ICD-10-CM

## 2022-06-07 DIAGNOSIS — M70.62 GREATER TROCHANTERIC BURSITIS OF LEFT HIP: ICD-10-CM

## 2022-06-07 DIAGNOSIS — M47.816 LUMBAR SPONDYLOSIS: ICD-10-CM

## 2022-06-07 DIAGNOSIS — M96.1 LUMBAR POSTLAMINECTOMY SYNDROME: ICD-10-CM

## 2022-06-07 DIAGNOSIS — M51.9 LUMBAR DISC DISORDER: ICD-10-CM

## 2022-06-07 PROCEDURE — 3017F COLORECTAL CA SCREEN DOC REV: CPT | Performed by: PAIN MEDICINE

## 2022-06-07 PROCEDURE — 99213 OFFICE O/P EST LOW 20 MIN: CPT | Performed by: PAIN MEDICINE

## 2022-06-07 PROCEDURE — G8427 DOCREV CUR MEDS BY ELIG CLIN: HCPCS | Performed by: PAIN MEDICINE

## 2022-06-07 PROCEDURE — 99213 OFFICE O/P EST LOW 20 MIN: CPT

## 2022-06-07 PROCEDURE — 1090F PRES/ABSN URINE INCON ASSESS: CPT | Performed by: PAIN MEDICINE

## 2022-06-07 PROCEDURE — G8400 PT W/DXA NO RESULTS DOC: HCPCS | Performed by: PAIN MEDICINE

## 2022-06-07 PROCEDURE — G8417 CALC BMI ABV UP PARAM F/U: HCPCS | Performed by: PAIN MEDICINE

## 2022-06-07 PROCEDURE — 1036F TOBACCO NON-USER: CPT | Performed by: PAIN MEDICINE

## 2022-06-07 PROCEDURE — 1123F ACP DISCUSS/DSCN MKR DOCD: CPT | Performed by: PAIN MEDICINE

## 2022-06-07 PROCEDURE — 72072 X-RAY EXAM THORAC SPINE 3VWS: CPT

## 2022-06-07 RX ORDER — POTASSIUM CHLORIDE 750 MG/1
10 TABLET, FILM COATED, EXTENDED RELEASE ORAL DAILY
COMMUNITY
Start: 2022-05-14

## 2022-06-07 RX ORDER — FUROSEMIDE 40 MG/1
40 TABLET ORAL DAILY
COMMUNITY
Start: 2022-03-07

## 2022-06-07 NOTE — PROGRESS NOTES
[address] [facility], [address] [place] [address] [place]  [phone]    Follow up Note      Abida Castillo     Date of Visit:  6/7/2022    CC:  Patient presents for follow up   Chief Complaint   Patient presents with    Follow-up     SCS     Fall     this AM, hit her head and left shoulder     HPI:    Follow up on her low back pain with complaints of losing SCS coverage s/p multiple falls. Change in quality of symptoms:no. Medication side effects:none. Recent diagnostic testing:none  Recent interventional procedures:none    Imaging:   Lumbar spine CT 07/2019  Remote postsurgical changes and multilevel degenerative changes as   described. Mild levoscoliosis. Moderate spinal canal stenosis at the   L3-L4 interspace level. No evidence of herniated nucleus pulposus. No   evidence of any level with critical spinal stenosis. No evidence of   remote postop complication.      Left hip Xray 05/2019  Stable hip joint arthritis. No acute findings.     Previous treatments: Surgery L4-5-S1 bilateral laminectomies 2016 with fusion and medications. .      Potential Aberrant Drug-Related Behavior:  None    Urine Drug Screening:  Saliva screen 03/2021 showed no narcotics which is consistent     OARRS report:  06/2022 consistent      Opioid agreement:  Renewal date:04/2022    Past Medical History:   Diagnosis Date    Arthritis     CAD (coronary artery disease)     Current use of long term anticoagulation     Plavix    Fibromyalgia     GERD (gastroesophageal reflux disease)     History of cardiovascular stress test 02/17/2014    lexiscan, also 4/21/2015    History of MI (myocardial infarction)     x4; most recent 2016    Hx of blood clots     DVT leg  (pt states she was traveling)    Hyperlipidemia     Hypertension     Hypothyroidism     Intractable low back pain 06/19/2016    Non-insulin dependent type 2 diabetes mellitus ([facility]     NSTEMI (non-ST elevated myocardial infarction) ([facility] 11/19/2015    Syncope and collapse 10/2020    x2  states was dt hypotension     Past Surgical History:   Procedure Laterality Date    APPENDECTOMY      ARTHROCENTESIS Left 8/22/2019    LEFT HIP CORTICOSTERIOD INJECTION UNDER FLUOROSCOPIC GUIDANCE performed by Parvin Justin DO at [address]  11/15/2017    Dr Olga Julian Right 7 7 15    CATARACT REMOVAL WITH IMPLANT Left 4/26/2016    CHOLECYSTECTOMY      COCCYX REMOVAL      COLONOSCOPY      CORONARY ANGIOPLASTY WITH STENT PLACEMENT      1996 started needing cardiac care; 5 stents total    CORONARY ANGIOPLASTY WITH STENT PLACEMENT  11/19/2015    Dr Julián Gould stent 3x18 prox Cx   [place] Dr. Samira Gibbons; 3 bypass grafts    ELBOW FRACTURE SURGERY Right 7/30/2021    RIGHT ELBOW EPICONDYLECTOMY, MEDIAL EPICONDYLE  WITH  PLASMA  RICH PLATELET  INJECTION ([address]) performed by Sobeida Barber DO at [facility], COLON, DIAGNOSTIC      HEMORRHOID SURGERY      HYSTERECTOMY      LUMBAR FUSION  6/7/2016    Dr. Srinivasan Mendoza Left 11 18 2015    left lumbar transforaminal nerve block l4-5 l5-s1    NERVE BLOCK Right 07/17/2017    lumbar transforaminal #1    NERVE BLOCK Right 09/06/2017    right knee injection#1    NERVE BLOCK Bilateral 10/24/2018    lumbar facet block     NERVE BLOCK Left 08/22/2019    hip     NERVE SURGERY N/A 6/15/2020    SPINAL CORD STIMULATOR IMPLANT WITH NERVO performed by Gibran Sanz MD at [address]      bilateral feet    OTHER SURGICAL HISTORY N/A 11/04/2019    spinal cord stimulator trial    IN ARTHRS KNE SURG W/MENISCECTOMY MED/LAT W/SHVG Right 6/6/2018    RIGHT KNEE ARTHROSCOPY MEDIAL AND LATERAL MENISECTOMY SYNOVECTOMY AND CHONDROPLASTY performed by Sobeida Barber DO at [place] [address]. DX/THER AGNT [address], LUMBAR/SAC, 1ST LEVEL Bilateral 10/24/2018    BILATERAL LUMBAR FACET BLOCK L1-2 ,L3-4 WITH X-RAY performed by Orlan Fabry, DO at [address] N/A 11/4/2019    SPINAL CORD STIMULATOR TRIAL WITH NEVRO [facility] Maxi [facility]ardenas [facility]  06/15/2020    nevro     TONSILLECTOMY      VASCULAR SURGERY      laser on leaky veins     Prior to Admission medications    Medication Sig Start Date End Date Taking?  Authorizing Provider   furosemide (LASIX) 40 MG tablet  3/7/22  Yes Historical Provider, MD   potassium chloride (KLOR-CON) 10 MEQ extended release tablet  5/14/22  Yes Historical Provider, MD   meclizine (ANTIVERT) 12.5 MG tablet Take 1 tablet by mouth twice daily 4/26/22 10/23/22 Yes Gabrielle Leslie., MD   gabapentin (NEURONTIN) 600 MG tablet TAKE 1 TABLET BY MOUTH IN THE MORNING, THEN TAKE  2 TABLETS BY MOUTH IN THE EVENING 4/26/22 10/26/22 Yes Gabrielle Leslie., MD   baclofen (LIORESAL) 10 MG tablet TAKE 1 TABLET EVERY NIGHT 4/15/22  Yes Cynthia Steven., MD   diclofenac sodium (VOLTAREN) 1 % GEL Apply 2 g topically 4 times daily as needed for Pain 11/1/21  Yes Leo Larson MD   fenofibrate (TRICOR) 145 MG tablet Take 145 mg by mouth three times a week m-w-f 4/6/21  Yes Historical Provider, MD   glipiZIDE (GLUCOTROL) 10 MG tablet Take by mouth 2 times daily (before meals)  4/6/21  Yes Historical Provider, MD   isosorbide mononitrate (IMDUR) 30 MG extended release tablet Take 30 mg by mouth daily am 3/10/21  Yes Historical Provider, MD   OZEMPIC, 0.25 OR 0.5 MG/DOSE, 2 MG/1.5ML SOPN Inject 0.5 mg into the skin once a week fridays 4/19/21  Yes Historical Provider, MD   Coenzyme Q10 (COQ10) 200 MG CAPS Take by mouth daily    Yes Historical Provider, MD   sennosides-docusate sodium (SENOKOT-S) 8.6-50 MG tablet Take 2 tablets by mouth daily 10/8/20  Yes Vicente Hilton MD   ferrous sulfate (IRON 325) 325 (65 Fe) MG tablet Take 325 mg by mouth daily (with breakfast)   Yes Historical Provider, MD   metoprolol succinate (TOPROL XL) 25 MG extended release tablet Take 25 mg by mouth daily am   Yes Historical Provider, MD   dapagliflozin (FARXIGA) 10 MG tablet Take 10 mg by mouth daily    Yes Historical Provider, MD   aspirin 81 MG tablet Take 81 mg by mouth daily Do not hold per Dr Kemp Ahr   Yes Historical Provider, MD   omeprazole (PRILOSEC) 20 MG delayed release capsule Take 20 mg by mouth daily am   Yes Historical Provider, MD   clopidogrel (PLAVIX) 75 MG tablet Take 75 mg by mouth daily Indications: Advised to discontinue patient would prefer to follow-up with her cardiologist Stopped 2 days pre op per Dr Farhan Taylor instructions   Yes Historical Provider, MD   nitroGLYCERIN (NITROSTAT) 0.4 MG SL tablet Place 0.4 mg under the tongue every 5 minutes as needed for Chest pain up to max of 3 total doses. If no relief after 1 dose, call 911. Yes Historical Provider, MD   vitamin D 1000 UNITS CAPS Take 1,000 Units by mouth every evening    Yes Historical Provider, MD   ezetimibe (ZETIA) 10 MG tablet Take 10 mg by mouth every evening    Yes Historical Provider, MD   levothyroxine (SYNTHROID) 100 MCG tablet Take 100 mcg by mouth daily.    Yes Historical Provider, MD   brompheniramine-pseudoephedrine-DM 2-30-10 MG/5ML syrup TAKE 1 TO 2 TEASPOONFULS BY MOUTH EVERY 6 HOURS AS NEEDED FOR COUGH  Patient not taking: Reported on 6/7/2022 8/31/21   Historical Provider, MD     Allergies   Allergen Reactions    Iodine Anaphylaxis     IVP DYE    Lipitor Other (See Comments)     Severe leg pain    Statins Other (See Comments)     Muscles go weak and she falls    Atorvastatin      Makes me sick severe leg cramps    Cymbalta [Duloxetine Hcl] Nausea Only    Cipro Xr      Reaction unknown    Diazepam     Duloxetine Nausea Only    Erythromycin      Hurt my stomach  Indomethacin     Ketorolac     Ketorolac Tromethamine Itching    Lodine [Etodolac]     Oscal 500-200 D-3 Dietra Crater Shell Calcium-D]     Paxil [Paroxetine Hcl]     Ropinirole     Tromethamine     Trulicity [Dulaglutide] Swelling    Zithromax [Azithromycin]     Requip [Ropinirole Hcl] Nausea And Vomiting     Social History     Socioeconomic History    Marital status:      Spouse name: Not on file    Number of children: Not on file    Years of education: Not on file    Highest education level: Not on file   Occupational History    Not on file   Tobacco Use    Smoking status: Never Smoker    Smokeless tobacco: Never Used   Vaping Use    Vaping Use: Never used   Substance and Sexual Activity    Alcohol use: No    Drug use: No    Sexual activity: Never   Other Topics Concern    Not on file   Social History Narrative    Not on file     Social Determinants of Health     Financial Resource Strain:     Difficulty of Paying Living Expenses: Not on file   Food Insecurity:     Worried About Running Out of Food in the Last Year: Not on file    Edilberto of Food in the Last Year: Not on file   Transportation Needs:     Lack of Transportation (Medical): Not on file    Lack of Transportation (Non-Medical):  Not on file   Physical Activity:     Days of Exercise per Week: Not on file    Minutes of Exercise per Session: Not on file   Stress:     Feeling of Stress : Not on file   Social Connections:     Frequency of Communication with Friends and Family: Not on file    Frequency of Social Gatherings with Friends and Family: Not on file    Attends [employer] Services: Not on file    Active Member of Clubs or Organizations: Not on file    Attends Club or Organization Meetings: Not on file    Marital Status: Not on file   Intimate Partner Violence:     Fear of Current or Ex-Partner: Not on file    Emotionally Abused: Not on file    Physically Abused: Not on file    Sexually Abused: Not on file Housing Stability:     Unable to Pay for Housing in the Last Year: Not on file    Number of Places Lived in the Last Year: Not on file    Unstable Housing in the Last Year: Not on file     Family History   Problem Relation Age of Onset    Cancer Father [de-identified]        bladdder    Cancer Brother     COPD Brother     Heart Attack Mother 54    Heart Disease Sister     Diabetes Sister     Heart Disease Brother     Heart Disease Other 58             Diabetes Other     ADHD Other      REVIEW OF SYSTEMS:     Daryle Capri denies fever/chills, chest pain, shortness of breath, new bowel or bladder complaints. All other review of systems was negative. PHYSICAL EXAMINATION:      /64   Pulse 68   Temp 96.9 °F (36.1 °C) (Infrared)   Resp 14   Ht 5' 1\" (1.549 m)   Wt 184 lb (83.5 kg)   SpO2 98%   BMI 34.77 kg/m²     General:       General appearance:   elderly, pleasant and well-hydrated. , in moderate discomfort and A & O x3  Build:Overweight     HEENT:     Head:normocephalic and atraumatic  Sclera: icterus absent,      Lungs:     Breathing:Breathing Pattern: regular, no distress     Abdomen:     Shape:non-distended and normal     Lumbar spine:     Spine inspection:surgical incision scar   Generator Left buttocks not flipped/no skin erosions     Musculoskeletal:     SLR:negative right, negative left, sitting      Extremities:     Tremors:None bilaterally upper and lower  Intact: Yes     Neurological:     Sensory:diminished to light touch and pin prick bilateral legs  Motor:                             Right Quadriceps4/5          Left Quadriceps4/5           Right Gastrocnemius4/5    Left Gastrocnemius4/5  Right Ant Tibialis4/5  Left Ant Tibialis4/5  Reflexes:    Right Quadriceps reflex0  Left Quadriceps reflex0  Right Achilles reflex0  Left Achilles reflex0  Gait:antalgic with a cane     Dermatology:     Skin:no unusual rashes and no skin lesions     Impression:  Chronic low back pain with radiation to both lower extremities   Lumbar spine MRI 2019 L4-5-S1 bilateral laminectomies with fusion  Patient had seen neurosurgery and is not a candidate for surgery, SCS was recommended   Plan:  6 month follow up on her low back pain with complaints of losing SCS coverage. Recent multiple falls(currently seeing a neurologist). Will schedule patient for thoracic spine Xray to check for lead migration. Patient met with [facility] rep for reprogramming(Right side is covered well). Continue with Norco 5/325 QD PRN for moderate to severe pain. Continue with Baclofen 5 mg BID. OARRS report reviewed 06/2022 consistent. Patient encouraged to stay active and to lose weight. Treatment plan discussed with the patient including medications side effects. We discussed with the patient that combining opioids, benzodiazepines, alcohol, illicit drugs or sleep aids increases the risk of respiratory depression including death. We discussed that these medications may cause drowsiness, sedation or dizziness and have counseled the patient not to drive or operate machinery. We have discussed that these medications will be prescribed only by one provider. We have discussed with the patient about age related risk factors and have thoroughly discussed the importance of taking these medications as prescribed. The patient verbalizes understanding. ccreferring physic M. Nelwyn Rinne M.D.

## 2022-06-07 NOTE — PROGRESS NOTES
Do you currently have any of the following:    Fever: No  Headache:  No  Cough: No  Shortness of breath: No  Exposed to anyone with these symptoms: Antonella Frostrill Hof presents to the [address] [place] [address] on 6/7/2022. Laquita Ying is complaining of pain in her shoulder, head, back and legs. . The pain is constant. The pain is described as aching and throbbing. Pain is rated on her best day at a 5, on her worst day at a 10, and on average at a 7 on the VAS scale. She took her last dose of Tylenol and baclofen. Afraid to use her pain medication Gabapentin . Laquita Ying does not have issues with constipation. Any procedures since your last visit: No,       She is not on NSAIDS and  is  on anticoagulation medications to include ASA and Plavix and is managed by Jan Davies DO  . Pacemaker or defibrillator: No Physician managing device is NA. Medication Contract and Consent for Opioid Use Documents Filed     Patient Documents     Type of Document Status Date Received Received By Description    Medication Contract [Status Missing]  Adriano Hicks 7/151/6 Neurosurgery Medication Contract                   /64   Pulse 68   Temp 96.9 °F (36.1 °C) (Infrared)   Resp 14   Ht 5' 1\" (1.549 m)   Wt 184 lb (83.5 kg)   SpO2 98%   BMI 34.77 kg/m²      No LMP recorded. Patient has had a hysterectomy. Goal Outcome Evaluation:  Plan of Care Reviewed With: patient           Outcome Summary: VSS, atarax given for anxiety, loose BM x2, tolerating GI soft reg diet, on falls precautions, Q2 turns provided when pt allowed, c/o pain from rt heel, Lt on pink and non blanchable, meplix applieds, floating heels with pillow,wound care to be done on day shift, Mepilex on hips to be replaced 11/1/21, meplix on coccyx replaced with BM, purewick in place, slept between care.

## 2022-06-10 ENCOUNTER — HOSPITAL ENCOUNTER (EMERGENCY)
Age: 77
Discharge: HOME | End: 2022-06-10
Attending: EMERGENCY MEDICINE
Payer: MEDICARE

## 2022-06-10 ENCOUNTER — APPOINTMENT (OUTPATIENT)
Dept: CT IMAGING | Age: 77
End: 2022-06-10
Payer: MEDICARE

## 2022-06-10 ENCOUNTER — APPOINTMENT (OUTPATIENT)
Dept: GENERAL RADIOLOGY | Age: 77
End: 2022-06-10
Payer: MEDICARE

## 2022-06-10 VITALS
HEART RATE: 65 BPM | TEMPERATURE: 98.5 F | RESPIRATION RATE: 16 BRPM | OXYGEN SATURATION: 98 % | SYSTOLIC BLOOD PRESSURE: 144 MMHG | DIASTOLIC BLOOD PRESSURE: 79 MMHG

## 2022-06-10 DIAGNOSIS — M25.512 ACUTE PAIN OF LEFT SHOULDER: ICD-10-CM

## 2022-06-10 DIAGNOSIS — S16.1XXA CERVICAL STRAIN, ACUTE, INITIAL ENCOUNTER: ICD-10-CM

## 2022-06-10 DIAGNOSIS — S40.012A CONTUSION OF LEFT SHOULDER, INITIAL ENCOUNTER: ICD-10-CM

## 2022-06-10 DIAGNOSIS — J84.10 CALCIFIED GRANULOMA OF LUNG (HCC): ICD-10-CM

## 2022-06-10 DIAGNOSIS — S09.90XA CLOSED HEAD INJURY, INITIAL ENCOUNTER: Primary | ICD-10-CM

## 2022-06-10 PROCEDURE — 70450 CT HEAD/BRAIN W/O DYE: CPT

## 2022-06-10 PROCEDURE — 71046 X-RAY EXAM CHEST 2 VIEWS: CPT

## 2022-06-10 PROCEDURE — 72125 CT NECK SPINE W/O DYE: CPT

## 2022-06-10 PROCEDURE — 99284 EMERGENCY DEPT VISIT MOD MDM: CPT

## 2022-06-10 PROCEDURE — 73030 X-RAY EXAM OF SHOULDER: CPT

## 2022-06-10 ASSESSMENT — PAIN - FUNCTIONAL ASSESSMENT
PAIN_FUNCTIONAL_ASSESSMENT: NONE - DENIES PAIN
PAIN_FUNCTIONAL_ASSESSMENT: NONE - DENIES PAIN
PAIN_FUNCTIONAL_ASSESSMENT: 0-10

## 2022-06-10 ASSESSMENT — PAIN SCALES - GENERAL: PAINLEVEL_OUTOF10: 4

## 2022-06-10 ASSESSMENT — PAIN DESCRIPTION - LOCATION: LOCATION: HEAD

## 2022-06-10 ASSESSMENT — PAIN DESCRIPTION - DESCRIPTORS: DESCRIPTORS: DULL

## 2022-06-10 ASSESSMENT — PAIN DESCRIPTION - ORIENTATION: ORIENTATION: RIGHT;POSTERIOR

## 2022-06-10 NOTE — ED PROVIDER NOTES
HPI:  6/10/22,   Time: 4:11 PM EDT         Brad Corrales is a 78 y.o. female presenting to the ED for fall, left shoulder pain, beginning 3 days ago. The complaint has been intermittent, mild in severity, and worsened by nothing. Patient states that she was ambulating and her knees gave out on her. She states that she fell backward into the door frame hitting the left side of her head and shoulder against the frame. She denies any loss of consciousness. Denies any associated chest pain, shortness of breath, lightheadedness, general weakness. Says that her knees give out on her frequently. She followed up with her cardiologist today and was told that her heart looked great. He recommended coming to the ED for imaging of the head, neck, shoulder as the patient is having pain s/p fall. Patient is on Plavix. She does have a history of head bleed in the past.  She denies any vision changes, speech changes, numbness, tingling, weakness, chest pain, shortness of breath, abdominal pain. Patient has full range of motion of her left shoulder. There is bruising to the posterior aspect. She also complains of left-sided neck pain. No radiation. No alleviating or exacerbating factors. ROS:   Pertinent positives and negatives are stated within HPI, all other systems reviewed and are negative.  --------------------------------------------- PAST HISTORY ---------------------------------------------  Past Medical History:  has a past medical history of Arthritis, CAD (coronary artery disease), Current use of long term anticoagulation, Fibromyalgia, GERD (gastroesophageal reflux disease), History of cardiovascular stress test, History of MI (myocardial infarction), Hx of blood clots, Hyperlipidemia, Hypertension, Hypothyroidism, Intractable low back pain, Non-insulin dependent type 2 diabetes mellitus ([facility] [address] NSTEMI (non-ST elevated myocardial infarction) ([facility] and Syncope and collapse.     Past Surgical History:  has a past surgical history that includes Salivary gland surgery; Rotator cuff repair; Cholecystectomy; Appendectomy; orthopedic surgery; Hysterectomy; Coccyx removal; Bladder surgery; Hemorrhoid surgery; Coronary angioplasty with stent; Coronary artery bypass graft; Tonsillectomy; Cataract removal with implant (Right, 7 7 15); Nerve Block (Left, 11 18 2015); Coronary angioplasty with stent (11/19/2015); Cataract removal with implant (Left, 4/26/2016); lumbar fusion (6/7/2016); Nerve Block (Right, 07/17/2017); Nerve Block (Right, 09/06/2017); Cardiac catheterization (11/15/2017); Colonoscopy; Endoscopy, colon, diagnostic; vascular surgery; pr arthrs kne surg w/meniscectomy med/lat w/shvg (Right, 6/6/2018); Nerve Block (Bilateral, 10/24/2018); pr inj dx/ther agnt paravert facet joint, lumbar/sac, 1st level (Bilateral, 10/24/2018); Nerve Block (Left, 08/22/2019); arthrocentesis (Left, 8/22/2019); other surgical history (N/A, 11/04/2019); REPLACE / REVISE VAGAL NERVE STIMULATOR (N/A, 11/4/2019); Spinal Cord Stimulator Surgery (06/15/2020); Nerve Surgery (N/A, 6/15/2020); and Elbow fracture surgery (Right, 7/30/2021). Social History:  reports that she has never smoked. She has never used smokeless tobacco. She reports that she does not drink alcohol and does not use drugs. Family History: family history includes ADHD in an other family member; COPD in her brother; Cancer in her brother; Cancer (age of onset: [de-identified]) in her father; Diabetes in her sister and another family member; Heart Attack (age of onset: 54) in her mother; Heart Disease in her brother and sister; Heart Disease (age of onset: 58) in an other family member. The patients home medications have been reviewed.     Allergies: Iodine, Lipitor, Statins, Atorvastatin, Cymbalta [duloxetine hcl], Cipro xr, Diazepam, Duloxetine, Erythromycin, Indomethacin, Ketorolac, Ketorolac tromethamine, Lodine [etodolac], Oscal 500-200 d-3 [oyster shell calcium-d], Paxil [paroxetine hcl], Ropinirole, Tromethamine, Trulicity [dulaglutide], Zithromax [azithromycin], and Requip [ropinirole hcl]    -------------------------------------------------- RESULTS -------------------------------------------------  All laboratory and radiology results have been personally reviewed by myself   LABS:  No results found for this visit on 06/10/22. RADIOLOGY:  Interpreted by Radiologist.  XR CHEST (2 VW)   Final Result   No acute process. Stable calcified granuloma, right lower lung. XR SHOULDER LEFT (MIN 2 VIEWS)   Final Result   No acute abnormality. CT HEAD WO CONTRAST   Final Result   No acute intracranial abnormality. CT CERVICAL SPINE WO CONTRAST   Final Result   No acute abnormality of the cervical spine. Degenerative changes at C5-C6.             ------------------------- NURSING NOTES AND VITALS REVIEWED ---------------------------   The nursing notes within the ED encounter and vital signs as below have been reviewed. BP (!) 144/79   Pulse 65   Temp 98.5 °F (36.9 °C) (Oral)   Resp 16   SpO2 98%   Oxygen Saturation Interpretation: Normal      ---------------------------------------------------PHYSICAL EXAM--------------------------------------    Constitutional/General: Alert and oriented x3, well appearing, non toxic in NAD  Head: NC/AT  Eyes: PERRL, EOMI  Mouth: Oropharynx clear, handling secretions, no trismus  Neck: Supple, left paracervical tenderness to palpation. No midline cervical spine tenderness to palpation. No thoracic tenderness to palpation. Pulmonary: Lungs clear to auscultation bilaterally, no wheezes, rales, or rhonchi. Not in respiratory distress  Cardiovascular:  Regular rate and rhythm, no murmurs, gallops, or rubs. 2+ distal pulses. No chest wall tenderness to palpation. Abdomen: Soft, non tender, non distended,   Extremities: Moves all extremities x 4. Warm and well perfused.   Full range of motion of the left upper extremity. Contusion to the left shoulder. No thoracic or lumbar spine tenderness to palpation. Skin: warm and dry without rash  Neurologic: GCS 15,  Psych: Normal Affect      ------------------------------ ED COURSE/MEDICAL DECISION MAKING----------------------  Medications - No data to display      Medical Decision Making:    CT of the head, cervical spine did not show an acute process. X-ray of the left shoulder did not show an acute abnormality. Chest x-ray shows no acute process. There is a stable calcified granuloma in the right lower lung. I discussed the results with the patient. I told her to continue taking her medications prescribed as directed. I told her to take Tylenol as needed for symptom relief and to follow-up with her primary care physician for reevaluation this week. She is agreeable with plan of care. Counseling: The emergency provider has spoken with the patient and discussed todays results, in addition to providing specific details for the plan of care and counseling regarding the diagnosis and prognosis. Questions are answered at this time and they are agreeable with the plan.      --------------------------------- IMPRESSION AND DISPOSITION ---------------------------------    IMPRESSION  1. Closed head injury, initial encounter    2. Cervical strain, acute, initial encounter    3. Acute pain of left shoulder    4. Contusion of left shoulder, initial encounter    5.  Calc[facility] granul[facility] ([facility]        DISPOSITION  Disposition: Discharge to home  Patient condition is stable                  Tiffanie Ware MD  06/10/22 0456

## 2022-06-16 ENCOUNTER — TREATMENT (OUTPATIENT)
Dept: SPEECH THERAPY | Age: 77
End: 2022-06-16
Payer: MEDICARE

## 2022-06-16 DIAGNOSIS — R41.841 COGNITIVE COMMUNICATION DEFICIT: Primary | ICD-10-CM

## 2022-06-16 PROCEDURE — 97130 THER IVNTJ EA ADDL 15 MIN: CPT | Performed by: SPEECH-LANGUAGE PATHOLOGIST

## 2022-06-16 PROCEDURE — 97129 THER IVNTJ 1ST 15 MIN: CPT | Performed by: SPEECH-LANGUAGE PATHOLOGIST

## 2022-06-20 ENCOUNTER — APPOINTMENT (OUTPATIENT)
Dept: CT IMAGING | Age: 77
End: 2022-06-20
Payer: MEDICARE

## 2022-06-20 ENCOUNTER — HOSPITAL ENCOUNTER (EMERGENCY)
Age: 77
Discharge: HOME | End: 2022-06-20
Attending: STUDENT IN AN ORGANIZED HEALTH CARE EDUCATION/TRAINING PROGRAM
Payer: MEDICARE

## 2022-06-20 ENCOUNTER — APPOINTMENT (OUTPATIENT)
Dept: GENERAL RADIOLOGY | Age: 77
End: 2022-06-20
Payer: MEDICARE

## 2022-06-20 VITALS
RESPIRATION RATE: 18 BRPM | TEMPERATURE: 98.2 F | HEART RATE: 72 BPM | WEIGHT: 184 LBS | SYSTOLIC BLOOD PRESSURE: 132 MMHG | DIASTOLIC BLOOD PRESSURE: 60 MMHG | OXYGEN SATURATION: 97 % | BODY MASS INDEX: 34.74 KG/M2 | HEIGHT: 61 IN
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
## 2022-06-20 LAB
ANION GAP SERPL CALCULATED.3IONS-SCNC: 11 MMOL/L (ref 7–16)
BACTERIA: ABNORMAL /HPF
BASOPHILS ABSOLUTE: 0.04 E9/L (ref 0–0.2)
BASOPHILS RELATIVE PERCENT: 0.9 % (ref 0–2)
BILIRUBIN URINE: NEGATIVE
BLOOD, URINE: NEGATIVE
BUN BLDV-MCNC: 25 MG/DL (ref 6–23)
CALCIUM SERPL-MCNC: 9.1 MG/DL (ref 8.6–10.2)
CHLORIDE BLD-SCNC: 103 MMOL/L (ref 98–107)
CLARITY: CLEAR
CO2: 27 MMOL/L (ref 22–29)
COLOR: YELLOW
CREAT SERPL-MCNC: 1.2 MG/DL (ref 0.5–1)
EKG ATRIAL RATE: 65 BPM
EKG P AXIS: -14 DEGREES
EKG P-R INTERVAL: 196 MS
EKG Q-T INTERVAL: 442 MS
EKG QRS DURATION: 82 MS
EKG QTC CALCULATION (BAZETT): 459 MS
EKG R AXIS: 5 DEGREES
EKG T AXIS: 5 DEGREES
EKG VENTRICULAR RATE: 65 BPM
EOSINOPHILS ABSOLUTE: 0.1 E9/L (ref 0.05–0.5)
EOSINOPHILS RELATIVE PERCENT: 2.1 % (ref 0–6)
GFR AFRICAN AMERICAN: 53
GFR NON-AFRICAN AMERICAN: 44 ML/MIN/1.73
GLUCOSE BLD-MCNC: 147 MG/DL (ref 74–99)
GLUCOSE URINE: >=1000 MG/DL
HCT VFR BLD CALC: 38.6 % (ref 34–48)
HEMOGLOBIN: 12.8 G/DL (ref 11.5–15.5)
IMMATURE GRANULOCYTES #: 0.02 E9/L
IMMATURE GRANULOCYTES %: 0.4 % (ref 0–5)
KETONES, URINE: NEGATIVE MG/DL
LEUKOCYTE ESTERASE, URINE: NEGATIVE
LYMPHOCYTES ABSOLUTE: 1.61 E9/L (ref 1.5–4)
LYMPHOCYTES RELATIVE PERCENT: 34.3 % (ref 20–42)
MCH RBC QN AUTO: 31.6 PG (ref 26–35)
MCHC RBC AUTO-ENTMCNC: 33.2 % (ref 32–34.5)
MCV RBC AUTO: 95.3 FL (ref 80–99.9)
MONOCYTES ABSOLUTE: 0.46 E9/L (ref 0.1–0.95)
MONOCYTES RELATIVE PERCENT: 9.8 % (ref 2–12)
NEUTROPHILS ABSOLUTE: 2.47 E9/L (ref 1.8–7.3)
NEUTROPHILS RELATIVE PERCENT: 52.5 % (ref 43–80)
NITRITE, URINE: NEGATIVE
PDW BLD-RTO: 13.3 FL (ref 11.5–15)
PH UA: 5 (ref 5–9)
PLATELET # BLD: 272 E9/L (ref 130–450)
PMV BLD AUTO: 10 FL (ref 7–12)
POTASSIUM REFLEX MAGNESIUM: 3.7 MMOL/L (ref 3.5–5)
PRO-BNP: 314 PG/ML (ref 0–450)
PROTEIN UA: NEGATIVE MG/DL
RBC # BLD: 4.05 E12/L (ref 3.5–5.5)
RBC UA: ABNORMAL /HPF (ref 0–2)
SODIUM BLD-SCNC: 141 MMOL/L (ref 132–146)
SPECIFIC GRAVITY UA: 1.01 (ref 1–1.03)
TROPONIN, HIGH SENSITIVITY: 17 NG/L (ref 0–9)
TROPONIN, HIGH SENSITIVITY: 18 NG/L (ref 0–9)
UROBILINOGEN, URINE: 0.2 E.U./DL
WBC # BLD: 4.7 E9/L (ref 4.5–11.5)
WBC UA: ABNORMAL /HPF (ref 0–5)

## 2022-06-20 PROCEDURE — 73130 X-RAY EXAM OF HAND: CPT

## 2022-06-20 PROCEDURE — 70450 CT HEAD/BRAIN W/O DYE: CPT

## 2022-06-20 PROCEDURE — 71045 X-RAY EXAM CHEST 1 VIEW: CPT

## 2022-06-20 PROCEDURE — 93005 ELECTROCARDIOGRAM TRACING: CPT | Performed by: STUDENT IN AN ORGANIZED HEALTH CARE EDUCATION/TRAINING PROGRAM

## 2022-06-20 PROCEDURE — 96374 THER/PROPH/DIAG INJ IV PUSH: CPT

## 2022-06-20 PROCEDURE — 73630 X-RAY EXAM OF FOOT: CPT

## 2022-06-20 PROCEDURE — 72125 CT NECK SPINE W/O DYE: CPT

## 2022-06-20 PROCEDURE — 80048 BASIC METABOLIC PNL TOTAL CA: CPT

## 2022-06-20 PROCEDURE — 90714 TD VACC NO PRESV 7 YRS+ IM: CPT | Performed by: STUDENT IN AN ORGANIZED HEALTH CARE EDUCATION/TRAINING PROGRAM

## 2022-06-20 PROCEDURE — 83880 ASSAY OF NATRIURETIC PEPTIDE: CPT

## 2022-06-20 PROCEDURE — 85025 COMPLETE CBC W/AUTO DIFF WBC: CPT

## 2022-06-20 PROCEDURE — 99285 EMERGENCY DEPT VISIT HI MDM: CPT

## 2022-06-20 PROCEDURE — 6360000002 HC RX W HCPCS: Performed by: STUDENT IN AN ORGANIZED HEALTH CARE EDUCATION/TRAINING PROGRAM

## 2022-06-20 PROCEDURE — 84484 ASSAY OF TROPONIN QUANT: CPT

## 2022-06-20 PROCEDURE — 73070 X-RAY EXAM OF ELBOW: CPT

## 2022-06-20 PROCEDURE — 81001 URINALYSIS AUTO W/SCOPE: CPT

## 2022-06-20 PROCEDURE — 73110 X-RAY EXAM OF WRIST: CPT

## 2022-06-20 PROCEDURE — 90471 IMMUNIZATION ADMIN: CPT | Performed by: STUDENT IN AN ORGANIZED HEALTH CARE EDUCATION/TRAINING PROGRAM

## 2022-06-20 PROCEDURE — 36415 COLL VENOUS BLD VENIPUNCTURE: CPT

## 2022-06-20 PROCEDURE — 72131 CT LUMBAR SPINE W/O DYE: CPT

## 2022-06-20 RX ORDER — MORPHINE SULFATE 4 MG/ML
4 INJECTION, SOLUTION INTRAMUSCULAR; INTRAVENOUS ONCE
Status: COMPLETED | OUTPATIENT
Start: 2022-06-20 | End: 2022-06-20

## 2022-06-20 RX ORDER — TETANUS AND DIPHTHERIA TOXOIDS ADSORBED 2; 2 [LF]/.5ML; [LF]/.5ML
0.5 INJECTION INTRAMUSCULAR ONCE
Status: COMPLETED | OUTPATIENT
Start: 2022-06-20 | End: 2022-06-20

## 2022-06-20 RX ADMIN — MORPHINE SULFATE 4 MG: 4 INJECTION, SOLUTION INTRAMUSCULAR; INTRAVENOUS at 15:36

## 2022-06-20 RX ADMIN — TETANUS AND DIPHTHERIA TOXOIDS ADSORBED 0.5 ML: 2; 2 INJECTION INTRAMUSCULAR at 16:21

## 2022-06-20 ASSESSMENT — PAIN SCALES - GENERAL
PAINLEVEL_OUTOF10: 5
PAINLEVEL_OUTOF10: 3

## 2022-06-20 ASSESSMENT — LIFESTYLE VARIABLES: HOW OFTEN DO YOU HAVE A DRINK CONTAINING ALCOHOL: NEVER

## 2022-06-20 ASSESSMENT — PAIN - FUNCTIONAL ASSESSMENT
PAIN_FUNCTIONAL_ASSESSMENT: 0-10
PAIN_FUNCTIONAL_ASSESSMENT: 0-10

## 2022-06-20 ASSESSMENT — PAIN DESCRIPTION - DESCRIPTORS: DESCRIPTORS: ACHING

## 2022-06-20 ASSESSMENT — PAIN DESCRIPTION - LOCATION: LOCATION: BACK

## 2022-06-20 ASSESSMENT — PAIN DESCRIPTION - ORIENTATION: ORIENTATION: MID

## 2022-06-20 NOTE — ED PROVIDER NOTES
ED  Provider Note  Admit Date/RoomTime: 6/20/2022  1:05 PM  ED Room: [place] [address]      History of Present Illness:  6/20/22, Time: 1:08 PM EDT  Chief Complaint   Patient presents with    Fall     to er via ems from home for evaluation of frequent falls recently. fell today. she is complaining of mid back pain. she says her legs give out while trying to walk and that is how she falls. Juana Whitley is a 78 y.o. female presenting to the ED for falls secondary to knees buckling and giving out intermittently, worsening in last couple of weeks, fall earlier today into a door, has scrape on back   Sudden onset, no prodrome, now with persistent mild  R arm pain entire arm   Neck pain   Mid back pain superficially where has abrasion no deep pain   No weakness or numbness of lower extremities, no saddle anaesthesia, no bowel or bladder incontinence, no urinary retention. No f/c, no c/c/r, no loc unknown head strike   SW consult    Also c/o visions intermittently, not distressing or negative, not ass'd with confusion, wordfinding, etc. Has been chronic         Review of Systems:   A complete review of systems was performed and pertinent positives and negatives are stated within HPI, all other systems reviewed and are negative.        --------------------------------------------- PATIENT HISTORY--------------------------------------------  Past Medical History:  has a past medical history of Arthritis, CAD (coronary artery disease), Current use of long term anticoagulation, Fibromyalgia, GERD (gastroesophageal reflux disease), History of cardiovascular stress test, History of MI (myocardial infarction), Hx of blood clots, Hyperlipidemia, Hypertension, Hypothyroidism, Intractable low back pain, Non-insulin dependent type 2 diabetes mellitus ([facility] [address] NSTEMI (non-ST elevated myocardial infarction) ([facility] [address] and Syncope and collapse.     Past Surgical History:  has a past surgical history that includes Salivary gland surgery; Rotator cuff repair; Cholecystectomy; Appendectomy; orthopedic surgery; Hysterectomy; Coccyx removal; Bladder surgery; Hemorrhoid surgery; Coronary angioplasty with stent; Coronary artery bypass graft; Tonsillectomy; Cataract removal with implant (Right, 7 7 15); Nerve Block (Left, 11 18 2015); Coronary angioplasty with stent (11/19/2015); Cataract removal with implant (Left, 4/26/2016); lumbar fusion (6/7/2016); Nerve Block (Right, 07/17/2017); Nerve Block (Right, 09/06/2017); Cardiac catheterization (11/15/2017); Colonoscopy; Endoscopy, colon, diagnostic; vascular surgery; pr arthrs kne surg w/meniscectomy med/lat w/shvg (Right, 6/6/2018); Nerve Block (Bilateral, 10/24/2018); pr inj dx/ther agnt paravert facet joint, lumbar/sac, 1st level (Bilateral, 10/24/2018); Nerve Block (Left, 08/22/2019); arthrocentesis (Left, 8/22/2019); other surgical history (N/A, 11/04/2019); REPLACE / REVISE VAGAL NERVE STIMULATOR (N/A, 11/4/2019); Spinal Cord Stimulator Surgery (06/15/2020); Nerve Surgery (N/A, 6/15/2020); and Elbow fracture surgery (Right, 7/30/2021). Family History: family history includes ADHD in an other family member; COPD in her brother; Cancer in her brother; Cancer (age of onset: [de-identified]) in her father; Diabetes in her sister and another family member; Heart Attack (age of onset: 54) in her mother; Heart Disease in her brother and sister; Heart Disease (age of onset: 58) in an other family member. . Unless otherwise noted, family history is non contributory    Social History:  reports that she has never smoked. She has never used smokeless tobacco. She reports that she does not drink alcohol and does not use drugs. The patients home medications have been reviewed.     Allergies: Iodine, Lipitor, Statins, Atorvastatin, Cymbalta [duloxetine hcl], Cipro xr, Diazepam, Duloxetine, Erythromycin, Indomethacin, Ketorolac, Ketorolac tromethamine, Lodine [etodolac], Oscal 500-200 d-3 [oyster shell calcium-d], Paxil [paroxetine hcl], Ropinirole, Tromethamine, Trulicity [dulaglutide], Zithromax [azithromycin], Zoloft [sertraline hcl], and Requip [ropinirole hcl]    I have reviewed the past medical history, past surgical history, social history, and family history    ---------------------------------------------------PHYSICAL EXAM--------------------------------------    Constitutional/General: Alert and oriented x3  Head: Normocephalic and atraumatic  Eyes: PERRL, EOMI, sclera non icteric  ENT: Oropharynx clear, handling secretions, no trismus  Neck: Supple, full ROM, no stridor, no meningismus  Respiratory: lctab   Cardiovascular: RRR, no R/G/M, 2+ peripheral pulses  Chest: No chest wall tenderness, equal chest rise  Gastrointestinal:  lctab  Musculoskeletal: Extremities warm and well perfused, moving all extremities, no midline TTP +abrasion mid back no ecchymosis or deformity/stepoffs, diffuse TTP R arm no bony deformity   Skin: skin warm and dry. No rashes. Neurologic: No focal deficits, strength and sensation grossly intact, 5/5 strength RLE, LLE, RUE, LUE sensation intact   Psychiatric: Normal Affect, behavior normal         -------------------------------------------------- RESULTS -------------------------------------------------  I have personally reviewed all laboratory and imaging results for this patient. Results are listed below.      LABS: (Lab results interpreted by me)  Results for orders placed or performed during the hospital encounter of 06/20/22   CBC with Auto Differential   Result Value Ref Range    WBC 4.7 4.5 - 11.5 E9/L    RBC 4.05 3.50 - 5.50 E12/L    Hemoglobin 12.8 11.5 - 15.5 g/dL    Hematocrit 38.6 34.0 - 48.0 %    MCV 95.3 80.0 - 99.9 fL    MCH 31.6 26.0 - 35.0 pg    MCHC 33.2 32.0 - 34.5 %    RDW 13.3 11.5 - 15.0 fL    Platelets 136 190 - 026 E9/L    MPV 10.0 7.0 - 12.0 fL    Neutrophils % 52.5 43.0 - 80.0 %    Immature Granulocytes % 0.4 0.0 - 5.0 %    Lymphocytes % 34.3 20.0 - 42.0 % Monocytes % 9.8 2.0 - 12.0 %    Eosinophils % 2.1 0.0 - 6.0 %    Basophils % 0.9 0.0 - 2.0 %    Neutrophils Absolute 2.47 1.80 - 7.30 E9/L    Immature Granulocytes # 0.02 E9/L    Lymphocytes Absolute 1.61 1.50 - 4.00 E9/L    Monocytes Absolute 0.46 0.10 - 0.95 E9/L    Eosinophils Absolute 0.10 0.05 - 0.50 E9/L    Basophils Absolute 0.04 0.00 - 0.20 Y9/B   Basic Metabolic Panel w/ Reflex to MG   Result Value Ref Range    Sodium 141 132 - 146 mmol/L    Potassium reflex Magnesium 3.7 3.5 - 5.0 mmol/L    Chloride 103 98 - 107 mmol/L    CO2 27 22 - 29 mmol/L    Anion Gap 11 7 - 16 mmol/L    Glucose 147 (H) 74 - 99 mg/dL    BUN 25 (H) 6 - 23 mg/dL    CREATININE 1.2 (H) 0.5 - 1.0 mg/dL    GFR Non-African American 44 >=60 mL/min/1.73    GFR African American 53     Calcium 9.1 8.6 - 10.2 mg/dL   Troponin   Result Value Ref Range    Troponin, High Sensitivity 18 (H) 0 - 9 ng/L   Brain Natriuretic Peptide   Result Value Ref Range    Pro- 0 - 450 pg/mL   Urinalysis with Microscopic   Result Value Ref Range    Color, UA Yellow Straw/Yellow    Clarity, UA Clear Clear    Glucose, Ur >=1000 (A) Negative mg/dL    Bilirubin Urine Negative Negative    Ketones, Urine Negative Negative mg/dL    Specific Gravity, UA 1.010 1.005 - 1.030    Blood, Urine Negative Negative    pH, UA 5.0 5.0 - 9.0    Protein, UA Negative Negative mg/dL    Urobilinogen, Urine 0.2 <2.0 E.U./dL    Nitrite, Urine Negative Negative    Leukocyte Esterase, Urine Negative Negative    WBC, UA NONE 0 - 5 /HPF    RBC, UA NONE 0 - 2 /HPF    Bacteria, UA NONE SEEN None Seen /HPF   Troponin   Result Value Ref Range    Troponin, High Sensitivity 17 (H) 0 - 9 ng/L   EKG 12 Lead   Result Value Ref Range    Ventricular Rate 65 BPM    Atrial Rate 65 BPM    P-R Interval 196 ms    QRS Duration 82 ms    Q-T Interval 442 ms    QTc Calculation (Bazett) 459 ms    P Axis -14 degrees    R Axis 5 degrees    T Axis 5 degrees   ,       RADIOLOGY:  Imaging interpreted by Radiologist unless otherwise specified  XR HAND RIGHT (MIN 3 VIEWS)   Final Result   No acute osseous abnormality. XR ELBOW RIGHT (2 VIEWS)   Final Result   No acute abnormality. XR WRIST RIGHT (MIN 3 VIEWS)   Final Result   Normal wrist radiographs      HISTORY:   ORDERING SYSTEM PROVIDED HISTORY: fall   TECHNOLOGIST PROVIDED HISTORY:   Reason for exam:->fall         XR CHEST 1 VIEW   Final Result   No acute process. Cardiomegaly, status post median sternotomy. XR FOOT LEFT (MIN 3 VIEWS)   Final Result   No acute osseous abnormality. CT Head WO Contrast   Final Result   No acute intracranial abnormality. Specifically, there is no acute   intracranial hemorrhage         CT Cervical Spine WO Contrast   Final Result   1. There is no acute compression fracture or subluxation of the cervical   spine. 2. Multilevel degenerative disc and degenerative joint disease. CT Lumbar Spine WO Contrast   Final Result   Stable hardware posteriorly fusing the L4, L5, and S1 levels with posterior   laminectomy of L4/L5 and L5/S1 level. Severe central spinal canal narrowing   with broad-based disc bulge seen at the L3/L4 level. There is incomplete   visualization due to streak artifact.               ------------------------- NURSING NOTES AND VITALS REVIEWED ---------------------------  The nursing notes within the ED encounter and vital signs as below have been reviewed by myself  /60   Pulse 72   Temp 98.2 °F (36.8 °C) (Oral)   Resp 18   Ht 5' 1\" (1.549 m)   Wt 184 lb (83.5 kg)   SpO2 97%   BMI 34.77 kg/m²      The patients available past medical records and past encounters were reviewed.         ------------------------------ ED COURSE/MEDICAL DECISION MAKING----------------------  Medications   morphine injection 4 mg (4 mg IntraVENous Given 6/20/22 1536)   diptheria-tetanus toxoids (DECAVAC) 2-2 LF/0.5ML injection 0.5 mL (0.5 mLs IntraMUSCular Given 6/20/22 1621)       Dr. JAMEY Hannah Higuera am the primary provider of record    Medical Decision Making:   Patient presents after fall 2/2 leg weakness. 5/5 strength in ED with no ambulatory issues. No ataxia. Abrasion to back to tetanus given. Workup unremarkable besides known spinal stenosis / DDD. Patient requests SW consult for home health but feels safe going home. Family member agrees with plan. Home health ordered        ED Counseling: This emergency provider has spoken with the patient and any family present to discuss clinical status, results, plan of care, diagnosis and prognosis as able to be determined at this time. Any questions were answered and patient and/or family/POA are agreeable with the plan.       --------------------------------- IMPRESSION AND DISPOSITION ---------------------------------    IMPRESSION  1. Fall, initial encounter        DISPOSITION  Disposition: Discharge to home  Patient condition is good      This report was transcribed using voice recognition software. Every effort was made to ensure accuracy; however, transcription errors may be present.          Libby Browne MD  06/21/22 8426

## 2022-06-20 NOTE — CARE COORDINATION
SW Consult. Pt here for fall. Waiting on troponin lab & if clear, Cheryl Sharp MD noted pt to be d/c'd but in need of [address]. SW met w/pt in ED HB 04 for transition of care planning. SW wearing mask/PPE and explained role. Pt's Dtr, Antonio Me @ bedside & pt gives permission to speak openly. Pt is  residing alone- 3 steps to enter. PTA, pt is independent in IADL's/ADL's and has insurance. Pt noted she has had 3-4 falls in last 2 weeks. She had TBI 2020 and has had some dizziness too. Pt did have CT head today which was clear. PCP is Lavinia Daugherty. Pt has following DME: [employer], cane, quad cane, Shower chair. Pt has [employer]. Denies hx of PAPI. Past hx of [address] but cannot recall name of agency. No Hx of 02. SW talked to pt about [address] if she is d/c'd home. List was provided. Lalitha Santillan noted pt will stay w/her for 1-2 days d/t falls. Pt also is going to move in with her Dtr in [place] but needs to have surgery on her L wrist 6/27/22. Pt notes she would like to use [address] agency she did in past and has it written down @ home. She can call SW tomorrow w/name. 200  Pt was d/c'd & informed SW she wants [facility]. SW called [facility]. On-call noted can fax referral and it would be looked @ in morning. SW faxed referral to [phone]. Electronically signed by MARCO ANTONIO Rao on 6/20/2022 at 6:59 PM    Addendum  6/21/22  [id number]  SW received VM from [facility]. She noted they do not have OT, pt is attending out-pt speech so [facility] does not [facility] & they cannot accept pt- referral for pt is being denied. SW called pt and informed her of above information. SW was willing to call another [address] agency to see of they would take pt. Pt noted she will just call [facility]. This is where she went in past for out-pt therapy. She will call  back if she has any issues/concerns or needs.    Electronically signed by MARCO ANTONIO Rao on 6/21/2022 at 10:51 AM

## 2022-06-21 ENCOUNTER — PREP FOR PROCEDURE (OUTPATIENT)
Dept: ORTHOPEDIC SURGERY | Age: 77
End: 2022-06-21

## 2022-06-21 ENCOUNTER — TELEPHONE (OUTPATIENT)
Dept: SPEECH THERAPY | Age: 77
End: 2022-06-21

## 2022-06-21 RX ORDER — SODIUM CHLORIDE 9 MG/ML
INJECTION, SOLUTION INTRAVENOUS CONTINUOUS
Status: CANCELLED | OUTPATIENT
Start: 2022-06-21

## 2022-06-21 RX ORDER — SODIUM CHLORIDE 0.9 % (FLUSH) 0.9 %
5-40 SYRINGE (ML) INJECTION EVERY 12 HOURS SCHEDULED
Status: CANCELLED | OUTPATIENT
Start: 2022-06-21

## 2022-06-21 RX ORDER — SODIUM CHLORIDE 0.9 % (FLUSH) 0.9 %
5-40 SYRINGE (ML) INJECTION PRN
Status: CANCELLED | OUTPATIENT
Start: 2022-06-21

## 2022-06-21 RX ORDER — SODIUM CHLORIDE 9 MG/ML
INJECTION, SOLUTION INTRAVENOUS PRN
Status: CANCELLED | OUTPATIENT
Start: 2022-06-21

## 2022-06-21 NOTE — TELEPHONE ENCOUNTER
Spoke with patient; she reports that she fell and was in the ER yesterday. She reports she is staying with her daughter because she is in much pain in her back. She also reports that she is having surgery on her hand next week. We agreed to cancel scheduled therapy sessions until she is feeling strong enough to return to outpatient therapy. She states she will contact this therapist when she is able to return. Courtland Manner.  Sheran Boast, MA/ANGEL-SLP  [phone]

## 2022-06-23 NOTE — PROGRESS NOTES
Patient seen for 60 minute in person session this date. Patient reports that she has had multiple falls, just loses balance and trips over stuff. Strongly encouraged her to call her doctor today and notify of condition. She was also encouraged to obtain an order for PT services to help with her balance. She agreed. Today, we worked on short term recall of 5 related words presented verbally with no distraction provided. She completed all with 65% acc with mod cues. We discussed use of a memory planner but patient uses her calendar at home consistently for appts/special events. She reports that she works on [employer] and word search puzzles daily for brain exercises. Homework activities encouraged. Will continue. David Guadarrama.  Kym Merino MA/CCC-SLP  [phone]    [facility] [id number]

## 2022-06-29 ENCOUNTER — HOSPITAL ENCOUNTER (OUTPATIENT)
Age: 77
Setting detail: OUTPATIENT SURGERY
Discharge: HOME | End: 2022-06-29
Attending: ORTHOPAEDIC SURGERY | Admitting: ORTHOPAEDIC SURGERY

## 2022-06-29 ENCOUNTER — ANESTHESIA (OUTPATIENT)
Dept: OPERATING ROOM | Age: 77
End: 2022-06-29

## 2022-06-29 ENCOUNTER — APPOINTMENT (OUTPATIENT)
Dept: GENERAL RADIOLOGY | Age: 77
End: 2022-06-29
Attending: ORTHOPAEDIC SURGERY

## 2022-06-29 ENCOUNTER — ANESTHESIA EVENT (OUTPATIENT)
Dept: OPERATING ROOM | Age: 77
End: 2022-06-29

## 2022-06-29 VITALS
BODY MASS INDEX: 34.74 KG/M2 | SYSTOLIC BLOOD PRESSURE: 128 MMHG | WEIGHT: 184 LBS | HEART RATE: 60 BPM | DIASTOLIC BLOOD PRESSURE: 61 MMHG | TEMPERATURE: 97.5 F | OXYGEN SATURATION: 98 % | HEIGHT: 61 IN | RESPIRATION RATE: 16 BRPM

## 2022-06-29 DIAGNOSIS — M67.432 GANGLION CYST OF DORSUM OF LEFT WRIST: ICD-10-CM

## 2022-06-29 DIAGNOSIS — G89.18 POST-OPERATIVE PAIN: Primary | ICD-10-CM

## 2022-06-29 LAB — METER GLUCOSE: 103 MG/DL (ref 74–99)

## 2022-06-29 PROCEDURE — 99999 PR OFFICE/OUTPT VISIT,PROCEDURE ONLY: CPT | Performed by: PHYSICIAN ASSISTANT

## 2022-06-29 PROCEDURE — 6370000000 HC RX 637 (ALT 250 FOR IP): Performed by: ANESTHESIOLOGY

## 2022-06-29 PROCEDURE — 7100000010 HC PHASE II RECOVERY - FIRST 15 MIN: Performed by: ORTHOPAEDIC SURGERY

## 2022-06-29 PROCEDURE — 6360000002 HC RX W HCPCS: Performed by: PHYSICIAN ASSISTANT

## 2022-06-29 PROCEDURE — 3700000000 HC ANESTHESIA ATTENDED CARE: Performed by: ORTHOPAEDIC SURGERY

## 2022-06-29 PROCEDURE — 2709999900 HC NON-CHARGEABLE SUPPLY: Performed by: ORTHOPAEDIC SURGERY

## 2022-06-29 PROCEDURE — 6360000002 HC RX W HCPCS: Performed by: NURSE ANESTHETIST, CERTIFIED REGISTERED

## 2022-06-29 PROCEDURE — 82962 GLUCOSE BLOOD TEST: CPT

## 2022-06-29 PROCEDURE — 88304 TISSUE EXAM BY PATHOLOGIST: CPT

## 2022-06-29 PROCEDURE — 3600000003 HC SURGERY LEVEL 3 BASE: Performed by: ORTHOPAEDIC SURGERY

## 2022-06-29 PROCEDURE — 2500000003 HC RX 250 WO HCPCS: Performed by: NURSE ANESTHETIST, CERTIFIED REGISTERED

## 2022-06-29 PROCEDURE — 2580000003 HC RX 258: Performed by: PHYSICIAN ASSISTANT

## 2022-06-29 PROCEDURE — 7100000001 HC PACU RECOVERY - ADDTL 15 MIN: Performed by: ORTHOPAEDIC SURGERY

## 2022-06-29 PROCEDURE — 2500000003 HC RX 250 WO HCPCS: Performed by: ORTHOPAEDIC SURGERY

## 2022-06-29 PROCEDURE — 7100000000 HC PACU RECOVERY - FIRST 15 MIN: Performed by: ORTHOPAEDIC SURGERY

## 2022-06-29 PROCEDURE — 3600000013 HC SURGERY LEVEL 3 ADDTL 15MIN: Performed by: ORTHOPAEDIC SURGERY

## 2022-06-29 PROCEDURE — 3209999900 FLUORO FOR SURGICAL PROCEDURES

## 2022-06-29 PROCEDURE — 7100000011 HC PHASE II RECOVERY - ADDTL 15 MIN: Performed by: ORTHOPAEDIC SURGERY

## 2022-06-29 PROCEDURE — 6360000002 HC RX W HCPCS: Performed by: ANESTHESIOLOGY

## 2022-06-29 PROCEDURE — 3700000001 HC ADD 15 MINUTES (ANESTHESIA): Performed by: ORTHOPAEDIC SURGERY

## 2022-06-29 PROCEDURE — C1713 ANCHOR/SCREW BN/BN,TIS/BN: HCPCS | Performed by: ORTHOPAEDIC SURGERY

## 2022-06-29 DEVICE — KIT FIBERLOCK SUSPENSION IMPLANT: Type: IMPLANTABLE DEVICE | Site: WRIST | Status: FUNCTIONAL

## 2022-06-29 RX ORDER — MIDAZOLAM HYDROCHLORIDE 1 MG/ML
INJECTION INTRAMUSCULAR; INTRAVENOUS PRN
Status: DISCONTINUED | OUTPATIENT
Start: 2022-06-29 | End: 2022-06-29 | Stop reason: SDUPTHER

## 2022-06-29 RX ORDER — OXYCODONE HYDROCHLORIDE 5 MG/1
10 TABLET ORAL PRN
Status: COMPLETED | OUTPATIENT
Start: 2022-06-29 | End: 2022-06-29

## 2022-06-29 RX ORDER — GLYCOPYRROLATE 0.2 MG/ML
INJECTION INTRAMUSCULAR; INTRAVENOUS PRN
Status: DISCONTINUED | OUTPATIENT
Start: 2022-06-29 | End: 2022-06-29 | Stop reason: SDUPTHER

## 2022-06-29 RX ORDER — ONDANSETRON 2 MG/ML
INJECTION INTRAMUSCULAR; INTRAVENOUS PRN
Status: DISCONTINUED | OUTPATIENT
Start: 2022-06-29 | End: 2022-06-29 | Stop reason: SDUPTHER

## 2022-06-29 RX ORDER — OXYCODONE HYDROCHLORIDE 5 MG/1
5 TABLET ORAL PRN
Status: COMPLETED | OUTPATIENT
Start: 2022-06-29 | End: 2022-06-29

## 2022-06-29 RX ORDER — PROPOFOL 10 MG/ML
INJECTION, EMULSION INTRAVENOUS PRN
Status: DISCONTINUED | OUTPATIENT
Start: 2022-06-29 | End: 2022-06-29 | Stop reason: SDUPTHER

## 2022-06-29 RX ORDER — SODIUM CHLORIDE 0.9 % (FLUSH) 0.9 %
5-40 SYRINGE (ML) INJECTION PRN
Status: DISCONTINUED | OUTPATIENT
Start: 2022-06-29 | End: 2022-06-29 | Stop reason: HOSPADM

## 2022-06-29 RX ORDER — LIDOCAINE HYDROCHLORIDE 20 MG/ML
INJECTION, SOLUTION EPIDURAL; INFILTRATION; INTRACAUDAL; PERINEURAL PRN
Status: DISCONTINUED | OUTPATIENT
Start: 2022-06-29 | End: 2022-06-29 | Stop reason: SDUPTHER

## 2022-06-29 RX ORDER — DEXAMETHASONE SODIUM PHOSPHATE 4 MG/ML
INJECTION, SOLUTION INTRA-ARTICULAR; INTRALESIONAL; INTRAMUSCULAR; INTRAVENOUS; SOFT TISSUE PRN
Status: DISCONTINUED | OUTPATIENT
Start: 2022-06-29 | End: 2022-06-29 | Stop reason: SDUPTHER

## 2022-06-29 RX ORDER — MEPERIDINE HYDROCHLORIDE 25 MG/ML
12.5 INJECTION INTRAMUSCULAR; INTRAVENOUS; SUBCUTANEOUS EVERY 5 MIN PRN
Status: DISCONTINUED | OUTPATIENT
Start: 2022-06-29 | End: 2022-06-29 | Stop reason: HOSPADM

## 2022-06-29 RX ORDER — KETAMINE HYDROCHLORIDE 10 MG/ML
INJECTION, SOLUTION INTRAMUSCULAR; INTRAVENOUS PRN
Status: DISCONTINUED | OUTPATIENT
Start: 2022-06-29 | End: 2022-06-29 | Stop reason: SDUPTHER

## 2022-06-29 RX ORDER — BUPIVACAINE HYDROCHLORIDE AND EPINEPHRINE 5; 5 MG/ML; UG/ML
INJECTION, SOLUTION EPIDURAL; INTRACAUDAL; PERINEURAL PRN
Status: DISCONTINUED | OUTPATIENT
Start: 2022-06-29 | End: 2022-06-29 | Stop reason: ALTCHOICE

## 2022-06-29 RX ORDER — OXYCODONE HYDROCHLORIDE AND ACETAMINOPHEN 5; 325 MG/1; MG/1
1 TABLET ORAL EVERY 6 HOURS PRN
Qty: 28 TABLET | Refills: 0 | Status: SHIPPED | OUTPATIENT
Start: 2022-06-29 | End: 2022-07-06

## 2022-06-29 RX ORDER — EPHEDRINE SULFATE 50 MG/ML
INJECTION INTRAVENOUS PRN
Status: DISCONTINUED | OUTPATIENT
Start: 2022-06-29 | End: 2022-06-29 | Stop reason: SDUPTHER

## 2022-06-29 RX ORDER — SODIUM CHLORIDE 9 MG/ML
INJECTION, SOLUTION INTRAVENOUS CONTINUOUS
Status: DISCONTINUED | OUTPATIENT
Start: 2022-06-29 | End: 2022-06-29 | Stop reason: HOSPADM

## 2022-06-29 RX ORDER — SODIUM CHLORIDE 9 MG/ML
INJECTION, SOLUTION INTRAVENOUS PRN
Status: DISCONTINUED | OUTPATIENT
Start: 2022-06-29 | End: 2022-06-29 | Stop reason: HOSPADM

## 2022-06-29 RX ORDER — ACETAMINOPHEN 500 MG
1000 TABLET ORAL ONCE
Status: COMPLETED | OUTPATIENT
Start: 2022-06-29 | End: 2022-06-29

## 2022-06-29 RX ORDER — FENTANYL CITRATE 50 UG/ML
INJECTION, SOLUTION INTRAMUSCULAR; INTRAVENOUS PRN
Status: DISCONTINUED | OUTPATIENT
Start: 2022-06-29 | End: 2022-06-29 | Stop reason: SDUPTHER

## 2022-06-29 RX ORDER — SODIUM CHLORIDE 0.9 % (FLUSH) 0.9 %
5-40 SYRINGE (ML) INJECTION EVERY 12 HOURS SCHEDULED
Status: DISCONTINUED | OUTPATIENT
Start: 2022-06-29 | End: 2022-06-29 | Stop reason: HOSPADM

## 2022-06-29 RX ORDER — DIPHENHYDRAMINE HYDROCHLORIDE 50 MG/ML
12.5 INJECTION INTRAMUSCULAR; INTRAVENOUS
Status: DISCONTINUED | OUTPATIENT
Start: 2022-06-29 | End: 2022-06-29 | Stop reason: HOSPADM

## 2022-06-29 RX ADMIN — EPHEDRINE SULFATE 10 MG: 50 INJECTION, SOLUTION INTRAVENOUS at 08:55

## 2022-06-29 RX ADMIN — OXYCODONE 5 MG: 5 TABLET ORAL at 11:13

## 2022-06-29 RX ADMIN — HYDROMORPHONE HYDROCHLORIDE 0.5 MG: 1 INJECTION, SOLUTION INTRAMUSCULAR; INTRAVENOUS; SUBCUTANEOUS at 09:39

## 2022-06-29 RX ADMIN — Medication 2000 MG: at 08:01

## 2022-06-29 RX ADMIN — PROPOFOL 110 MG: 10 INJECTION, EMULSION INTRAVENOUS at 08:05

## 2022-06-29 RX ADMIN — SODIUM CHLORIDE: 9 INJECTION, SOLUTION INTRAVENOUS at 07:54

## 2022-06-29 RX ADMIN — MIDAZOLAM 2 MG: 1 INJECTION INTRAMUSCULAR; INTRAVENOUS at 07:54

## 2022-06-29 RX ADMIN — ACETAMINOPHEN 1000 MG: 500 TABLET ORAL at 07:49

## 2022-06-29 RX ADMIN — GLYCOPYRROLATE 0.2 MG: 0.2 INJECTION, SOLUTION INTRAMUSCULAR; INTRAVENOUS at 08:24

## 2022-06-29 RX ADMIN — HYDROMORPHONE HYDROCHLORIDE 0.5 MG: 1 INJECTION, SOLUTION INTRAMUSCULAR; INTRAVENOUS; SUBCUTANEOUS at 10:34

## 2022-06-29 RX ADMIN — FENTANYL CITRATE 50 MCG: 50 INJECTION, SOLUTION INTRAMUSCULAR; INTRAVENOUS at 08:44

## 2022-06-29 RX ADMIN — DEXAMETHASONE SODIUM PHOSPHATE 10 MG: 4 INJECTION, SOLUTION INTRAMUSCULAR; INTRAVENOUS at 08:14

## 2022-06-29 RX ADMIN — FENTANYL CITRATE 50 MCG: 50 INJECTION, SOLUTION INTRAMUSCULAR; INTRAVENOUS at 08:05

## 2022-06-29 RX ADMIN — ONDANSETRON 4 MG: 2 INJECTION INTRAMUSCULAR; INTRAVENOUS at 08:14

## 2022-06-29 RX ADMIN — LIDOCAINE HYDROCHLORIDE 100 MG: 20 INJECTION, SOLUTION EPIDURAL; INFILTRATION; INTRACAUDAL; PERINEURAL at 08:05

## 2022-06-29 RX ADMIN — KETAMINE HYDROCHLORIDE 25 MG: 10 INJECTION INTRAMUSCULAR; INTRAVENOUS at 08:05

## 2022-06-29 ASSESSMENT — PAIN - FUNCTIONAL ASSESSMENT
PAIN_FUNCTIONAL_ASSESSMENT: PREVENTS OR INTERFERES SOME ACTIVE ACTIVITIES AND ADLS
PAIN_FUNCTIONAL_ASSESSMENT: PREVENTS OR INTERFERES SOME ACTIVE ACTIVITIES AND ADLS
PAIN_FUNCTIONAL_ASSESSMENT: 0-10

## 2022-06-29 ASSESSMENT — PAIN DESCRIPTION - ORIENTATION
ORIENTATION: LEFT

## 2022-06-29 ASSESSMENT — PAIN DESCRIPTION - DESCRIPTORS
DESCRIPTORS: DISCOMFORT
DESCRIPTORS: DISCOMFORT
DESCRIPTORS: THROBBING
DESCRIPTORS: BURNING

## 2022-06-29 ASSESSMENT — PAIN DESCRIPTION - FREQUENCY: FREQUENCY: CONTINUOUS

## 2022-06-29 ASSESSMENT — LIFESTYLE VARIABLES: SMOKING_STATUS: 0

## 2022-06-29 ASSESSMENT — PAIN DESCRIPTION - LOCATION
LOCATION: FINGER (COMMENT WHICH ONE);WRIST
LOCATION: HAND
LOCATION: WRIST;FINGER (COMMENT WHICH ONE)
LOCATION: HAND

## 2022-06-29 ASSESSMENT — PAIN SCALES - GENERAL
PAINLEVEL_OUTOF10: 8
PAINLEVEL_OUTOF10: 7
PAINLEVEL_OUTOF10: 10
PAINLEVEL_OUTOF10: 0
PAINLEVEL_OUTOF10: 10

## 2022-06-29 ASSESSMENT — PAIN DESCRIPTION - ONSET: ONSET: ON-GOING

## 2022-06-29 ASSESSMENT — PAIN DESCRIPTION - PAIN TYPE: TYPE: SURGICAL PAIN

## 2022-06-29 NOTE — H&P
Department of Orthopedic Surgery  History and Physical        CHIEF COMPLAINT: Left wrist bump with pain     HISTORY OF PRESENT ILLNESS:                 The patient is a 78 y.o. female who presents with a ganglion cyst of the left wrist which was been confirmed under ultrasound with associated pain at the base of the thumb. She has had this cyst for a lengthy time. It was never been painful. Beginning a couple months ago she began having pain to the base of her wrist that would radiate to the dorsal side. Denies any preceding injury or inciting event. She does note some occasional numbness to select fingers when sleeping, however, she cannot recall which fingers.     Since her last visit she is now complaining of a mallet deformity to her middle finger. About a month ago she reports her dog leash got caught up in her middle finger. She had pain and swelling and some bleeding. She saw her PCP. There is concern for an infection. She was placed on Keflex. She reports no significant improvement in her symptoms.     She reports that the injection she received at the base of her thumb was not helpful.   She is inquiring about surgical options.     Past Medical History:    Past Medical History             Diagnosis Date    Arthritis      CAD (coronary artery disease)      Current use of long term anticoagulation       Plavix    Fibromyalgia      GERD (gastroesophageal reflux disease)      History of cardiovascular stress test 02/17/2014     lexiscan, also 4/21/2015    History of MI (myocardial infarction)       x4; most recent 2016    Hx of blood clots       DVT leg  (pt states she was traveling)    Hyperlipidemia      Hypertension      Hypothyroidism      Intractable low back pain 06/19/2016    Non-insulin dependent type 2 diabetes mellitus ([facility] [address]      NSTEMI (non-ST elevated myocardial infarction) ([facility] [address] 11/19/2015    Syncope and collapse 10/2020     x2  states was dt hypotension         Past Surgical History:    Past Surgical History       Procedure Laterality Date    APPENDECTOMY        ARTHROCENTESIS Left 8/22/2019     LEFT HIP CORTICOSTERIOD INJECTION UNDER FLUOROSCOPIC GUIDANCE performed by Francesca Quiñonez DO at [address] [facility] [address]   11/15/2017     Dr Michael Morales Right 7 7 15    CATARACT REMOVAL WITH IMPLANT Left 4/26/2016    CHOLECYSTECTOMY        COCCYX REMOVAL        COLONOSCOPY        CORONARY ANGIOPLASTY WITH STENT PLACEMENT         1996 started needing cardiac care; 5 stents total    CORONARY ANGIOPLASTY WITH STENT PLACEMENT   11/19/2015     Dr Estelle Millan stent 3x18 prox Cx   Marisol Yoseph Dr. Juan Collins; 3 bypass grafts    ELBOW FRACTURE SURGERY Right 7/30/2021     RIGHT ELBOW EPICONDYLECTOMY, MEDIAL EPICONDYLE  WITH  PLASMA  RICH PLATELET  INJECTION ([address]) performed by Leandro March DO at [facility], COLON, DIAGNOSTIC        HEMORRHOID SURGERY        HYSTERECTOMY        LUMBAR FUSION   6/7/2016     Dr. Colin Long Left 11 18 2015     left lumbar transforaminal nerve block l4-5 l5-s1    NERVE BLOCK Right 07/17/2017     lumbar transforaminal #1    NERVE BLOCK Right 09/06/2017     right knee injection#1    NERVE BLOCK Bilateral 10/24/2018     lumbar facet block     NERVE BLOCK Left 08/22/2019     hip     NERVE SURGERY N/A 6/15/2020     SPINAL CORD STIMULATOR IMPLANT WITH NERVO performed by Jennyfer Figueroa MD at [address] [facility]         bilateral feet    OTHER SURGICAL HISTORY N/A 11/04/2019     spinal cord stimulator trial    SC ARTHRS KNE SURG W/MENISCECTOMY MED/LAT W/SHVG Right 6/6/2018     RIGHT KNEE ARTHROSCOPY MEDIAL AND LATERAL MENISECTOMY SYNOVECTOMY AND CHONDROPLASTY performed by Leandro March DO at [facility]. 16. DX/THER AGNT PARAVERT FACET JOINT, LUMBAR/SAC, 1ST LEVEL Bilateral 10/24/2018     BILATERAL LUMBAR FACET BLOCK L1-2 ,L3-4 WITH X-RAY performed by Francene Babinski, DO at [address] [facility] [address] N/A 11/4/2019     SPINAL CORD STIMULATOR TRIAL WITH NEVRO performed by Jennyfer Figueroa MD at [address]        SPINAL CORD STIMULATOR SURGERY   06/15/2020     nevro     TONSILLECTOMY        VASCULAR SURGERY         laser on leaky veins         Current Medications:   Current Hospital Medications   No current facility-administered medications for this visit. Allergies:  Iodine, Lipitor, Statins, Atorvastatin, Cymbalta [duloxetine hcl], Cipro xr, Diazepam, Duloxetine, Erythromycin, Indomethacin, Ketorolac, Ketorolac tromethamine, Lodine [etodolac], Oscal 500-200 d-3 [oyster shell calcium-d], Paxil [paroxetine hcl], Ropinirole, Tromethamine, Trulicity [dulaglutide], Zithromax [azithromycin], and Requip [ropinirole hcl]     Social History:   TOBACCO:   reports that she has never smoked. She has never used smokeless tobacco.  ETOH:   reports no history of alcohol use. DRUGS:   reports no history of drug use.   ACTIVITIES OF DAILY LIVING:    OCCUPATION:    Family History:   Family History             Problem Relation Age of Onset    Cancer Father [de-identified]         bladdder    Cancer Brother      COPD Brother      Heart Attack Mother 54    Heart Disease Sister      Diabetes Sister      Heart Disease Brother      Heart Disease Other 58              Diabetes Other      ADHD Other              REVIEW OF SYSTEMS:  CONSTITUTIONAL:  negative  HEENT:  negative  RESPIRATORY:  negative  CARDIOVASCULAR:  negative  MUSCULOSKELETAL:  positive for pain at the base of the left thumb  NEUROLOGICAL:  negative  BEHAVIOR/PSYCH:  negative     PHYSICAL EXAM:    VITALS:  Ht 5' 1\" (1.549 m)   Wt 184 lb (83.5 kg)   BMI 34.77 kg/m²   CONSTITUTIONAL:  awake, alert, cooperative, no apparent distress, and appears stated age  EYES:  sclera clear, conjunctiva normal  ENT:  Normocephalic, without obvious abnormality, atraumatic  NECK:  Supple, symmetrical, trachea midline  LUNGS:  CTA  CARDIOVASCULAR:  2+ radial pulses, extremities warm and well perfused  ABDOMEN: Nondistended  CHEST:  Atraumatic   GENITAL/URINARY:  deferred  NEUROLOGIC:  Awake, alert, oriented to name, place and time. Cranial nerves II-XII are grossly intact. Motor is 5 out of 5 bilaterally. Sensory is intact.  gait is normal.  MUSCULOSKELETAL:  Transillumination of the cyst over volar radial aspect of wrist.  Finkelsteins positive  Thumb CMC grind positive  25 degrees hyperextension the MCPJ  Christopher's test negative  Tinel's:              Carpal tunnel negative              Cubital tunnel negative  Pulses:               Radial 2+     Mallet deformity of middle finger measuring approximately 15 degrees. Passive this can be fully extended. She has full flexion. There is some mild erythema/hyperemia over the dorsal ulnar nail fold. No drainage. Radial, ulnar, median nerves are intact light touch. 2+ radial pulse. Otherwise neurovascular intact.              DATA:    CBC:         Lab Results   Component Value Date     WBC 4.6 04/26/2022     RBC 4.20 04/26/2022     HGB 13.1 04/26/2022     HCT 38.7 04/26/2022     MCV 92.1 04/26/2022     MCH 31.2 04/26/2022     MCHC 33.9 04/26/2022     RDW 13.1 04/26/2022      04/26/2022     MPV 9.6 04/26/2022      PT/INR:          Lab Results   Component Value Date     PROTIME 13.0 10/02/2020     PROTIME 12.4 08/04/2011     INR 1.2 10/02/2020         Radiology Review: 3 views of the left hand were obtained today in the office. This demonstrates basal joint arthritis of the thumb. There is also an associated fracture of the dorsal ulnar aspect of the distal phalanx of the middle finger.   Impression office x-rays: Avulsion corner fracture dorsal ulnar aspect of the middle finger as well as underlying thumb basal joint arthritis     IMPRESSION:  · Left thumb CMC joint arthritis  · Left ganglion cyst, symptomatic  · New onset middle finger mallet deformity with associated fracture  · CAD, fibromyalgia, history of MI, hypertension  · GERD  · History of DVT on anticoagulation     PLAN:     Today's findings were explained the patient. Discussed treatment options. We have fitted her with a DIP joint extension splint today in the office for mallet deformity. We have adjusted her Keflex to Bactrim. She is also advised on warm soapy water soaks/Epson salt/half-strength hydrogen peroxide soaks as needed. In addition she inquired about surgical options regards to her cyst as well as her continuing pain in the thumb basal joint. After discussion she like to proceed with surgical intervention. Plan for thumb LR TI with ligament reconstruction and ganglion cyst excision.           I explained the risks, benefits, alternatives and complications of surgery with the patient including but not limited to the risks of infection, possible damage to nerves, vessels, or tendons, stiffness, loss of range of motion, scar sensitivity, wound healing complications, worsening symptoms, possible need for therapy, as well as the possible need further surgery and unanticipated complications. The patient voiced understanding and all questions were answered. The patient elected to proceed with surgical intervention. History and Physical Update     Patient was seen and examined. Patient's history and physical was reviewed with the patient. There has been no significant interval changes.

## 2022-06-29 NOTE — ANESTHESIA POSTPROCEDURE EVALUATION
Department of Anesthesiology  Postprocedure Note    Patient: Sahra Gabriel  MRN: [id number]  YOB: 1945  Date of evaluation: 6/29/2022      Procedure Summary     Date: 06/29/22 Room / Location: [facility] OR 05 / [facility]    Anesthesia Start: 7635 Anesthesia Stop: 9192    Procedures:       LEFT WRIST GANGLION CYST EXCISON LEFT THUMB TRAPEZIECTOMY (Left Wrist)      LIGAMENT RECONSTUCTION TENDON INTERPOSITION (Left Wrist) Diagnosis:       Ganglion cyst of dorsum of left wrist      (LEFT WRIST GANGLION CYST, LEFT THUMB CARPOMETACARPAL ARTHRITIS)    Surgeons: Wilmar Calvillo MD Responsible Provider: Renaldo Junior MD    Anesthesia Type: general ASA Status: 3          Anesthesia Type: No value filed.     Yamilka Phase I: Yamilka Score: 8    Yamilka Phase II: Yamilka Score: 10      Anesthesia Post Evaluation    Patient location during evaluation: PACU  Patient participation: complete - patient participated  Level of consciousness: awake and alert  Airway patency: patent  Nausea & Vomiting: no nausea and no vomiting  Complications: no  Cardiovascular status: hemodynamically stable  Respiratory status: acceptable  Hydration status: euvolemic  Multimodal analgesia pain management approach

## 2022-06-29 NOTE — BRIEF OP NOTE
Brief Postoperative Note      Patient: Estrella Verduzco  YOB: 1945  MRN: [id number]    Date of Procedure: 6/29/2022    Pre-Op Diagnosis: LEFT WRIST GANGLION CYST, LEFT THUMB CARPOMETACARPAL ARTHRITIS    Post-Op Diagnosis: Same       Procedure(s):  LEFT WRIST GANGLION CYST EXCISON LEFT THUMB TRAPEZIECTOMY  LIGAMENT RECONSTUCTION TENDON INTERPOSITION    Surgeon(s):  Abdi Murray MD    Assistant:  Jacqueline Suarez, [facility]    Anesthesia: General    Estimated Blood Loss (mL): Minimal    Complications: None    Specimens:   ID Type Source Tests Collected by Time Destination   A : LEFT WRIST GANGLION CYST Specimen Arm SURGICAL PATHOLOGY Abdi Murray MD 6/29/2022 8542        Implants:  Implant Name Type Inv.  Item Serial No.  Lot No. LRB No. Used Action   KIT FIBERLOCK SUSPENSION IMPLANT - [id number]  KIT FIBERLOCK SUSPENSION IMPLANT  [address] [facility] [employer]- [id number] Left 1 Implanted         Drains: * No LDAs found *    Findings: see op note    Electronically signed by SKY Schneider on 6/29/2022 at 9:13 AM

## 2022-06-29 NOTE — ANESTHESIA PRE PROCEDURE
Department of Anesthesiology  Preprocedure Note       Name:  Keren Fall   Age:  78 y.o.  :  1945                                          MRN:  [id number]         Date:  2022      Surgeon: Meme Herron):  Meet Mott MD    Procedure: Procedure(s):  LEFT WRIST GANGLION CYST EXCISON LEFT THUMB TRAPEZIECTOMY  LIGAMENT RECONSTUCTION TENDON INTERPOSITION  ++ARTHREX++ ++FIBERLOCK++   ++IODINE ALLERGY++    Medications prior to admission:   Prior to Admission medications    Medication Sig Start Date End Date Taking? Authorizing Provider   oxyCODONE-acetaminophen (PERCOCET) 5-325 MG per tablet Take 1 tablet by mouth every 6 hours as needed for Pain for up to 7 days.  22  SKY Braxton   furosemide (LASIX) 40 MG tablet Take 40 mg by mouth daily  3/7/22   Historical Provider, MD   potassium chloride (KLOR-CON) 10 MEQ extended release tablet Take 10 mEq by mouth daily  22   Historical Provider, MD   meclizine (ANTIVERT) 12.5 MG tablet Take 1 tablet by mouth twice daily 4/26/22 10/23/22  Nayeli Panda MD   gabapentin (NEURONTIN) 600 MG tablet TAKE 1 TABLET BY MOUTH IN THE MORNING, THEN TAKE  2 TABLETS BY MOUTH IN THE EVENING 4/26/22 10/26/22  Nayeli Panda MD   baclofen (LIORESAL) 10 MG tablet TAKE 1 TABLET EVERY NIGHT 4/15/22   Nayeli Panda MD   diclofenac sodium (VOLTAREN) 1 % GEL Apply 2 g topically 4 times daily as needed for Pain 21   Meet Mott MD   fenofibrate (TRICOR) 145 MG tablet Take 145 mg by mouth three times a week -w-21   Historical Provider, MD   glipiZIDE (GLUCOTROL) 10 MG tablet Take by mouth 2 times daily (before meals)  21   Historical Provider, MD   isosorbide mononitrate (IMDUR) 30 MG extended release tablet Take 30 mg by mouth daily am 3/10/21   Historical Provider, MD   OZEMPIC, 0.25 OR 0.5 MG/DOSE, 2 MG/1.5ML SOPN Inject 0.5 mg into the skin once a week 21   Historical Provider, MD   Coenzyme Q10 (COQ10) 200 MG CAPS Take by mouth daily     Historical Provider, MD   sennosides-docusate sodium (SENOKOT-S) 8.6-50 MG tablet Take 2 tablets by mouth daily 10/8/20   Idalia Patterson MD   ferrous sulfate (IRON 325) 325 (65 Fe) MG tablet Take 325 mg by mouth daily (with breakfast)    Historical Provider, MD   metoprolol succinate (TOPROL XL) 25 MG extended release tablet Take 25 mg by mouth daily am    Historical Provider, MD   dapagliflozin (FARXIGA) 10 MG tablet Take 10 mg by mouth daily     Historical Provider, MD   aspirin 81 MG tablet Take 81 mg by mouth daily Do not hold per Dr Katherine Jeffries Provider, MD   omeprazole (PRILOSEC) 20 MG delayed release capsule Take 20 mg by mouth daily am    Historical Provider, MD   clopidogrel (PLAVIX) 75 MG tablet Take 75 mg by mouth daily Indications: Advised to discontinue patient would prefer to follow-up with her cardiologist Stopped 2 days pre op per Dr Dennison  instructions    Historical Provider, MD   nitroGLYCERIN (NITROSTAT) 0.4 MG SL tablet Place 0.4 mg under the tongue every 5 minutes as needed for Chest pain up to max of 3 total doses. If no relief after 1 dose, call 911. Historical Provider, MD   vitamin D 1000 UNITS CAPS Take 1,000 Units by mouth every evening     Historical Provider, MD   ezetimibe (ZETIA) 10 MG tablet Take 10 mg by mouth every evening     Historical Provider, MD   levothyroxine (SYNTHROID) 100 MCG tablet Take 100 mcg by mouth daily. Historical Provider, MD       Current medications:    No current facility-administered medications for this visit. Current Outpatient Medications   Medication Sig Dispense Refill    oxyCODONE-acetaminophen (PERCOCET) 5-325 MG per tablet Take 1 tablet by mouth every 6 hours as needed for Pain for up to 7 days.  28 tablet 0     Facility-Administered Medications Ordered in Other Visits   Medication Dose Route Frequency Provider Last Rate Last Admin    0.9 % sodium chloride infusion   IntraVENous Continuous Roberta Tipton        0.9 % sodium chloride infusion   IntraVENous PRN SKY Tipton        ceFAZolin (ANCEF) 2000 mg in sterile water 20 mL IV syringe  2,000 mg IntraVENous On Call to [address]        sodium chloride flush 0.9 % injection 5-40 mL  5-40 mL IntraVENous 2 times per day SKY Tipton        sodium chloride flush 0.9 % injection 5-40 mL  5-40 mL IntraVENous PRN SKY Tipton           Allergies:     Allergies   Allergen Reactions    Iodine Anaphylaxis     IVP DYE    Lipitor Other (See Comments)     Severe leg pain    Statins Other (See Comments)     Muscles go weak and she falls    Atorvastatin      Makes me sick severe leg cramps    Cymbalta [Duloxetine Hcl] Nausea Only    Cipro Xr      Reaction unknown    Diazepam     Duloxetine Nausea Only    Erythromycin      Hurt my stomach    Indomethacin     Ketorolac     Ketorolac Tromethamine Itching    Lodine [Etodolac]     Oscal 500-200 D-3 [Oyster Shell Calcium-D]     Paxil [Paroxetine Hcl]     Ropinirole     Tromethamine     Trulicity [Dulaglutide] Swelling    Zithromax [Azithromycin]     Zoloft [Sertraline Hcl] Other (See Comments)     Does not agree with me    Requip [Ropinirole Hcl] Nausea And Vomiting       Problem List:    Patient Active Problem List   Diagnosis Code    Non-insulin dependent type 2 diabetes mellitus (HCC) E11.9    GERD (gastroesophageal reflux disease) K21.9    Hypothyroidism E03.9    History of MI (myocardial infarction) I25.2    Hypertension I10    HLD (hyperlipidemia) E78.5    Acute renal failure (ARF) (HCC) N17.9    Normocytic anemia D64.9    Proximal weakness of limb M62.89    Falls frequently R29.6    Cervical spinal stenosis M48.02    Neural foraminal stenosis of cervical spine M48.02    Facet arthropathy, cervical M47.812    Right cataract H26.9    Lumbar radiculopathy M54.16    Neural foraminal stenosis of lumbar spine M48.061    DDD (degenerative disc disease), lumbar M51.36    Protruded lumbar disc M51.26    DM II (diabetes mellitus, type II), controlled ([address] E11.9    NSTEMI (non-ST elevated myocardial infarction) ([facility] I21.4    Left cataract H26.9    Colitis K52.9    Primary osteoarthritis of right knee M17.11    Primary osteoarthritis of both knees M17.0    Pseudoaneurysm following procedure ([facility] [address] T81.718A, I72.9    History of coronary artery bypass surgery Z95.1    Hx of hypercholesterolemia Z86.39    Acute lateral meniscus tear of right knee S83.281A    L-S radiculopathy M54.17    Lumbar postlaminectomy syndrome M96.1    Lumbar facet arthropathy M47.816    Lumbar disc disorder M51.9    Greater trochanteric bursitis of left hip M70.62    Greater trochanteric bursitis of right hip M70.61    Primary osteoarthritis of both hips M16.0    Osteoarthritis of left hip M16.12    Chest pain R07.9    SAH [facility]subarachnoid hemorrhage) ([facility]) I60.9    Concussion S06. 0X9A    Syncope and collapse R55    Scalp laceration, initial encounter S01. 01XA    Cervical strain S16. 1XXA    Fecal retention K59.00    Medial epicondylitis of elbow, right M77.01       Past Medical History:        Diagnosis Date    Arthritis     CAD (coronary artery disease)     Current use of long term anticoagulation     Plavix    Falls frequently     Fibromyalgia     GERD (gastroesophageal reflux disease)     History of cardiovascular stress test 02/17/2014    lexiscan, also 4/21/2015    History of MI (myocardial infarction)     x4; most recent 2016    Hx of blood clots     DVT leg  (pt states she was traveling)    Hyperlipidemia     Hypertension     Hypothyroidism     Intractable low back pain 06/19/2016    Non-insulin dependent type 2 diabetes mellitus ([facility] [address]     NSTEMI (non-ST elevated myocardial infarction) ([facility] [address] 11/19/2015    Syncope and collapse 10/2020    x2  states was dt hypotension       Past Surgical History:        Procedure Laterality Date    APPENDECTOMY      ARTHROCENTESIS Left 8/22/2019    LEFT HIP CORTICOSTERIOD INJECTION UNDER FLUOROSCOPIC GUIDANCE performed by Jojo Cutler DO at [address]  11/15/2017    Dr Alex Rick Right 7 7 15    CATARACT REMOVAL WITH IMPLANT Left 4/26/2016    CHOLECYSTECTOMY      COCCYX REMOVAL      COLONOSCOPY      CORONARY ANGIOPLASTY WITH STENT PLACEMENT      1996 started needing cardiac care; 5 stents total    CORONARY ANGIOPLASTY WITH STENT PLACEMENT  11/19/2015    Dr Bindu [place] stent 3x18 prox Cx   Madhuri Kitten Dr. Qi Jackson; 3 bypass grafts    ELBOW FRACTURE SURGERY Right 7/30/2021    RIGHT ELBOW EPICONDYLECTOMY, MEDIAL EPICONDYLE  WITH  PLASMA  RICH PLATELET  INJECTION (Ingrid Jain) performed by Diana Sharif DO at [facility], COLON, DIAGNOSTIC      HEMORRHOID SURGERY      HYSTERECTOMY (CERVIX STATUS UNKNOWN)      LUMBAR FUSION  6/7/2016    Dr. Christiano Graf Left 11 18 2015    left lumbar transforaminal nerve block l4-5 l5-s1    NERVE BLOCK Right 07/17/2017    lumbar transforaminal #1    NERVE BLOCK Right 09/06/2017    right knee injection#1    NERVE BLOCK Bilateral 10/24/2018    lumbar facet block     NERVE BLOCK Left 08/22/2019    hip     NERVE SURGERY N/A 6/15/2020    SPINAL CORD STIMULATOR IMPLANT WITH NERVO performed by Anuj Diaz MD at [address]      bilateral feet    OTHER SURGICAL HISTORY N/A 11/04/2019    spinal cord stimulator trial    AZ ARTHRS KNE SURG W/MENISCECTOMY MED/LAT W/SHVG Right 6/6/2018    RIGHT KNEE ARTHROSCOPY MEDIAL AND LATERAL MENISECTOMY SYNOVECTOMY AND CHONDROPLASTY performed by Diana Sharif DO at [facility] [facility] DX/THER AGNT PARAVERT FACET JOINT, LUMBAR/SAC, 1ST LEVEL Bilateral 10/24/2018    BILATERAL LUMBAR FACET BLOCK L1-2 ,L3-4 WITH X-RAY performed by Ghassan Boyle DO Earl at [facility] / [address] N/A 11/4/2019    SPINAL CORD STIMULATOR TRIAL WITH NEVRO performed by Eve Guzman MD at [address] STIMULATOR SURGERY  06/15/2020    nevro     TONSILLECTOMY      VASCULAR SURGERY      laser on leaky veins       Social History:    Social History     Tobacco Use    Smoking status: Never Smoker    Smokeless tobacco: Never Used   Substance Use Topics    Alcohol use: No                                Counseling given: Not Answered      Vital Signs (Current): There were no vitals filed for this visit. BP Readings from Last 3 Encounters:   06/29/22 (!) 147/67   06/20/22 132/60   06/10/22 (!) 144/79       NPO Status:                                                                                 BMI:   Wt Readings from Last 3 Encounters:   06/27/22 184 lb (83.5 kg)   06/20/22 184 lb (83.5 kg)   06/07/22 184 lb (83.5 kg)     There is no height or weight on file to calculate BMI.    CBC:   Lab Results   Component Value Date    WBC 4.7 06/20/2022    RBC 4.05 06/20/2022    HGB 12.8 06/20/2022    HCT 38.6 06/20/2022    MCV 95.3 06/20/2022    RDW 13.3 06/20/2022     06/20/2022       CMP:   Lab Results   Component Value Date     06/20/2022    K 3.7 06/20/2022     06/20/2022    CO2 27 06/20/2022    BUN 25 06/20/2022    CREATININE 1.2 06/20/2022    GFRAA 53 06/20/2022    LABGLOM 44 06/20/2022    GLUCOSE 147 06/20/2022    GLUCOSE 147 01/31/2012    PROT 6.9 04/26/2022    CALCIUM 9.1 06/20/2022    BILITOT 0.2 04/26/2022    ALKPHOS 67 04/26/2022    AST 25 04/26/2022    ALT 32 04/26/2022       POC Tests: No results for input(s): POCGLU, POCNA, POCK, POCCL, POCBUN, POCHEMO, POCHCT in the last 72 hours.     Coags:   Lab Results   Component Value Date    PROTIME 13.0 10/02/2020    PROTIME 12.4 08/04/2011    INR 1.2 10/02/2020    APTT 23.3 10/02/2020       HCG (If Applicable): No results found for: PREGTESTUR, PREGSERUM, HCG, HCGQUANT     ABGs: No results found for: PHART, PO2ART, NAK1NFJ, UTD3GZV, BEART, Z5JFBPEF     Type & Screen (If Applicable):  No results found for: LABABO, LABRH    Drug/Infectious Status (If Applicable):  No results found for: HIV, HEPCAB    COVID-19 Screening (If Applicable):   Lab Results   Component Value Date    COVID19 Not Detected 07/23/2021           Anesthesia Evaluation  Patient summary reviewed no history of anesthetic complications:   Airway: Mallampati: III  TM distance: >3 FB   Neck ROM: full  Mouth opening: > = 3 FB   Dental:    (+) upper dentures and partials      Pulmonary:Negative Pulmonary ROS breath sounds clear to auscultation      (-) not a current smoker                           Cardiovascular:    (+) hypertension:, past MI: > 6 months, CAD: obstructive, CABG/stent (Stent):, hyperlipidemia      ECG reviewed  Rhythm: regular  Rate: normal  Echocardiogram reviewed  Stress test reviewed             ROS comment: ECHO 10/2/2020  Summary   Normal left ventricle size. Estimated left ventricle ejection fraction 55   %. No gross regional wall motion abnormality. Normal right ventricular size and function. Technically difficult study: no gross valvular pathology noted within   these limitations. STRESS TEST 7/4/2020  1. No pharmacologically induced reversible perfusion defects to suggest  ischemia  2. Ejection fraction of 68%  3.  No focal wall motion abnormality         Neuro/Psych:   (+) neuromuscular disease:,              ROS comment: Cervical spinal stenosis  Neural foraminal stenosis of cervical spine  Facet arthropathy, cervical    Lumbar radiculopathy  Neural foraminal stenosis of lumbar spine  DDD (degenerative disc disease), lumbar  Protruded lumbar disc     GI/Hepatic/Renal:   (+) GERD:,           Endo/Other:    (+) DiabetesType II DM, , hypothyroidism, blood dyscrasia: anticoagulation therapy, arthritis: OA., .                  ROS comment: obese Abdominal:             Vascular:   + DVT, . Other Findings:             Anesthesia Plan      general     ASA 3     (#4 LMA)  Induction: intravenous. MIPS: Postoperative opioids intended and Prophylactic antiemetics administered. Anesthetic plan and risks discussed with patient. Plan discussed with CRNA. Mabel Jacinto MD   6/29/2022    Chart reviewed, patient seen and interviewed. Agree with note above.   TRINIDAD Lawson - CRNA

## 2022-06-29 NOTE — OP NOTE
Operative Note      Patient: Rona Montanez  YOB: 1945  MRN: [id number]    Date of Procedure: 6/29/2022    Pre-Op Diagnosis: LEFT WRIST GANGLION CYST, LEFT THUMB CARPOMETACARPAL ARTHRITIS    Post-Op Diagnosis: Same       Procedure(s):  LEFT WRIST GANGLION CYST EXCISON LEFT THUMB TRAPEZIECTOMY  LIGAMENT RECONSTUCTION TENDON INTERPOSITION    Surgeon(s):  Sergio Costa MD    Assistant:   * No surgical staff found *    Anesthesia: General    Estimated Blood Loss (mL): Minimal    Complications: None    Specimens:   ID Type Source Tests Collected by Time Destination   A : LEFT WRIST GANGLION CYST Specimen Arm SURGICAL PATHOLOGY Sergio Costa MD 6/29/2022 6621        Implants:  Implant Name Type Inv. Item Serial No.  Lot No. LRB No. Used Action   KIT FIBERLOCK SUSPENSION IMPLANT - [id number]  KIT FIBERLOCK SUSPENSION IMPLANT  [address] [id number] Left 1 Implanted         Drains: * No LDAs found *    Findings: see details    Detailed Description of Procedure:   Detailed Description of Procedure:   [id number] [address]     OPERATIVE REPORT         DATE OF PROCEDURE: 6/29/2022       PREOPERATIVE DIAGNOSIS:   1. Left thumb basal joint arthritis and left carpal tunnel syndrome   2. Left volar ganglion cyst    POSTOPERATIVE DIAGNOSIS: same     PROCEDURES:  1. left thumb trapeziectomy. 2 .left thumb volar oblique ligament reconstruction using a split flexor  carpi radialis tendon transfer. 3. [place] of split flexor carpi radialis tendon for ligament reconstruction. 4. left thumb application of Fiberlock anchor  5. Left volar ganglion cyst excision wrist  6. Left wrist joint debridement       ANESTHESIA:  1. General.  2. Local anesthetic by surgeon consisting of approximately 20 mL of 0.25%  Marcaine with epinephrine. ASSISTANT:  ANTONIO Gama. She was present throughout the procedure.  Her assistance was used for preoperative positioning, intraoperative retraction, closure, and dressing application. Her assistance expedited the case and decreased the surgical time. TOURNIQUET TIME: Approximately 35 minutes at 250 mmHg brachial tourniquet. FINDINGS:  1. Significant end-stage arthrosis involving the thumb basal joint. 2. The thumb scaphotrapezoidal joint was without significant arthritic  changes. 3. Status post trapeziectomy with volar oblique ligament reconstruction and Fiberlock anchor the intraoperative carpometacarpal grind test was negative. 4. Large volar ganglion cyst with joint extension  5. Synovitis of radial-carpal joint, debrided       COMPLICATIONS: None. SPECIMENS: ganglion cyst for pathology     DISPOSITION: The patient was stable throughout procedure. OPERATIVE INDICATIONS: The patient presents with  persistent recalcitrant left  thumb basal joint arthrosis and ganglion cyst. After failing  conservative management, the patient wished to proceed with surgical intervention. The risks, the benefits, alternatives, and complications of surgical  intervention were explained and include but are not limited to the risks of  infection; damage to nerves, vessels, tendons; failure to improve  symptoms; worsening of symptoms; recurrence of symptoms; persistent symptoms;  scar sensitivity; need for compliance with postoperative immobilization and  skilled therapy. The patient voiced understanding and all questions were answered. SURGERY IN DETAIL: The patient was identified in the holding area. The left  upper extremity was identified as the surgical site and the right thumb as the injection site. . The patient was then seen by Anesthesia and taken to the operating room, placed supine on the table, underwent general anesthesia per anesthesia department. All bony prominences were well padded. A well-padded arm tourniquet was placed.  The right upper extremity  was prepped and draped in standard sterile fashion. The arm was exsanguinated  and the tourniquet was inflated to 250 mmHg. A volar incision over the palpable cyst was then created from the volar  wrist flexion crease proximally for a total of approximately 3-4 cm  followed by blunt dissection and identification of the radial artery  proximally. This was traced distally into the cyst.  This was a very  large multiloculated ganglion cyst.  The radial artery was mobilized off  of the cyst and protected throughout the remainder of the procedure. The cyst was then traced distally and volar to the FCR tendon, which  was mobilized and retracted. This was extended down to the radiocarpal  articulation. At this level, the cyst was excised and was filled with  gelatinous material consistent with a ganglion cyst and sent for  pathology. The radial-carpal joint was inspected and found to have synovitis which was debrided. Care was taken to identify and protect the radioscaphocapitate ligament. Cautery was used with bipolar cautery down to the level of the capsule. Incision was then made over the dorsal aspect of the thumb carpometacarpal  articulation extending from the base of the thumb metacarpal proximally to the  level of the radial styloid. Following blunt dissection, identification and  preservation of the radial sensory nerve branches. The radial sensory nerve  branches were preserved and a dorsal ulnar release of the first extensor  compartment was undertaken, releasing both the APL and EPB tendons. The  interval was then developed and the radial artery was identified and mobilized  and retracted and preserved throughout the procedure. An arthrotomy was made  dorsally over the thumb carpometacarpal articulation. Synovitis was  encountered and evacuated. The thumb carpometacarpal articulation was then  found to be arthritic.  Trapeziectomy was then undertaken using a combination  of sharp and blunt dissection to remove the trapezium and preserve the  underlying flexor carpi radialis tendon. The scaphotrapezoidal joint was then  inspected and found to be free of arthritis. With this completed, a  high-speed bur was then used to create a bone tunnel from dorsal exiting  palmarly and ulnarly through the thumb metacarpal base. The wound was  copiously irrigated out. Fluoroscopy was then brought in and used to assess the trapeziectomy. This confirmed a trapeziectomy and gross mobility to the thumb metacarpal.  Under fluoroscopic imaging, a guidepin for the Arthrex fiber lock anchor was placed at the base of the index metacarpal.  The visit pin position was confirmed both AP and lateral planes. This was then followed by placement of the guide over the pin and removal of the pin. This was then overdrilled again using fluoroscopic imaging. With this completed the Arthrex fiber lock anchor was then again seated using fluoroscopic imaging to confirm placement of the anchor in the base the metacarpal followed by seating with good purchase achieved. The guide system was then removed. Attention was then directed towards harvesting the split flexor carpi radialis  tendon for volar oblique ligament reconstruction. A separate and distinct  volar radial incision was made over the FCR tendon, extending proximally to a  total length of 2-3 cm, followed by blunt dissection and identification of the  FCR tendon, which was mobilized proximally in the subcutaneous tunnel. The  tendon was then mobilized. A second transverse incision more proximally was  then created followed by blunt dissection and identification of the  myotendinous origin of the FCR tendon. Attention was then directed towards radial tunnel. Preserving the palmar branch of the radial artery, the FCR tendon was  Released through the tunnel.  The ulnar one-half of the FCR tendon was then harvested with a PDS suture placed distally, passed subcutaneously more proximally, splitting the FCR tendon, which was then tenotomized at the myotendinous origin and brought out  distally, shuttled through the FCR tunnel with a Hewson passer into the basal  joint arthroplasty site. The tendon was then passed from palmar to dorsal  through the thumb metacarpal and then back upon itself with a Hewson passer. Care was taken to place the FCR tendon and palmar to the previously placed Arthrex fiber lock anchor. Fluoroscopy was then brought back in. A guidepin was placed palmar and ulnar to the previously placed bone tunnel for the FCR tendon. The guidepin was then overdrilled using the drill guide and appropriate drill bit. With longitudinal traction and adduction force placed and appropriate amount of tension placed the swivel lock anchor was used to secure the fiber lock with appropriate tensioning into the base of the thumb metacarpal.  This provided excellent stability to the thumb metacarpal under fluoroscopic imaging with gentle pressure. There remains some dorsal to palmar and residual laxity that was resolved with tensioning of the FCR tendon. The FCR tendon was then secured to itself with multiple  4-0 looped FiberWire sutures. Intraoperative CMC grind test was then undertaken and confirmed good suspensionplasty and volar oblique ligament reconstruction. Wound was copiously irrigated out. The tendon was anchovied and placed deep within the wound. The dorsal capsule was then tenodesed using  0 Vicryl suture while protecting the radial artery. At this point, the  tourniquet was deflated. Good hemostasis achieved with bipolar cautery. All  wounds were copiously irrigated out. Local anesthetic infiltrated in the soft  tissues. The donor site was then closed with 4-0 Monocryl inverted suture and the  arthroplasty site was closed with 3-0 Monocryl and a 4-0 Monocryl subcuticular  stitch. Dermabond was applied to the incisions. Abulky sterile dressing and thumb spica  splint was applied.  The patient tolerated the procedure well and was taken to  recovery room. Milton Cronin MD         Electronically signed by Rach Styles MD on 6/29/2022 at 11:14 AM

## 2022-06-29 NOTE — PROGRESS NOTES
1105: Resume plavix tomorrow.
CLINICAL PHARMACY NOTE: MEDS TO BEDS    Total # of Prescriptions Filled: 1   The following medications were delivered to the patient:  · Percocet 5-325 mg    Additional Documentation:
products on the day of surgery    [x] You can expect a call the business day prior to procedure to notify you if your arrival time changes    [x] If you receive a survey after surgery we would greatly appreciate your comments    [] Parent/guardian of a minor must accompany their child and remain on the premises  the entire time they are under our care     [] Pediatric patients may bring favorite toy, blanket or comfort item with them    [] A caregiver or family member must remain with the patient during their stay if they are mentally handicapped, have dementia, disoriented or unable to use a call light or would be a safety concern if left unattended    [x] Please notify surgeon if you develop any illness between now and time of surgery (cold, cough, sore throat, fever, nausea, vomiting) or any signs of infections  including skin, wounds, and dental.    [x]  The Outpatient Pharmacy is available to fill your prescription here on your day of surgery, ask your preop nurse for details    [x] Other instructions: wear loose, comfortable clothing    EDUCATIONAL MATERIALS PROVIDED:    [] PAT Preoperative Education Packet/Booklet     [] Medication List    [] Transfusion bracelet applied with instructions    [] Shower with soap, lather and rinse well, and use CHG wipes provided the evening before surgery as instructed    [] Incentive spirometer with instructions

## 2022-07-05 ENCOUNTER — APPOINTMENT (OUTPATIENT)
Dept: GENERAL RADIOLOGY | Age: 77
End: 2022-07-05
Payer: MEDICARE

## 2022-07-05 ENCOUNTER — HOSPITAL ENCOUNTER (EMERGENCY)
Age: 77
Discharge: HOME | End: 2022-07-06
Attending: EMERGENCY MEDICINE
Payer: MEDICARE

## 2022-07-05 ENCOUNTER — APPOINTMENT (OUTPATIENT)
Dept: CT IMAGING | Age: 77
End: 2022-07-05
Payer: MEDICARE

## 2022-07-05 VITALS
HEART RATE: 72 BPM | SYSTOLIC BLOOD PRESSURE: 180 MMHG | OXYGEN SATURATION: 96 % | TEMPERATURE: 98.2 F | RESPIRATION RATE: 16 BRPM | DIASTOLIC BLOOD PRESSURE: 68 MMHG

## 2022-07-05 DIAGNOSIS — R53.1 GENERALIZED WEAKNESS: ICD-10-CM
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
## 2022-07-05 DIAGNOSIS — R25.1 TREMOR: ICD-10-CM

## 2022-07-05 LAB
ALBUMIN SERPL-MCNC: 3.8 G/DL (ref 3.5–5.2)
ALP BLD-CCNC: 58 U/L (ref 35–104)
ALT SERPL-CCNC: 24 U/L (ref 0–32)
ANION GAP SERPL CALCULATED.3IONS-SCNC: 11 MMOL/L (ref 7–16)
AST SERPL-CCNC: 26 U/L (ref 0–31)
BACTERIA: ABNORMAL /HPF
BASOPHILS ABSOLUTE: 0.02 E9/L (ref 0–0.2)
BASOPHILS RELATIVE PERCENT: 0.4 % (ref 0–2)
BILIRUB SERPL-MCNC: 0.4 MG/DL (ref 0–1.2)
BILIRUBIN URINE: NEGATIVE
BLOOD, URINE: NEGATIVE
BUN BLDV-MCNC: 15 MG/DL (ref 6–23)
CALCIUM SERPL-MCNC: 9.6 MG/DL (ref 8.6–10.2)
CHLORIDE BLD-SCNC: 99 MMOL/L (ref 98–107)
CLARITY: CLEAR
CO2: 30 MMOL/L (ref 22–29)
COLOR: YELLOW
CREAT SERPL-MCNC: 1.2 MG/DL (ref 0.5–1)
EOSINOPHILS ABSOLUTE: 0.32 E9/L (ref 0.05–0.5)
EOSINOPHILS RELATIVE PERCENT: 6.2 % (ref 0–6)
EPITHELIAL CELLS, UA: ABNORMAL /HPF
GFR AFRICAN AMERICAN: 53
GFR NON-AFRICAN AMERICAN: 44 ML/MIN/1.73
GLUCOSE BLD-MCNC: 73 MG/DL (ref 74–99)
GLUCOSE URINE: 500 MG/DL
HCT VFR BLD CALC: 40.9 % (ref 34–48)
HEMOGLOBIN: 13.4 G/DL (ref 11.5–15.5)
IMMATURE GRANULOCYTES #: 0.04 E9/L
IMMATURE GRANULOCYTES %: 0.8 % (ref 0–5)
KETONES, URINE: NEGATIVE MG/DL
LEUKOCYTE ESTERASE, URINE: NEGATIVE
LYMPHOCYTES ABSOLUTE: 1.59 E9/L (ref 1.5–4)
LYMPHOCYTES RELATIVE PERCENT: 30.9 % (ref 20–42)
MCH RBC QN AUTO: 31 PG (ref 26–35)
MCHC RBC AUTO-ENTMCNC: 32.8 % (ref 32–34.5)
MCV RBC AUTO: 94.7 FL (ref 80–99.9)
MONOCYTES ABSOLUTE: 0.56 E9/L (ref 0.1–0.95)
MONOCYTES RELATIVE PERCENT: 10.9 % (ref 2–12)
NEUTROPHILS ABSOLUTE: 2.62 E9/L (ref 1.8–7.3)
NEUTROPHILS RELATIVE PERCENT: 50.8 % (ref 43–80)
NITRITE, URINE: NEGATIVE
PDW BLD-RTO: 13.1 FL (ref 11.5–15)
PH UA: 7 (ref 5–9)
PLATELET # BLD: 262 E9/L (ref 130–450)
PMV BLD AUTO: 9.8 FL (ref 7–12)
POTASSIUM REFLEX MAGNESIUM: 3.7 MMOL/L (ref 3.5–5)
PRO-BNP: 262 PG/ML (ref 0–450)
PROTEIN UA: NEGATIVE MG/DL
RBC # BLD: 4.32 E12/L (ref 3.5–5.5)
RBC UA: ABNORMAL /HPF (ref 0–2)
SODIUM BLD-SCNC: 140 MMOL/L (ref 132–146)
SPECIFIC GRAVITY UA: 1.01 (ref 1–1.03)
TOTAL PROTEIN: 6.5 G/DL (ref 6.4–8.3)
TROPONIN, HIGH SENSITIVITY: 23 NG/L (ref 0–9)
TROPONIN, HIGH SENSITIVITY: 24 NG/L (ref 0–9)
UROBILINOGEN, URINE: 1 E.U./DL
WBC # BLD: 5.2 E9/L (ref 4.5–11.5)
WBC UA: ABNORMAL /HPF (ref 0–5)

## 2022-07-05 PROCEDURE — 83880 ASSAY OF NATRIURETIC PEPTIDE: CPT

## 2022-07-05 PROCEDURE — 36415 COLL VENOUS BLD VENIPUNCTURE: CPT

## 2022-07-05 PROCEDURE — 80053 COMPREHEN METABOLIC PANEL: CPT

## 2022-07-05 PROCEDURE — 85025 COMPLETE CBC W/AUTO DIFF WBC: CPT

## 2022-07-05 PROCEDURE — 93005 ELECTROCARDIOGRAM TRACING: CPT | Performed by: STUDENT IN AN ORGANIZED HEALTH CARE EDUCATION/TRAINING PROGRAM

## 2022-07-05 PROCEDURE — 70450 CT HEAD/BRAIN W/O DYE: CPT

## 2022-07-05 PROCEDURE — 71045 X-RAY EXAM CHEST 1 VIEW: CPT

## 2022-07-05 PROCEDURE — 99285 EMERGENCY DEPT VISIT HI MDM: CPT

## 2022-07-05 PROCEDURE — 73502 X-RAY EXAM HIP UNI 2-3 VIEWS: CPT

## 2022-07-05 PROCEDURE — 72125 CT NECK SPINE W/O DYE: CPT

## 2022-07-05 PROCEDURE — 73030 X-RAY EXAM OF SHOULDER: CPT

## 2022-07-05 PROCEDURE — 81001 URINALYSIS AUTO W/SCOPE: CPT

## 2022-07-05 PROCEDURE — 6370000000 HC RX 637 (ALT 250 FOR IP): Performed by: EMERGENCY MEDICINE

## 2022-07-05 PROCEDURE — 2580000003 HC RX 258: Performed by: STUDENT IN AN ORGANIZED HEALTH CARE EDUCATION/TRAINING PROGRAM

## 2022-07-05 PROCEDURE — 84484 ASSAY OF TROPONIN QUANT: CPT

## 2022-07-05 RX ORDER — MECLIZINE HCL 12.5 MG/1
6.25 TABLET ORAL NIGHTLY
COMMUNITY

## 2022-07-05 RX ORDER — ACETAMINOPHEN 500 MG
1000 TABLET ORAL ONCE
Status: COMPLETED | OUTPATIENT
Start: 2022-07-05 | End: 2022-07-05

## 2022-07-05 RX ORDER — GABAPENTIN 600 MG/1
1200 TABLET ORAL NIGHTLY
COMMUNITY

## 2022-07-05 RX ORDER — DOCUSATE SODIUM 100 MG/1
100 CAPSULE, LIQUID FILLED ORAL
COMMUNITY

## 2022-07-05 RX ORDER — GABAPENTIN 600 MG/1
600 TABLET ORAL DAILY
COMMUNITY

## 2022-07-05 RX ORDER — ASPIRIN 81 MG/1
81 TABLET ORAL DAILY
COMMUNITY

## 2022-07-05 RX ORDER — SODIUM CHLORIDE 0.9 % (FLUSH) 0.9 %
SYRINGE (ML) INJECTION
Status: DISCONTINUED
Start: 2022-07-05 | End: 2022-07-06 | Stop reason: HOSPADM

## 2022-07-05 RX ORDER — 0.9 % SODIUM CHLORIDE 0.9 %
1000 INTRAVENOUS SOLUTION INTRAVENOUS ONCE
Status: COMPLETED | OUTPATIENT
Start: 2022-07-05 | End: 2022-07-06

## 2022-07-05 RX ORDER — CLOTRIMAZOLE AND BETAMETHASONE DIPROPIONATE 10; .64 MG/G; MG/G
1 CREAM TOPICAL 2 TIMES DAILY PRN
COMMUNITY

## 2022-07-05 RX ORDER — SENNA PLUS 8.6 MG/1
1 TABLET ORAL
COMMUNITY

## 2022-07-05 RX ADMIN — SODIUM CHLORIDE 1000 ML: 9 INJECTION, SOLUTION INTRAVENOUS at 22:47

## 2022-07-05 RX ADMIN — ACETAMINOPHEN 1000 MG: 500 TABLET ORAL at 23:09

## 2022-07-05 NOTE — ED NOTES
ekg done and given to dr Rosalee Salazar, pt on cardiac monitor with cont pulse ox, bed low/locked with side rails up and call light within reach, daughter at bedside, pt fell this am, unwitnessed, slept until 1600 and woke confused and short of breath, dr Rosalee Salazar advised     Steven Sosa, RN  07/05/22 3743

## 2022-07-06 ENCOUNTER — TELEPHONE (OUTPATIENT)
Dept: ORTHOPEDIC SURGERY | Age: 77
End: 2022-07-06

## 2022-07-06 DIAGNOSIS — M17.12 PRIMARY OSTEOARTHRITIS OF LEFT KNEE: Primary | ICD-10-CM

## 2022-07-06 DIAGNOSIS — M18.12 PRIMARY OSTEOARTHRITIS OF FIRST CARPOMETACARPAL JOINT OF LEFT HAND: ICD-10-CM

## 2022-07-06 LAB
EKG ATRIAL RATE: 375 BPM
EKG P AXIS: -50 DEGREES
EKG Q-T INTERVAL: 430 MS
EKG QRS DURATION: 80 MS
EKG QTC CALCULATION (BAZETT): 443 MS
EKG R AXIS: 1 DEGREES
EKG T AXIS: 22 DEGREES
EKG VENTRICULAR RATE: 64 BPM

## 2022-07-06 ASSESSMENT — ENCOUNTER SYMPTOMS
COLOR CHANGE: 1
SORE THROAT: 0
NAUSEA: 0
EYE PAIN: 0
SHORTNESS OF BREATH: 1
VOMITING: 0
ABDOMINAL PAIN: 0
DIARRHEA: 0
BACK PAIN: 1

## 2022-07-06 NOTE — ED PROVIDER NOTES
Chief Complaint   Patient presents with    Fall     via ems from home, frequent falls, states her legs give out, pt sleepy and oriented to name only, was short of breath and 88% on room air per ems, denies chest pain, c/o left elbow/right hip/right shoulder pain,     Altered Mental Status       HPI   Kota Henley is a 78 y.o. old female presenting to the emergency department via EMS from home for complaint of a fall with altered mental status. History is obtained by the patient and her daughter at the bedside. Patient had a fall earlier this morning that was unwitnessed. She states her legs gave out. She reports multiple previous episodes. The patient's daughter heard the fall and immediately offered assistance. She found her on her side and was able to help her up to her bed. Patient slept for a long period time, however when she woke up she was slightly confused and had a tremor of her lower extremities. Patient complained of right shoulder pain and right hip pain that is mild in severity. She denies any relieving or provoking factors to her symptoms. She states her weakness is moderate in severity and constant. Patient reports a recent left wrist surgery 6/29 for which she has been taking oxycodone. Patient denies any fevers, chills, nausea, vomiting, chest pain, abdominal pain, numbness, or tingling. Review of Systems   Constitutional: Positive for activity change and fatigue. Negative for chills and fever. HENT: Negative for ear pain and sore throat. Eyes: Negative for pain. Respiratory: Positive for shortness of breath. Cardiovascular: Negative for chest pain. Gastrointestinal: Negative for abdominal pain, diarrhea, nausea and vomiting. Genitourinary: Negative for flank pain. Musculoskeletal: Positive for arthralgias, back pain and neck pain. Skin: Positive for color change. Negative for rash. Neurological: Positive for tremors and weakness. Negative for headaches. Psychiatric:         Mood and Affect: Mood normal.          Procedures     MDM   Patient is a 68-year-old female presenting with complaint of a fall. She appeared in no acute distress on arrival.  Labs and imaging were obtained. Imaging revealed no acute process. Lab work was significant for mild hypoglycemia. Patient received fluids, Tylenol, and was fed. We recommended admission for increasing weakness and multiple falls. Case was discussed with the hospitalist who stated that the patient would need to agree to be placed in a skilled rehab facility. This option was discussed with the patient, however she declined stating she would rather be discharged home. ED Course as of 07/06/22 0122   Tue Jul 05, 2022 2213 Patient was offered admission for placement to a skilled nursing facility, however she declined stating she would rather go home. Patient will follow up with Dr. Shamir Esteban. She is given strict return precautions. [TO]   2216 We discussed potential admission with the hospitalist.  He stated he would admit the patient if she was agreeable to being placed in a skilled rehab facility. This was relayed to the patient who declined. [TO]      ED Course User Index  [TO] Chuy Madrigal, DO       --------------------------------------------- PAST HISTORY ---------------------------------------------  Past Medical History:  has a past medical history of Arthritis, CAD (coronary artery disease), Current use of long term anticoagulation, Falls frequently, Fibromyalgia, GERD (gastroesophageal reflux disease), History of cardiovascular stress test, History of MI (myocardial infarction), Hx of blood clots, Hyperlipidemia, Hypertension, Hypothyroidism, Intractable low back pain, Non-insulin dependent type 2 diabetes mellitus ([address] NSTEMI (non-ST elevated myocardial infarction) ([facility] [address] and Syncope and collapse. Past Surgical History:  has a past surgical history that includes Salivary gland surgery;  Rotator cuff repair; Cholecystectomy; Appendectomy; orthopedic surgery; Hysterectomy; Coccyx removal; Bladder surgery; Hemorrhoid surgery; Coronary angioplasty with stent; Coronary artery bypass graft; Tonsillectomy; Cataract removal with implant (Right, 7 7 15); Nerve Block (Left, 11 18 2015); Coronary angioplasty with stent (11/19/2015); Cataract removal with implant (Left, 4/26/2016); lumbar fusion (6/7/2016); Nerve Block (Right, 07/17/2017); Nerve Block (Right, 09/06/2017); Cardiac catheterization (11/15/2017); Colonoscopy; Endoscopy, colon, diagnostic; vascular surgery; pr arthrs kne surg w/meniscectomy med/lat w/shvg (Right, 6/6/2018); Nerve Block (Bilateral, 10/24/2018); pr inj dx/ther agnt paravert facet joint, lumbar/sac, 1st level (Bilateral, 10/24/2018); Nerve Block (Left, 08/22/2019); arthrocentesis (Left, 8/22/2019); other surgical history (N/A, 11/04/2019); REPLACE / REVISE VAGAL NERVE STIMULATOR (N/A, 11/4/2019); Spinal Cord Stimulator Surgery (06/15/2020); Nerve Surgery (N/A, 6/15/2020); Elbow fracture surgery (Right, 7/30/2021); arthroplasty (Left, 6/29/2022); and Hand tendon surgery (Left, 6/29/2022). Social History:  reports that she has never smoked. She has never used smokeless tobacco. She reports that she does not drink alcohol and does not use drugs. Family History: family history includes ADHD in an other family member; COPD in her brother; Cancer in her brother; Cancer (age of onset: [de-identified]) in her father; Diabetes in her sister and another family member; Heart Attack (age of onset: 54) in her mother; Heart Disease in her brother and sister; Heart Disease (age of onset: 58) in an other family member. The patients home medications have been reviewed.     Allergies: Iodine, Lipitor, Statins, Atorvastatin, Cymbalta [duloxetine hcl], Cipro xr, Diazepam, Duloxetine, Erythromycin, Indomethacin, Ketorolac, Ketorolac tromethamine, Lodine [etodolac], Oscal 500-200 d-3 [oyster shell calcium-d], Paxil [paroxetine hcl], Ropinirole, Tromethamine, Trulicity [dulaglutide], Zithromax [azithromycin], Zoloft [sertraline hcl], and Requip [ropinirole hcl]    -------------------------------------------------- RESULTS -------------------------------------------------  Labs:  Results for orders placed or performed during the hospital encounter of 07/05/22   CBC with Auto Differential   Result Value Ref Range    WBC 5.2 4.5 - 11.5 E9/L    RBC 4.32 3.50 - 5.50 E12/L    Hemoglobin 13.4 11.5 - 15.5 g/dL    Hematocrit 40.9 34.0 - 48.0 %    MCV 94.7 80.0 - 99.9 fL    MCH 31.0 26.0 - 35.0 pg    MCHC 32.8 32.0 - 34.5 %    RDW 13.1 11.5 - 15.0 fL    Platelets 226 412 - 000 E9/L    MPV 9.8 7.0 - 12.0 fL    Neutrophils % 50.8 43.0 - 80.0 %    Immature Granulocytes % 0.8 0.0 - 5.0 %    Lymphocytes % 30.9 20.0 - 42.0 %    Monocytes % 10.9 2.0 - 12.0 %    Eosinophils % 6.2 (H) 0.0 - 6.0 %    Basophils % 0.4 0.0 - 2.0 %    Neutrophils Absolute 2.62 1.80 - 7.30 E9/L    Immature Granulocytes # 0.04 E9/L    Lymphocytes Absolute 1.59 1.50 - 4.00 E9/L    Monocytes Absolute 0.56 0.10 - 0.95 E9/L    Eosinophils Absolute 0.32 0.05 - 0.50 E9/L    Basophils Absolute 0.02 0.00 - 0.20 E9/L   Comprehensive Metabolic Panel w/ Reflex to MG   Result Value Ref Range    Sodium 140 132 - 146 mmol/L    Potassium reflex Magnesium 3.7 3.5 - 5.0 mmol/L    Chloride 99 98 - 107 mmol/L    CO2 30 (H) 22 - 29 mmol/L    Anion Gap 11 7 - 16 mmol/L    Glucose 73 (L) 74 - 99 mg/dL    BUN 15 6 - 23 mg/dL    CREATININE 1.2 (H) 0.5 - 1.0 mg/dL    GFR Non-African American 44 >=60 mL/min/1.73    GFR African American 53     Calcium 9.6 8.6 - 10.2 mg/dL    Total Protein 6.5 6.4 - 8.3 g/dL    Albumin 3.8 3.5 - 5.2 g/dL    Total Bilirubin 0.4 0.0 - 1.2 mg/dL    Alkaline Phosphatase 58 35 - 104 U/L    ALT 24 0 - 32 U/L    AST 26 0 - 31 U/L   Troponin   Result Value Ref Range    Troponin, High Sensitivity 23 (H) 0 - 9 ng/L   Brain Natriuretic Peptide   Result Value Ref Range Pro- 0 - 450 pg/mL   Urinalysis with Microscopic   Result Value Ref Range    Color, UA Yellow Straw/Yellow    Clarity, UA Clear Clear    Glucose, Ur 500 (A) Negative mg/dL    Bilirubin Urine Negative Negative    Ketones, Urine Negative Negative mg/dL    Specific Gravity, UA 1.015 1.005 - 1.030    Blood, Urine Negative Negative    pH, UA 7.0 5.0 - 9.0    Protein, UA Negative Negative mg/dL    Urobilinogen, Urine 1.0 <2.0 E.U./dL    Nitrite, Urine Negative Negative    Leukocyte Esterase, Urine Negative Negative    WBC, UA 0-1 0 - 5 /HPF    RBC, UA 0-1 0 - 2 /HPF    Epithelial Cells, UA FEW /HPF    Bacteria, UA NONE SEEN None Seen /HPF   Troponin   Result Value Ref Range    Troponin, High Sensitivity 24 (H) 0 - 9 ng/L   EKG 12 Lead   Result Value Ref Range    Ventricular Rate 64 BPM    Atrial Rate 375 BPM    QRS Duration 80 ms    Q-T Interval 430 ms    QTc Calculation (Bazett) 443 ms    P Axis -50 degrees    R Axis 1 degrees    T Axis 22 degrees       Radiology:  XR CHEST 1 VIEW   Final Result   No acute cardiopulmonary abnormality. XR HIP 2-3 VW W PELVIS RIGHT   Final Result   No acute abnormality. XR SHOULDER RIGHT (MIN 2 VIEWS)   Final Result   No fracture or joint dislocation. CT Head WO Contrast   Final Result   CT HEAD WITHOUT CONTRAST:      1. No skull fracture or acute intracranial abnormality. CT CERVICAL SPINE WITHOUT CONTRAST:      1. No fracture or joint dislocation is seen. 2. Mild central canal stenosis at C5-6 with moderate to severe left neural   foraminal stenosis. RECOMMENDATIONS:   Unavailable         CT Cervical Spine WO Contrast   Final Result   CT HEAD WITHOUT CONTRAST:      1. No skull fracture or acute intracranial abnormality. CT CERVICAL SPINE WITHOUT CONTRAST:      1. No fracture or joint dislocation is seen. 2. Mild central canal stenosis at C5-6 with moderate to severe left neural   foraminal stenosis.       RECOMMENDATIONS:   Unavailable ------------------------- NURSING NOTES AND VITALS REVIEWED ---------------------------  Date / Time Roomed:  7/5/2022  6:48 PM  ED Bed Assignment:  JUSTYNA/JUSTYNA    The nursing notes within the ED encounter and vital signs as below have been reviewed. BP (!) 180/68   Pulse 72   Temp 98.2 °F (36.8 °C) (Oral)   Resp 16   SpO2 96%   Oxygen Saturation Interpretation: Normal      ------------------------------------------ PROGRESS NOTES ------------------------------------------  1:22 AM EDT  I have spoken with the Patient and/or Family and discussed todays results, in addition to providing specific details for the plan of care and counseling regarding the diagnosis and prognosis. Labs and Imaging discussed with patient including appropriate follow- up and re-evaluation. Their questions are answered at this time and they are agreeable with the plan. I discussed at length with them reasons for immediate return here for re evaluation. They will followup with PCP by calling their office Next Business Day      --------------------------------- ADDITIONAL PROVIDER NOTES ---------------------------------  At this time the patient is without objective evidence of an acute process requiring hospitalization or inpatient management. They have remained hemodynamically stable throughout their entire ED visit and are stable for discharge with outpatient follow-up. The plan has been discussed in detail and they are aware of the specific conditions for emergent return, as well as the importance of follow-up. Discharge Medication List as of 7/6/2022 12:28 AM          Diagnosis:  1. Fall, initial encounter    2. Generalized weakness    3. Tremor        Disposition:  Patient's disposition: Discharge to home  Patient's condition is stable.          Fabiola Ocampo, DO  Resident  07/06/22 Angelica Mckenzie, DO  Resident  07/06/22 1920

## 2022-07-06 NOTE — ED NOTES
Two person assist to restroom, legs buckling and unsteady on feet, dr Diggs Record advised,      Milton Hope, RN  07/05/22 9725

## 2022-07-06 NOTE — ED NOTES
Two person assist pt to restroom urine obtained and send, pt very unsteady on her feet, denies dizziness, cont to c/o of shortness of breath but is 97% on room air     Chilo Ghosh RN  07/05/22 2059

## 2022-07-06 NOTE — ED NOTES
Iv established and labs drawn, pt now alert and oriented x 4, pt has tremors     [place] Bradshaw, RN  07/05/22 2002

## 2022-07-07 ENCOUNTER — OFFICE VISIT (OUTPATIENT)
Dept: ORTHOPEDIC SURGERY | Age: 77
End: 2022-07-07

## 2022-07-07 ENCOUNTER — TREATMENT (OUTPATIENT)
Dept: OCCUPATIONAL THERAPY | Age: 77
End: 2022-07-07
Payer: MEDICARE

## 2022-07-07 VITALS — BODY MASS INDEX: 34.74 KG/M2 | HEIGHT: 61 IN | RESPIRATION RATE: 20 BRPM | WEIGHT: 184 LBS

## 2022-07-07 DIAGNOSIS — M18.12 PRIMARY OSTEOARTHRITIS OF FIRST CARPOMETACARPAL JOINT OF LEFT HAND: Primary | ICD-10-CM

## 2022-07-07 DIAGNOSIS — G89.18 POST-OP PAIN: Primary | ICD-10-CM

## 2022-07-07 DIAGNOSIS — M18.12 PRIMARY OSTEOARTHRITIS OF FIRST CARPOMETACARPAL JOINT OF LEFT HAND: ICD-10-CM

## 2022-07-07 PROCEDURE — 99024 POSTOP FOLLOW-UP VISIT: CPT | Performed by: ORTHOPAEDIC SURGERY

## 2022-07-07 PROCEDURE — 97760 ORTHOTIC MGMT&TRAING 1ST ENC: CPT | Performed by: OCCUPATIONAL THERAPIST

## 2022-07-07 PROCEDURE — 97110 THERAPEUTIC EXERCISES: CPT | Performed by: OCCUPATIONAL THERAPIST

## 2022-07-07 RX ORDER — HYDROCODONE BITARTRATE AND ACETAMINOPHEN 5; 325 MG/1; MG/1
1 TABLET ORAL EVERY 6 HOURS PRN
Qty: 28 TABLET | Refills: 0 | Status: SHIPPED | OUTPATIENT
Start: 2022-07-07 | End: 2022-07-14

## 2022-07-07 NOTE — PROGRESS NOTES
Doing well status post left volar radial ganglion cyst excision as well as trapeziectomy ligament reconstruction using a fiber tack. Physical exam: Mild swelling. No signs of infection. Incisions are healing nicely. Neurovascular intact. X-rays in office today: AP lateral obliques of the left hand were obtained demonstrating a stable trapeziectomy ligament reconstruction. No new fractures or dislocations. Impression office x-rays: Stable left thumb trapeziectomy and suspension plasty    Assessment: Postop left thumb trapeziectomy ligament structure with a ganglion cyst excision doing well    Plan    Patient was referred over to therapy for splint fabrication. She may continue with therapy closer to home for gentle range of motion exercises. May wean out of the brace at approximately 1 month postop. Light strengthening at 8 weeks. Modalities as needed.   Follow-up 4 to 6 weeks

## 2022-07-07 NOTE — PROGRESS NOTES
OCCUPATIONAL THERAPY EVALUATION/Splint Application Only   Phone: [phone]   Fax: [phone]     Date:  2022      Patient Name:  Erasmo Christina    :  1945    Restrictions/Precautions:   Follow CMC interpositional protocol with arthrex fiberlock,  Low fall risk   Diagnosis:  R CMC interpositional with arthrex fiberlock   M18.12 (ICD-10-CM) - Primary osteoarthritis of first carpometacarpal joint of left hand      Treatment Diagnosis:  same  Insurance/Certification information:  [employer] Medicare  Referring Physician:  Dr. Silva Richmond  Date of Surgery/Injury: sx  2022   ( 1 week post op)   Plan of care signed (Y/N):  N  Visit# / total visits:   ( pt to schedule her OT evaluation closer to home at our facility in [place])     Past Medical History:   Past Medical History:   Diagnosis Date    Arthritis     CAD (coronary artery disease)     Current use of long term anticoagulation     Plavix    Falls frequently     Fibromyalgia     GERD (gastroesophageal reflux disease)     History of cardiovascular stress test 2014    lexiscan, also 2015    History of MI (myocardial infarction)     x4; most recent 2016    Hx of blood clots     DVT leg  (pt states she was traveling)    Hyperlipidemia     Hypertension     Hypothyroidism     Intractable low back pain 2016    Non-insulin dependent type 2 diabetes mellitus ([facility] [address]     NSTEMI (non-ST elevated myocardial infarction) ([facility] [address] 2015    Syncope and collapse 10/2020    x2  states was dt hypotension     Past Surgical History:   Past Surgical History:   Procedure Laterality Date    APPENDECTOMY      ARTHROCENTESIS Left 2019    LEFT HIP CORTICOSTERIOD INJECTION UNDER FLUOROSCOPIC GUIDANCE performed by Ignacio Gutierrez DO at [facility] Left 2022    LEFT WRIST GANGLION CYST EXCISON LEFT THUMB TRAPEZIECTOMY performed by Brandie Guevara MD at [address] [place] [address] CATHETERIZATION  11/15/2017    Dr Serina Arriaga Right 7 7 15    CATARACT REMOVAL WITH IMPLANT Left 4/26/2016    CHOLECYSTECTOMY      COCCYX REMOVAL      COLONOSCOPY      CORONARY ANGIOPLASTY WITH STENT PLACEMENT      1996 started needing cardiac care; 5 stents total    CORONARY ANGIOPLASTY WITH STENT PLACEMENT  11/19/2015    Dr Mckay Barajas stent 3x18 prox Cx   Fayetta Sin Dr. Mya Brown; 3 bypass grafts    ELBOW FRACTURE SURGERY Right 7/30/2021    RIGHT ELBOW EPICONDYLECTOMY, MEDIAL EPICONDYLE  WITH  PLASMA  RICH PLATELET  INJECTION ([address]) performed by Vladimir Sheridan DO at [facility], COLON, DIAGNOSTIC      HAND TENDON SURGERY Left 6/29/2022    LIGAMENT RECONSTUCTION TENDON INTERPOSITION performed by Abdi Murray MD at [facility] (CERVIX STATUS UNKNOWN)      LUMBAR FUSION  6/7/2016    Dr. Fransisca Donahue Left 11 18 2015    left lumbar transforaminal nerve block l4-5 l5-s1    NERVE BLOCK Right 07/17/2017    lumbar transforaminal #1    NERVE BLOCK Right 09/06/2017    right knee injection#1    NERVE BLOCK Bilateral 10/24/2018    lumbar facet block     NERVE BLOCK Left 08/22/2019    hip     NERVE SURGERY N/A 6/15/2020    SPINAL CORD STIMULATOR IMPLANT WITH NERVO performed by Landon Bailey MD at [address] [facility] [address]      bilateral feet    OTHER SURGICAL HISTORY N/A 11/04/2019    spinal cord stimulator trial    GA ARTHRS KNE SURG W/MENISCECTOMY MED/LAT W/SHVG Right 6/6/2018    RIGHT KNEE ARTHROSCOPY MEDIAL AND LATERAL MENISECTOMY SYNOVECTOMY AND CHONDROPLASTY performed by Vladimir Sheridan DO at [facility] [facility]. DX/THER AGNT PARAVERT FACET JOINT, LUMBAR/SAC, 1ST LEVEL Bilateral 10/24/2018    BILATERAL LUMBAR FACET BLOCK L1-2 ,L3-4 WITH X-RAY performed by Adriano López DO at [address] N/A 11/4/2019 SPINAL CORD STIMULATOR TRIAL WITH NEVRO performed by Dannielle Currie MD at [address] [facility] SURGERY  06/15/2020    nevro     TONSILLECTOMY      VASCULAR SURGERY      laser on leaky veins       Reason for Referral: Pt seen today for splint fabrication 2* to [facility] sx in her L hand on 6/29/2022. Procedures include:   PREOPERATIVE DIAGNOSIS:   1. Left thumb basal joint arthritis and left carpal tunnel syndrome   2. Left volar ganglion cyst     POSTOPERATIVE DIAGNOSIS: same     PROCEDURES:  1. left thumb trapeziectomy. 2 .left thumb volar oblique ligament reconstruction using a split flexor  carpi radialis tendon transfer. 3. [place] of split flexor carpi radialis tendon for ligament reconstruction. 4. left thumb application of Fiberlock anchor  5. Left volar ganglion cyst excision wrist  6. Left wrist joint debridement  She presents today with her granddaughter for splint fabrication. She will be calling a facitliy in [place] she is familiar with to get PT for this sx. Therapist will call the therapist tomorrow Tabatha Quintana ) with specific instructions. Splint Fabricated/Provided: wrist/ thumb spica splint allowing digital motion and thumb IP flex. .. Education Provided: Patient was provided with verbal education/handout regarding splint care, wear, precautions, and hygiene    Splint Care: Splint can be washed with mild soap and cool water. Splint needs to air dry. Avoid leaving splint in or near a source of heat such as a hot car or a space heater. Use nail polish remover to remove stains on splint. Use Purell will decrease any odor that may develop. Splint Precuations: Patient was instructed to remove splint and contact therapist if the following occur:   1. Redness that lasts longer that 15-20 mins   2. Increased swelling   3. Increased pain   4. Pressure Sores    Wear Schedule: To wear 24/7.   Can remove for shower but do not use her thumb for prehension. Can remove if sitting doing nothing to air the hand out. Splint charge : ortho fit x's one, there exercise x's one   Profile and History- from physician notes and patient    Rehab Potential: Good   Recommendations: Outpatient OT  Goal Formulation: Patient  Requires OT Follow Up: Yes    Plan   Plan: Plan of care initiated. Pt was seen today for splint only. Pt may need an additional visit for splint modification. Pt was given our phone number is she has questions or needs a splint adjustment and the number of our [place] facility. Goals (1 session):  1. Patient will verbalize and demo ability to don/doff splint appropriately in 1 session with good accuracy   Goal Met: Yes   2. Patient will verbalize understanding of splint precautions, splint care, and wear schedule in 1 session   Goal Met: Yes  3. Patient will demonstrate understand of stretching and/or exercise provided in 1 session. Goal Met: Yes. Pt has about 40 * thumb IP flexion and full digital flex/ ext. .  She has slight + edema. Therapist reveiwed edema control techniques. Therapist showed her tendon gliding to do 4 - 6 x's a day with her splint and and blocked thumb IP flexion to do 4 - 6 x's a day. Therapist instructed pt to begin therapy not next week but the week after. Therapist will call that therapist to review the protocol. Pt and grand daughter appeared to understand instructions. Please review Patient's OT evaluation and if you agree sign/date and fax back to us at our [address] [facility]  fax # [phone]. Total Tx Time: 35 min. Ortho fit 20 min.,  There exercise 15 min.      [de-identified] Signature:____________________________ Date:__________________       Esdras Keenan OT/L, CHT   # [phone]

## 2022-07-12 ENCOUNTER — TELEPHONE (OUTPATIENT)
Dept: OCCUPATIONAL THERAPY | Age: 77
End: 2022-07-12

## 2022-07-12 NOTE — TELEPHONE ENCOUNTER
Patient was called to schedule OT in [place]. Pt has already scheduled to go to [facility] in [place].

## 2022-07-18 ENCOUNTER — OFFICE VISIT (OUTPATIENT)
Dept: ORTHOPEDIC SURGERY | Age: 77
End: 2022-07-18
Payer: MEDICARE

## 2022-07-18 VITALS — WEIGHT: 184 LBS | HEIGHT: 61 IN | TEMPERATURE: 98 F | BODY MASS INDEX: 34.74 KG/M2

## 2022-07-18 DIAGNOSIS — M75.21 BICEPS TENDINITIS OF RIGHT UPPER EXTREMITY: Primary | ICD-10-CM

## 2022-07-18 PROCEDURE — 99213 OFFICE O/P EST LOW 20 MIN: CPT | Performed by: ORTHOPAEDIC SURGERY

## 2022-07-18 PROCEDURE — 1123F ACP DISCUSS/DSCN MKR DOCD: CPT | Performed by: ORTHOPAEDIC SURGERY

## 2022-07-18 NOTE — PROGRESS NOTES
Chief Complaint   Patient presents with    Shoulder Pain     Right Arm/Shoulder, has had multiple falls, states of weakness in her arm. Knee Pain     Bilateral Knee F/U, has had multiple falls and instability in the knees. Luis Enrique Lock is here today with right arm/elbow pain. She is point tender over the insertion of the biceps tendon.     Past Medical History:   Diagnosis Date    Arthritis     CAD (coronary artery disease)     Current use of long term anticoagulation     Plavix    Falls frequently     Fibromyalgia     GERD (gastroesophageal reflux disease)     History of cardiovascular stress test 02/17/2014    lexiscan, also 4/21/2015    History of MI (myocardial infarction)     x4; most recent 2016    Hx of blood clots     DVT leg  (pt states she was traveling)    Hyperlipidemia     Hypertension     Hypothyroidism     Intractable low back pain 06/19/2016    Non-insulin dependent type 2 diabetes mellitus (HCC)     NSTEMI (non-ST elevated myocardial infarction) ([facility] [address] 11/19/2015    Syncope and collapse 10/2020    x2  states was dt hypotension     Past Surgical History:   Procedure Laterality Date    APPENDECTOMY      ARTHROCENTESIS Left 8/22/2019    LEFT HIP CORTICOSTERIOD INJECTION UNDER FLUOROSCOPIC GUIDANCE performed by Maryam Galicia DO at [facility] Left 6/29/2022    LEFT WRIST GANGLION CYST EXCISON LEFT THUMB TRAPEZIECTOMY performed by Gloria Penaloza MD at [facility]  11/15/2017    Dr Corrine Coffman Right 7 7 15    CATARACT REMOVAL WITH IMPLANT Left 4/26/2016    CHOLECYSTECTOMY      COCCYX REMOVAL      COLONOSCOPY      CORONARY ANGIOPLASTY WITH [address] started needing cardiac care; 5 stents total    CORONARY ANGIOPLASTY WITH STENT PLACEMENT  11/19/2015    Dr Nicolasa Jennings stent 3x18 prox Cx    Linda Chen; 3 bypass grafts    ELBOW FRACTURE SURGERY Right 7/30/2021    RIGHT ELBOW EPICONDYLECTOMY, MEDIAL EPICONDYLE  WITH  PLASMA  RICH PLATELET  INJECTION ([address]) performed by Andrés Morrow DO at [facility],  [address], COLON, DIAGNOSTIC      HAND TENDON SURGERY Left 6/29/2022    LIGAMENT RECONSTUCTION TENDON INTERPOSITION performed by Sergio Costa MD at [address] ([facility])      LUMBAR FUSION  6/7/2016    Dr. Elgin Rizvi Left 11 18 2015    left lumbar transforaminal nerve block l4-5 l5-s1    NERVE BLOCK Right 07/17/2017    lumbar transforaminal #1    NERVE BLOCK Right 09/06/2017    right knee injection#1    NERVE BLOCK Bilateral 10/24/2018    lumbar facet block     NERVE BLOCK Left 08/22/2019    hip     NERVE SURGERY N/A 6/15/2020    SPINAL CORD STIMULATOR IMPLANT WITH NERVO performed by Yun Petty MD at [facility]      bilateral feet    OTHER SURGICAL HISTORY N/A 11/04/2019    spinal cord stimulator trial    AL ARTHRS KNE SURG W/MENISCECTOMY MED/LAT W/SHVG Right 6/6/2018    RIGHT KNEE ARTHROSCOPY MEDIAL AND LATERAL MENISECTOMY SYNOVECTOMY AND CHONDROPLASTY performed by Andrés Morrow DO at [address] [facility] DX/THER AGNT PARAVERT FACET JOINT, LUMBAR/SAC, 1ST LEVEL Bilateral 10/24/2018    BILATERAL LUMBAR FACET BLOCK L1-2 ,L3-4 WITH X-RAY performed by Darryl Ordaz DO at [facility] / [address] [place] [address] N/A 11/4/2019    SPINAL CORD STIMULATOR TRIAL WITH NEVRO performed by Yun Petty MD at [id number] [place] [address]  06/15/2020    nevro     TONSILLECTOMY      VASCULAR SURGERY      laser on leaky veins       Current Outpatient Medications:     aspirin 81 MG EC tablet, Take 81 mg by mouth daily, Disp: , Rfl:     gabapentin (NEURONTIN) 600 MG tablet, Take 600 mg by mouth daily. , Disp: , Rfl:     gabapentin (NEURONTIN) 600 MG tablet, Take 1,200 mg by mouth at bedtime. , Disp: , Rfl:     meclizine (ANTIVERT) 12.5 MG tablet, Take 6.25 mg by mouth at bedtime, Disp: , Rfl:     senna (SENOKOT) 8.6 MG tablet, Take 1 tablet by mouth three times a week, Disp: , Rfl:     docusate sodium (COLACE) 100 MG capsule, Take 100 mg by mouth three times a week, Disp: , Rfl:     clotrimazole-betamethasone (LOTRISONE) 1-0.05 % cream, Apply 1 each topically 2 times daily as needed (Irritation), Disp: , Rfl:     furosemide (LASIX) 40 MG tablet, Take 40 mg by mouth daily , Disp: , Rfl:     potassium chloride (KLOR-CON) 10 MEQ extended release tablet, Take 10 mEq by mouth daily , Disp: , Rfl:     baclofen (LIORESAL) 10 MG tablet, TAKE 1 TABLET EVERY NIGHT, Disp: 90 tablet, Rfl: 1    diclofenac sodium (VOLTAREN) 1 % GEL, Apply 2 g topically 4 times daily as needed for Pain, Disp: 350 g, Rfl: 0    fenofibrate (TRICOR) 145 MG tablet, Take 145 mg by mouth three times a week m-w-f, Disp: , Rfl:     glipiZIDE (GLUCOTROL) 10 MG tablet, Take 10 mg by mouth 2 times daily (before meals) , Disp: , Rfl:     isosorbide mononitrate (IMDUR) 30 MG extended release tablet, Take 30 mg by mouth daily , Disp: , Rfl:     OZEMPIC, 0.25 OR 0.5 MG/DOSE, 2 MG/1.5ML SOPN, Inject 0.5 mg into the skin once a week fridays, Disp: , Rfl:     Coenzyme Q10 (COQ10) 200 MG CAPS, Take 200 mg by mouth daily , Disp: , Rfl:     ferrous sulfate (IRON 325) 325 (65 Fe) MG tablet, Take 325 mg by mouth daily (with breakfast), Disp: , Rfl:     metoprolol succinate (TOPROL XL) 25 MG extended release tablet, Take 25 mg by mouth daily , Disp: , Rfl:     dapagliflozin (FARXIGA) 10 MG tablet, Take 10 mg by mouth daily , Disp: , Rfl:     omeprazole (PRILOSEC) 20 MG delayed release capsule, Take 20 mg by mouth daily , Disp: , Rfl:     clopidogrel (PLAVIX) 75 MG tablet, Take 75 mg by mouth daily Stopped 2 days pre op per Dr Jo Montanez instruction, Disp: , Rfl:     nitroGLYCERIN (NITROSTAT) 0.4 MG SL tablet, Place 0.4 mg labored. Patient is not gasping. Palpation of the chest reveals no tactile fremitus. Skin:    Upon inspection: the skin appears warm, dry and intact. There is not a previous scar over the affected area. There is not any cellulitis, lymphedema or cutaneous lesions noted in the lower extremities. Upon palpation there is no induration noted. Neurologic:      Motor exam of the upper extremities show: The reflexes in biceps/triceps/brachioradialis are equal and symmetric. Sensory exam C5-T1 are normal bilaterally. Cardiovascular: The vascular exam is normal and is well perfused to distal extremities. There are 2+ radial pulses bilaterally, and motor and sensation is intact to median, ulnar, and radial, musclocutaneus, and axillary nerve distribution and grossly symmetric bilaterally. There is cap refill noted less than two seconds in all digits. There is not edema of the bilateral upper extremities. There is not varicosities noted in the distal extremities. Lymph:    Upon palpation,  there is no lymphadenopathy noted in bilateral upper extremities. Musculoskeletal:    Gait: normal; examination of the nails and digits reveal no cyanosis or clubbing. Cervical Exam:    On physical exam, Sonja Saldaña is well-developed, well-nourished, oriented to person, place and time. her gait is normal.  On evaluation of her cervical spine, she has full range of motion of the cervical spine without pain. There is no cervical tenderness to palpation. Shoulder Exam:     On evaluation of her bilaterally upper extremities, her bilateral shoulder has no deformity. There is not evidence of scapular dyskinesis. There is not muscle atrophy in shoulder girdle. The range of motion for the Right Shoulder is 140/40/T12 and for the Left shoulder is 140/40/T12. Right shoulder Motor strength is 5/5 in the supraspinatus, 5/5 internal rotation and 5/5 in external rotation, and Left shoulder motor strength 5/5 in supraspinatus, 5/5 in internal rotation, 5/5 in external rotation. Right shoulder:  (-) Impingement , (-) Posada , (-) Speeds, (-) Apprehension ,(-) Horn Load Shift, (-) [employer], (-) Cross arm test.     Left shoulder:  (-) Impingement, (-) Posada, (-) Speeds, (-) Apprehension,(-) Horn, (-) Load Shift, (-) [employer],(-)  Cross arm test        Right  elbow: pain is located over the biceps tendon. Range of motion: R.Elbow flexion 150/extension 0; L. Elbow flexion 150/extension 0. ; Wrist ROM R wrist DF 70, VF 80, L wrist DF 70, VF 80, R pronation 90/ supination 90, L pronation 90/supination 90. There is swelling, there is ecchymosis. Exam is compared bilaterally. Right:  Special tests: Cozen's sign negative. Reverse cozen sign negative    Left:  Special tests: Cozen's sign negative. Reverse cozen sign negative    Xray:    Not performed today. MRI:    Not performed today. Radiographic findings reviewed with patient    Assessment:   Encounter Diagnosis   Name Primary? Biceps tendinitis of right upper extremity Yes       Plan:    Natural history and expected course discussed. Questions answered. Rest, ice, compression, and elevation (RICE) therapy. The patient will continue with activities as tolerated. She is unable to take a medrol dose pack. I will see her back as needed.

## 2022-08-04 ENCOUNTER — OFFICE VISIT (OUTPATIENT)
Dept: ORTHOPEDIC SURGERY | Age: 77
End: 2022-08-04

## 2022-08-04 VITALS — HEIGHT: 61 IN | WEIGHT: 179 LBS | BODY MASS INDEX: 33.79 KG/M2

## 2022-08-04 DIAGNOSIS — M18.12 ARTHRITIS OF CARPOMETACARPAL (CMC) JOINT OF LEFT THUMB: Primary | ICD-10-CM

## 2022-08-04 PROCEDURE — 99024 POSTOP FOLLOW-UP VISIT: CPT | Performed by: ORTHOPAEDIC SURGERY

## 2022-08-04 NOTE — PROGRESS NOTES
Now 5 weeks out from left volar radial ganglion cyst excision as well as trapeziectomy ligament reconstruction using a fiber tack. Patient is doing well. She did notice a previous suture abscess that has since popped. She denies any increase in her symptoms. She denies any fevers or chills. Physical exam: Mild swelling. No signs of infection. Incisions continue to heal nicely. Previous spitting suture about the proximal volar wrist incision no suture there today. X-rays in office today: AP lateral obliques of the left hand were obtained demonstrating a stable trapeziectomy ligament reconstruction without any new signs of subsidence. No new fractures or dislocations. Impression office x-rays: Stable left thumb trapeziectomy and suspension plasty    Assessment: Postop left thumb trapeziectomy ligament structure with a ganglion cyst excision doing well    Plan    Patient is moving to [place], it was advised that she should continue home exercise program per her therapist.  At 8 weeks she should begin strengthening exercises. She should also find a PCP and another hand surgeon possible if she has any issues while she is in [place]. She will call with any questions or concerns. Follow-up as needed. I have seen and evaluated the patient and agree with the above assessment and plan on today's visit. I have performed the key components of the history and physical examination with significant findings of postop. I concur with the findings and plan as documented.     Leyda Sanchez MD  8/4/2022

## 2022-08-11 ENCOUNTER — OFFICE VISIT (OUTPATIENT)
Dept: PAIN MANAGEMENT | Age: 77
End: 2022-08-11
Payer: MEDICARE

## 2022-08-11 VITALS
HEIGHT: 61 IN | WEIGHT: 179 LBS | BODY MASS INDEX: 33.79 KG/M2 | HEART RATE: 68 BPM | DIASTOLIC BLOOD PRESSURE: 62 MMHG | TEMPERATURE: 97.5 F | SYSTOLIC BLOOD PRESSURE: 132 MMHG | OXYGEN SATURATION: 94 %

## 2022-08-11 DIAGNOSIS — M70.61 GREATER TROCHANTERIC BURSITIS OF RIGHT HIP: ICD-10-CM

## 2022-08-11 DIAGNOSIS — M47.816 LUMBAR SPONDYLOSIS: ICD-10-CM

## 2022-08-11 DIAGNOSIS — M77.11 EPICONDYLITIS, LATERAL, RIGHT: ICD-10-CM

## 2022-08-11 DIAGNOSIS — M70.62 GREATER TROCHANTERIC BURSITIS OF LEFT HIP: ICD-10-CM

## 2022-08-11 DIAGNOSIS — M16.0 PRIMARY OSTEOARTHRITIS OF BOTH HIPS: ICD-10-CM

## 2022-08-11 DIAGNOSIS — M47.814 THORACIC SPONDYLOSIS: ICD-10-CM

## 2022-08-11 DIAGNOSIS — M54.16 LUMBAR RADICULOPATHY: ICD-10-CM

## 2022-08-11 DIAGNOSIS — M96.1 LUMBAR POSTLAMINECTOMY SYNDROME: ICD-10-CM

## 2022-08-11 DIAGNOSIS — G89.4 CHRONIC PAIN SYNDROME: Primary | ICD-10-CM

## 2022-08-11 DIAGNOSIS — M47.816 LUMBAR FACET ARTHROPATHY: ICD-10-CM

## 2022-08-11 DIAGNOSIS — M51.9 LUMBAR DISC DISORDER: ICD-10-CM

## 2022-08-11 DIAGNOSIS — M53.3 DISORDER OF SACRUM: ICD-10-CM

## 2022-08-11 DIAGNOSIS — M51.36 DDD (DEGENERATIVE DISC DISEASE), LUMBAR: ICD-10-CM

## 2022-08-11 PROCEDURE — 99214 OFFICE O/P EST MOD 30 MIN: CPT | Performed by: PAIN MEDICINE

## 2022-08-11 PROCEDURE — 20605 DRAIN/INJ JOINT/BURSA W/O US: CPT | Performed by: PAIN MEDICINE

## 2022-08-11 PROCEDURE — 1123F ACP DISCUSS/DSCN MKR DOCD: CPT | Performed by: PAIN MEDICINE

## 2022-08-11 PROCEDURE — 99213 OFFICE O/P EST LOW 20 MIN: CPT | Performed by: PAIN MEDICINE

## 2022-08-11 RX ORDER — METHYLPREDNISOLONE ACETATE 40 MG/ML
40 INJECTION, SUSPENSION INTRA-ARTICULAR; INTRALESIONAL; INTRAMUSCULAR; SOFT TISSUE ONCE
Status: COMPLETED | OUTPATIENT
Start: 2022-08-11 | End: 2022-08-11

## 2022-08-11 RX ORDER — BUPIVACAINE HYDROCHLORIDE 2.5 MG/ML
1 INJECTION, SOLUTION INFILTRATION; PERINEURAL ONCE
Status: COMPLETED | OUTPATIENT
Start: 2022-08-11 | End: 2022-08-11

## 2022-08-11 RX ADMIN — BUPIVACAINE HYDROCHLORIDE 75 MG: 2.5 INJECTION, SOLUTION INFILTRATION; PERINEURAL at 16:16

## 2022-08-11 RX ADMIN — METHYLPREDNISOLONE ACETATE 40 MG: 40 INJECTION, SUSPENSION INTRA-ARTICULAR; INTRALESIONAL; INTRAMUSCULAR; SOFT TISSUE at 16:17

## 2022-08-11 NOTE — PROGRESS NOTES
[address] [facility], [address] [place] [address] [place]  [phone]    Follow up Note      Gahnshyam Cast     Date of Visit:  8/11/2022    CC:  Patient presents for follow up   Chief Complaint   Patient presents with    Back Pain    Arm Pain     Right - tendonitis     HPI:    Follow up on her low back pain with complaints of increased right buttocks and elbow pain   Change in quality of symptoms:no. Medication side effects:none. Recent diagnostic testing:thoracic spine Xray  Recent interventional procedures:none    Imaging:   Lumbar spine CT 07/2019  Remote postsurgical changes and multilevel degenerative changes as   described. Mild levoscoliosis. Moderate spinal canal stenosis at the   L3-L4 interspace level. No evidence of herniated nucleus pulposus. No   evidence of any level with critical spinal stenosis. No evidence of   remote postop complication. Left hip Xray 05/2019  Stable hip joint arthritis. No acute findings. Previous treatments: Surgery L4-5-S1 bilateral laminectomies 2016 with fusion and medications. .      Potential Aberrant Drug-Related Behavior:  None    Urine Drug Screening:  Saliva screen 03/2021 showed no narcotics which is consistent     OARRS report:  08/2022 consistent      Opioid agreement:  Renewal date:04/2022    Past Medical History:   Diagnosis Date    Arthritis     CAD (coronary artery disease)     Current use of long term anticoagulation     Plavix    Falls frequently     Fibromyalgia     GERD (gastroesophageal reflux disease)     History of cardiovascular stress test 02/17/2014    lexiscan, also 4/21/2015    History of MI (myocardial infarction)     x4; most recent 2016    Hx of blood clots     DVT leg  (pt states she was traveling)    Hyperlipidemia     Hypertension     Hypothyroidism     Intractable low back pain 06/19/2016    Non-insulin dependent type 2 diabetes mellitus ([facility] [address]     NSTEMI (non-ST elevated myocardial infarction) trial    MS ARTHRS KNE SURG W/MENISCECTOMY MED/LAT W/SHVG Right 6/6/2018    RIGHT KNEE ARTHROSCOPY MEDIAL AND LATERAL MENISECTOMY SYNOVECTOMY AND CHONDROPLASTY performed by Joanna Mercado DO at [address] DX/THER AGNT PARAVERT FACET JOINT, LUMBAR/SAC, 1ST LEVEL Bilateral 10/24/2018    BILATERAL LUMBAR FACET BLOCK L1-2 ,L3-4 WITH X-RAY performed by Bairon Simon DO at [facility] / [address] [place] [address] N/A 11/4/2019    SPINAL CORD STIMULATOR TRIAL WITH NEVRO performed by Fartun Pennington MD at [facility]  06/15/2020    nevro     TONSILLECTOMY      VASCULAR SURGERY      laser on leaky veins     Prior to Admission medications    Medication Sig Start Date End Date Taking? Authorizing Provider   aspirin 81 MG EC tablet Take 81 mg by mouth daily   Yes Historical Provider, MD   gabapentin (NEURONTIN) 600 MG tablet Take 600 mg by mouth daily. Yes Historical Provider, MD   gabapentin (NEURONTIN) 600 MG tablet Take 1,200 mg by mouth at bedtime.    Yes Historical Provider, MD   meclizine (ANTIVERT) 12.5 MG tablet Take 6.25 mg by mouth at bedtime   Yes Historical Provider, MD   senna (SENOKOT) 8.6 MG tablet Take 1 tablet by mouth three times a week   Yes Historical Provider, MD   docusate sodium (COLACE) 100 MG capsule Take 100 mg by mouth three times a week   Yes Historical Provider, MD   clotrimazole-betamethasone (LOTRISONE) 1-0.05 % cream Apply 1 each topically 2 times daily as needed (Irritation)   Yes Historical Provider, MD   furosemide (LASIX) 40 MG tablet Take 40 mg by mouth daily  3/7/22  Yes Historical Provider, MD   potassium chloride (KLOR-CON) 10 MEQ extended release tablet Take 10 mEq by mouth daily  5/14/22  Yes Historical Provider, MD   baclofen (LIORESAL) 10 MG tablet TAKE 1 TABLET EVERY NIGHT 4/15/22  Yes Arnie Chris., MD   diclofenac sodium (VOLTAREN) 1 % GEL Apply 2 g topically 4 times daily as needed for Pain 11/1/21  Yes Gloria Penaloza MD   fenofibrate (TRICOR) 145 MG tablet Take 145 mg by mouth three times a week m-w-f 4/6/21  Yes Historical Provider, MD   glipiZIDE (GLUCOTROL) 10 MG tablet Take 10 mg by mouth 2 times daily (before meals)  4/6/21  Yes Historical Provider, MD   isosorbide mononitrate (IMDUR) 30 MG extended release tablet Take 30 mg by mouth daily  3/10/21  Yes Historical Provider, MD   OZEMPIC, 0.25 OR 0.5 MG/DOSE, 2 MG/1.5ML SOPN Inject 0.5 mg into the skin once a week fridays 4/19/21  Yes Historical Provider, MD   Coenzyme Q10 (COQ10) 200 MG CAPS Take 200 mg by mouth daily    Yes Historical Provider, MD   ferrous sulfate (IRON 325) 325 (65 Fe) MG tablet Take 325 mg by mouth daily (with breakfast)   Yes Historical Provider, MD   metoprolol succinate (TOPROL XL) 25 MG extended release tablet Take 25 mg by mouth daily    Yes Historical Provider, MD   dapagliflozin (FARXIGA) 10 MG tablet Take 10 mg by mouth daily    Yes Historical Provider, MD   omeprazole (PRILOSEC) 20 MG delayed release capsule Take 20 mg by mouth daily    Yes Historical Provider, MD   clopidogrel (PLAVIX) 75 MG tablet Take 75 mg by mouth daily Stopped 2 days pre op per Dr Harshil Blood instruction   Yes Historical Provider, MD   nitroGLYCERIN (NITROSTAT) 0.4 MG SL tablet Place 0.4 mg under the tongue every 5 minutes as needed for Chest pain up to max of 3 total doses. If no relief after 1 dose, call 911. Yes Historical Provider, MD   vitamin D 1000 UNITS CAPS Take 1,000 Units by mouth every evening    Yes Historical Provider, MD   ezetimibe (ZETIA) 10 MG tablet Take 10 mg by mouth every evening    Yes Historical Provider, MD   levothyroxine (SYNTHROID) 100 MCG tablet Take 100 mcg by mouth daily.    Yes Historical Provider, MD     Allergies   Allergen Reactions    Iodine Anaphylaxis     IVP DYE    Lipitor Other (See Comments)     Severe leg pain    Statins Other (See Comments)     Muscles go weak and she falls    Atorvastatin Other (See Comments)     Makes me sick severe leg cramps    Cymbalta [Duloxetine Hcl] Nausea Only    Cipro Xr Other (See Comments)     \"Unknown\"     Diazepam Other (See Comments)     \"Unknown\"     Duloxetine Nausea Only    Erythromycin Nausea Only    Indomethacin Other (See Comments)     \"Unknown\"     Ketorolac Itching    Ketorolac Tromethamine Itching    Lodine [Etodolac] Other (See Comments)     \"Unknown\"     Oscal 500-200 D-3 [Oyster Shell Calcium-D] Other (See Comments)     \"Unknown\"     Paxil [Paroxetine Hcl] Other (See Comments)     \"Unknown\"     Ropinirole Other (See Comments)     \"Unknown\"     Tromethamine Other (See Comments)     \"Unknown\"     Trulicity [Dulaglutide] Swelling    Zithromax [Azithromycin] Other (See Comments)     \"Unknown\"     Zoloft [Sertraline Hcl] Other (See Comments)     Does not agree with me    Requip [Ropinirole Hcl] Nausea And Vomiting     Social History     Socioeconomic History    Marital status:       Spouse name: Not on file    Number of children: Not on file    Years of education: Not on file    Highest education level: Not on file   Occupational History    Not on file   Tobacco Use    Smoking status: Never    Smokeless tobacco: Never   Vaping Use    Vaping Use: Never used   Substance and Sexual Activity    Alcohol use: No    Drug use: No    Sexual activity: Never   Other Topics Concern    Not on file   Social History Narrative    Not on file     Social Determinants of Health     Financial Resource Strain: Not on file   Food Insecurity: Not on file   Transportation Needs: Not on file   Physical Activity: Not on file   Stress: Not on file   Social Connections: Not on file   Intimate Partner Violence: Not on file   Housing Stability: Not on file     Family History   Problem Relation Age of Onset    Cancer Father [de-identified]        bladdder    Cancer Brother     COPD Brother     Heart Attack Mother 54    Heart Disease Sister     Diabetes Sister     Heart Disease Brother     Heart Disease Other 58             Diabetes Other     ADHD Other      REVIEW OF SYSTEMS:     Reid Welsh denies fever/chills, chest pain, shortness of breath, new bowel or bladder complaints. All other review of systems was negative. PHYSICAL EXAMINATION:      /62   Pulse 68   Temp 97.5 °F (36.4 °C) (Infrared)   Ht 5' 1\" (1.549 m)   Wt 179 lb (81.2 kg)   SpO2 94%   BMI 33.82 kg/m²     General:       General appearance:   elderly, pleasant and well-hydrated. , in moderate discomfort and A & O x3  Build:Overweight     HEENT:     Head:normocephalic and atraumatic  Sclera: icterus absent,      Lungs:     Breathing:Breathing Pattern: regular, no distress     Abdomen:     Shape:non-distended and normal      Cervical spine:    Inspection:normal  Palpation:normal, facet loading, left, right, negative, and tenderness. Range of motion:normal not flexion, extension rotation bilateral and is not painful. Lumbar spine:     Spine inspection:surgical incision scar  CVA tenderness:No   Palpation:tenderness paravertebral muscles, facet loading, left, right, and negative.   Range of motion:abnormal mildly Lateral bending, flexion, extension rotation bilateral and is    Generator Left buttocks not flipped/no skin erosions     Musculoskeletal:     SI joint tenderness:positive right, negative left  YAYA test:positive right, negative             left  Positive thigh thrust right  Piriformis tenderness:negative right, negative left  Trochanteric bursa tenderness:negative right, negative left  SLR:negative right, negative left, sitting   Tenderness over right lateral epicondyle exacerbated by pronation     Extremities:     Tremors:None bilaterally upper and lower  Intact:Yes     Neurological:     Sensory:diminished to light touch and pin prick bilateral legs  Motor:         Right Bicep5/5           Left Bicep5/5              Right Triceps5/5       Left Triceps5/5          Right Deltoid5/5 Left Deltoid5/5                       Right Quadriceps4/5          Left Quadriceps4/5           Right Gastrocnemius4/5    Left Gastrocnemius4/5  Right Ant Tibialis4/5  Left Ant Tibialis4/5  Reflexes:    Right Brachioradialis reflex2+  Left Brachioradialis reflex2+  Right Biceps reflex2+  Left Biceps reflex2+  Right Triceps reflex2+  Left Triceps reflex2+  Right Quadriceps reflex0  Left Quadriceps reflex0  Right Achilles reflex0  Left Achilles reflex0  Gait:antalgic with a cane     Dermatology:     Skin:no unusual rashes and no skin lesions     Impression:  Chronic low back pain with radiation to both lower extremities   Lumbar spine MRI 2019 L4-5-S1 bilateral laminectomies with fusion  Patient had seen neurosurgery and is not a candidate for surgery, SCS was recommended   Plan:  Follow up on her low back pain with complaints of increased pain in right buttocks and right elbow. Reviewed thoracic spine Xray from her last office visit(no lead migration)  Reviewed lumbar spine imaging studies. Her right buttocks pain is consistent  with right SIJ dysfunction. Discussed treatment options including right SIJ injection and SIJ belt. Patient is moving to [place] in one week. Discussed right lateral epicondyle injection including R/B/A patient agrees to proceed. Continues to use SCS with good coverage of her pain. Continue with Norco 5/325 QD PRN for moderate to severe pain(no refill needed)  Continue with Baclofen 5 mg BID. OARRS report reviewed 08/2022 consistent. Patient encouraged to stay active and to lose weight. Treatment plan discussed with the patient including medications and procedure side effects. We discussed with the patient that combining opioids, benzodiazepines, alcohol, illicit drugs or sleep aids increases the risk of respiratory depression including death.  We discussed that these medications may cause drowsiness, sedation or dizziness and have counseled the patient not to drive or operate machinery. We have discussed that these medications will be prescribed only by one provider. We have discussed with the patient about age related risk factors and have thoroughly discussed the importance of taking these medications as prescribed. The patient verbalizes understanding. bryant Arroyo M.D.2022    Patient: Nathaniel Patel  :  1945  Age:  78 y.o. Sex:  female     PRE-OP DIAGNOSIS: Right lateral epicondylitis. POST-OP DIAGNOSIS:  Same    PROCEDURE: Right lateral epicondyle steroid injection. BRIEF HISTORY:  Karina Delvalle comes in today for a  Right lateral epicondyle steroid injection. Karina Delvalle was again explained this procedure in detail, including potential complications and did again request we proceed. Adali's complete History & Physical examination was reviewed in depth. It is unchanged. DESCRIPTION OF PROCEDURE:   Patient was placed in the seated position and the area of the Right lateral epicondyle was prepped with chloraprep  and draped in the usual sterile manner. A #22-gauge 1 1/2 inch  needle was advanced towards the Right epicondyle, with care to avoid intraligamentous injection. After negative aspiration . A solution of cc 0.25% Bupivacaine 1cc mixed with DepoMedrol 40 mg was injected easily. The needle was then removed intact without difficulty and Band-Aid were applied. Disposition the patient tolerated the procedure well and there were no complications . Karina Delvalle will follow up in our [facility] as scheduled. She was encouraged to call with questions, concerns or if worsening of symptoms occurs.     Sarath Hughes MD

## 2022-08-11 NOTE — PROGRESS NOTES
Do you currently have any of the following:    Fever: No  Headache:  No  Cough: No  Shortness of breath: No  Exposed to anyone with these symptoms: No                                                                                                                Estrella Verduzco presents to the [address] [id number] on 8/11/2022. Shannon Sommer is complaining of pain in back and right arm. The pain is constant. The pain is described as aching. Pain is rated on her best day at a 5, on her worst day at a 10, and on average at a 5 on the VAS scale. She took her last dose of Tylenol last night. Shannon Sommer does not have issues with constipation. Any procedures since your last visit: No.    She is  on NSAIDS and  is  on anticoagulation medications to include ASA and is managed by Live Oconnor DO . Pacemaker or defibrillator: No.    Medication Contract and Consent for Opioid Use Documents Filed       Patient Documents       Type of Document Status Date Received Received By Description    Medication Contract [Status Missing]  Martha Beth 7/151/6 Neurosurgery Medication Contract                       Temp 97.5 °F (36.4 °C) (Infrared)   Ht 5' 1\" (1.549 m)   Wt 179 lb (81.2 kg)   BMI 33.82 kg/m²      No LMP recorded. Patient has had a hysterectomy.

## 2024-07-17 ENCOUNTER — HOSPITAL ENCOUNTER (EMERGENCY)
Age: 79
Discharge: HOME | End: 2024-07-17
Payer: MEDICARE

## 2024-07-17 VITALS
OXYGEN SATURATION: 99 % | WEIGHT: 170 LBS | TEMPERATURE: 98.1 F | RESPIRATION RATE: 18 BRPM | HEART RATE: 68 BPM | BODY MASS INDEX: 32.12 KG/M2 | DIASTOLIC BLOOD PRESSURE: 47 MMHG | SYSTOLIC BLOOD PRESSURE: 104 MMHG

## 2024-07-17 DIAGNOSIS — N30.01 ACUTE CYSTITIS WITH HEMATURIA: Primary | ICD-10-CM

## 2024-07-17 LAB
BACTERIA URNS QL MICRO: ABNORMAL
BILIRUB UR QL STRIP: NEGATIVE
CLARITY UR: ABNORMAL
COLOR UR: YELLOW
GLUCOSE UR STRIP-MCNC: 500 MG/DL
HGB UR QL STRIP.AUTO: ABNORMAL
KETONES UR STRIP-MCNC: NEGATIVE MG/DL
LEUKOCYTE ESTERASE UR QL STRIP: ABNORMAL
NITRITE UR QL STRIP: NEGATIVE
PH UR STRIP: 5.5 [PH] (ref 5–9)
PROT UR STRIP-MCNC: NEGATIVE MG/DL
RBC #/AREA URNS HPF: ABNORMAL /HPF
SP GR UR STRIP: 1.01 (ref 1–1.03)
UROBILINOGEN UR STRIP-ACNC: 0.2 EU/DL (ref 0–1)
WBC #/AREA URNS HPF: ABNORMAL /HPF

## 2024-07-17 PROCEDURE — 87077 CULTURE AEROBIC IDENTIFY: CPT

## 2024-07-17 PROCEDURE — 87086 URINE CULTURE/COLONY COUNT: CPT

## 2024-07-17 PROCEDURE — 99211 OFF/OP EST MAY X REQ PHY/QHP: CPT

## 2024-07-17 PROCEDURE — 81001 URINALYSIS AUTO W/SCOPE: CPT

## 2024-07-17 RX ORDER — HYDROCODONE BITARTRATE AND ACETAMINOPHEN 5; 325 MG/1; MG/1
1 TABLET ORAL EVERY 6 HOURS PRN
COMMUNITY

## 2024-07-17 RX ORDER — CEPHALEXIN 500 MG/1
500 CAPSULE ORAL 3 TIMES DAILY
Qty: 21 CAPSULE | Refills: 0 | Status: SHIPPED | OUTPATIENT
Start: 2024-07-17 | End: 2024-07-24

## 2024-07-17 RX ORDER — TORSEMIDE 20 MG/1
20 TABLET ORAL DAILY
COMMUNITY

## 2024-07-17 RX ORDER — LOSARTAN POTASSIUM 100 MG/1
100 TABLET ORAL DAILY
COMMUNITY

## 2024-07-17 ASSESSMENT — PAIN - FUNCTIONAL ASSESSMENT: PAIN_FUNCTIONAL_ASSESSMENT: NONE - DENIES PAIN

## 2024-07-17 NOTE — ED PROVIDER NOTES
urinary tract infection symptoms such as frequency urgency and dysuria.  Urinalysis does reveal WBCs bacteria cloudy urine microscopic blood and leukocyte esterase.  No CVA tenderness.  Abdomen benign.  Vitals are stable.  Placed on Keflex antibiotic she has taken this in the past without problems.  Instructions given.         DISCHARGE MEDICATIONS:  New Prescriptions    No medications on file       DISCONTINUED MEDICATIONS:  Discontinued Medications    ASPIRIN 81 MG EC TABLET    Take 81 mg by mouth daily       PATIENT REFERRED TO:  No follow-up provider specified.    --------------------------------- ADDITIONAL PROVIDER NOTES ---------------------------------  I have spoken with the patient and discussed today’s results, in addition to providing specific details for the plan of care and counseling regarding the diagnosis and prognosis.  Their questions are answered at this time and they are agreeable with the plan.   This patient is stable for discharge.  I have shared the specific conditions for return, as well as the importance of follow-up.      * NOTE: (Please note that portions of this note were completed with a voice recognition program.  Efforts were made to edit the dictations but occasionally words are mis-transcribed.)    --------------------------------- IMPRESSION AND DISPOSITION ---------------------------------    IMPRESSION  1. Acute cystitis with hematuria        DISPOSITION Discharge - Pending Orders Complete 07/17/2024 03:33:03 PM    Disposition: Discharge to home  Patient condition is good    I am the Primary Clinician of Record.     Chris Cortes PA-C (electronically signed) on 7/17/2024 at 4:01 PM         Chris Cortes PA-C  07/17/24 [id number]

## 2024-07-20 LAB
MICROORGANISM SPEC CULT: ABNORMAL
SPECIMEN DESCRIPTION: ABNORMAL

## (undated) DEVICE — 3M™ IOBAN™ 2 ANTIMICROBIAL INCISE DRAPE 6640EZ: Brand: IOBAN™ 2

## (undated) DEVICE — GLOVE SURG SZ 8 L11.2IN FNGR THK12.7MIL CUF THK9.7MIL BRN

## (undated) DEVICE — INTENDED FOR TISSUE SEPARATION, AND OTHER PROCEDURES THAT REQUIRE A SHARP SURGICAL BLADE TO PUNCTURE OR CUT.: Brand: BARD-PARKER ® STAINLESS STEEL BLADES

## (undated) DEVICE — Z DISCONTINUED USE 2275686 GLOVE SURG SZ 8 L12IN FNGR THK13MIL WHT ISOLEX POLYISOPRENE

## (undated) DEVICE — HYPODERMIC SAFETY NEEDLE: Brand: MAGELLAN

## (undated) DEVICE — PAD,ABDOMINAL,8"X10",ST,LF: Brand: MEDLINE

## (undated) DEVICE — CAMERA STRYKER 1488 HD GEN

## (undated) DEVICE — BINDER ABD UNISX 9IN 62IN L AND XL UNIV

## (undated) DEVICE — SOLUTION IV IRRIG WATER 1000ML POUR BRL 2F7114

## (undated) DEVICE — BANDAGE COMPR L W4INXL11YD 100% COT WVN E DBL LEN CLP CLSR

## (undated) DEVICE — BANDAGE COMPR W6INXL12FT SMOOTH FOR LIMB EXSANG ESMARCH

## (undated) DEVICE — GLOVE ORANGE PI 8 1/2   MSG9085

## (undated) DEVICE — GAUZE,SPONGE,4"X4",16PLY,STRL,LF,10/TRAY: Brand: MEDLINE

## (undated) DEVICE — SLING ARM 9 1/2X20IN L MULTIPAK

## (undated) DEVICE — 1810 FOAM BLOCK NEEDLE COUNTER: Brand: DEVON

## (undated) DEVICE — NEEDLE HYPO 25GA L1.5IN BLU POLYPR HUB S STL REG BVL STR

## (undated) DEVICE — SOLUTION SURG PREP ANTIMICROBIAL 4 OZ SKIN WND EXIDINE

## (undated) DEVICE — GOWN SURG XL SMS FAB NONREINFORCED RAGLAN SLV HK LOOP CLSR

## (undated) DEVICE — BANDAGE COMPR W4XL108IN WHT LAYERED NO CLSR SYN RUB ESMARCH

## (undated) DEVICE — DRAPE 64X41IN RADIOLOGY C ARM EQUIP STER

## (undated) DEVICE — BASIC PACK: Brand: CONVERTORS

## (undated) DEVICE — GAUZE,SPONGE,4"X4",16PLY,XRAY,STRL,LF: Brand: MEDLINE

## (undated) DEVICE — TUBING PMP L16FT MAIN DISP FOR AR-6400 AR-6475

## (undated) DEVICE — HANDLE CVR PATENTED RETENTION DISC STRL LIGHT SHLD

## (undated) DEVICE — DRAPE SHEET, X-LARGE: Brand: CONVERTORS

## (undated) DEVICE — 3M™ TEGADERM™ TRANSPARENT FILM DRESSING FRAME STYLE, 1628, 6 IN X 8 IN (15 CM X 20 CM), 10/CT 8CT/CASE: Brand: 3M™ TEGADERM™

## (undated) DEVICE — MARKER,SKIN,WI/RULER AND LABELS: Brand: MEDLINE

## (undated) DEVICE — PACK,EXTREMITY,ORTHOMAX,5/CS: Brand: MEDLINE

## (undated) DEVICE — DRAPE CARM MINI FOR IMAG SYS INSIGHT FLROSCN

## (undated) DEVICE — SOLUTION IV IRRIG POUR BRL 0.9% SODIUM CHL 2F7124

## (undated) DEVICE — Z INACTIVE USE 2660664 SOLUTION IRRIG 3000ML 0.9% SOD CHL USP UROMATIC PLAS CONT

## (undated) DEVICE — DOUBLE BASIN SET: Brand: MEDLINE INDUSTRIES, INC.

## (undated) DEVICE — GLOVE SURG SZ 6 THK91MIL LTX FREE SYN POLYISOPRENE ANTI

## (undated) DEVICE — NEEDLE HYPO 18GA L1.5IN PNK POLYPR HUB S STL THN WALL FILL

## (undated) DEVICE — SET SURG INSTR DISSECT

## (undated) DEVICE — LAPAROTOMY PACK WITH POLYREINFORCED GOWNS: Brand: CONVERTORS

## (undated) DEVICE — LEAD HAND

## (undated) DEVICE — BANDAGE ADH W1XL3IN NAT FAB WVN FLX DURABLE N ADH PD SEAL

## (undated) DEVICE — TUBING, SUCTION, 1/4" X 10', STRAIGHT: Brand: MEDLINE

## (undated) DEVICE — KIT NDL INSRT 14GA L4IN CRV TIP

## (undated) DEVICE — HEWSON SUTURE RETRIEVER: Brand: HEWSON SUTURE RETRIEVER

## (undated) DEVICE — TOWEL OR BLUEE 16X26IN ST 8 PACK ORB08 16X26ORTWL

## (undated) DEVICE — SYRINGE, LUER LOCK, 10ML: Brand: MEDLINE

## (undated) DEVICE — ZIMMER® STERILE DISPOSABLE TOURNIQUET CUFF WITH PLC, DUAL PORT, SINGLE BLADDER, 18 IN. (46 CM)

## (undated) DEVICE — SOLUTION IRRIG 1000ML 09% SOD CHL USP PIC PLAS CONTAINER

## (undated) DEVICE — OSTEOTOME SURG L5IN BLDE W15MM STR MINI LAMBOTTE

## (undated) DEVICE — CHLORAPREP 26ML ORANGE

## (undated) DEVICE — HOOK LOCK LATEX FREE ELASTIC BANDAGE 3INX5YD

## (undated) DEVICE — GAUZE,SPONGE,4"X4",12PLY,STERILE,LF,2'S: Brand: MEDLINE

## (undated) DEVICE — MEDI-VAC NON-CONDUCTIVE SUCTION TUBING: Brand: CARDINAL HEALTH

## (undated) DEVICE — SHEET,DRAPE,40X58,STERILE: Brand: MEDLINE

## (undated) DEVICE — ELECTRODE NDL 2.8IN COAT VALLEYLAB

## (undated) DEVICE — TUBING, SUCTION, 3/16" X 10', STRAIGHT: Brand: MEDLINE

## (undated) DEVICE — SUTURE VCRL SZ 2-0 L27IN ABSRB UD L26MM SH 1/2 CIR J417H

## (undated) DEVICE — SUTURE PERMA-HAND SZ 2-0 L30IN NONABSORBABLE BLK L26MM SH K833H

## (undated) DEVICE — 4-PORT MANIFOLD: Brand: NEPTUNE 2

## (undated) DEVICE — BLADE CLIPPER GEN PURP NS

## (undated) DEVICE — SUTURE ETHLN SZ 3-0 L18IN NONABSORBABLE BLK L24MM PS-1 3/8 1663G

## (undated) DEVICE — SPLINT ORTH W4XL15IN PLSTR OF PARIS LO EXOTHERM SMOOTH

## (undated) DEVICE — NEEDLE SPNL 22GA L5IN BLK HUB S STL W/ QNCKE PNT W/OUT

## (undated) DEVICE — BANDAGE COMPR W6INXL5YD SELF ADH COHESIVE CO FLX

## (undated) DEVICE — GLOVE ORANGE PI 7 1/2   MSG9075

## (undated) DEVICE — 3M™ STERI-DRAPE™ U-DRAPE, LONG 1019: Brand: STERI-DRAPE™

## (undated) DEVICE — TUNNELING TOOL KIT, 35CM: Brand: NEVRO®

## (undated) DEVICE — TRAY SET HAND REUSABLE

## (undated) DEVICE — DRAPE ARTC ARM W36XL28IN BND BG RUBBERBAND

## (undated) DEVICE — KENDALL SCD EXPRESS SLEEVES, KNEE LENGTH, MEDIUM: Brand: KENDALL SCD

## (undated) DEVICE — DRESSING TRNSPAR W6XL8IN FLM SURESITE 123

## (undated) DEVICE — Z DISCONTINUED PER MEDLINE USE 2741944 DRESSING AQUACEL 12 IN SURG W9XL30CM SIL CVR WTRPRF VIR BACT BARR ANTIMIC

## (undated) DEVICE — 6 ML SYRINGE LUER-LOCK TIP: Brand: MONOJECT

## (undated) DEVICE — SUTURE VCRL SZ 3-0 L27IN ABSRB UD L26MM SH 1/2 CIR J416H

## (undated) DEVICE — DRESSING GZ XRFRM 4X4(25/BX 6BX/CS)

## (undated) DEVICE — SUTURE VCRL SZ 2-0 L27IN ABSRB UD L26MM CT-2 1/2 CIR J269H

## (undated) DEVICE — SURGICAL PROCEDURE PACK HND

## (undated) DEVICE — Device

## (undated) DEVICE — TAPE SURG W4INXL11YD 2IN PERF LINERLESS NONWOVEN MEDFIX EZ

## (undated) DEVICE — 1530S-1, 1 IN X 1.5 YD (2,5 CM X 1,37 M), UNPACKAGED ROLLS, 100 RL/CARTON, 5 CARTONS/CASE, 500 RL/CA: Brand: 3M™ MICROPORE™

## (undated) DEVICE — CUFF TOURNIQUET 18 SNG BLADDER DUAL PORT

## (undated) DEVICE — STERILE POLYISOPRENE POWDER-FREE SURGICAL GLOVES: Brand: PROTEXIS

## (undated) DEVICE — 3M™ RED DOT™ MONITORING ELECTRODE WITH FOAM TAPE AND STICKY GEL 2560, 50/BAG, 20/CASE, 72/PLT: Brand: RED DOT™

## (undated) DEVICE — PADDING CAST 4 YDX3 IN COTTON NS WBRL

## (undated) DEVICE — SUTURE PROL SZ 3-0 L18IN NONABSORBABLE BLU L19MM PS-2 3/8 8687H

## (undated) DEVICE — TOPAZ EZ MICRODEBRIDER COBLATION WAND WITH INTEGRATED FINGER SWITCH IFS: Brand: COBLATION

## (undated) DEVICE — COVER HNDL LT DISP

## (undated) DEVICE — SENZA®  CHARGER KIT: Brand: SENZA®

## (undated) DEVICE — NDL CNTR 40CT FM MAG: Brand: MEDLINE INDUSTRIES, INC.

## (undated) DEVICE — NEEDLE EPI EPIMED RX-COUDE

## (undated) DEVICE — 2.0MM ROUND SOLID CARBIDE BUR MEDIUM

## (undated) DEVICE — PEN: MARKING STD 100/CS: Brand: MEDICAL ACTION INDUSTRIES

## (undated) DEVICE — PATIENT REMOTE KIT: Brand: SENZA®

## (undated) DEVICE — 3M™ STERI-DRAPE™ U-DRAPE 1015: Brand: STERI-DRAPE™

## (undated) DEVICE — 3 ML SYRINGE LUER-LOCK TIP: Brand: MONOJECT

## (undated) DEVICE — TOWEL,OR,DSP,ST,BLUE,STD,6/PK,12PK/CS: Brand: MEDLINE

## (undated) DEVICE — SUTURE MCRYL SZ 4-0 L18IN ABSRB UD L19MM PS-2 3/8 CIR PRIM Y496G

## (undated) DEVICE — Z DISCONTINUED APPLICATOR SURG PREP 0.35OZ 2% CHG 70% ISO ALC W/ HI LT

## (undated) DEVICE — NON-DEHP CATHETER EXTENSION SET, MALE LUER LOCK ADAPTER

## (undated) DEVICE — BLADE SHVR AGRSV + RED BL 4.0MM

## (undated) DEVICE — PADDING,UNDERCAST,COTTON, 3X4YD STERILE: Brand: MEDLINE

## (undated) DEVICE — SYSTEM PRP DBL SYR W/ CAP FOR AUTOLGS PLSM SYS ACP

## (undated) DEVICE — PADDING,UNDERCAST,COTTON, 4"X4YD STERILE: Brand: MEDLINE

## (undated) DEVICE — NEEDLE SPNL L3.5IN PNK HUB S STL REG WALL FIT STYL W/ QNCKE

## (undated) DEVICE — ENCORE® LATEX TEXTURED SIZE 6.5, STERILE LATEX POWDER-FREE SURGICAL GLOVE: Brand: ENCORE

## (undated) DEVICE — PADDING UNDERCAST W4INXL4YD COT FBR LO LINTING WYTEX

## (undated) DEVICE — GLOVE SURG 7 LTX TRIFLEX WIDE FINGER PWDR

## (undated) DEVICE — IPG2000 TEMPLATE KIT: Brand: NEVRO

## (undated) DEVICE — GOWN,SIRUS,POLYRNF,BRTHSLV,XLN/XL,20/CS: Brand: MEDLINE

## (undated) DEVICE — 20 ML SYRINGE REGULAR TIP: Brand: MONOJECT

## (undated) DEVICE — GLOVE SURG SZ 65 L12IN FNGR THK79MIL GRN LTX FREE

## (undated) DEVICE — CRADLE ARM W8.75XH12.5XL16IN FOAM SUPP ELEVATION VENT